# Patient Record
Sex: FEMALE | Race: BLACK OR AFRICAN AMERICAN | NOT HISPANIC OR LATINO | Employment: OTHER | URBAN - METROPOLITAN AREA
[De-identification: names, ages, dates, MRNs, and addresses within clinical notes are randomized per-mention and may not be internally consistent; named-entity substitution may affect disease eponyms.]

---

## 2017-01-18 DIAGNOSIS — G47.00 INSOMNIA, UNSPECIFIED TYPE: Primary | ICD-10-CM

## 2017-01-19 RX ORDER — AMITRIPTYLINE HYDROCHLORIDE 25 MG/1
TABLET, FILM COATED ORAL
Qty: 30 TABLET | Refills: 6 | Status: SHIPPED | OUTPATIENT
Start: 2017-01-19 | End: 2017-01-19 | Stop reason: SDUPTHER

## 2017-01-20 RX ORDER — AMITRIPTYLINE HYDROCHLORIDE 25 MG/1
25 TABLET, FILM COATED ORAL NIGHTLY
Qty: 30 TABLET | Refills: 6 | Status: SHIPPED | OUTPATIENT
Start: 2017-01-20 | End: 2018-01-31 | Stop reason: SDUPTHER

## 2017-03-16 DIAGNOSIS — J45.20 ASTHMA, WELL CONTROLLED, MILD INTERMITTENT: ICD-10-CM

## 2017-03-20 RX ORDER — ALBUTEROL SULFATE 90 UG/1
AEROSOL, METERED RESPIRATORY (INHALATION)
Qty: 8.5 INHALER | Refills: 0 | Status: SHIPPED | OUTPATIENT
Start: 2017-03-20 | End: 2017-06-14 | Stop reason: SDUPTHER

## 2017-04-04 ENCOUNTER — CLINICAL SUPPORT (OUTPATIENT)
Dept: OPHTHALMOLOGY | Facility: CLINIC | Age: 72
End: 2017-04-04
Attending: OPHTHALMOLOGY
Payer: MEDICARE

## 2017-04-04 DIAGNOSIS — H40.1133 PRIMARY OPEN ANGLE GLAUCOMA OF BOTH EYES, SEVERE STAGE: ICD-10-CM

## 2017-04-04 DIAGNOSIS — Z98.83 GLAUCOMA FILTERING BLEB OF BOTH EYES: ICD-10-CM

## 2017-04-04 DIAGNOSIS — H04.123 DRY EYES, BILATERAL: Primary | ICD-10-CM

## 2017-04-04 DIAGNOSIS — Z96.1 PSEUDOPHAKIA OF BOTH EYES: ICD-10-CM

## 2017-04-04 DIAGNOSIS — H43.813 POSTERIOR VITREOUS DETACHMENT OF BOTH EYES: ICD-10-CM

## 2017-04-04 DIAGNOSIS — E11.9 TYPE 2 DIABETES MELLITUS WITHOUT OPHTHALMIC MANIFESTATIONS: ICD-10-CM

## 2017-04-04 PROCEDURE — 99213 OFFICE O/P EST LOW 20 MIN: CPT | Mod: PBBFAC | Performed by: OPHTHALMOLOGY

## 2017-04-04 PROCEDURE — 99999 PR PBB SHADOW E&M-EST. PATIENT-LVL III: CPT | Mod: PBBFAC,,, | Performed by: OPHTHALMOLOGY

## 2017-04-04 PROCEDURE — 92083 EXTENDED VISUAL FIELD XM: CPT | Mod: 26,S$PBB,, | Performed by: OPHTHALMOLOGY

## 2017-04-04 PROCEDURE — 92014 COMPRE OPH EXAM EST PT 1/>: CPT | Mod: S$PBB,,, | Performed by: OPHTHALMOLOGY

## 2017-04-04 PROCEDURE — 92133 CPTRZD OPH DX IMG PST SGM ON: CPT | Mod: PBBFAC | Performed by: OPHTHALMOLOGY

## 2017-04-04 NOTE — PROGRESS NOTES
HPI     Glaucoma    Additional comments: HVF and OCT review today           Comments   DLS: 11/11/16    1) POAG  2) PCIOL OU  3) Type 2 DM NO DR  4) LOLA  5) PVD OU    MEDS:  AT's PF PRN OU           Last edited by Tia Garcia MA on 4/4/2017 10:34 AM. (History)            Assessment /Plan     For exam results, see Encounter Report.    Dry eyes, bilateral    Primary open angle glaucoma of both eyes, severe stage  -     Posterior Segment OCT Optic Nerve- Both eyes    Posterior vitreous detachment of both eyes - Both Eyes    Type 2 diabetes mellitus without ophthalmic manifestations    Pseudophakia of both eyes    Glaucoma filtering bleb of both eyes      1. POAG (primary open-angle glaucoma) - Severe stage - Both Eyes   Glaucoma (type and duration) POAG,   -  first HVF 1997  -  first photo: 1996     Family history None   Glaucoma meds - (off all gtts os post combined)  (( use to use - Alphagan od , Xalatan od , dorzolamide bid od ))  H/O adverse rxn to glaucoma drops into to BB 2/2 asthma   LASERS SLT 12/8/05 and repeat 7/16/09 OD, and SLT 1/6/06 OS;  SLT os 7/31/14, od 8/14/14 (no response ou)   GLAUCOMA SURGERIES - combined phaco/IOL trab -os 12/28/2015// elastic sclera - 9 sutures to close - all visible ones cut /// combined - phaco/trab w/ minishunt od - 3/30/2016  OTHER EYE SURGERIES PC IOL OS (combined 1/28/2015) // PC IOL od (combined 3/30/2016)  CDR 0.95 OU   Tbase 22 OU   Tmax 37 (4/11/2016) /38 ( 4/16/2015)   Ttarget 14/14  HVF 20 HVF: 1928-3148 - SAD with new IAD OD, Sad/iad os  Gonio +3 OU   /421   OCT 8 test 2004 - 2017: Dec S/I/T OD// Dec S/T/I,bord N  HRT 9 test 2005 to 2016 -  - dec. S, bord T/I // dec/ N, bord S/T/I   Disc photos 1996, 1997, 2003: slides // 2012 , 2015 - OIS    Ttoday  12/14  Test done today: HVF / DFE / OCT      2. Diabetes mellitus type II   No BDR     3. Dry eyes - Both Eyes   AT's prn     4. Posterior vitreous detachment of both eyes - Both Eyes   With floaters - stable      5. A lot of family stressors   Mom passed since last visit  dad elderly and in poor health, have sitters  Daughter is unemployed and had to move back home  Daughter recently ill - in ICU and intubated 2/2 pancreatitis (8/2/2015 to 8/11/2015)          Plan    Stay off  all glaucoma gtts OU for now - can add back if IOP goes above target of 14 ou   IOP OK ou off all gtts post trabs ou // OD without mini shunt // OS with a mini shunt (very elastic sclera)     Cont AT's ou prn     -  consider yag laser to ant capsule has significant  Ant capsule phimosis    Glasses - heidi 8/25/2016 - doing well     F/U 3-4 months with HRT / gonio       I have seen and personally examined the patient.  I agree with the findings, assessment and plan of the resident and/or fellow.     Sanna Wiley MD

## 2017-04-11 ENCOUNTER — HOSPITAL ENCOUNTER (OUTPATIENT)
Dept: RADIOLOGY | Facility: HOSPITAL | Age: 72
Discharge: HOME | End: 2017-04-11
Attending: ALLERGY & IMMUNOLOGY
Payer: MEDICARE

## 2017-04-11 ENCOUNTER — OFFICE VISIT (OUTPATIENT)
Dept: INTERNAL MEDICINE | Facility: CLINIC | Age: 72
End: 2017-04-11
Payer: MEDICARE

## 2017-04-11 VITALS
WEIGHT: 173.06 LBS | DIASTOLIC BLOOD PRESSURE: 90 MMHG | SYSTOLIC BLOOD PRESSURE: 146 MMHG | HEART RATE: 92 BPM | BODY MASS INDEX: 32.67 KG/M2 | HEIGHT: 61 IN

## 2017-04-11 DIAGNOSIS — J45.41 MODERATE PERSISTENT ASTHMA WITH ACUTE EXACERBATION: ICD-10-CM

## 2017-04-11 DIAGNOSIS — J20.9 BRONCHITIS, ACUTE, WITH BRONCHOSPASM: ICD-10-CM

## 2017-04-11 DIAGNOSIS — J30.89 ALLERGIC RHINITIS DUE TO OTHER ALLERGEN: Primary | ICD-10-CM

## 2017-04-11 DIAGNOSIS — J06.9 UPPER RESPIRATORY TRACT INFECTION, UNSPECIFIED TYPE: ICD-10-CM

## 2017-04-11 PROCEDURE — 71020 XR CHEST PA AND LATERAL: CPT | Mod: TC

## 2017-04-11 PROCEDURE — 71020 XR CHEST PA AND LATERAL: CPT | Mod: 26,,, | Performed by: RADIOLOGY

## 2017-04-11 PROCEDURE — 99999 PR PBB SHADOW E&M-EST. PATIENT-LVL IV: CPT | Mod: PBBFAC,,, | Performed by: ALLERGY & IMMUNOLOGY

## 2017-04-11 RX ORDER — BUDESONIDE AND FORMOTEROL FUMARATE DIHYDRATE 160; 4.5 UG/1; UG/1
AEROSOL RESPIRATORY (INHALATION)
Qty: 10.2 G | Refills: 3 | Status: SHIPPED | OUTPATIENT
Start: 2017-04-11 | End: 2017-11-27 | Stop reason: SDUPTHER

## 2017-04-11 RX ORDER — PREDNISONE 20 MG/1
TABLET ORAL
Qty: 18 TABLET | Refills: 0 | Status: SHIPPED | OUTPATIENT
Start: 2017-04-11 | End: 2017-08-11

## 2017-04-11 RX ORDER — DOXYCYCLINE HYCLATE 100 MG
100 TABLET ORAL 2 TIMES DAILY
Qty: 28 TABLET | Refills: 0 | Status: SHIPPED | OUTPATIENT
Start: 2017-04-11 | End: 2017-08-11 | Stop reason: ALTCHOICE

## 2017-04-11 RX ORDER — PREDNISONE 20 MG/1
20 TABLET ORAL DAILY
Qty: 10 TABLET | Refills: 0 | Status: SHIPPED | OUTPATIENT
Start: 2017-04-11 | End: 2017-04-11 | Stop reason: ALTCHOICE

## 2017-04-11 RX ORDER — FLUTICASONE PROPIONATE 50 MCG
2 SPRAY, SUSPENSION (ML) NASAL 2 TIMES DAILY
Qty: 32 G | Refills: 6 | Status: SHIPPED | OUTPATIENT
Start: 2017-04-11 | End: 2018-04-06 | Stop reason: SDUPTHER

## 2017-04-11 NOTE — PROGRESS NOTES
Subjective:       Patient ID: Lupe Jewell is a 71 y.o. female.    Chief Complaint: Wheezing (3Xweeks,taking short breathes ); Cough (3Xweeks, hard cough, mucus greenish color); and Headache (on and off after a really hard cough)    HPI Comments: Patient presents today with acute URI, Bronchitis with bronchospasm, and asthma exacerbation. She was exposed to a viral URI by her grandchildren. She began with symptoms 3 weeks ago. At the end of the 2nd week she began to feel better then symptoms flared again. She is off of her Symbicort for 6 weeks due to cost. She has been using Proair HFA 2-3 times a day. She has a history of allergy to mold, dust, and mildew, fumes also trigger asthma symptoms. She has tried other inhalers, but feels Symbicort controlled her symptoms the best. She is having nasal congestion, no rhinorrhea, no sinus pressure/ha, she is having ET dysfunction pressure and popping. Positive for Wheezing, SOB, Cough is tight, but productive with thick green discolored mucous.     Review of Systems   Constitutional: Positive for activity change. Negative for appetite change, chills, diaphoresis, fatigue, fever and unexpected weight change.   HENT: Positive for congestion, postnasal drip, rhinorrhea and sinus pressure. Negative for dental problem, drooling, ear discharge, ear pain, facial swelling, hearing loss, mouth sores, nosebleeds, sneezing, sore throat, tinnitus, trouble swallowing and voice change.    Eyes: Negative for photophobia, pain, discharge, redness, itching and visual disturbance.   Respiratory: Positive for cough, shortness of breath and wheezing. Negative for apnea, choking, chest tightness and stridor.    Cardiovascular: Negative for chest pain, palpitations and leg swelling.   Gastrointestinal: Negative for abdominal distention, abdominal pain, constipation, diarrhea, nausea and vomiting.   Endocrine: Negative for cold intolerance, heat intolerance, polydipsia, polyphagia and polyuria.    Genitourinary: Negative for difficulty urinating, dysuria, enuresis, flank pain, frequency, hematuria, menstrual problem, pelvic pain and urgency.   Musculoskeletal: Negative for arthralgias, back pain, gait problem, joint swelling, myalgias, neck pain and neck stiffness.   Skin: Negative for color change, pallor, rash and wound.   Allergic/Immunologic: Negative for environmental allergies, food allergies and immunocompromised state.   Neurological: Negative for dizziness, tremors, seizures, syncope, facial asymmetry, speech difficulty, weakness, light-headedness, numbness and headaches.   Hematological: Negative for adenopathy. Does not bruise/bleed easily.   Psychiatric/Behavioral: Negative for agitation, behavioral problems, confusion, decreased concentration, dysphoric mood, hallucinations, self-injury, sleep disturbance and suicidal ideas. The patient is not nervous/anxious and is not hyperactive.    All other systems reviewed and are negative.    Objective:   Physical Exam   Constitutional: She is oriented to person, place, and time. She appears well-developed and well-nourished. She is active and cooperative.  Non-toxic appearance. She does not have a sickly appearance. She does not appear ill. No distress. She is not intubated.   HENT:   Head: Normocephalic and atraumatic.   Right Ear: Hearing, tympanic membrane, external ear and ear canal normal. No lacerations. No drainage, swelling or tenderness. No foreign bodies. No mastoid tenderness. Tympanic membrane is not injected, not scarred, not perforated, not erythematous, not retracted and not bulging. Tympanic membrane mobility is normal. No middle ear effusion. No hemotympanum. No decreased hearing is noted.   Left Ear: Hearing, tympanic membrane, external ear and ear canal normal. No lacerations. No drainage, swelling or tenderness. No foreign bodies. No mastoid tenderness. Tympanic membrane is not injected, not scarred, not perforated, not erythematous,  not retracted and not bulging. Tympanic membrane mobility is normal.  No middle ear effusion. No hemotympanum. No decreased hearing is noted.   Nose: Mucosal edema and rhinorrhea present. No nose lacerations, sinus tenderness, nasal deformity, septal deviation or nasal septal hematoma. No epistaxis.  No foreign bodies. Right sinus exhibits no maxillary sinus tenderness and no frontal sinus tenderness. Left sinus exhibits no maxillary sinus tenderness and no frontal sinus tenderness.   Mouth/Throat: Uvula is midline, oropharynx is clear and moist and mucous membranes are normal. She does not have dentures. No oral lesions. No trismus in the jaw. Normal dentition. No dental abscesses, uvula swelling, lacerations or dental caries. No oropharyngeal exudate, posterior oropharyngeal edema, posterior oropharyngeal erythema or tonsillar abscesses. No tonsillar exudate.   Eyes: Conjunctivae, EOM and lids are normal. Pupils are equal, round, and reactive to light. Right eye exhibits no chemosis, no discharge, no exudate and no hordeolum. No foreign body present in the right eye. Left eye exhibits no chemosis, no discharge, no exudate and no hordeolum. No foreign body present in the left eye. Right conjunctiva is not injected. Right conjunctiva has no hemorrhage. Left conjunctiva is not injected. Left conjunctiva has no hemorrhage. No scleral icterus.   Neck: Trachea normal, normal range of motion, full passive range of motion without pain and phonation normal. Neck supple. No tracheal tenderness, no spinous process tenderness and no muscular tenderness present. No rigidity. No tracheal deviation, no edema, no erythema and normal range of motion present. No thyroid mass and no thyromegaly present.   Cardiovascular: Normal rate, regular rhythm, S1 normal, S2 normal, normal heart sounds and normal pulses.  Exam reveals no gallop and no friction rub.    No murmur heard.  Pulmonary/Chest: Effort normal. No accessory muscle usage  or stridor. No apnea, no tachypnea and no bradypnea. She is not intubated. No respiratory distress. She has no decreased breath sounds. She has wheezes. She has rhonchi. She has no rales. She exhibits no tenderness.   Bilateral expiratory wheezing and rhonchi scattered through out chest in all fields   Abdominal: Soft. Normal appearance and bowel sounds are normal. She exhibits no distension and no mass. There is no tenderness.   Musculoskeletal: Normal range of motion. She exhibits no edema, tenderness or deformity.   Lymphadenopathy:        Head (right side): No submental, no submandibular, no tonsillar, no preauricular, no posterior auricular and no occipital adenopathy present.        Head (left side): No submental, no submandibular, no tonsillar, no preauricular, no posterior auricular and no occipital adenopathy present.     She has no cervical adenopathy.        Right cervical: No superficial cervical, no deep cervical and no posterior cervical adenopathy present.       Left cervical: No superficial cervical, no deep cervical and no posterior cervical adenopathy present.   Neurological: She is alert and oriented to person, place, and time. She has normal strength. She is not disoriented.   Skin: Skin is warm, dry and intact. No abrasion, no bruising, no burn, no ecchymosis, no laceration, no lesion, no petechiae, no purpura and no rash noted. Rash is not macular, not maculopapular, not nodular, not pustular, not vesicular and not urticarial. She is not diaphoretic. No cyanosis or erythema. No pallor. Nails show no clubbing.   Psychiatric: She has a normal mood and affect. Her speech is normal and behavior is normal. Judgment and thought content normal. Cognition and memory are normal.   Nursing note and vitals reviewed.    Assessment:     1. Allergic rhinitis due to other allergen    2. Moderate persistent asthma with acute exacerbation    3. Bronchitis, acute, with bronchospasm    4. Upper respiratory tract  infection, unspecified type      Plan:   Lupe ORTEGA was seen today for wheezing, cough and headache.    Diagnoses and all orders for this visit:    Allergic rhinitis due to other allergen  -     fluticasone (FLONASE) 50 mcg/actuation nasal spray; 2 sprays by Each Nare route 2 (two) times daily.    Moderate persistent asthma with acute exacerbation  -     budesonide-formoterol 160-4.5 mcg (SYMBICORT) 160-4.5 mcg/actuation HFAA; inhale 2 puffs by mouth every 12 hours  -     X-Ray Chest PA And Lateral; Future  -     Discontinue: predniSONE (DELTASONE) 20 MG tablet; Take 1 tablet (20 mg total) by mouth once daily.    Bronchitis, acute, with bronchospasm  -     X-Ray Chest PA And Lateral; Future  -     Discontinue: predniSONE (DELTASONE) 20 MG tablet; Take 1 tablet (20 mg total) by mouth once daily.  -     doxycycline (VIBRA-TABS) 100 MG tablet; Take 1 tablet (100 mg total) by mouth 2 (two) times daily.  -     predniSONE (DELTASONE) 20 MG tablet; 1 tab 3 times a day for 5 days then 1 tab once a day  For 3 days    Upper respiratory tract infection, unspecified type  -     X-Ray Chest PA And Lateral; Future    Start Doxycycline 100 mg 1 tab 2 times a day for 14 days  Start Prednisone 20 mg 1 tab 3 times a day for 5 days then 1 tab once a day for 3 days. This will make your blood sugar elevated, monitor.  Re-start Symbicort 160/4.5 HFA 2 puffs inhaled twice a day, rinse mouth after each dose  Proair HFA 2 puffs inhaled every 4 hours as needed  Continue Flonase 2 sprays each nostril 2 times a day until well 1 week then decrease to once a day  Continue Singulair 10 mg 1 tab every 24 hours.    Return if symptoms worsen or fail to improve.    Discussed options and strategies  Reviewed medications risk v. Benefit  Reviewed pathophysiology  All questions answered  Emotional support provided  Pt verbalized understanding of all the above and treatment plan.      DALILA Man

## 2017-04-11 NOTE — PATIENT INSTRUCTIONS
Start Doxycycline 100 mg 1 tab 2 times a day for 14 days  Start Prednisone 20 mg 1 tab 3 times a day for 5 days then 1 tab once a day for 3 days. This will make your blood sugar elevated, monitor.  Re-start Symbicort 160/4.5 HFA 2 puffs inhaled twice a day, rinse mouth after each dose  Proair HFA 2 puffs inhaled every 4 hours as needed  Continue Flonase 2 sprays each nostril 2 times a day until well 1 week then decrease to once a day  Continue Singulair 10 mg 1 tab every 24 hours.

## 2017-04-11 NOTE — MR AVS SNAPSHOT
[facility]  [address] [address]  Phone: [phone]  Fax: [phone]                  Lupe Jewell   2017 11:30 AM   Office Visit    Description:  Female : 1945   Provider:  DALILA Man   Department:  [facility]           Reason for Visit     Wheezing     Cough     Headache           Diagnoses this Visit        Comments    Allergic rhinitis due to other allergen    -  Primary     Moderate persistent asthma with acute exacerbation         Bronchitis, acute, with bronchospasm         Upper respiratory tract infection, unspecified type                To Do List           Future Appointments        Provider Department Dept Phone    2017 9:00 AM Jose Jeong MD [facility] [phone]    2017 10:15 AM CARDONA, VISUAL ORELLANA [facility] [phone]    2017 10:45 AM Sanna Wiley MD [facility] [phone]      Goals (5 Years of Data)     None      Follow-Up and Disposition     Return if symptoms worsen or fail to improve.       These Medications        Disp Refills Start End    budesonide-formoterol 160-4.5 mcg (SYMBICORT) 160-4.5 mcg/actuation HFAA 10.2 g 3 2017     inhale 2 puffs by mouth every 12 hours    Pharmacy: [address] Ph #: [phone]       fluticasone (FLONASE) 50 mcg/actuation nasal spray 32 g 6 2017     2 sprays by Each Nare route 2 (two) times daily. - Each Nare    Pharmacy: [address] - [address] Ph #: [phone]       doxycycline (VIBRA-TABS) 100 MG tablet 28 tablet 0 2017     Take 1 tablet (100 mg total) by mouth 2 (two) times daily. - Oral    Pharmacy: [address] Ph #: [phone]       predniSONE (DELTASONE) 20 MG tablet 18 tablet 0  2017     1 tab 3 times a day for 5 days then 1 tab once a day  For 3 days    Pharmacy: [facility] #: [phone]         [facility]     [facility] Nurse Care Line -  Assistance  Unless otherwise directed by your provider, please contact [facility], our nurse care line that is available for  assistance.     Registered nurses in the [facility] provide: appointment scheduling, clinical advisement, health education, and other advisory services.  Call: [phone] (toll free)               Medications           Message regarding Medications     Verify the changes and/or additions to your medication regime listed below are the same as discussed with your clinician today.  If any of these changes or additions are incorrect, please notify your healthcare provider.        START taking these NEW medications        Refills    doxycycline (VIBRA-TABS) 100 MG tablet 0    Sig: Take 1 tablet (100 mg total) by mouth 2 (two) times daily.    Class: Normal    Route: Oral    predniSONE (DELTASONE) 20 MG tablet 0    Si tab 3 times a day for 5 days then 1 tab once a day  For 3 days    Class: Normal      CHANGE how you are taking these medications     Start Taking Instead of    fluticasone (FLONASE) 50 mcg/actuation nasal spray fluticasone (FLONASE) 50 mcg/actuation nasal spray    Dosage:  2 sprays by Each Nare route 2 (two) times daily. Dosage:  instill 2 sprays into each nostril twice a day    Reason for Change:  Reorder            Verify that the below list of medications is an accurate representation of the medications you are currently taking.  If none reported, the list may be blank. If incorrect, please contact your healthcare provider. Carry this list with you in case of emergency.           Current Medications     amitriptyline (ELAVIL) 25 MG tablet Take 1 tablet (25 mg total) by mouth every evening.    atorvastatin  "(LIPITOR) 10 MG tablet take 1 tablet by mouth once daily    budesonide-formoterol 160-4.5 mcg (SYMBICORT) 160-4.5 mcg/actuation HFAA inhale 2 puffs by mouth every 12 hours    fluocinonide (LIDEX) 0.05 % external solution apply to affected area twice a day    fluticasone (FLONASE) 50 mcg/actuation nasal spray 2 sprays by Each Nare route 2 (two) times daily.    metformin (GLUCOPHAGE) 1000 MG tablet take 1 tablet by mouth twice a day with food    montelukast (SINGULAIR) 10 mg tablet take 1 tablet by mouth every evening    PROAIR HFA 90 mcg/actuation inhaler inhale 2 puffs every 4 hours    triamterene-hydrochlorothiazide 37.5-25 mg (MAXZIDE-25) 37.5-25 mg per tablet take 1 tablet by mouth once daily    desonide (DESOWEN) 0.05 % cream AAA bid    doxycycline (VIBRA-TABS) 100 MG tablet Take 1 tablet (100 mg total) by mouth 2 (two) times daily.    hydrocortisone butyrate (LOCOID) 0.1 % Oint AAA face bid    predniSONE (DELTASONE) 20 MG tablet 1 tab 3 times a day for 5 days then 1 tab once a day  For 3 days           Clinical Reference Information           Your Vitals Were     BP Pulse Height Weight BMI    146/90 92 5' 0.5" (1.537 m) 78.5 kg (173 lb 1 oz) 33.24 kg/m2      Blood Pressure          Most Recent Value    BP  (!)  146/90      Allergies as of 4/11/2017     Penicillins      Immunizations Administered on Date of Encounter - 4/11/2017     None      Orders Placed During Today's Visit     Future Labs/Procedures Expected by Expires    X-Ray Chest PA And Lateral  4/11/2017 4/11/2018      [facility] Sign-Up     Activating your [facility] account is as easy as 1-2-3!     1) Visit [email or web address], select Sign Up Now, enter this activation code and your date of birth, then select Next.  [email or web address]  Expires: 5/26/2017 11:51 AM      2) Create a username and password to use when you visit [facility] in the future and select a security question in case you lose your password and select Next.    3) Enter your e-mail address and " click Sign Up!    Additional Information  If you have questions, please e-mail [email or web address] or call [phone] to talk to our [facility] staff. Remember, [facility] is NOT to be used for urgent needs. For medical emergencies, dial 911.         Instructions    Start Doxycycline 100 mg 1 tab 2 times a day for 14 days  Start Prednisone 20 mg 1 tab 3 times a day for 5 days then 1 tab once a day for 3 days. This will make your blood sugar elevated, monitor.  Re-start Symbicort 160/4.5 HFA 2 puffs inhaled twice a day, rinse mouth after each dose  Proair HFA 2 puffs inhaled every 4 hours as needed  Continue Flonase 2 sprays each nostril 2 times a day until well 1 week then decrease to once a day  Continue Singulair 10 mg 1 tab every 24 hours.       Language Assistance Services     ATTENTION: Language assistance services are available, free of charge. Please call [phone].      ATENCIÓN: Si habla alex, tiene a pyle disposición servicios gratuitos de asistencia lingüística. Llame al [phone].     WATSON Ý: N?u b?n nói Ti?ng Vi?t, có các d?ch v? h? tr? ngôn ng? mi?n phí dành cho b?n. G?i s? [phone].         [facility] complies with applicable Federal civil rights laws and does not discriminate on the basis of race, color, national origin, age, disability, or sex.

## 2017-04-20 ENCOUNTER — OFFICE VISIT (OUTPATIENT)
Dept: INTERNAL MEDICINE | Facility: CLINIC | Age: 72
End: 2017-04-20
Payer: MEDICARE

## 2017-04-20 VITALS
HEART RATE: 88 BPM | WEIGHT: 171.31 LBS | TEMPERATURE: 98 F | SYSTOLIC BLOOD PRESSURE: 138 MMHG | BODY MASS INDEX: 32.9 KG/M2 | DIASTOLIC BLOOD PRESSURE: 78 MMHG

## 2017-04-20 DIAGNOSIS — R22.2 SUBCUTANEOUS MASS OF SUPRACLAVICULAR AREA: Primary | ICD-10-CM

## 2017-04-20 PROCEDURE — 99999 PR PBB SHADOW E&M-EST. PATIENT-LVL III: CPT | Mod: PBBFAC,,, | Performed by: INTERNAL MEDICINE

## 2017-04-20 PROCEDURE — 99213 OFFICE O/P EST LOW 20 MIN: CPT | Mod: S$PBB,,, | Performed by: INTERNAL MEDICINE

## 2017-04-20 PROCEDURE — 99213 OFFICE O/P EST LOW 20 MIN: CPT | Mod: PBBFAC | Performed by: INTERNAL MEDICINE

## 2017-04-20 NOTE — PROGRESS NOTES
Subjective:       Patient ID: Lupe Jewell is a 71 y.o. female.    Chief Complaint: Mass    Brandie Jewell here today for evaluation of a mass she noted in her L supraclavicular area last month. She denies any pain, fevers, chills, weight loss. She is currently being treated for excacerbation of her asthma and is doing well in that regard. On exam, she has a prominent, fleshy prominence in the L supraclavicular area > than the R. This has the consistency of a fat pad. She has hx of diabetes, has used steroids for her asthma issues and is likely benign. Nevertheless, will obtain US of that area and proceed from there.  Review of Systems   All other systems reviewed and are negative.      Objective:      Physical Exam   Constitutional: She is oriented to person, place, and time. She appears well-developed and well-nourished. No distress.   Neck: Normal range of motion. Neck supple. No JVD present. No thyromegaly present.   Prominent L supraclavicular fat pad >R.   Cardiovascular: Normal rate, regular rhythm, normal heart sounds and intact distal pulses.    No murmur heard.  Pulmonary/Chest: Effort normal and breath sounds normal. No respiratory distress. She has no wheezes. She exhibits no tenderness.   Lymphadenopathy:     She has no cervical adenopathy.   Neurological: She is alert and oriented to person, place, and time.   Skin: She is not diaphoretic.   Psychiatric: She has a normal mood and affect. Her behavior is normal.   Nursing note and vitals reviewed.      Assessment:       1. Subcutaneous mass of supraclavicular area        Plan:   1. Obtain soft tissue US and proceed from there.       2. Call with results.

## 2017-04-20 NOTE — MR AVS SNAPSHOT
[facility]  [address]  [facility] [address]  Phone: [phone]  Fax: [phone]                  Lupe Jewell   2017 9:00 AM   Office Visit    Description:  Female : 1945   Provider:  Jose Jeong MD   Department:  [facility]           Reason for Visit     Mass           Diagnoses this Visit        Comments    Subcutaneous mass of supraclavicular area    -  Primary            To Do List           Future Appointments        Provider Department Dept Phone    2017 2:45 PM [facility] 11 ALL [facility] [phone]    2017 10:15 AM CARDONA, VISUAL ORELLANA [facility] [phone]    2017 10:45 AM Sanna Wiley MD [facility] [phone]      Goals (5 Years of Data)     None      Follow-Up and Disposition     Return if symptoms worsen or fail to improve.      [facility]     [facility] Nurse Care Line -  Assistance  Unless otherwise directed by your provider, please contact [facility], our nurse care line that is available for  assistance.     Registered nurses in the [facility] provide: appointment scheduling, clinical advisement, health education, and other advisory services.  Call: [phone] (toll free)               Medications           Message regarding Medications     Verify the changes and/or additions to your medication regime listed below are the same as discussed with your clinician today.  If any of these changes or additions are incorrect, please notify your healthcare provider.             Verify that the below list of medications is an accurate representation of the medications you are currently taking.  If none reported, the list may be blank. If incorrect, please contact your healthcare provider. Carry this list with you in case of emergency.           Current Medications     amitriptyline (ELAVIL) 25 MG tablet Take 1  tablet (25 mg total) by mouth every evening.    atorvastatin (LIPITOR) 10 MG tablet take 1 tablet by mouth once daily    budesonide-formoterol 160-4.5 mcg (SYMBICORT) 160-4.5 mcg/actuation HFAA inhale 2 puffs by mouth every 12 hours    doxycycline (VIBRA-TABS) 100 MG tablet Take 1 tablet (100 mg total) by mouth 2 (two) times daily.    fluticasone (FLONASE) 50 mcg/actuation nasal spray 2 sprays by Each Nare route 2 (two) times daily.    metformin (GLUCOPHAGE) 1000 MG tablet take 1 tablet by mouth twice a day with food    montelukast (SINGULAIR) 10 mg tablet take 1 tablet by mouth every evening    PROAIR HFA 90 mcg/actuation inhaler inhale 2 puffs every 4 hours    triamterene-hydrochlorothiazide 37.5-25 mg (MAXZIDE-25) 37.5-25 mg per tablet take 1 tablet by mouth once daily    desonide (DESOWEN) 0.05 % cream AAA bid    fluocinonide (LIDEX) 0.05 % external solution apply to affected area twice a day    hydrocortisone butyrate (LOCOID) 0.1 % Oint AAA face bid    predniSONE (DELTASONE) 20 MG tablet 1 tab 3 times a day for 5 days then 1 tab once a day  For 3 days           Clinical Reference Information           Your Vitals Were     BP Pulse Temp Weight BMI    138/78 (BP Location: Left arm, Patient Position: Sitting) 88 98 °F (36.7 °C) (Oral) 77.7 kg (171 lb 4.8 oz) 32.9 kg/m2      Blood Pressure          Most Recent Value    BP  138/78      Allergies as of 4/20/2017     Penicillins      Immunizations Administered on Date of Encounter - 4/20/2017     None      Orders Placed During Today's Visit     Future Labs/Procedures Expected by Expires    US Soft Tissue Misc  4/20/2017 4/20/2018      [facility] Sign-Up     Activating your [facility] account is as easy as 1-2-3!     1) Visit [email or web address], select Sign Up Now, enter this activation code and your date of birth, then select Next.  [email or web address]  Expires: 5/26/2017 11:51 AM      2) Create a username and password to use when you visit [facility] in the future and select  a security question in case you lose your password and select Next.    3) Enter your e-mail address and click Sign Up!    Additional Information  If you have questions, please e-mail [email or web address] or call [phone] to talk to our [facility] staff. Remember, [facility] is NOT to be used for urgent needs. For medical emergencies, dial 911.         Language Assistance Services     ATTENTION: Language assistance services are available, free of charge. Please call [phone].      ATENCIÓN: Si habla español, tiene a pyle disposición servicios gratuitos de asistencia lingüística. Llame al [phone].     CHÚ Ý: N?u b?n nói Ti?ng Vi?t, có các d?ch v? h? tr? ngôn ng? mi?n phí dành cho b?n. G?i s? [phone].         [facility] complies with applicable Federal civil rights laws and does not discriminate on the basis of race, color, national origin, age, disability, or sex.

## 2017-04-24 ENCOUNTER — HOSPITAL ENCOUNTER (OUTPATIENT)
Dept: RADIOLOGY | Facility: HOSPITAL | Age: 72
Discharge: HOME | End: 2017-04-24
Attending: INTERNAL MEDICINE
Payer: MEDICARE

## 2017-04-24 DIAGNOSIS — R22.2 SUBCUTANEOUS MASS OF SUPRACLAVICULAR AREA: ICD-10-CM

## 2017-04-24 PROCEDURE — 76604 US EXAM CHEST: CPT | Mod: TC

## 2017-04-24 PROCEDURE — 76604 US EXAM CHEST: CPT | Mod: 26,GC,, | Performed by: INTERNAL MEDICINE

## 2017-04-24 RX ORDER — ATORVASTATIN CALCIUM 10 MG/1
TABLET, FILM COATED ORAL
Qty: 30 TABLET | Refills: 6 | Status: SHIPPED | OUTPATIENT
Start: 2017-04-24 | End: 2017-12-02 | Stop reason: SDUPTHER

## 2017-04-28 ENCOUNTER — TELEPHONE (OUTPATIENT)
Dept: INTERNAL MEDICINE | Facility: CLINIC | Age: 72
End: 2017-04-28

## 2017-04-28 DIAGNOSIS — Z12.39 BREAST CANCER SCREENING: Primary | ICD-10-CM

## 2017-04-28 DIAGNOSIS — Z12.31 ENCOUNTER FOR SCREENING MAMMOGRAM FOR MALIGNANT NEOPLASM OF BREAST: ICD-10-CM

## 2017-05-08 ENCOUNTER — HOSPITAL ENCOUNTER (OUTPATIENT)
Dept: RADIOLOGY | Facility: HOSPITAL | Age: 72
Discharge: HOME | End: 2017-05-08
Attending: INTERNAL MEDICINE
Payer: MEDICARE

## 2017-05-08 DIAGNOSIS — Z12.31 ENCOUNTER FOR SCREENING MAMMOGRAM FOR MALIGNANT NEOPLASM OF BREAST: ICD-10-CM

## 2017-05-08 DIAGNOSIS — Z12.39 BREAST CANCER SCREENING: ICD-10-CM

## 2017-05-08 PROCEDURE — 77063 BREAST TOMOSYNTHESIS BI: CPT | Mod: 26,,, | Performed by: RADIOLOGY

## 2017-05-08 PROCEDURE — 77067 SCR MAMMO BI INCL CAD: CPT | Mod: TC

## 2017-05-08 PROCEDURE — 77067 SCR MAMMO BI INCL CAD: CPT | Mod: 26,,, | Performed by: RADIOLOGY

## 2017-06-14 DIAGNOSIS — J45.20 ASTHMA, WELL CONTROLLED, MILD INTERMITTENT: ICD-10-CM

## 2017-06-20 RX ORDER — ALBUTEROL SULFATE 90 UG/1
AEROSOL, METERED RESPIRATORY (INHALATION)
Qty: 8.5 INHALER | Refills: 0 | Status: SHIPPED | OUTPATIENT
Start: 2017-06-20 | End: 2018-01-23 | Stop reason: SDUPTHER

## 2017-06-24 RX ORDER — MONTELUKAST SODIUM 10 MG/1
TABLET ORAL
Qty: 30 TABLET | Refills: 12 | Status: SHIPPED | OUTPATIENT
Start: 2017-06-24 | End: 2018-07-03 | Stop reason: SDUPTHER

## 2017-06-30 ENCOUNTER — OFFICE VISIT (OUTPATIENT)
Dept: INTERNAL MEDICINE | Facility: CLINIC | Age: 72
End: 2017-06-30
Payer: MEDICARE

## 2017-06-30 ENCOUNTER — HOSPITAL ENCOUNTER (OUTPATIENT)
Dept: CARDIOLOGY | Facility: CLINIC | Age: 72
Discharge: HOME | End: 2017-06-30
Payer: MEDICARE

## 2017-06-30 VITALS
DIASTOLIC BLOOD PRESSURE: 80 MMHG | HEART RATE: 92 BPM | BODY MASS INDEX: 31.76 KG/M2 | WEIGHT: 165.38 LBS | SYSTOLIC BLOOD PRESSURE: 136 MMHG | TEMPERATURE: 99 F

## 2017-06-30 DIAGNOSIS — I10 ESSENTIAL HYPERTENSION: ICD-10-CM

## 2017-06-30 DIAGNOSIS — E78.5 HYPERLIPIDEMIA, UNSPECIFIED HYPERLIPIDEMIA TYPE: ICD-10-CM

## 2017-06-30 DIAGNOSIS — Z00.00 ROUTINE GENERAL MEDICAL EXAMINATION AT A HEALTH CARE FACILITY: Primary | ICD-10-CM

## 2017-06-30 DIAGNOSIS — Z00.00 ROUTINE GENERAL MEDICAL EXAMINATION AT A HEALTH CARE FACILITY: ICD-10-CM

## 2017-06-30 DIAGNOSIS — E08.9 DIABETES MELLITUS DUE TO UNDERLYING CONDITION WITHOUT COMPLICATION, WITHOUT LONG-TERM CURRENT USE OF INSULIN: ICD-10-CM

## 2017-06-30 DIAGNOSIS — E55.9 VITAMIN D DEFICIENCY: ICD-10-CM

## 2017-06-30 PROCEDURE — 93005 ELECTROCARDIOGRAM TRACING: CPT | Mod: PBBFAC | Performed by: INTERNAL MEDICINE

## 2017-06-30 PROCEDURE — 1126F AMNT PAIN NOTED NONE PRSNT: CPT | Mod: ,,, | Performed by: INTERNAL MEDICINE

## 2017-06-30 PROCEDURE — 99214 OFFICE O/P EST MOD 30 MIN: CPT | Mod: S$PBB,,, | Performed by: INTERNAL MEDICINE

## 2017-06-30 PROCEDURE — 93010 ELECTROCARDIOGRAM REPORT: CPT | Mod: S$PBB,,, | Performed by: INTERNAL MEDICINE

## 2017-06-30 PROCEDURE — 1159F MED LIST DOCD IN RCRD: CPT | Mod: ,,, | Performed by: INTERNAL MEDICINE

## 2017-06-30 PROCEDURE — 99999 PR PBB SHADOW E&M-EST. PATIENT-LVL III: CPT | Mod: PBBFAC,,, | Performed by: INTERNAL MEDICINE

## 2017-06-30 NOTE — PROGRESS NOTES
Subjective:       Patient ID: Lupe Jewell is a 71 y.o. female.    Chief Complaint: Annual Exam    HPIPleasant lady from [place] here for her annual exam. Overall doing well and had no specific complaints. She has hx of DM on metformin. She recently received prednisone for asthma excacerbation and she noted sx's c/w hyperglycemia at the time. These have resolved, but she has noted occassional episodes of hypoglycemia with sensation of hunger, shaky - with the AM dose of metformin. She notes this especially when she does not eat sufficient amounts; she counteracts the sx's with OJ or a snack. She has not noted similar sx's at night. I briefly discussed hypoglycemia, how to recognize sx's and what to do about it. Otherwise doing well.  Review of Systems   Constitutional: Negative for activity change, appetite change, fatigue and unexpected weight change.   HENT: Negative.    Eyes: Negative for visual disturbance.   Respiratory: Negative for cough, shortness of breath and wheezing.    Cardiovascular: Negative for chest pain, palpitations and leg swelling.   Gastrointestinal: Negative for abdominal distention, abdominal pain and blood in stool.   Endocrine: Negative for polydipsia, polyphagia and polyuria.   Genitourinary: Negative for difficulty urinating.   Musculoskeletal: Negative for arthralgias and joint swelling.   Neurological: Negative for dizziness, tremors, weakness, light-headedness, numbness and headaches.   Hematological: Negative.        Objective:      Physical Exam   Constitutional: She is oriented to person, place, and time. She appears well-developed and well-nourished. No distress.   HENT:   Head: Normocephalic and atraumatic.   Right Ear: External ear normal.   Left Ear: External ear normal.   Nose: Nose normal.   Mouth/Throat: Oropharynx is clear and moist. No oropharyngeal exudate.   Eyes: Conjunctivae and EOM are normal. Pupils are equal, round, and reactive to light. Right eye exhibits no  discharge. Left eye exhibits no discharge. No scleral icterus.   Neck: Normal range of motion. Neck supple. No JVD present. No thyromegaly present.   Cardiovascular: Normal rate, regular rhythm, normal heart sounds and intact distal pulses.    Pulmonary/Chest: Effort normal and breath sounds normal. No respiratory distress. She exhibits no tenderness.   Abdominal: Soft. Bowel sounds are normal. She exhibits no distension and no mass. There is no tenderness.   Musculoskeletal: Normal range of motion. She exhibits no edema.   Lymphadenopathy:     She has no cervical adenopathy.   Neurological: She is alert and oriented to person, place, and time. No cranial nerve deficit. Coordination normal.   Skin: Skin is warm and dry. No rash noted. She is not diaphoretic. No erythema.   Psychiatric: She has a normal mood and affect. Her behavior is normal. Judgment and thought content normal.   Nursing note and vitals reviewed.      Assessment:       1. Routine general medical examination at a health care facility    2. Diabetes mellitus due to underlying condition without complication, without long-term current use of insulin    3. Vitamin D deficiency    4. Hyperlipidemia, unspecified hyperlipidemia type    5. Essential hypertension        Plan:    1. Obtain labs.         2. Obtain urine.         3. EKG.         4. Continue with current medications.         5. Call with results and adjust meds if indicated.         6. RTC 6 months or sooner if needed.

## 2017-07-03 RX ORDER — METFORMIN HYDROCHLORIDE 1000 MG/1
TABLET ORAL
Qty: 180 TABLET | Refills: 3 | Status: SHIPPED | OUTPATIENT
Start: 2017-07-03 | End: 2018-09-24 | Stop reason: SDUPTHER

## 2017-07-23 DIAGNOSIS — L30.9 DERMATITIS: Primary | ICD-10-CM

## 2017-07-25 RX ORDER — FLUOCINONIDE 0.5 MG/G
CREAM TOPICAL
Qty: 60 G | Refills: 2 | Status: SHIPPED | OUTPATIENT
Start: 2017-07-25 | End: 2018-06-19 | Stop reason: SDUPTHER

## 2017-08-11 ENCOUNTER — OFFICE VISIT (OUTPATIENT)
Dept: OPHTHALMOLOGY | Facility: CLINIC | Age: 72
End: 2017-08-11
Payer: MEDICARE

## 2017-08-11 ENCOUNTER — CLINICAL SUPPORT (OUTPATIENT)
Dept: OPHTHALMOLOGY | Facility: CLINIC | Age: 72
End: 2017-08-11
Payer: MEDICARE

## 2017-08-11 DIAGNOSIS — E11.9 TYPE 2 DIABETES MELLITUS WITHOUT OPHTHALMIC MANIFESTATIONS: ICD-10-CM

## 2017-08-11 DIAGNOSIS — Z98.83 GLAUCOMA FILTERING BLEB OF BOTH EYES: ICD-10-CM

## 2017-08-11 DIAGNOSIS — H43.813 POSTERIOR VITREOUS DETACHMENT OF BOTH EYES: ICD-10-CM

## 2017-08-11 DIAGNOSIS — H40.1133 PRIMARY OPEN ANGLE GLAUCOMA OF BOTH EYES, SEVERE STAGE: Primary | ICD-10-CM

## 2017-08-11 DIAGNOSIS — H04.123 DRY EYES, BILATERAL: ICD-10-CM

## 2017-08-11 DIAGNOSIS — Z96.1 PSEUDOPHAKIA OF BOTH EYES: ICD-10-CM

## 2017-08-11 PROCEDURE — 92134 CPTRZ OPH DX IMG PST SGM RTA: CPT | Mod: 50,PBBFAC | Performed by: OPHTHALMOLOGY

## 2017-08-11 PROCEDURE — 99999 PR PBB SHADOW E&M-EST. PATIENT-LVL II: CPT | Mod: PBBFAC,,, | Performed by: OPHTHALMOLOGY

## 2017-08-11 PROCEDURE — 92012 INTRM OPH EXAM EST PATIENT: CPT | Mod: S$PBB,,, | Performed by: OPHTHALMOLOGY

## 2017-08-11 PROCEDURE — 99212 OFFICE O/P EST SF 10 MIN: CPT | Mod: PBBFAC | Performed by: OPHTHALMOLOGY

## 2017-08-11 NOTE — PROGRESS NOTES
HPI     Glaucoma    Additional comments: HRT review today           Comments   DLS: 4/4/17    1) POAG  2) PCIOL OU  3) Type 2 DM NO DR  4) LOLA  5) PVD OU    MEDS:  AT's PF PRN OU  Off glaucoma drops post trabs           Last edited by Sanna Wiley MD on 8/11/2017 10:45 AM. (History)            Assessment /Plan     For exam results, see Encounter Report.    There are no diagnoses linked to this encounter.    1. POAG (primary open-angle glaucoma) - Severe stage - Both Eyes   Glaucoma (type and duration) POAG,   -  first HVF 1997  -  first photo: 1996     Family history None   Glaucoma meds - (off all gtts os post combined)  (( use to use - Alphagan od , Xalatan od , dorzolamide bid od ))  H/O adverse rxn to glaucoma drops into to BB 2/2 asthma   LASERS SLT 12/8/05 and repeat 7/16/09 OD, and SLT 1/6/06 OS;  SLT os 7/31/14, od 8/14/14 (no response ou)   GLAUCOMA SURGERIES - combined phaco/IOL trab -os 12/28/2015// elastic sclera - 9 sutures to close - all visible ones cut /// combined - phaco/trab w/ minishunt od - 3/30/2016  OTHER EYE SURGERIES PC IOL OS (combined 1/28/2015) // PC IOL od (combined 3/30/2016)  CDR 0.95 OU   Tbase 22 OU   Tmax 37 (4/11/2016) /38 ( 4/16/2015)   Ttarget 14/14  HVF 20 HVF: 7134-0214 - SAD with new IAD OD, Sad/iad os  Gonio +3 OU   /421   OCT 8 test 2004 - 2017: Dec S/I/T OD// Dec S/T/I,bord N  HRT 10 test 2005 to 2017 - MR -  Border TS, T od // decr G, TI, N, NI, border T, TS, NS os /// CDR 0.68 od // 0.78 os  Disc photos 1996, 1997, 2003: slides // 2012 , 2015 - OIS    Ttoday  12/14  Test done today: HRT     2. Diabetes mellitus type II   No BDR     3. Dry eyes - Both Eyes   AT's prn     4. Posterior vitreous detachment of both eyes - Both Eyes   With floaters - stable     5. A lot of family stressors   Mom passed since last visit  dad elderly and in poor health, have sitters  Daughter is unemployed and had to move back home  Daughter recently ill - in ICU and intubated 2/2  pancreatitis (8/2/2015 to 8/11/2015)          Plan    Stay off  all glaucoma gtts OU for now - can add back if IOP goes above target of 14 ou   IOP OK ou off all gtts post trabs ou // OD w/mini shunt // OS without mini shunt (very elastic sclera)     Cont AT's ou prn     -  consider yag laser to ant capsule has significant  Ant capsule phimosis    Glasses - heidi 8/25/2016 - doing well     F/U 3-4 months with IOP check other tests up to date      I have seen and personally examined the patient.  I agree with the findings, assessment and plan of the resident and/or fellow.     Sanna Wiley MD

## 2017-10-23 RX ORDER — TRIAMTERENE/HYDROCHLOROTHIAZID 37.5-25 MG
TABLET ORAL
Qty: 30 TABLET | Refills: 9 | Status: SHIPPED | OUTPATIENT
Start: 2017-10-23 | End: 2018-09-06 | Stop reason: SDUPTHER

## 2017-11-27 ENCOUNTER — HOSPITAL ENCOUNTER (OUTPATIENT)
Dept: RADIOLOGY | Facility: HOSPITAL | Age: 72
Discharge: HOME | End: 2017-11-27
Attending: NURSE PRACTITIONER
Payer: MEDICARE

## 2017-11-27 ENCOUNTER — OFFICE VISIT (OUTPATIENT)
Dept: INTERNAL MEDICINE | Facility: CLINIC | Age: 72
End: 2017-11-27
Payer: MEDICARE

## 2017-11-27 VITALS
TEMPERATURE: 99 F | HEIGHT: 60 IN | HEART RATE: 102 BPM | DIASTOLIC BLOOD PRESSURE: 80 MMHG | BODY MASS INDEX: 33.76 KG/M2 | WEIGHT: 171.94 LBS | SYSTOLIC BLOOD PRESSURE: 140 MMHG | OXYGEN SATURATION: 98 %

## 2017-11-27 DIAGNOSIS — J45.41 MODERATE PERSISTENT ASTHMA WITH ACUTE EXACERBATION: ICD-10-CM

## 2017-11-27 DIAGNOSIS — J45.901 EXACERBATION OF ASTHMA, UNSPECIFIED ASTHMA SEVERITY, UNSPECIFIED WHETHER PERSISTENT: Primary | ICD-10-CM

## 2017-11-27 DIAGNOSIS — J45.901 EXACERBATION OF ASTHMA, UNSPECIFIED ASTHMA SEVERITY, UNSPECIFIED WHETHER PERSISTENT: ICD-10-CM

## 2017-11-27 PROCEDURE — 71020 XR CHEST PA AND LATERAL: CPT | Mod: 26,,, | Performed by: RADIOLOGY

## 2017-11-27 PROCEDURE — 99214 OFFICE O/P EST MOD 30 MIN: CPT | Mod: S$PBB,25,, | Performed by: NURSE PRACTITIONER

## 2017-11-27 PROCEDURE — 94760 N-INVAS EAR/PLS OXIMETRY 1: CPT | Mod: PBBFAC | Performed by: NURSE PRACTITIONER

## 2017-11-27 PROCEDURE — 99215 OFFICE O/P EST HI 40 MIN: CPT | Mod: PBBFAC,25 | Performed by: NURSE PRACTITIONER

## 2017-11-27 PROCEDURE — 71020 XR CHEST PA AND LATERAL: CPT | Mod: TC

## 2017-11-27 PROCEDURE — 94640 AIRWAY INHALATION TREATMENT: CPT | Mod: PBBFAC

## 2017-11-27 PROCEDURE — 94060 EVALUATION OF WHEEZING: CPT | Mod: S$PBB,,, | Performed by: NURSE PRACTITIONER

## 2017-11-27 PROCEDURE — 94060 EVALUATION OF WHEEZING: CPT | Mod: PBBFAC | Performed by: NURSE PRACTITIONER

## 2017-11-27 PROCEDURE — 99999 PR PBB SHADOW E&M-EST. PATIENT-LVL V: CPT | Mod: PBBFAC,,, | Performed by: NURSE PRACTITIONER

## 2017-11-27 RX ORDER — PREDNISONE 20 MG/1
TABLET ORAL
Qty: 10 TABLET | Refills: 0 | Status: SHIPPED | OUTPATIENT
Start: 2017-11-27 | End: 2017-12-02

## 2017-11-27 RX ORDER — IPRATROPIUM BROMIDE AND ALBUTEROL SULFATE 2.5; .5 MG/3ML; MG/3ML
3 SOLUTION RESPIRATORY (INHALATION) EVERY 6 HOURS PRN
Qty: 1 BOX | Refills: 6 | Status: SHIPPED | OUTPATIENT
Start: 2017-11-27 | End: 2019-07-22 | Stop reason: SDUPTHER

## 2017-11-27 RX ORDER — DOXYCYCLINE HYCLATE 100 MG
100 TABLET ORAL EVERY 12 HOURS
Qty: 20 TABLET | Refills: 0 | Status: SHIPPED | OUTPATIENT
Start: 2017-11-27 | End: 2018-01-23 | Stop reason: SDUPTHER

## 2017-11-27 RX ORDER — IPRATROPIUM BROMIDE AND ALBUTEROL SULFATE 2.5; .5 MG/3ML; MG/3ML
3 SOLUTION RESPIRATORY (INHALATION)
Status: COMPLETED | OUTPATIENT
Start: 2017-11-27 | End: 2017-11-27

## 2017-11-27 RX ORDER — BUDESONIDE AND FORMOTEROL FUMARATE DIHYDRATE 160; 4.5 UG/1; UG/1
AEROSOL RESPIRATORY (INHALATION)
Qty: 10.2 INHALER | Refills: 3 | Status: SHIPPED | OUTPATIENT
Start: 2017-11-27 | End: 2018-08-06 | Stop reason: SDUPTHER

## 2017-11-27 RX ADMIN — IPRATROPIUM BROMIDE AND ALBUTEROL SULFATE 3 ML: .5; 2.5 SOLUTION RESPIRATORY (INHALATION) at 01:11

## 2017-11-27 NOTE — TELEPHONE ENCOUNTER
----- Message from DALILA Hanna-DEANDRE sent at 11/27/2017  4:11 PM CST -----  Please call pt and tell her that her chest xray looks good no pneumonia

## 2017-11-27 NOTE — PATIENT INSTRUCTIONS
Resume Symbicort    Take over the counter Allegra, Zyrtec or Claritin daily to help    To ER for any worsening    Use Nebulizer machine and Duoneb treatments every 6 hours if needed    Take Prednisone once a day with food for 5 days start today    Chest xray today

## 2017-11-27 NOTE — PROGRESS NOTES
Subjective:       Patient ID: Lupe Jewell is a 72 y.o. female.    Chief Complaint: Cough and Nasal Congestion    Pt here with nasal congestion x 4 weeks and increased cough.  Has hx asthma has been out of her Symbicort for a week due to finances.  Denies fever.  States that she is using her Flonase and Singulair.  Not taking any long acting histamine blocker.  Denies any recent car trips or plane rides        Cough   Associated symptoms include postnasal drip, rhinorrhea and wheezing. Pertinent negatives include no chest pain, fever, headaches, myalgias, rash or sore throat.     Past Medical History:   Diagnosis Date    ALLERGIC RHINITIS 8/17/2012    Asthma, well controlled 8/17/2012    Chronic asthma     Chronic rhinitis     Diabetes 2/4/2014    Diabetes mellitus     Diabetes mellitus type II     Fever blister     GERD (gastroesophageal reflux disease) 8/17/2012    Glaucoma     Hyperlipemia     Hypertension     Obstructive sleep apnea     Osteoporosis, unspecified      Past Surgical History:   Procedure Laterality Date    BLADDER SURGERY      BREAST BIOPSY      CATARACT EXTRACTION W/  INTRAOCULAR LENS IMPLANT Left 1/28/2015    OS with trab (Dr. Wiley)    CATARACT EXTRACTION W/  INTRAOCULAR LENS IMPLANT Right 3/30/16    OD (Dr. Wiley) trab     CATARACT EXTRACTION W/ INTRAOCULAR LENS IMPLANTW/ TRABECULECTOMY Left 1/28/15    combined CE and trab with express mini shunt ()    COLONOSCOPY N/A 7/1/2016    Procedure: COLONOSCOPY;  Surgeon: Rony Lima MD;  Location: [address]);  Service: Endoscopy;  Laterality: N/A;    HYSTERECTOMY      stomach procedure       Social History     Social History Narrative    No narrative on file     Family History   Problem Relation Age of Onset    Glaucoma Mother     Glaucoma Sister     No Known Problems Father     No Known Problems Brother     No Known Problems Maternal Aunt     No Known Problems Maternal Uncle     No  Known Problems Paternal Aunt     No Known Problems Paternal Uncle     No Known Problems Maternal Grandmother     No Known Problems Maternal Grandfather     No Known Problems Paternal Grandmother     No Known Problems Paternal Grandfather     Melanoma Neg Hx     Psoriasis Neg Hx     Lupus Neg Hx     Eczema Neg Hx     Acne Neg Hx     Blindness Neg Hx     Macular degeneration Neg Hx     Retinal detachment Neg Hx     Amblyopia Neg Hx     Cancer Neg Hx     Cataracts Neg Hx     Diabetes Neg Hx     Hypertension Neg Hx     Strabismus Neg Hx     Stroke Neg Hx     Thyroid disease Neg Hx      Vitals:    11/27/17 1307   BP: (!) 140/80   Pulse: 102   Temp: 99 °F (37.2 °C)   SpO2: 98%   Weight: 78 kg (171 lb 15.3 oz)   Height: 5' (1.524 m)   PainSc: 0-No pain     Outpatient Encounter Prescriptions as of 11/27/2017   Medication Sig Dispense Refill    amitriptyline (ELAVIL) 25 MG tablet Take 1 tablet (25 mg total) by mouth every evening. 30 tablet 6    atorvastatin (LIPITOR) 10 MG tablet take 1 tablet by mouth once daily 30 tablet 6    budesonide-formoterol 160-4.5 mcg (SYMBICORT) 160-4.5 mcg/actuation HFAA inhale 2 puffs by mouth every 12 hours 10.2 g 3    fluocinonide (LIDEX) 0.05 % external solution apply to affected area twice a day 30 mL 3    fluocinonide 0.05% (LIDEX) 0.05 % cream apply to affected area twice a day 60 g 2    fluticasone (FLONASE) 50 mcg/actuation nasal spray 2 sprays by Each Nare route 2 (two) times daily. 32 g 6    metformin (GLUCOPHAGE) 1000 MG tablet take 1 tablet by mouth twice a day with food 180 tablet 3    montelukast (SINGULAIR) 10 mg tablet take 1 tablet by mouth every evening 30 tablet 12    PROAIR HFA 90 mcg/actuation inhaler inhale 2 puff by mouth every 4 hours 8.5 Inhaler 0    triamterene-hydrochlorothiazide 37.5-25 mg (MAXZIDE-25) 37.5-25 mg per tablet take 1 tablet by mouth once daily 30 tablet 9    desonide (DESOWEN) 0.05 % cream AAA bid 1 Tube 3    FLUAD  "1537-0037, 65 YR UP,,PF, 45 mcg (15 mcg x 3)/0.5 mL Syrg inject 0.5 milliliter intramuscularly  0    hydrocortisone butyrate (LOCOID) 0.1 % Oint AAA face bid 45 g 3     Facility-Administered Encounter Medications as of 11/27/2017   Medication Dose Route Frequency Provider Last Rate Last Dose    albuterol-ipratropium 2.5mg-0.5mg/3mL nebulizer solution 3 mL  3 mL Nebulization 1 time in Clinic/HOD PADILLA Hanna         Wt Readings from Last 3 Encounters:   11/27/17 78 kg (171 lb 15.3 oz)   06/30/17 75 kg (165 lb 5.5 oz)   04/20/17 77.7 kg (171 lb 4.8 oz)     Last 3 sets of Vitals    Vitals - 1 value per visit 6/30/2017 8/11/2017 11/27/2017   SYSTOLIC 136 - 140   DIASTOLIC 80 - 80   PULSE 92 - 102   TEMPERATURE 98.5 - 99   RESPIRATIONS - - -   SPO2 - - 98   Weight (lb) 165.35 - 171.96   Weight (kg) 75 - 78   HEIGHT - - 5' 0"   BODY MASS INDEX 31.76 - 33.58   VISIT REPORT - - -   Pain Score  0 0 0   Some recent data might be hidden     No results found for: CBC  Review of Systems   Constitutional: Negative for fatigue, fever and unexpected weight change.   HENT: Positive for congestion, postnasal drip and rhinorrhea. Negative for sinus pain, sinus pressure, sneezing, sore throat, tinnitus, trouble swallowing and voice change.    Respiratory: Positive for cough, chest tightness and wheezing.    Cardiovascular: Negative for chest pain and palpitations.   Gastrointestinal: Negative for abdominal pain, diarrhea, nausea and vomiting.   Musculoskeletal: Negative for arthralgias, myalgias, neck pain and neck stiffness.   Skin: Negative for rash.   Neurological: Negative for dizziness, facial asymmetry and headaches.   Hematological: Negative for adenopathy.   Psychiatric/Behavioral: Negative for sleep disturbance.       Objective:      Physical Exam   Constitutional: She is oriented to person, place, and time. She appears well-developed and well-nourished. No distress.   HENT:   Head: Normocephalic and " atraumatic.   Right Ear: External ear normal.   Left Ear: External ear normal.   Nose: Nose normal.   Mouth/Throat: Oropharynx is clear and moist. No oropharyngeal exudate.   Eyes: Conjunctivae and EOM are normal. Pupils are equal, round, and reactive to light. Right eye exhibits no discharge. Left eye exhibits no discharge.   Neck: Normal range of motion. No JVD present.   Cardiovascular: Normal heart sounds.  Tachycardia present.    Pulmonary/Chest: Effort normal. No stridor. She has decreased breath sounds in the right upper field, the right middle field, the right lower field, the left upper field, the left middle field and the left lower field.   Abdominal: Soft.   Lymphadenopathy:     She has no cervical adenopathy.   Neurological: She is alert and oriented to person, place, and time.   Skin: Skin is warm and dry. Capillary refill takes less than 2 seconds. No rash noted. She is not diaphoretic. No erythema. No pallor.   Psychiatric: She has a normal mood and affect. Her behavior is normal. Judgment and thought content normal.   Nursing note and vitals reviewed.        Improved air mvmt all lobes after tx.  Still with some exp wheezing RLL    Assessment:       1. Exacerbation of asthma, unspecified asthma severity, unspecified whether persistent        Plan:       Lupe ORTEGA was seen today for cough and nasal congestion.    Diagnoses and all orders for this visit:    Exacerbation of asthma, unspecified asthma severity, unspecified whether persistent  -     albuterol-ipratropium 2.5mg-0.5mg/3mL nebulizer solution 3 mL; Take 3 mLs by nebulization one time.  -     X-Ray Chest PA And Lateral; Future  -     predniSONE (DELTASONE) 20 MG tablet; Take 2 po with breakfast x 5d  -     doxycycline (VIBRA-TABS) 100 MG tablet; Take 1 tablet (100 mg total) by mouth every 12 (twelve) hours.  -     NEBULIZER FOR HOME USE  -     NEBULIZER KIT (SUPPLIES) FOR HOME USE  -     albuterol-ipratropium 2.5mg-0.5mg/3mL (DUO-NEB) 0.5 mg-3  mg(2.5 mg base)/3 mL nebulizer solution; Take 3 mLs by nebulization every 6 (six) hours as needed for Wheezing or Shortness of Breath (cough). Rescue    Moderate persistent asthma with acute exacerbation  -     X-Ray Chest PA And Lateral; Future  -     predniSONE (DELTASONE) 20 MG tablet; Take 2 po with breakfast x 5d  -     doxycycline (VIBRA-TABS) 100 MG tablet; Take 1 tablet (100 mg total) by mouth every 12 (twelve) hours.  -     NEBULIZER FOR HOME USE  -     NEBULIZER KIT (SUPPLIES) FOR HOME USE  -     albuterol-ipratropium 2.5mg-0.5mg/3mL (DUO-NEB) 0.5 mg-3 mg(2.5 mg base)/3 mL nebulizer solution; Take 3 mLs by nebulization every 6 (six) hours as needed for Wheezing or Shortness of Breath (cough). Rescue      Patient Instructions   Resume Symbicort    Take over the counter Allegra, Zyrtec or Claritin daily to help    To ER for any worsening    Use Nebulizer machine and Duoneb treatments every 6 hours if needed    Take Prednisone once a day with food for 5 days start today    Chest xray today

## 2017-11-27 NOTE — TELEPHONE ENCOUNTER
Msg left on VM , CXR was normal , no pneumonia. Instructed if she has any further concerns please call our office at [phone].

## 2017-11-28 ENCOUNTER — TELEPHONE (OUTPATIENT)
Dept: INTERNAL MEDICINE | Facility: CLINIC | Age: 72
End: 2017-11-28

## 2017-11-28 NOTE — TELEPHONE ENCOUNTER
MADHU [place] did patient discuss needing a nebulizer ? I didn't see any orders . Can you please address.

## 2017-11-28 NOTE — TELEPHONE ENCOUNTER
----- Message from Herlinda Manning sent at 11/28/2017  8:48 AM CST -----  Contact: Patient [phone]  Patient stated that [facility] does not supply the nebulizer machine and she needs to know where to get it.    Please call and advise.    Thank You

## 2017-11-30 NOTE — TELEPHONE ENCOUNTER
Spoke with Eugenia at [facility],  She states that the request for the Nebulizer and supplies will be out-sourced to another distributor due to her insurance. [employer] Will contact the patient to deliver the nebulizer and supplies.

## 2017-11-30 NOTE — TELEPHONE ENCOUNTER
Spoke with patient she was made aware that as per [facility] , her order will be out-sourced to an outside DME Co due to her insurance. Made her aware that [employer], will contact her to deliver the nebulizer and supplies. She verbalizes understanding.

## 2017-12-04 RX ORDER — ATORVASTATIN CALCIUM 10 MG/1
TABLET, FILM COATED ORAL
Qty: 30 TABLET | Refills: 6 | Status: SHIPPED | OUTPATIENT
Start: 2017-12-04 | End: 2018-06-28 | Stop reason: SDUPTHER

## 2017-12-18 ENCOUNTER — OFFICE VISIT (OUTPATIENT)
Dept: OPHTHALMOLOGY | Facility: CLINIC | Age: 72
End: 2017-12-18
Payer: MEDICARE

## 2017-12-18 DIAGNOSIS — H40.1113 PRIMARY OPEN ANGLE GLAUCOMA OF RIGHT EYE, SEVERE STAGE: ICD-10-CM

## 2017-12-18 DIAGNOSIS — Z98.83 GLAUCOMA FILTERING BLEB OF BOTH EYES: ICD-10-CM

## 2017-12-18 DIAGNOSIS — Z96.1 PSEUDOPHAKIA OF BOTH EYES: ICD-10-CM

## 2017-12-18 DIAGNOSIS — H04.123 DRY EYES, BILATERAL: ICD-10-CM

## 2017-12-18 DIAGNOSIS — H40.1122 PRIMARY OPEN ANGLE GLAUCOMA OF LEFT EYE, MODERATE STAGE: Primary | ICD-10-CM

## 2017-12-18 PROCEDURE — 99212 OFFICE O/P EST SF 10 MIN: CPT | Mod: PBBFAC | Performed by: OPHTHALMOLOGY

## 2017-12-18 PROCEDURE — 92012 INTRM OPH EXAM EST PATIENT: CPT | Mod: S$PBB,,, | Performed by: OPHTHALMOLOGY

## 2017-12-18 PROCEDURE — 99999 PR PBB SHADOW E&M-EST. PATIENT-LVL II: CPT | Mod: PBBFAC,,, | Performed by: OPHTHALMOLOGY

## 2017-12-18 NOTE — PROGRESS NOTES
HPI     DLS: 8/11/17    Pt here for 4 month check;  Pt states she was on 40mg of steroids due to asthma a few weeks ago.     Meds: AT's PF PRN OU    1) POAG   2) PCIOL OU   3) Type 2 DM NO DR   4) LOLA   5) PVD OU       Last edited by Tila Kent on 12/18/2017  9:00 AM. (History)            Assessment /Plan     For exam results, see Encounter Report.    Primary open angle glaucoma of left eye, moderate stage    Primary open angle glaucoma of right eye, severe stage    Dry eyes, bilateral    Pseudophakia of both eyes    Glaucoma filtering bleb of both eyes      1. POAG (primary open-angle glaucoma) - Severe stage - Both Eyes   Glaucoma (type and duration) POAG,   -  first HVF 1997  -  first photo: 1996     Family history None   Glaucoma meds - (off all gtts os post combined)  (( use to use - Alphagan od , Xalatan od , dorzolamide bid od ))  H/O adverse rxn to glaucoma drops into to BB 2/2 asthma   LASERS SLT 12/8/05 and repeat 7/16/09 OD, and SLT 1/6/06 OS;  SLT os 7/31/14, od 8/14/14 (no response ou)   GLAUCOMA SURGERIES - combined phaco/IOL trab -os 12/28/2015// elastic sclera - 9 sutures to close - all visible ones cut /// combined - phaco/trab w/ minishunt od - 3/30/2016  OTHER EYE SURGERIES PC IOL OS (combined 1/28/2015) // PC IOL od (combined 3/30/2016)  CDR 0.95 OU   Tbase 22 OU   Tmax 37 (4/11/2016) /38 ( 4/16/2015)   Ttarget 14/14  HVF 20 HVF: 8288-7092 - SAD with new IAD OD, Sad/iad os  Gonio +3 OU   /421   OCT 8 test 2004 - 2017: Dec S/I/T OD// Dec S/T/I,bord N  HRT 10 test 2005 to 2017 - MR -  Border TS, T od // decr G, TI, N, NI, border T, TS, NS os /// CDR 0.68 od // 0.78 os  Disc photos 1996, 1997, 2003: slides // 2012 , 2015 - OIS    Ttoday  14/14  Test done today: follow up     2. Diabetes mellitus type II   No BDR     3. Dry eyes - Both Eyes   AT's prn     4. Posterior vitreous detachment of both eyes - Both Eyes   With floaters - stable     5. A lot of family stressors   Mom passed since  last visit  dad elderly and in poor health, have sitters  Daughter is unemployed and had to move back home  Daughter recently ill - in ICU and intubated 2/2 pancreatitis (8/2/2015 to 8/11/2015)          Plan    Stay off  all glaucoma gtts OU for now - can add back if IOP goes above target of 14 ou   IOP OK ou off all gtts post trabs ou // OD w/mini shunt // OS without mini shunt (very elastic sclera)     Cont AT's ou prn     -  consider yag laser to ant capsule has significant  Ant capsule phimosis    Glasses - heidi 8/25/2016 - doing well     F/U 3-4 months with HVF / DFE /Photos

## 2017-12-22 ENCOUNTER — TELEPHONE (OUTPATIENT)
Dept: INTERNAL MEDICINE | Facility: CLINIC | Age: 72
End: 2017-12-22

## 2017-12-22 NOTE — TELEPHONE ENCOUNTER
Message left on VM that I was doing a call back to make sure she had gotten her Nebulizer and supplies as ordered. She was instructed to leave a message in her My [facility] or give our office a call at [phone] to discuss.

## 2018-01-20 PROCEDURE — 99284 EMERGENCY DEPT VISIT MOD MDM: CPT | Mod: 25

## 2018-01-21 ENCOUNTER — HOSPITAL ENCOUNTER (EMERGENCY)
Facility: OTHER | Age: 73
Discharge: HOME | End: 2018-01-21
Attending: EMERGENCY MEDICINE
Payer: MEDICARE

## 2018-01-21 VITALS
WEIGHT: 171 LBS | HEART RATE: 108 BPM | RESPIRATION RATE: 18 BRPM | BODY MASS INDEX: 32.28 KG/M2 | OXYGEN SATURATION: 100 % | HEIGHT: 61 IN | DIASTOLIC BLOOD PRESSURE: 83 MMHG | TEMPERATURE: 100 F | SYSTOLIC BLOOD PRESSURE: 178 MMHG

## 2018-01-21 DIAGNOSIS — J45.901 ASTHMA WITH SEVERE EXACERBATION: Primary | ICD-10-CM

## 2018-01-21 DIAGNOSIS — R06.02 SOB (SHORTNESS OF BREATH): ICD-10-CM

## 2018-01-21 LAB
FLUAV AG SPEC QL IA: NEGATIVE
FLUBV AG SPEC QL IA: NEGATIVE
SPECIMEN SOURCE: NORMAL

## 2018-01-21 PROCEDURE — 94644 CONT INHLJ TX 1ST HOUR: CPT

## 2018-01-21 PROCEDURE — 96365 THER/PROPH/DIAG IV INF INIT: CPT

## 2018-01-21 PROCEDURE — 87400 INFLUENZA A/B EACH AG IA: CPT

## 2018-01-21 PROCEDURE — 25000242 PHARM REV CODE 250 ALT 637 W/ HCPCS

## 2018-01-21 PROCEDURE — 25000242 PHARM REV CODE 250 ALT 637 W/ HCPCS: Performed by: EMERGENCY MEDICINE

## 2018-01-21 PROCEDURE — 94640 AIRWAY INHALATION TREATMENT: CPT

## 2018-01-21 PROCEDURE — 63600175 PHARM REV CODE 636 W HCPCS: Performed by: EMERGENCY MEDICINE

## 2018-01-21 RX ORDER — PREDNISONE 20 MG/1
60 TABLET ORAL
Status: COMPLETED | OUTPATIENT
Start: 2018-01-21 | End: 2018-01-21

## 2018-01-21 RX ORDER — PREDNISONE 20 MG/1
60 TABLET ORAL DAILY
Qty: 12 TABLET | Refills: 0 | Status: SHIPPED | OUTPATIENT
Start: 2018-01-21 | End: 2018-01-25

## 2018-01-21 RX ORDER — MAGNESIUM SULFATE HEPTAHYDRATE 40 MG/ML
2 INJECTION, SOLUTION INTRAVENOUS
Status: COMPLETED | OUTPATIENT
Start: 2018-01-21 | End: 2018-01-21

## 2018-01-21 RX ORDER — ALBUTEROL SULFATE 0.83 MG/ML
SOLUTION RESPIRATORY (INHALATION)
Status: COMPLETED
Start: 2018-01-21 | End: 2018-01-21

## 2018-01-21 RX ORDER — IPRATROPIUM BROMIDE AND ALBUTEROL SULFATE 2.5; .5 MG/3ML; MG/3ML
SOLUTION RESPIRATORY (INHALATION)
Status: DISCONTINUED
Start: 2018-01-21 | End: 2018-01-21 | Stop reason: WASHOUT

## 2018-01-21 RX ORDER — ALBUTEROL SULFATE 0.83 MG/ML
15 SOLUTION RESPIRATORY (INHALATION)
Status: COMPLETED | OUTPATIENT
Start: 2018-01-21 | End: 2018-01-21

## 2018-01-21 RX ORDER — ALBUTEROL SULFATE 0.83 MG/ML
2.5 SOLUTION RESPIRATORY (INHALATION)
Status: COMPLETED | OUTPATIENT
Start: 2018-01-21 | End: 2018-01-21

## 2018-01-21 RX ADMIN — PREDNISONE 60 MG: 20 TABLET ORAL at 12:01

## 2018-01-21 RX ADMIN — ALBUTEROL SULFATE 2.5 MG: 2.5 SOLUTION RESPIRATORY (INHALATION) at 02:01

## 2018-01-21 RX ADMIN — MAGNESIUM SULFATE IN WATER 2 G: 40 INJECTION, SOLUTION INTRAVENOUS at 12:01

## 2018-01-21 RX ADMIN — ALBUTEROL SULFATE 15 MG: 2.5 SOLUTION RESPIRATORY (INHALATION) at 12:01

## 2018-01-21 NOTE — ED NOTES
Pt presents to the ed with c/o SOB x1 week with a productive cough. Pt reports is experiencing left sided pain. Pt denies any nausea or dizziness. Pt has hx of asthma. Pt reports she used her Symbicort earlier this week but ran out and could not go to  the prescription do to not feeling well.  pt is able to speak in sentences. O2 sat is >95% on RA. Pt is placed on cont cardiac, bp and pulse ox monitoring. Call light is within reach.

## 2018-01-21 NOTE — ED PROVIDER NOTES
"Encounter Date: 1/20/2018    SCRIBE #1 NOTE: I, Marisol Lozano , am scribing for, and in the presence of, Dr. Arambula.       History     Chief Complaint   Patient presents with    Shortness of Breath     "I cant breath"     Time seen by provider: 12:39 AM    This is a 72 y.o. Female, with history of asthma, who presents with complaint of shortness of breath that has been constant for six days. She did not experience any improvement after using her nebulizer machine today, and last used steroids two months ago. She denies prior intubation or hospital admission for an asthma exacerbation. The patient reports cough with yellow sputum production and non-specific headache, but denies fever, chills, congestion, rhinorrhea, sore throat, nausea, vomiting, abdominal pain, wheezing, or myalgias. She reports that her daughter and grandchildren are currently experiencing "cold-like" symptoms.       The history is provided by the patient.     Review of patient's allergies indicates:   Allergen Reactions    Penicillins Rash     Past Medical History:   Diagnosis Date    ALLERGIC RHINITIS 8/17/2012    Asthma, well controlled 8/17/2012    Chronic asthma     Chronic rhinitis     Diabetes 2/4/2014    Diabetes mellitus     Diabetes mellitus type II     Fever blister     GERD (gastroesophageal reflux disease) 8/17/2012    Glaucoma     Hyperlipemia     Hypertension     Obstructive sleep apnea     Osteoporosis, unspecified      Past Surgical History:   Procedure Laterality Date    BLADDER SURGERY      BREAST BIOPSY      CATARACT EXTRACTION W/  INTRAOCULAR LENS IMPLANT Left 1/28/2015    OS with trab (Dr. Wiley)    CATARACT EXTRACTION W/  INTRAOCULAR LENS IMPLANT Right 3/30/16    OD (Dr. Wiley) trab     CATARACT EXTRACTION W/ INTRAOCULAR LENS IMPLANTW/ TRABECULECTOMY Left 1/28/15    combined CE and trab with express mini shunt ()    COLONOSCOPY N/A 7/1/2016    Procedure: COLONOSCOPY;  Surgeon: Rony BEASLEY" MD Clarence;  Location: [facility] ([address] [place] [address]);  Service: Endoscopy;  Laterality: N/A;    HYSTERECTOMY      stomach procedure       Family History   Problem Relation Age of Onset    Glaucoma Mother     Glaucoma Sister     No Known Problems Father     No Known Problems Brother     No Known Problems Maternal Aunt     No Known Problems Maternal Uncle     No Known Problems Paternal Aunt     No Known Problems Paternal Uncle     No Known Problems Maternal Grandmother     No Known Problems Maternal Grandfather     No Known Problems Paternal Grandmother     No Known Problems Paternal Grandfather     Melanoma Neg Hx     Psoriasis Neg Hx     Lupus Neg Hx     Eczema Neg Hx     Acne Neg Hx     Blindness Neg Hx     Macular degeneration Neg Hx     Retinal detachment Neg Hx     Amblyopia Neg Hx     Cancer Neg Hx     Cataracts Neg Hx     Diabetes Neg Hx     Hypertension Neg Hx     Strabismus Neg Hx     Stroke Neg Hx     Thyroid disease Neg Hx      Social History   Substance Use Topics    Smoking status: Former Smoker    Smokeless tobacco: Never Used    Alcohol use Yes      Comment: socially     Review of Systems   Constitutional: Negative for chills and fever.   HENT: Negative for congestion, rhinorrhea and sore throat.    Eyes: Negative for photophobia and redness.   Respiratory: Positive for cough and shortness of breath. Negative for wheezing.    Cardiovascular: Negative for chest pain.   Gastrointestinal: Negative for abdominal pain, nausea and vomiting.   Genitourinary: Negative for dysuria.   Musculoskeletal: Negative for back pain and myalgias.   Skin: Negative for rash.   Neurological: Positive for headaches. Negative for weakness and light-headedness.   Psychiatric/Behavioral: Negative for confusion.       Physical Exam     Initial Vitals [01/20/18 2341]   BP Pulse Resp Temp SpO2   (!) 147/85 (!) 124 (!) 30 99.8 °F (37.7 °C) 95 %      MAP       105.67         Physical Exam    Nursing note and  vitals reviewed.  Constitutional: She appears well-developed and well-nourished. She is not diaphoretic. No distress.   HENT:   Head: Normocephalic and atraumatic.   Right Ear: External ear normal.   Left Ear: External ear normal.   Eyes: EOM are normal. Pupils are equal, round, and reactive to light. Right eye exhibits no discharge. Left eye exhibits no discharge.   Neck: Normal range of motion. No JVD present.   Cardiovascular: Regular rhythm and normal heart sounds. Tachycardia present.  Exam reveals no gallop and no friction rub.    No murmur heard.  Pulmonary/Chest: Tachypnea noted. No respiratory distress. She has no wheezes. She has no rhonchi. She has no rales.   Patient is speaking in short sentences. Severely diminished breath sounds bilaterally.    Abdominal: Soft. There is no tenderness. There is no rebound and no guarding.   Musculoskeletal: Normal range of motion. She exhibits no edema or tenderness.   No lower extremity edema.    Neurological: She is alert and oriented to person, place, and time.   Skin: Skin is warm and dry. No rash and no abscess noted. No erythema. No pallor.   Psychiatric: She has a normal mood and affect. Her behavior is normal. Judgment and thought content normal.         ED Course   Procedures  Labs Reviewed   INFLUENZA A AND B ANTIGEN     Imaging Results          X-Ray Chest PA And Lateral (Final result)  Result time 01/21/18 00:12:39    Final result by Emilia De Los Santos MD (01/21/18 00:12:39)                 Impression:      No acute cardiopulmonary process identified.      Electronically signed by: EMILIA DE LOS SANTOS MD  Date:     01/21/18  Time:    00:12              Narrative:    Chest PA and lateral..  Comparison: 11/27/17.    Cardiac silhouette is mildly enlarged but stable in size.  Mediastinal structures are midline.  Lungs are symmetrically expanded.  No evidence of focal consolidative process, pneumothorax, or significant effusion.  No acute osseous abnormality  identified.                                   X-Rays:   Independently Interpreted Readings:   Chest X-Ray: No effusion or consolidation.      Medical Decision Making:   Clinical Tests:   Lab Tests: Ordered and Reviewed  Radiological Study: Ordered and Reviewed    Additional MDM:   Comments: 72-year-old female with history of asthma presented with complaint of a nonproductive cough and dyspnea that has not been relieved with  her home inhaler. on exam she did have severely diminished breath sounds.  Vital signs are significant for tachycardia and tachypnea.  She received prednisone, magnesium, and one continuous albuterol neb.  Upon reassessment she had improvement in her lung exam as well as symptomatic improvement.  She was able to ambulate down the hallway without difficulty but stated she could use another treatment.  SHe was given 3 additional intermittent nebs and was d/c'ed home with a RX for a steroid burst.  CXR WNL and flu swab neg.  .          Scribe Attestation:   Scribe #1: I performed the above scribed service and the documentation accurately describes the services I performed. I attest to the accuracy of the note.    Attending Attestation:           Physician Attestation for Scribe:  Physician Attestation Statement for Scribe #1: I, Dr. Arambula, reviewed documentation, as scribed by Marisol Lozano  in my presence, and it is both accurate and complete.                 ED Course      Clinical Impression:     1. Asthma with severe exacerbation    2. SOB (shortness of breath)                                 Elba Arambula MD  01/21/18 2752

## 2018-01-21 NOTE — ED NOTES
Pt is sitting up on the side of the bed. Breathing treatment is completed. Pt feels much better and is breathing better

## 2018-01-23 ENCOUNTER — OFFICE VISIT (OUTPATIENT)
Dept: INTERNAL MEDICINE | Facility: CLINIC | Age: 73
End: 2018-01-23
Payer: MEDICARE

## 2018-01-23 DIAGNOSIS — J45.901 EXACERBATION OF ASTHMA, UNSPECIFIED ASTHMA SEVERITY, UNSPECIFIED WHETHER PERSISTENT: Primary | ICD-10-CM

## 2018-01-23 DIAGNOSIS — J45.41 MODERATE PERSISTENT ASTHMA WITH ACUTE EXACERBATION: ICD-10-CM

## 2018-01-23 PROCEDURE — 99213 OFFICE O/P EST LOW 20 MIN: CPT | Mod: S$PBB,,, | Performed by: INTERNAL MEDICINE

## 2018-01-23 PROCEDURE — 99999 PR PBB SHADOW E&M-EST. PATIENT-LVL III: CPT | Mod: PBBFAC,,, | Performed by: INTERNAL MEDICINE

## 2018-01-23 PROCEDURE — 99213 OFFICE O/P EST LOW 20 MIN: CPT | Mod: PBBFAC | Performed by: INTERNAL MEDICINE

## 2018-01-23 RX ORDER — DOXYCYCLINE HYCLATE 100 MG
100 TABLET ORAL EVERY 12 HOURS
Qty: 20 TABLET | Refills: 0 | Status: SHIPPED | OUTPATIENT
Start: 2018-01-23 | End: 2018-08-23 | Stop reason: SDUPTHER

## 2018-01-23 RX ORDER — ALBUTEROL SULFATE 90 UG/1
AEROSOL, METERED RESPIRATORY (INHALATION)
Qty: 8.5 INHALER | Refills: 3 | Status: SHIPPED | OUTPATIENT
Start: 2018-01-23 | End: 2018-07-18 | Stop reason: SDUPTHER

## 2018-01-24 VITALS — HEART RATE: 94 BPM | SYSTOLIC BLOOD PRESSURE: 124 MMHG | OXYGEN SATURATION: 95 % | DIASTOLIC BLOOD PRESSURE: 76 MMHG

## 2018-01-24 NOTE — PROGRESS NOTES
Subjective:       Patient ID: Lupe Jewell is a 72 y.o. female.    Chief Complaint: Follow-up and Asthma    HPIMiss Lupe is here today for follow up. SHe was recently seen in ER 2o asthma exacerbation. She was treated symptomatically, received nebulizer treatments that helped and was sent home on prednisone 60 mgs daily x 5 days which she is still taking. She has been nebulizing at home as well, but coughing not improved.   On exam, she is in NAD. Breathing not labored, but has diffuse expiratory wheezes thruout both lung fields with significant cough with deep breaths.   Review of Systems   All other systems reviewed and are negative.      Objective:      Physical Exam   Constitutional: She is oriented to person, place, and time. She appears well-developed and well-nourished. No distress.   HENT:   Head: Normocephalic and atraumatic.   Right Ear: External ear normal.   Left Ear: External ear normal.   Mouth/Throat: Oropharynx is clear and moist. No oropharyngeal exudate.   Cardiovascular: Normal rate, regular rhythm, normal heart sounds and intact distal pulses.    No murmur heard.  Pulmonary/Chest: Effort normal. No respiratory distress. She has wheezes.   Neurological: She is alert and oriented to person, place, and time.   Skin: Skin is warm and dry. She is not diaphoretic.   Psychiatric: She has a normal mood and affect. Her behavior is normal.   Nursing note and vitals reviewed.      Assessment:       1. Exacerbation of asthma, unspecified asthma severity, unspecified whether persistent    2. Moderate persistent asthma with acute exacerbation        Plan:   1. Rx for Doxycycline 100 mgs BID x 10 days.        2. Continuewith prednisone 60 mgs daily until completed treatment.        3. Continue with nebulizers and inhalers as indicated.        4. Advised to go to ER If not improved or worsening sx's.        5. RTC prn.

## 2018-01-31 DIAGNOSIS — G47.00 INSOMNIA, UNSPECIFIED TYPE: ICD-10-CM

## 2018-01-31 RX ORDER — AMITRIPTYLINE HYDROCHLORIDE 25 MG/1
TABLET, FILM COATED ORAL
Qty: 30 TABLET | Refills: 6 | Status: SHIPPED | OUTPATIENT
Start: 2018-01-31 | End: 2018-12-09 | Stop reason: SDUPTHER

## 2018-03-31 RX ORDER — FLUTICASONE PROPIONATE 50 MCG
SPRAY, SUSPENSION (ML) NASAL
Qty: 32 G | Refills: 6 | Status: CANCELLED | OUTPATIENT
Start: 2018-03-31

## 2018-04-05 ENCOUNTER — TELEPHONE (OUTPATIENT)
Dept: ALLERGY | Facility: CLINIC | Age: 73
End: 2018-04-05

## 2018-04-06 DIAGNOSIS — J30.1 ACUTE NONSEASONAL ALLERGIC RHINITIS DUE TO POLLEN: ICD-10-CM

## 2018-04-06 RX ORDER — FLUTICASONE PROPIONATE 50 MCG
2 SPRAY, SUSPENSION (ML) NASAL 2 TIMES DAILY
Qty: 32 G | Refills: 6 | Status: SHIPPED | OUTPATIENT
Start: 2018-04-06 | End: 2019-04-16 | Stop reason: SDUPTHER

## 2018-04-23 ENCOUNTER — OFFICE VISIT (OUTPATIENT)
Dept: OPHTHALMOLOGY | Facility: CLINIC | Age: 73
End: 2018-04-23
Attending: OPHTHALMOLOGY
Payer: MEDICARE

## 2018-04-23 DIAGNOSIS — H40.1122 PRIMARY OPEN ANGLE GLAUCOMA OF LEFT EYE, MODERATE STAGE: Primary | ICD-10-CM

## 2018-04-23 DIAGNOSIS — H40.1113 PRIMARY OPEN ANGLE GLAUCOMA OF RIGHT EYE, SEVERE STAGE: ICD-10-CM

## 2018-04-23 DIAGNOSIS — H40.1122 PRIMARY OPEN ANGLE GLAUCOMA OF LEFT EYE, MODERATE STAGE: ICD-10-CM

## 2018-04-23 DIAGNOSIS — Z96.1 PSEUDOPHAKIA OF BOTH EYES: ICD-10-CM

## 2018-04-23 DIAGNOSIS — H04.123 DRY EYES, BILATERAL: ICD-10-CM

## 2018-04-23 DIAGNOSIS — E11.9 TYPE 2 DIABETES MELLITUS WITHOUT OPHTHALMIC MANIFESTATIONS: ICD-10-CM

## 2018-04-23 DIAGNOSIS — Z98.83 GLAUCOMA FILTERING BLEB OF BOTH EYES: ICD-10-CM

## 2018-04-23 PROCEDURE — 92083 EXTENDED VISUAL FIELD XM: CPT | Mod: 26,S$PBB,, | Performed by: OPHTHALMOLOGY

## 2018-04-23 PROCEDURE — 92083 EXTENDED VISUAL FIELD XM: CPT | Mod: PBBFAC

## 2018-04-23 PROCEDURE — 99999 PR PBB SHADOW E&M-EST. PATIENT-LVL II: CPT | Mod: PBBFAC,,, | Performed by: OPHTHALMOLOGY

## 2018-04-23 PROCEDURE — 99212 OFFICE O/P EST SF 10 MIN: CPT | Mod: PBBFAC | Performed by: OPHTHALMOLOGY

## 2018-04-23 PROCEDURE — 92250 FUNDUS PHOTOGRAPHY W/I&R: CPT | Mod: PBBFAC | Performed by: OPHTHALMOLOGY

## 2018-04-23 PROCEDURE — 92014 COMPRE OPH EXAM EST PT 1/>: CPT | Mod: S$PBB,,, | Performed by: OPHTHALMOLOGY

## 2018-04-23 NOTE — PROGRESS NOTES
HPI     DLS: 12/18/17    Pt here for HVF review;  Pt states she is having trouble seeing small, fine print with her   bifocals.     Meds: AT's tid ou    1) POAG   2) PCIOL OU   3) Type 2 DM NO DR   4) LOLA   5) PVD OU         Last edited by Tila Kent on 4/23/2018 10:06 AM. (History)            Assessment /Plan     For exam results, see Encounter Report.    Dry eyes, bilateral    Primary open angle glaucoma of left eye, moderate stage  -     Color Fundus Photography - OU - Both Eyes    Primary open angle glaucoma of right eye, severe stage  -     Color Fundus Photography - OU - Both Eyes    Pseudophakia of both eyes    Glaucoma filtering bleb of both eyes    Type 2 diabetes mellitus without ophthalmic manifestations          1. POAG (primary open-angle glaucoma) - Severe stage - Both Eyes   Glaucoma (type and duration) POAG,   -  first HVF 1997  -  first photo: 1996     Family history None   Glaucoma meds - (off all gtts os post combined)  (( use to use - Alphagan od , Xalatan od , dorzolamide bid od ))  H/O adverse rxn to glaucoma drops into to BB 2/2 asthma   LASERS SLT 12/8/05 and repeat 7/16/09 OD, and SLT 1/6/06 OS;  SLT os 7/31/14, od 8/14/14 (no response ou)   GLAUCOMA SURGERIES - combined phaco/IOL trab -os 12/28/2015// elastic sclera - 9 sutures to close - all visible ones cut /// combined - phaco/trab w/ minishunt od - 3/30/2016  OTHER EYE SURGERIES PC IOL OS (combined 1/28/2015) // PC IOL od (combined 3/30/2016)  CDR 0.95 OU   Tbase 22 OU   Tmax 37 (4/11/2016) /38 ( 4/16/2015)   Ttarget 14/14  HVF 20 HVF: 1102-9802 - SAD with new IAD OD, Sad/iad os  Gonio +3 OU   /421   OCT 8 test 2004 - 2017: Dec S/I/T OD// Dec S/T/I,bord N  HRT 10 test 2005 to 2017 - MR -  Border TS, T od // decr G, TI, N, NI, border T, TS, NS os /// CDR 0.68 od // 0.78 os  Disc photos 1996, 1997, 2003: slides // 2012 , 2015 - OIS    Ttoday  14/14  Test done today: follow up     2. Diabetes mellitus type II   No BDR     3. Dry  eyes - Both Eyes   AT's prn     4. Posterior vitreous detachment of both eyes - Both Eyes   With floaters - stable     5. A lot of family stressors   Mom passed since last visit  dad elderly and in poor health, have sitters  Daughter is unemployed and had to move back home  Daughter recently ill - in ICU and intubated 2/2 pancreatitis (8/2/2015 to 8/11/2015)          Plan    Stay off  all glaucoma gtts OU for now - can add back if IOP goes above target of 14 ou   IOP OK ou off all gtts post trabs ou // OD w/mini shunt // OS without mini shunt (very elastic sclera)     Cont AT's ou prn     -  consider yag laser to ant capsule has significant  Ant capsule phimosis    Glasses - heidi 8/25/2016 - doing well     F/U 3-4 months with HRT

## 2018-05-09 ENCOUNTER — TELEPHONE (OUTPATIENT)
Dept: INTERNAL MEDICINE | Facility: CLINIC | Age: 73
End: 2018-05-09

## 2018-05-09 DIAGNOSIS — Z12.39 BREAST CANCER SCREENING: Primary | ICD-10-CM

## 2018-05-11 ENCOUNTER — HOSPITAL ENCOUNTER (OUTPATIENT)
Dept: RADIOLOGY | Facility: HOSPITAL | Age: 73
Discharge: HOME | End: 2018-05-11
Attending: INTERNAL MEDICINE
Payer: MEDICARE

## 2018-05-11 DIAGNOSIS — Z12.39 BREAST CANCER SCREENING: ICD-10-CM

## 2018-05-11 PROCEDURE — 77063 BREAST TOMOSYNTHESIS BI: CPT | Mod: 26,,, | Performed by: RADIOLOGY

## 2018-05-11 PROCEDURE — 77067 SCR MAMMO BI INCL CAD: CPT | Mod: TC

## 2018-05-11 PROCEDURE — 77067 SCR MAMMO BI INCL CAD: CPT | Mod: 26,,, | Performed by: RADIOLOGY

## 2018-06-19 DIAGNOSIS — L30.9 DERMATITIS: ICD-10-CM

## 2018-06-19 RX ORDER — FLUOCINONIDE 0.5 MG/G
CREAM TOPICAL
Qty: 60 G | Refills: 0 | Status: SHIPPED | OUTPATIENT
Start: 2018-06-19 | End: 2018-09-06 | Stop reason: SDUPTHER

## 2018-06-29 RX ORDER — ATORVASTATIN CALCIUM 10 MG/1
10 TABLET, FILM COATED ORAL DAILY
Qty: 90 TABLET | Refills: 0 | Status: SHIPPED | OUTPATIENT
Start: 2018-06-29 | End: 2018-11-14 | Stop reason: SDUPTHER

## 2018-07-09 RX ORDER — MONTELUKAST SODIUM 10 MG/1
10 TABLET ORAL NIGHTLY
Qty: 30 TABLET | Refills: 6 | Status: SHIPPED | OUTPATIENT
Start: 2018-07-09 | End: 2019-03-04 | Stop reason: SDUPTHER

## 2018-07-18 DIAGNOSIS — J45.41 MODERATE PERSISTENT ASTHMA WITH ACUTE EXACERBATION: ICD-10-CM

## 2018-07-18 DIAGNOSIS — J45.901 EXACERBATION OF ASTHMA, UNSPECIFIED ASTHMA SEVERITY, UNSPECIFIED WHETHER PERSISTENT: ICD-10-CM

## 2018-07-18 RX ORDER — ALBUTEROL SULFATE 90 UG/1
AEROSOL, METERED RESPIRATORY (INHALATION)
Qty: 8.5 INHALER | Refills: 3 | Status: SHIPPED | OUTPATIENT
Start: 2018-07-18 | End: 2019-09-09 | Stop reason: SDUPTHER

## 2018-07-31 ENCOUNTER — EXTERNAL CHRONIC CARE MANAGEMENT (OUTPATIENT)
Dept: PRIMARY CARE CLINIC | Facility: CLINIC | Age: 73
End: 2018-07-31
Payer: MEDICARE

## 2018-07-31 PROCEDURE — 99490 CHRNC CARE MGMT STAFF 1ST 20: CPT | Mod: PBBFAC | Performed by: INTERNAL MEDICINE

## 2018-07-31 PROCEDURE — 99490 CHRNC CARE MGMT STAFF 1ST 20: CPT | Mod: S$PBB,,, | Performed by: INTERNAL MEDICINE

## 2018-08-06 DIAGNOSIS — J45.41 MODERATE PERSISTENT ASTHMA WITH ACUTE EXACERBATION: ICD-10-CM

## 2018-08-08 RX ORDER — BUDESONIDE AND FORMOTEROL FUMARATE DIHYDRATE 160; 4.5 UG/1; UG/1
AEROSOL RESPIRATORY (INHALATION)
Qty: 10.2 G | Refills: 3 | Status: SHIPPED | OUTPATIENT
Start: 2018-08-08 | End: 2019-09-09 | Stop reason: SDUPTHER

## 2018-08-23 ENCOUNTER — LAB VISIT (OUTPATIENT)
Dept: LAB | Facility: HOSPITAL | Age: 73
End: 2018-08-23
Attending: INTERNAL MEDICINE
Payer: MEDICARE

## 2018-08-23 ENCOUNTER — OFFICE VISIT (OUTPATIENT)
Dept: INTERNAL MEDICINE | Facility: CLINIC | Age: 73
End: 2018-08-23
Payer: MEDICARE

## 2018-08-23 ENCOUNTER — TELEPHONE (OUTPATIENT)
Dept: INTERNAL MEDICINE | Facility: CLINIC | Age: 73
End: 2018-08-23

## 2018-08-23 VITALS
HEIGHT: 61 IN | BODY MASS INDEX: 32.47 KG/M2 | TEMPERATURE: 99 F | SYSTOLIC BLOOD PRESSURE: 132 MMHG | WEIGHT: 172 LBS | DIASTOLIC BLOOD PRESSURE: 88 MMHG

## 2018-08-23 DIAGNOSIS — J45.41 MODERATE PERSISTENT ASTHMA WITH ACUTE EXACERBATION: ICD-10-CM

## 2018-08-23 DIAGNOSIS — E78.5 HYPERLIPIDEMIA, UNSPECIFIED HYPERLIPIDEMIA TYPE: ICD-10-CM

## 2018-08-23 DIAGNOSIS — E11.9 TYPE 2 DIABETES MELLITUS WITHOUT COMPLICATION, WITHOUT LONG-TERM CURRENT USE OF INSULIN: ICD-10-CM

## 2018-08-23 DIAGNOSIS — J45.40 MODERATE PERSISTENT ASTHMA, UNSPECIFIED WHETHER COMPLICATED: ICD-10-CM

## 2018-08-23 DIAGNOSIS — E55.9 VITAMIN D DEFICIENCY: ICD-10-CM

## 2018-08-23 DIAGNOSIS — R53.83 FATIGUE, UNSPECIFIED TYPE: ICD-10-CM

## 2018-08-23 DIAGNOSIS — J45.901 EXACERBATION OF ASTHMA, UNSPECIFIED ASTHMA SEVERITY, UNSPECIFIED WHETHER PERSISTENT: ICD-10-CM

## 2018-08-23 DIAGNOSIS — Z00.00 ROUTINE GENERAL MEDICAL EXAMINATION AT A HEALTH CARE FACILITY: ICD-10-CM

## 2018-08-23 DIAGNOSIS — J40 BRONCHITIS: ICD-10-CM

## 2018-08-23 DIAGNOSIS — Z00.00 ROUTINE GENERAL MEDICAL EXAMINATION AT A HEALTH CARE FACILITY: Primary | ICD-10-CM

## 2018-08-23 LAB
25(OH)D3+25(OH)D2 SERPL-MCNC: 7 NG/ML
ALBUMIN SERPL BCP-MCNC: 4.1 G/DL
ALP SERPL-CCNC: 87 U/L
ALT SERPL W/O P-5'-P-CCNC: 16 U/L
ANION GAP SERPL CALC-SCNC: 10 MMOL/L
AST SERPL-CCNC: 17 U/L
BASOPHILS # BLD AUTO: 0.08 K/UL
BASOPHILS NFR BLD: 0.7 %
BILIRUB SERPL-MCNC: 0.5 MG/DL
BUN SERPL-MCNC: 6 MG/DL
CALCIUM SERPL-MCNC: 9.9 MG/DL
CHLORIDE SERPL-SCNC: 101 MMOL/L
CHOLEST SERPL-MCNC: 161 MG/DL
CHOLEST/HDLC SERPL: 3 {RATIO}
CO2 SERPL-SCNC: 29 MMOL/L
CREAT SERPL-MCNC: 0.8 MG/DL
DIFFERENTIAL METHOD: ABNORMAL
EOSINOPHIL # BLD AUTO: 0.3 K/UL
EOSINOPHIL NFR BLD: 2.4 %
ERYTHROCYTE [DISTWIDTH] IN BLOOD BY AUTOMATED COUNT: 12.9 %
EST. GFR  (AFRICAN AMERICAN): >60 ML/MIN/1.73 M^2
EST. GFR  (NON AFRICAN AMERICAN): >60 ML/MIN/1.73 M^2
ESTIMATED AVG GLUCOSE: 108 MG/DL
GLUCOSE SERPL-MCNC: 126 MG/DL
HBA1C MFR BLD HPLC: 5.4 %
HCT VFR BLD AUTO: 37.8 %
HDLC SERPL-MCNC: 54 MG/DL
HDLC SERPL: 33.5 %
HGB BLD-MCNC: 13.1 G/DL
IMM GRANULOCYTES # BLD AUTO: 0.17 K/UL
IMM GRANULOCYTES NFR BLD AUTO: 1.6 %
LDLC SERPL CALC-MCNC: 91 MG/DL
LYMPHOCYTES # BLD AUTO: 3.2 K/UL
LYMPHOCYTES NFR BLD: 29.5 %
MCH RBC QN AUTO: 29.4 PG
MCHC RBC AUTO-ENTMCNC: 34.7 G/DL
MCV RBC AUTO: 85 FL
MONOCYTES # BLD AUTO: 0.7 K/UL
MONOCYTES NFR BLD: 6.8 %
NEUTROPHILS # BLD AUTO: 6.3 K/UL
NEUTROPHILS NFR BLD: 59 %
NONHDLC SERPL-MCNC: 107 MG/DL
NRBC BLD-RTO: 0 /100 WBC
PLATELET # BLD AUTO: 391 K/UL
PMV BLD AUTO: 9.9 FL
POTASSIUM SERPL-SCNC: 3.8 MMOL/L
PROT SERPL-MCNC: 8 G/DL
RBC # BLD AUTO: 4.45 M/UL
SODIUM SERPL-SCNC: 140 MMOL/L
TRIGL SERPL-MCNC: 80 MG/DL
TSH SERPL DL<=0.005 MIU/L-ACNC: 1.51 UIU/ML
WBC # BLD AUTO: 10.73 K/UL

## 2018-08-23 PROCEDURE — 80053 COMPREHEN METABOLIC PANEL: CPT

## 2018-08-23 PROCEDURE — 99214 OFFICE O/P EST MOD 30 MIN: CPT | Mod: S$PBB,,, | Performed by: INTERNAL MEDICINE

## 2018-08-23 PROCEDURE — 82306 VITAMIN D 25 HYDROXY: CPT

## 2018-08-23 PROCEDURE — 83036 HEMOGLOBIN GLYCOSYLATED A1C: CPT

## 2018-08-23 PROCEDURE — 99999 PR PBB SHADOW E&M-EST. PATIENT-LVL III: CPT | Mod: PBBFAC,,, | Performed by: INTERNAL MEDICINE

## 2018-08-23 PROCEDURE — 80061 LIPID PANEL: CPT

## 2018-08-23 PROCEDURE — 84443 ASSAY THYROID STIM HORMONE: CPT

## 2018-08-23 PROCEDURE — 85025 COMPLETE CBC W/AUTO DIFF WBC: CPT

## 2018-08-23 PROCEDURE — 36415 COLL VENOUS BLD VENIPUNCTURE: CPT

## 2018-08-23 PROCEDURE — 99213 OFFICE O/P EST LOW 20 MIN: CPT | Mod: PBBFAC | Performed by: INTERNAL MEDICINE

## 2018-08-23 RX ORDER — DOXYCYCLINE HYCLATE 100 MG
100 TABLET ORAL EVERY 12 HOURS
Qty: 20 TABLET | Refills: 0 | Status: SHIPPED | OUTPATIENT
Start: 2018-08-23 | End: 2019-05-30 | Stop reason: ALTCHOICE

## 2018-08-23 NOTE — TELEPHONE ENCOUNTER
----- Message from Veronica Batres sent at 8/23/2018  8:53 AM CDT -----  Contact: Lacy/Jaime/[phone]  Jaime called in regards needing diagnosis code, is not on file for albuterol-ipratropium 2.5mg-0.5mg/3mL (DUO-NEB) 0.5 mg-3 mg(2.5 mg base)/3 mL nebulizer solution please call and advise. Thank you

## 2018-08-31 ENCOUNTER — EXTERNAL CHRONIC CARE MANAGEMENT (OUTPATIENT)
Dept: PRIMARY CARE CLINIC | Facility: CLINIC | Age: 73
End: 2018-08-31
Payer: MEDICARE

## 2018-08-31 PROCEDURE — 99490 CHRNC CARE MGMT STAFF 1ST 20: CPT | Mod: S$PBB,,, | Performed by: INTERNAL MEDICINE

## 2018-08-31 PROCEDURE — 99490 CHRNC CARE MGMT STAFF 1ST 20: CPT | Mod: PBBFAC | Performed by: INTERNAL MEDICINE

## 2018-09-06 DIAGNOSIS — I10 HYPERTENSION, UNSPECIFIED TYPE: Primary | ICD-10-CM

## 2018-09-06 DIAGNOSIS — L30.9 DERMATITIS: ICD-10-CM

## 2018-09-06 RX ORDER — TRIAMTERENE/HYDROCHLOROTHIAZID 37.5-25 MG
TABLET ORAL
Qty: 30 TABLET | Refills: 9 | Status: SHIPPED | OUTPATIENT
Start: 2018-09-06 | End: 2019-07-23 | Stop reason: SDUPTHER

## 2018-09-07 RX ORDER — FLUOCINONIDE 0.5 MG/G
CREAM TOPICAL
Qty: 60 G | Refills: 0 | Status: SHIPPED | OUTPATIENT
Start: 2018-09-07 | End: 2018-12-29 | Stop reason: SDUPTHER

## 2018-09-23 NOTE — PROGRESS NOTES
Assessment /Plan     For exam results, see Encounter Report.    Primary open angle glaucoma of left eye, moderate stage    Primary open angle glaucoma of right eye, severe stage    Dry eyes, bilateral    Type 2 diabetes mellitus without ophthalmic manifestations    Posterior vitreous detachment of both eyes - Both Eyes    Pseudophakia of both eyes    Glaucoma filtering bleb of both eyes        1. POAG (primary open-angle glaucoma) - Severe stage - Both Eyes   Glaucoma (type and duration) POAG,   -  first HVF 1997  -  first photo: 1996     Family history None   Glaucoma meds - (off all gtts os post combined)  (( use to use - Alphagan od , Xalatan od , dorzolamide bid od ))  H/O adverse rxn to glaucoma drops into to BB 2/2 asthma   LASERS SLT 12/8/05 and repeat 7/16/09 OD, and SLT 1/6/06 OS;  SLT os 7/31/14, od 8/14/14 (no response ou)   GLAUCOMA SURGERIES - combined phaco/IOL trab -os 12/28/2015// elastic sclera - 9 sutures to close - all visible ones cut /// combined - phaco/trab w/ minishunt od - 3/30/2016  OTHER EYE SURGERIES PC IOL OS (combined 1/28/2015) // PC IOL od (combined 3/30/2016)  CDR 0.95 OU   Tbase 22 OU   Tmax 37 (4/11/2016) /38 ( 4/16/2015)   Ttarget 14/14  HVF 20 HVF: 7688-9365 - SAD with new IAD OD, Sad/iad os  Gonio +3 OU   /421   OCT 8 test 2004 - 2017: Dec S/I/T OD// Dec S/T/I,bord N   test 2005 to 2018 -MR -  ? [place] off  od // dec. S/N,, bord T/I os /// CDR ? [place] off  od // 0.80 os  Disc photos 1996, 1997, 2003: slides // 2012 , 2015 - OIS    Ttoday  14/14  Test done today: HRT / IOP      2. Diabetes mellitus type II   No BDR     3. Dry eyes - Both Eyes   AT's prn     4. Posterior vitreous detachment of both eyes - Both Eyes   With floaters - stable     5. A lot of family stressors   Mom passed since last visit  dad elderly and in poor health, have sitters  Daughter is unemployed and had to move back home  Daughter recently ill - in ICU and intubated 2/2 pancreatitis  (8/2/2015 to 8/11/2015)          Plan    Stay off  all glaucoma gtts OU for now - can add back if IOP goes above target of 14 ou   IOP OK ou off all gtts post trabs ou // OD w/mini shunt // OS without mini shunt (very elastic sclera)     Cont AT's ou prn     -  consider yag laser to ant capsule has significant  Ant capsule phimosis    Glasses - heidi 8/25/2016 - doing well     F/U 4 months gonio

## 2018-09-24 ENCOUNTER — OFFICE VISIT (OUTPATIENT)
Dept: OPHTHALMOLOGY | Facility: CLINIC | Age: 73
End: 2018-09-24
Payer: MEDICARE

## 2018-09-24 ENCOUNTER — CLINICAL SUPPORT (OUTPATIENT)
Dept: OPHTHALMOLOGY | Facility: CLINIC | Age: 73
End: 2018-09-24
Payer: MEDICARE

## 2018-09-24 DIAGNOSIS — Z96.1 PSEUDOPHAKIA OF BOTH EYES: ICD-10-CM

## 2018-09-24 DIAGNOSIS — H40.1113 PRIMARY OPEN ANGLE GLAUCOMA OF RIGHT EYE, SEVERE STAGE: ICD-10-CM

## 2018-09-24 DIAGNOSIS — H04.123 DRY EYES, BILATERAL: ICD-10-CM

## 2018-09-24 DIAGNOSIS — H43.813 POSTERIOR VITREOUS DETACHMENT OF BOTH EYES: ICD-10-CM

## 2018-09-24 DIAGNOSIS — E08.9 DIABETES MELLITUS DUE TO UNDERLYING CONDITION WITHOUT COMPLICATION, WITHOUT LONG-TERM CURRENT USE OF INSULIN: Primary | ICD-10-CM

## 2018-09-24 DIAGNOSIS — H40.1122 PRIMARY OPEN ANGLE GLAUCOMA OF LEFT EYE, MODERATE STAGE: Primary | ICD-10-CM

## 2018-09-24 DIAGNOSIS — Z98.83 GLAUCOMA FILTERING BLEB OF BOTH EYES: ICD-10-CM

## 2018-09-24 DIAGNOSIS — E11.9 TYPE 2 DIABETES MELLITUS WITHOUT OPHTHALMIC MANIFESTATIONS: ICD-10-CM

## 2018-09-24 DIAGNOSIS — H40.1122 PRIMARY OPEN ANGLE GLAUCOMA OF LEFT EYE, MODERATE STAGE: ICD-10-CM

## 2018-09-24 PROCEDURE — 99212 OFFICE O/P EST SF 10 MIN: CPT | Mod: PBBFAC,25 | Performed by: OPHTHALMOLOGY

## 2018-09-24 PROCEDURE — 92133 CPTRZD OPH DX IMG PST SGM ON: CPT | Mod: PBBFAC

## 2018-09-24 PROCEDURE — 92133 CPTRZD OPH DX IMG PST SGM ON: CPT | Mod: 26,S$PBB,, | Performed by: OPHTHALMOLOGY

## 2018-09-24 PROCEDURE — 92012 INTRM OPH EXAM EST PATIENT: CPT | Mod: S$PBB,,, | Performed by: OPHTHALMOLOGY

## 2018-09-24 PROCEDURE — 99999 PR PBB SHADOW E&M-EST. PATIENT-LVL II: CPT | Mod: PBBFAC,,, | Performed by: OPHTHALMOLOGY

## 2018-09-24 RX ORDER — METFORMIN HYDROCHLORIDE 1000 MG/1
1000 TABLET ORAL 2 TIMES DAILY WITH MEALS
Qty: 180 TABLET | Refills: 3 | Status: SHIPPED | OUTPATIENT
Start: 2018-09-24 | End: 2019-08-23 | Stop reason: DRUGHIGH

## 2018-09-30 ENCOUNTER — EXTERNAL CHRONIC CARE MANAGEMENT (OUTPATIENT)
Dept: PRIMARY CARE CLINIC | Facility: CLINIC | Age: 73
End: 2018-09-30
Payer: MEDICARE

## 2018-09-30 PROCEDURE — 99490 CHRNC CARE MGMT STAFF 1ST 20: CPT | Mod: PBBFAC | Performed by: INTERNAL MEDICINE

## 2018-09-30 PROCEDURE — 99490 CHRNC CARE MGMT STAFF 1ST 20: CPT | Mod: S$PBB,,, | Performed by: INTERNAL MEDICINE

## 2018-10-31 ENCOUNTER — EXTERNAL CHRONIC CARE MANAGEMENT (OUTPATIENT)
Dept: PRIMARY CARE CLINIC | Facility: CLINIC | Age: 73
End: 2018-10-31
Payer: MEDICARE

## 2018-10-31 PROCEDURE — 99490 CHRNC CARE MGMT STAFF 1ST 20: CPT | Mod: PBBFAC | Performed by: INTERNAL MEDICINE

## 2018-10-31 PROCEDURE — 99490 CHRNC CARE MGMT STAFF 1ST 20: CPT | Mod: S$PBB,,, | Performed by: INTERNAL MEDICINE

## 2018-11-15 RX ORDER — ATORVASTATIN CALCIUM 10 MG/1
TABLET, FILM COATED ORAL
Qty: 90 TABLET | Refills: 0 | Status: SHIPPED | OUTPATIENT
Start: 2018-11-15 | End: 2019-03-04 | Stop reason: SDUPTHER

## 2018-11-23 ENCOUNTER — HOSPITAL ENCOUNTER (EMERGENCY)
Facility: OTHER | Age: 73
Discharge: HOME | End: 2018-11-23
Attending: EMERGENCY MEDICINE
Payer: MEDICARE

## 2018-11-23 VITALS
TEMPERATURE: 98 F | HEIGHT: 60 IN | RESPIRATION RATE: 20 BRPM | BODY MASS INDEX: 34.16 KG/M2 | WEIGHT: 174 LBS | DIASTOLIC BLOOD PRESSURE: 95 MMHG | HEART RATE: 98 BPM | OXYGEN SATURATION: 98 % | SYSTOLIC BLOOD PRESSURE: 172 MMHG

## 2018-11-23 DIAGNOSIS — R21 RASH: Primary | ICD-10-CM

## 2018-11-23 PROCEDURE — 99283 EMERGENCY DEPT VISIT LOW MDM: CPT

## 2018-11-23 RX ORDER — SELENIUM SULFIDE 2.5 MG/100ML
LOTION TOPICAL
Qty: 118 ML | Refills: 0 | Status: SHIPPED | OUTPATIENT
Start: 2018-11-23 | End: 2022-08-12

## 2018-11-23 NOTE — ED PROVIDER NOTES
Encounter Date: 11/23/2018       History     Chief Complaint   Patient presents with    Itching     with redness and burning x 1.5 weeks to chest, bilateral arms and anus.      Patient is a 73-year-old female with history of diabetes, hypertension, hyperlipidemia, osteoporosis, and asthma who presents to the emergency department with rash. Patient states over the last weeks she has had itching to her upper buttocks.  Patient states the itching is becoming so severe.  She states she has applied hydrogen peroxide, steroids, and Monistat with no relief of her symptoms. She states the itching is becoming so severe that she feels like she has developed a rash on her whole body.  She denies any changes in lotions, soaps, laundry detergents, or any other body products.  She denies any recent fevers or chills. She denies any new medications.  She denies fevers or chills.  She denies any other systemic symptoms.      The history is provided by the patient.     Review of patient's allergies indicates:   Allergen Reactions    Penicillins Rash     Past Medical History:   Diagnosis Date    ALLERGIC RHINITIS 8/17/2012    Asthma, well controlled 8/17/2012    Chronic asthma     Chronic rhinitis     Diabetes 2/4/2014    Diabetes mellitus     Diabetes mellitus type II     Fever blister     GERD (gastroesophageal reflux disease) 8/17/2012    Glaucoma     Hyperlipemia     Hypertension     Obstructive sleep apnea     Osteoporosis, unspecified      Past Surgical History:   Procedure Laterality Date    BLADDER SURGERY      BREAST BIOPSY      CATARACT EXTRACTION W/  INTRAOCULAR LENS IMPLANT Left 1/28/2015    OS with trab (Dr. Wiley)    CATARACT EXTRACTION W/  INTRAOCULAR LENS IMPLANT Right 3/30/16    OD (Dr. Wiley) trab     CATARACT EXTRACTION W/ INTRAOCULAR LENS IMPLANTW/ TRABECULECTOMY Left 1/28/15    combined CE and trab with express mini shunt ()    COLONOSCOPY N/A 7/1/2016    Procedure:  COLONOSCOPY;  Surgeon: Rony Lima MD;  Location: [facility] (4TH FLR);  Service: Endoscopy;  Laterality: N/A;    COLONOSCOPY N/A 7/1/2016    Performed by Rony Lima MD at [facility] (4TH FLR)    COLONOSCOPY N/A 4/9/2013    Performed by Rony Lima MD at [facility] (4TH FLR)    HYSTERECTOMY      INSERTION- SHUNT Right 3/30/2016    Performed by Sanna Wiley MD at [facility] OR 1ST FLR    INSERTION-INTRAOCULAR LENS (IOL) Right 3/30/2016    Performed by Sanna Wiley MD at [facility] OR 1ST FLR    INSERTION-INTRAOCULAR LENS (IOL) Left 1/28/2015    Performed by Sanna Wiley MD at [facility] OR [address] [place] [address]    PHACOEMULSIFICATION-ASPIRATION-CATARACT Right 3/30/2016    Performed by Sanna Wiley MD at [facility] OR 1ST FLR    PHACOEMULSIFICATION-ASPIRATION-CATARACT Left 1/28/2015    [facility]    stomach procedure      TRABECULECTOMY Right 3/30/2016    Performed by Sanna [facility] MD at [facility] 1ST FLR    TRABECULECTOMY Left 1/28/2015    Performed by Sanna Wiley MD at [facility] OR [address] [place] [address]     Family History   Problem Relation Age of Onset    Glaucoma Mother     Glaucoma Sister     No Known Problems Father     No Known Problems Brother     No Known Problems Maternal Aunt     No Known Problems Maternal Uncle     No Known Problems Paternal Aunt     No Known Problems Paternal Uncle     No Known Problems Maternal Grandmother     No Known Problems Maternal Grandfather     No Known Problems Paternal Grandmother     No Known Problems Paternal Grandfather     Melanoma Neg Hx     Psoriasis Neg Hx     Lupus Neg Hx     Eczema Neg Hx     Acne Neg Hx     Blindness Neg Hx     Macular degeneration Neg Hx     Retinal detachment Neg Hx     Amblyopia Neg Hx     Cancer Neg Hx     Cataracts Neg Hx     Diabetes Neg Hx     Hypertension Neg Hx     Strabismus Neg Hx     Stroke Neg Hx     Thyroid disease Neg Hx      Social History      Tobacco Use    Smoking status: Former Smoker    Smokeless tobacco: Never Used   Substance Use Topics    Alcohol use: Yes     Comment: socially    Drug use: No     Review of Systems   Constitutional: Negative for activity change, appetite change, chills, fatigue and fever.   HENT: Negative for congestion, ear discharge, ear pain, postnasal drip, rhinorrhea, sore throat and trouble swallowing.    Respiratory: Negative for cough and shortness of breath.    Cardiovascular: Negative for chest pain.   Gastrointestinal: Negative for abdominal pain, blood in stool, constipation, diarrhea, nausea and vomiting.   Genitourinary: Negative for dysuria, flank pain and hematuria.   Musculoskeletal: Negative for back pain, neck pain and neck stiffness.   Skin: Positive for rash. Negative for wound.   Neurological: Negative for dizziness, speech difficulty, weakness, light-headedness, numbness and headaches.       Physical Exam     Initial Vitals [11/23/18 1000]   BP Pulse Resp Temp SpO2   (!) 176/81 99 16 98.7 °F (37.1 °C) 97 %      MAP       --         Physical Exam    Nursing note and vitals reviewed.  Constitutional: She appears well-developed and well-nourished. She is not diaphoretic.  Non-toxic appearance. No distress.   HENT:   Head: Normocephalic.   Right Ear: Hearing and external ear normal.   Left Ear: Hearing and external ear normal.   Nose: Nose normal.   Mouth/Throat: Uvula is midline, oropharynx is clear and moist and mucous membranes are normal. No oropharyngeal exudate.   Eyes: Conjunctivae are normal. Pupils are equal, round, and reactive to light.   Neck: Normal range of motion.   Cardiovascular: Normal rate and regular rhythm.   Pulmonary/Chest: Breath sounds normal.   Abdominal: Soft. Bowel sounds are normal. There is no tenderness.   Genitourinary: Rectal exam shows external hemorrhoid. Rectal exam shows no mass.   Lymphadenopathy:     She has no cervical adenopathy.   Neurological: She is alert and  oriented to person, place, and time.   Skin: Skin is warm and dry. Capillary refill takes less than 2 seconds.        Psychiatric: She has a normal mood and affect.         ED Course   Procedures  Labs Reviewed - No data to display       Imaging Results    None          Medical Decision Making:   Initial Assessment:   Urgent evaluation of a 73-year-old female with extensive medical history who presents to the emergency department with itching.  Patient is afebrile, nontoxic appearing, and hemodynamically stable. On exam, there is a large mildly erythematous area to the upper buttocks with no open wounds.  There is hypopigmentation of skin.  No other rashes noted to body.  I suspect fungal infection.  Patient will be given topical antifungal.  She is advised to keep the area dry and clean.  She is advised to follow up with Dermatology if symptoms persist.  She is advised to return to the emergency department with any worsening symptoms or concerns.                      Clinical Impression:   The encounter diagnosis was Rash.                             Sari Rajput PA-C  11/23/18 1145

## 2018-11-23 NOTE — ED NOTES
ROUNDING:  Lying on the stretcher with HOB elevated. AAOx4. States pain 0/10. C/o itching to chest, R arm & buttocks. Denies difficulty swallowing, oral swelling, cp, sob, dizziness, lightheadedness or any other discomfort at this time. Skin is warm and dry. Resp:20 even and unlabored. Comfort and BR needs addressed. Plan of care updated. NADN. Bed locked in low position, side rails up x2 and call light within reach. Warm blanket provided. Will continue to monitor.

## 2018-11-26 ENCOUNTER — OFFICE VISIT (OUTPATIENT)
Dept: DERMATOLOGY | Facility: CLINIC | Age: 73
End: 2018-11-26
Payer: MEDICARE

## 2018-11-26 ENCOUNTER — TELEPHONE (OUTPATIENT)
Dept: DERMATOLOGY | Facility: CLINIC | Age: 73
End: 2018-11-26

## 2018-11-26 DIAGNOSIS — L30.9 ECZEMA, UNSPECIFIED TYPE: Primary | ICD-10-CM

## 2018-11-26 PROCEDURE — 99202 OFFICE O/P NEW SF 15 MIN: CPT | Mod: S$PBB,,, | Performed by: DERMATOLOGY

## 2018-11-26 PROCEDURE — 99999 PR PBB SHADOW E&M-EST. PATIENT-LVL II: CPT | Mod: PBBFAC,,, | Performed by: DERMATOLOGY

## 2018-11-26 PROCEDURE — 99212 OFFICE O/P EST SF 10 MIN: CPT | Mod: PBBFAC | Performed by: DERMATOLOGY

## 2018-11-26 RX ORDER — CLOBETASOL PROPIONATE 0.5 MG/G
OINTMENT TOPICAL
Qty: 60 G | Refills: 2 | Status: SHIPPED | OUTPATIENT
Start: 2018-11-26 | End: 2021-02-22 | Stop reason: ALTCHOICE

## 2018-11-26 RX ORDER — HYDROXYZINE HYDROCHLORIDE 25 MG/1
25 TABLET, FILM COATED ORAL NIGHTLY
Qty: 30 TABLET | Refills: 2 | Status: SHIPPED | OUTPATIENT
Start: 2018-11-26 | End: 2018-12-26

## 2018-11-26 NOTE — TELEPHONE ENCOUNTER
Spoke with pt and she stated that she has a rash that is getting worse. I explain to the pt that no other dermatologist has any availability for today because they are all returning from the thanksgiving holiday and advised the pt to go the nearest ER or to an urgent care until she can be seen on tomorrow. Pt stated that she went to the ER on Friday and the rash is still worse. Pt requested to speak with a supervisor so that she can be seen on today for an appointment.

## 2018-11-26 NOTE — PROGRESS NOTES
Subjective:       Patient ID:  Lupe Jewell is a 73 y.o. female who presents for   Chief Complaint   Patient presents with    Rash     body     Pt presents today for a rash on the body for a week; it started around the anus and has spread to the neck, arms.  She is itching all over.  History of sensitive skin in the past. Has tried treating with peroxide and Monistat cream. She has fecal incontinence and wears a padded undergarment during the day for this.  She does take care of grandkids but no known pinworm exposure nor recent travel history. No new soaps or detergents but she does use fragranced soaps and detergents.    She is very uncomfortable due to the itching.            Review of Systems   Skin: Positive for itching and rash.   Hematologic/Lymphatic: Does not bruise/bleed easily.        Objective:    Physical Exam   Constitutional: She appears well-developed and well-nourished. No distress.   Neurological: She is alert and oriented to person, place, and time. She is not disoriented.   Psychiatric: She has a normal mood and affect.   Skin:   Areas Examined (abnormalities noted in diagram):   Scalp / Hair Palpated and Inspected  Head / Face Inspection Performed  Neck Inspection Performed  Chest / Axilla Inspection Performed  Abdomen Inspection Performed  Genitals / Buttocks / Groin Inspection Performed  Back Inspection Performed  RUE Inspected  LUE Inspection Performed  RLE Inspected  LLE Inspection Performed  Nails and Digits Inspection Performed              Diagram Legend     Erythematous scaling macule/papule c/w actinic keratosis       Vascular papule c/w angioma      Pigmented verrucoid papule/plaque c/w seborrheic keratosis      Yellow umbilicated papule c/w sebaceous hyperplasia      Irregularly shaped tan macule c/w lentigo     1-2 mm smooth white papules consistent with Milia      Movable subcutaneous cyst with punctum c/w epidermal inclusion cyst      Subcutaneous movable cyst c/w pilar cyst       Firm pink to brown papule c/w dermatofibroma      Pedunculated fleshy papule(s) c/w skin tag(s)      Evenly pigmented macule c/w junctional nevus     Mildly variegated pigmented, slightly irregular-bordered macule c/w mildly atypical nevus      Flesh colored to evenly pigmented papule c/w intradermal nevus       Pink pearly papule/plaque c/w basal cell carcinoma      Erythematous hyperkeratotic cursted plaque c/w SCC      Surgical scar with no sign of skin cancer recurrence      Open and closed comedones      Inflammatory papules and pustules      Verrucoid papule consistent consistent with wart     Erythematous eczematous patches and plaques     Dystrophic onycholytic nail with subungual debris c/w onychomycosis     Umbilicated papule    Erythematous-base heme-crusted tan verrucoid plaque consistent with inflamed seborrheic keratosis     Erythematous Silvery Scaling Plaque c/w Psoriasis     See annotation      Assessment / Plan:        Eczema/pruritis ani -   -     clobetasol 0.05% (TEMOVATE) 0.05 % Oint; To rash on chest and buttocks area  Dispense: 60 g; Refill:   -     hydrOXYzine HCl (ATARAX) 25 MG tablet; Take 1 tablet (25 mg total) by mouth every evening. Prn pruritus. Do not drive or operate machinery while taking this medicine.  Dispense: 30 tablet; Refill: 2  I advised changing to a fragrance free bar soap or body wash such as Dove, and fragrance free detergent such as Tide Free & Clear, for sensitive skin.      Due to location of itching starting around the anus, I advised patient do a scotch tape test overnight - wear scotch tape over anus and check for pinworms in morning.  If worms noted, patient instructed to call for RX or use OTC pinworm Cholo's medication.           Follow-up if symptoms worsen or fail to improve.

## 2018-11-26 NOTE — TELEPHONE ENCOUNTER
----- Message from Porsche Ruffin sent at 11/26/2018  8:09 AM CST -----  Contact: self  Patient states has  rash whole body itching all over body with burning.   Patient has appointment scheduled for tomorrow patient  need appointment for today.  Please call  patient [phone]

## 2018-11-30 ENCOUNTER — EXTERNAL CHRONIC CARE MANAGEMENT (OUTPATIENT)
Dept: PRIMARY CARE CLINIC | Facility: CLINIC | Age: 73
End: 2018-11-30
Payer: MEDICARE

## 2018-11-30 PROCEDURE — 99490 CHRNC CARE MGMT STAFF 1ST 20: CPT | Mod: S$PBB,,, | Performed by: INTERNAL MEDICINE

## 2018-11-30 PROCEDURE — 99490 CHRNC CARE MGMT STAFF 1ST 20: CPT | Mod: PBBFAC | Performed by: INTERNAL MEDICINE

## 2018-12-09 DIAGNOSIS — G47.00 INSOMNIA, UNSPECIFIED TYPE: ICD-10-CM

## 2018-12-10 RX ORDER — AMITRIPTYLINE HYDROCHLORIDE 25 MG/1
TABLET, FILM COATED ORAL
Qty: 30 TABLET | Refills: 3 | Status: SHIPPED | OUTPATIENT
Start: 2018-12-10 | End: 2019-02-13 | Stop reason: SDUPTHER

## 2018-12-29 DIAGNOSIS — L30.9 DERMATITIS: ICD-10-CM

## 2018-12-31 ENCOUNTER — EXTERNAL CHRONIC CARE MANAGEMENT (OUTPATIENT)
Dept: PRIMARY CARE CLINIC | Facility: CLINIC | Age: 73
End: 2018-12-31
Payer: MEDICARE

## 2018-12-31 PROCEDURE — 99490 CHRNC CARE MGMT STAFF 1ST 20: CPT | Mod: PBBFAC | Performed by: INTERNAL MEDICINE

## 2018-12-31 PROCEDURE — 99490 CHRNC CARE MGMT STAFF 1ST 20: CPT | Mod: S$PBB,,, | Performed by: INTERNAL MEDICINE

## 2018-12-31 PROCEDURE — 99490 PR CHRONIC CARE MGMT, 1ST 20 MIN: ICD-10-PCS | Mod: S$PBB,,, | Performed by: INTERNAL MEDICINE

## 2019-01-07 RX ORDER — FLUOCINONIDE 0.5 MG/G
CREAM TOPICAL
Qty: 60 G | Refills: 0 | Status: SHIPPED | OUTPATIENT
Start: 2019-01-07 | End: 2019-07-08 | Stop reason: SDUPTHER

## 2019-01-27 NOTE — PROGRESS NOTES
Assessment /Plan     For exam results, see Encounter Report.    Primary open angle glaucoma of left eye, moderate stage    Primary open angle glaucoma of right eye, severe stage    Dry eyes, bilateral    Type 2 diabetes mellitus without ophthalmic manifestations    Posterior vitreous detachment of both eyes - Both Eyes    Pseudophakia of both eyes    Glaucoma filtering bleb of both eyes        1. POAG (primary open-angle glaucoma) - Severe stage - Both Eyes   Glaucoma (type and duration) POAG,   -  first HVF 1997  -  first photo: 1996     Family history None   Glaucoma meds - (off all gtts os post combined)  (( use to use - Alphagan od , Xalatan od , dorzolamide bid od ))  H/O adverse rxn to glaucoma drops into to BB 2/2 asthma   LASERS SLT 12/8/05 and repeat 7/16/09 OD, and SLT 1/6/06 OS;  SLT os 7/31/14, od 8/14/14 (no response ou)   GLAUCOMA SURGERIES - combined phaco/IOL trab -os 12/28/2015// elastic sclera - 9 sutures to close - all visible ones cut /// combined - phaco/trab w/ minishunt od - 3/30/2016  OTHER EYE SURGERIES PC IOL OS (combined 1/28/2015) // PC IOL od (combined 3/30/2016)  CDR 0.95 OU   Tbase 22 OU   Tmax 37 (4/11/2016) /38 ( 4/16/2015)   Ttarget 14/14  HVF 20 HVF: 6769-5529 - SAD with new IAD OD, Sad/iad os  Gonio +3 OU   /421   OCT 8 test 2004 - 2017: Dec S/I/T OD// Dec S/T/I,bord N   test 2005 to 2018 -MR -  ? [place] off  od // dec. S/N,, bord T/I os /// CDR ? [place] off  od // 0.80 os  Disc photos 1996, 1997, 2003: slides // 2012 , 2015 - OIS    Ttoday  14/14  Test done today: gonio      2. Diabetes mellitus type II   No BDR     3. Dry eyes - Both Eyes   AT's prn     4. Posterior vitreous detachment of both eyes - Both Eyes   With floaters - stable     5. A lot of family stressors   Mom passed since last visit  dad elderly and in poor health, have sitters  Daughter is unemployed and had to move back home  Daughter recently ill - in ICU and intubated 2/2 pancreatitis  (8/2/2015 to 8/11/2015)          Plan    Stay off  all glaucoma gtts OU for now - can add back if IOP goes above target of 14 ou   IOP OK ou off all gtts post trabs ou // OD w/mini shunt // OS without mini shunt (very elastic sclera)     Cont AT's ou prn     -  consider yag laser to ant capsule has significant  Ant capsule phimosis    Glasses - heidi 8/25/2016 - doing well     F/U 4 months HVF / DFE / OCT

## 2019-01-28 ENCOUNTER — OFFICE VISIT (OUTPATIENT)
Dept: OPHTHALMOLOGY | Facility: CLINIC | Age: 74
End: 2019-01-28
Payer: MEDICARE

## 2019-01-28 DIAGNOSIS — Z98.83 GLAUCOMA FILTERING BLEB OF BOTH EYES: ICD-10-CM

## 2019-01-28 DIAGNOSIS — H40.1113 PRIMARY OPEN ANGLE GLAUCOMA OF RIGHT EYE, SEVERE STAGE: ICD-10-CM

## 2019-01-28 DIAGNOSIS — E11.9 TYPE 2 DIABETES MELLITUS WITHOUT OPHTHALMIC MANIFESTATIONS: ICD-10-CM

## 2019-01-28 DIAGNOSIS — H40.1122 PRIMARY OPEN ANGLE GLAUCOMA OF LEFT EYE, MODERATE STAGE: Primary | ICD-10-CM

## 2019-01-28 DIAGNOSIS — H04.123 DRY EYES, BILATERAL: ICD-10-CM

## 2019-01-28 DIAGNOSIS — H43.813 POSTERIOR VITREOUS DETACHMENT OF BOTH EYES: ICD-10-CM

## 2019-01-28 DIAGNOSIS — Z96.1 PSEUDOPHAKIA OF BOTH EYES: ICD-10-CM

## 2019-01-28 PROCEDURE — 99999 PR PBB SHADOW E&M-EST. PATIENT-LVL II: ICD-10-PCS | Mod: PBBFAC,,, | Performed by: OPHTHALMOLOGY

## 2019-01-28 PROCEDURE — 92012 INTRM OPH EXAM EST PATIENT: CPT | Mod: S$PBB,,, | Performed by: OPHTHALMOLOGY

## 2019-01-28 PROCEDURE — 99999 PR PBB SHADOW E&M-EST. PATIENT-LVL II: CPT | Mod: PBBFAC,,, | Performed by: OPHTHALMOLOGY

## 2019-01-28 PROCEDURE — 99212 OFFICE O/P EST SF 10 MIN: CPT | Mod: PBBFAC | Performed by: OPHTHALMOLOGY

## 2019-01-28 PROCEDURE — 92012 PR EYE EXAM, EST PATIENT,INTERMED: ICD-10-PCS | Mod: S$PBB,,, | Performed by: OPHTHALMOLOGY

## 2019-01-31 ENCOUNTER — EXTERNAL CHRONIC CARE MANAGEMENT (OUTPATIENT)
Dept: PRIMARY CARE CLINIC | Facility: CLINIC | Age: 74
End: 2019-01-31
Payer: MEDICARE

## 2019-01-31 PROCEDURE — 99490 CHRNC CARE MGMT STAFF 1ST 20: CPT | Mod: S$PBB,,, | Performed by: INTERNAL MEDICINE

## 2019-01-31 PROCEDURE — 99490 CHRNC CARE MGMT STAFF 1ST 20: CPT | Mod: PBBFAC | Performed by: INTERNAL MEDICINE

## 2019-01-31 PROCEDURE — 99490 PR CHRONIC CARE MGMT, 1ST 20 MIN: ICD-10-PCS | Mod: S$PBB,,, | Performed by: INTERNAL MEDICINE

## 2019-02-13 DIAGNOSIS — G47.00 INSOMNIA, UNSPECIFIED TYPE: ICD-10-CM

## 2019-02-13 RX ORDER — AMITRIPTYLINE HYDROCHLORIDE 25 MG/1
TABLET, FILM COATED ORAL
Qty: 90 TABLET | Refills: 3 | Status: SHIPPED | OUTPATIENT
Start: 2019-02-13 | End: 2020-04-29

## 2019-02-28 ENCOUNTER — EXTERNAL CHRONIC CARE MANAGEMENT (OUTPATIENT)
Dept: PRIMARY CARE CLINIC | Facility: CLINIC | Age: 74
End: 2019-02-28
Payer: MEDICARE

## 2019-02-28 PROCEDURE — 99490 CHRNC CARE MGMT STAFF 1ST 20: CPT | Mod: PBBFAC | Performed by: INTERNAL MEDICINE

## 2019-02-28 PROCEDURE — 99490 CHRNC CARE MGMT STAFF 1ST 20: CPT | Mod: S$PBB,,, | Performed by: INTERNAL MEDICINE

## 2019-02-28 PROCEDURE — 99490 PR CHRONIC CARE MGMT, 1ST 20 MIN: ICD-10-PCS | Mod: S$PBB,,, | Performed by: INTERNAL MEDICINE

## 2019-03-07 ENCOUNTER — TELEPHONE (OUTPATIENT)
Dept: INTERNAL MEDICINE | Facility: CLINIC | Age: 74
End: 2019-03-07

## 2019-03-07 RX ORDER — ATORVASTATIN CALCIUM 10 MG/1
TABLET, FILM COATED ORAL
Qty: 90 TABLET | Refills: 0 | Status: SHIPPED | OUTPATIENT
Start: 2019-03-07 | End: 2019-06-10 | Stop reason: SDUPTHER

## 2019-03-07 RX ORDER — MONTELUKAST SODIUM 10 MG/1
TABLET ORAL
Qty: 90 TABLET | Refills: 1 | Status: SHIPPED | OUTPATIENT
Start: 2019-03-07 | End: 2019-09-09 | Stop reason: SDUPTHER

## 2019-03-31 ENCOUNTER — EXTERNAL CHRONIC CARE MANAGEMENT (OUTPATIENT)
Dept: PRIMARY CARE CLINIC | Facility: CLINIC | Age: 74
End: 2019-03-31
Payer: MEDICARE

## 2019-03-31 PROCEDURE — 99490 CHRNC CARE MGMT STAFF 1ST 20: CPT | Mod: S$PBB,,, | Performed by: INTERNAL MEDICINE

## 2019-03-31 PROCEDURE — 99490 CHRNC CARE MGMT STAFF 1ST 20: CPT | Mod: PBBFAC | Performed by: INTERNAL MEDICINE

## 2019-03-31 PROCEDURE — 99490 PR CHRONIC CARE MGMT, 1ST 20 MIN: ICD-10-PCS | Mod: S$PBB,,, | Performed by: INTERNAL MEDICINE

## 2019-04-16 DIAGNOSIS — J30.1 ACUTE NONSEASONAL ALLERGIC RHINITIS DUE TO POLLEN: ICD-10-CM

## 2019-04-17 RX ORDER — FLUTICASONE PROPIONATE 50 MCG
SPRAY, SUSPENSION (ML) NASAL
Qty: 32 ML | Refills: 0 | Status: SHIPPED | OUTPATIENT
Start: 2019-04-17 | End: 2019-07-11 | Stop reason: SDUPTHER

## 2019-04-25 ENCOUNTER — PES CALL (OUTPATIENT)
Dept: ADMINISTRATIVE | Facility: CLINIC | Age: 74
End: 2019-04-25

## 2019-04-30 ENCOUNTER — EXTERNAL CHRONIC CARE MANAGEMENT (OUTPATIENT)
Dept: PRIMARY CARE CLINIC | Facility: CLINIC | Age: 74
End: 2019-04-30
Payer: MEDICARE

## 2019-04-30 PROCEDURE — 99490 CHRNC CARE MGMT STAFF 1ST 20: CPT | Mod: S$PBB,,, | Performed by: INTERNAL MEDICINE

## 2019-04-30 PROCEDURE — 99490 PR CHRONIC CARE MGMT, 1ST 20 MIN: ICD-10-PCS | Mod: S$PBB,,, | Performed by: INTERNAL MEDICINE

## 2019-04-30 PROCEDURE — 99490 CHRNC CARE MGMT STAFF 1ST 20: CPT | Mod: PBBFAC | Performed by: INTERNAL MEDICINE

## 2019-05-01 ENCOUNTER — TELEPHONE (OUTPATIENT)
Dept: INTERNAL MEDICINE | Facility: CLINIC | Age: 74
End: 2019-05-01

## 2019-05-01 DIAGNOSIS — Z12.31 ENCOUNTER FOR SCREENING MAMMOGRAM FOR BREAST CANCER: Primary | ICD-10-CM

## 2019-05-01 DIAGNOSIS — R93.9 DIAGNOSTIC IMAGING INCONCLUSIVE DUE TO EXCESS BODY FAT OF PATIENT: ICD-10-CM

## 2019-05-07 ENCOUNTER — HOSPITAL ENCOUNTER (OUTPATIENT)
Dept: RADIOLOGY | Facility: HOSPITAL | Age: 74
Discharge: HOME | End: 2019-05-07
Attending: INTERNAL MEDICINE
Payer: MEDICARE

## 2019-05-07 DIAGNOSIS — R93.9 DIAGNOSTIC IMAGING INCONCLUSIVE DUE TO EXCESS BODY FAT OF PATIENT: ICD-10-CM

## 2019-05-07 DIAGNOSIS — Z12.31 ENCOUNTER FOR SCREENING MAMMOGRAM FOR BREAST CANCER: ICD-10-CM

## 2019-05-07 PROCEDURE — 77067 SCR MAMMO BI INCL CAD: CPT | Mod: 26,,, | Performed by: RADIOLOGY

## 2019-05-07 PROCEDURE — 77063 BREAST TOMOSYNTHESIS BI: CPT | Mod: 26,,, | Performed by: RADIOLOGY

## 2019-05-07 PROCEDURE — 77063 MAMMO DIGITAL SCREENING BILAT WITH TOMOSYNTHESIS_CAD: ICD-10-PCS | Mod: 26,,, | Performed by: RADIOLOGY

## 2019-05-07 PROCEDURE — 77067 MAMMO DIGITAL SCREENING BILAT WITH TOMOSYNTHESIS_CAD: ICD-10-PCS | Mod: 26,,, | Performed by: RADIOLOGY

## 2019-05-07 PROCEDURE — 77067 SCR MAMMO BI INCL CAD: CPT | Mod: TC,PO

## 2019-05-28 NOTE — PROGRESS NOTES
HPI     Glaucoma      Additional comments: HVF and OCT review today and pt states no   complaints today              Comments     DLS: 1/28/19    1) POAG  2) PCIOL OU  3) Type 2 DM NO DR  4) LOLA  5) PVD OU  6) S/P Trabs  ou - off gtts post surgery     MEDS:  Pres Free AT's  PRN OU              Last edited by Sanna Wiley MD on 5/30/2019  1:42 PM. (History)              Assessment /Plan     For exam results, see Encounter Report.    Primary open angle glaucoma of left eye, moderate stage    Primary open angle glaucoma of right eye, severe stage    Dry eyes, bilateral    Type 2 diabetes mellitus without ophthalmic manifestations    Posterior vitreous detachment of both eyes - Both Eyes    Pseudophakia of both eyes    Glaucoma filtering bleb of both eyes          1. POAG (primary open-angle glaucoma) - Severe stage - Both Eyes   Glaucoma (type and duration) POAG,   -  first HVF 1997  -  first photo: 1996     Family history None   Glaucoma meds - (off all gtts os post combined)  (( use to use - Alphagan od , Xalatan od , dorzolamide bid od ))  H/O adverse rxn to glaucoma drops into to BB 2/2 asthma   LASERS SLT 12/8/05 and repeat 7/16/09 OD, and SLT 1/6/06 OS;  SLT os 7/31/14, od 8/14/14 (no response ou)   GLAUCOMA SURGERIES - combined phaco/IOL trab -os 12/28/2015// elastic sclera - 9 sutures to close - all visible ones cut /// combined - phaco/trab w/ minishunt od - 3/30/2016  OTHER EYE SURGERIES PC IOL OS (combined 1/28/2015) // PC IOL od (combined 3/30/2016)  CDR 0.95 OU   Tbase 22 OU   Tmax 37 (4/11/2016) /38 ( 4/16/2015)   Ttarget 14/14  HVF 21 HVF: 0849-5469 - SAD and  INS  OD, // SAD / IAD  os  Gonio +3 OU   /421   OCT 9 test 2004 - 2019: Dec S/I/T OD// Dec thru out    test 2005 to 2018 -MR -  ? [place] off  od // dec. S/N,, bord T/I os /// CDR ? [place] off  od // 0.80 os  Disc photos 1996, 1997, 2003: slides // 2012 , 2015 - OIS    Ttoday  13/14  Test done today: HVF / GARYE / OCT      2.  Diabetes mellitus type II   No BDR     3. Dry eyes - Both Eyes   AT's prn     4. Posterior vitreous detachment of both eyes - Both Eyes   With floaters - stable     5. A lot of family stressors   Mom passed since last visit  dad elderly and in poor health, have sitters  Daughter is unemployed and had to move back home  Daughter recently ill - in ICU and intubated 2/2 pancreatitis (8/2/2015 to 8/11/2015)          Plan    Stay off  all glaucoma gtts OU for now - can add back if IOP goes above target of 14 ou   IOP OK ou off all gtts post trabs ou // OD w/mini shunt // OS without mini shunt (very elastic sclera)     Cont AT's ou prn     -  consider yag laser to PCO - left eye and also has significant  Ant capsule phimosis    Glasses - heidi 8/25/2016 - doing well     F/U 4 months HRT

## 2019-05-30 ENCOUNTER — CLINICAL SUPPORT (OUTPATIENT)
Dept: OPHTHALMOLOGY | Facility: CLINIC | Age: 74
End: 2019-05-30
Attending: OPHTHALMOLOGY
Payer: MEDICARE

## 2019-05-30 DIAGNOSIS — H43.813 POSTERIOR VITREOUS DETACHMENT OF BOTH EYES: ICD-10-CM

## 2019-05-30 DIAGNOSIS — H40.1113 PRIMARY OPEN ANGLE GLAUCOMA OF RIGHT EYE, SEVERE STAGE: ICD-10-CM

## 2019-05-30 DIAGNOSIS — H40.1122 PRIMARY OPEN ANGLE GLAUCOMA OF LEFT EYE, MODERATE STAGE: Primary | ICD-10-CM

## 2019-05-30 DIAGNOSIS — Z98.83 GLAUCOMA FILTERING BLEB OF BOTH EYES: ICD-10-CM

## 2019-05-30 DIAGNOSIS — H04.123 DRY EYES, BILATERAL: ICD-10-CM

## 2019-05-30 DIAGNOSIS — E11.9 TYPE 2 DIABETES MELLITUS WITHOUT OPHTHALMIC MANIFESTATIONS: ICD-10-CM

## 2019-05-30 DIAGNOSIS — Z96.1 PSEUDOPHAKIA OF BOTH EYES: ICD-10-CM

## 2019-05-30 DIAGNOSIS — H40.1122 PRIMARY OPEN ANGLE GLAUCOMA OF LEFT EYE, MODERATE STAGE: ICD-10-CM

## 2019-05-30 PROCEDURE — 92014 PR EYE EXAM, EST PATIENT,COMPREHESV: ICD-10-PCS | Mod: S$PBB,,, | Performed by: OPHTHALMOLOGY

## 2019-05-30 PROCEDURE — 92083 EXTENDED VISUAL FIELD XM: CPT | Mod: 26,S$PBB,, | Performed by: OPHTHALMOLOGY

## 2019-05-30 PROCEDURE — 92083 HUMPHREY VISUAL FIELD - OU - BOTH EYES: ICD-10-PCS | Mod: 26,S$PBB,, | Performed by: OPHTHALMOLOGY

## 2019-05-30 PROCEDURE — 92014 COMPRE OPH EXAM EST PT 1/>: CPT | Mod: S$PBB,,, | Performed by: OPHTHALMOLOGY

## 2019-05-30 PROCEDURE — 99999 PR PBB SHADOW E&M-EST. PATIENT-LVL II: ICD-10-PCS | Mod: PBBFAC,,, | Performed by: OPHTHALMOLOGY

## 2019-05-30 PROCEDURE — 99212 OFFICE O/P EST SF 10 MIN: CPT | Mod: PBBFAC,25 | Performed by: OPHTHALMOLOGY

## 2019-05-30 PROCEDURE — 92083 EXTENDED VISUAL FIELD XM: CPT | Mod: PBBFAC

## 2019-05-30 PROCEDURE — 92133 POSTERIOR SEGMENT OCT OPTIC NERVE(OCULAR COHERENCE TOMOGRAPHY) - OU - BOTH EYES: ICD-10-PCS | Mod: 26,S$PBB,, | Performed by: OPHTHALMOLOGY

## 2019-05-30 PROCEDURE — 92133 CPTRZD OPH DX IMG PST SGM ON: CPT | Mod: 26,S$PBB,, | Performed by: OPHTHALMOLOGY

## 2019-05-30 PROCEDURE — 99999 PR PBB SHADOW E&M-EST. PATIENT-LVL II: CPT | Mod: PBBFAC,,, | Performed by: OPHTHALMOLOGY

## 2019-05-30 PROCEDURE — 92133 CPTRZD OPH DX IMG PST SGM ON: CPT | Mod: PBBFAC

## 2019-05-31 ENCOUNTER — EXTERNAL CHRONIC CARE MANAGEMENT (OUTPATIENT)
Dept: PRIMARY CARE CLINIC | Facility: CLINIC | Age: 74
End: 2019-05-31
Payer: MEDICARE

## 2019-05-31 PROCEDURE — 99490 CHRNC CARE MGMT STAFF 1ST 20: CPT | Mod: S$PBB,,, | Performed by: INTERNAL MEDICINE

## 2019-05-31 PROCEDURE — 99490 PR CHRONIC CARE MGMT, 1ST 20 MIN: ICD-10-PCS | Mod: S$PBB,,, | Performed by: INTERNAL MEDICINE

## 2019-05-31 PROCEDURE — 99490 CHRNC CARE MGMT STAFF 1ST 20: CPT | Mod: PBBFAC | Performed by: INTERNAL MEDICINE

## 2019-06-10 NOTE — PROGRESS NOTES
Subjective:       Patient ID: Lupe Jewell is a 72 y.o. female.    Chief Complaint: Annual Exam (check up ); Asthma (Shortness of breath, green mucous ); Headache; and Otalgia    HPIMiss Lupe is here today for her annual exam. Overall doing well, but she is currently dealing with asthma exacerbation for the ls few days. She is also experiencing sinus issues with congestion, ear pain and fatigue. She also reports being under much stress 2o to her daughter's declining health. I acknowledged this issues and offered some suggestions in that regard.  Review of Systems   Constitutional: Positive for fatigue. Negative for unexpected weight change.   HENT: Positive for congestion, ear pain and sinus pressure.    Respiratory: Positive for cough, shortness of breath and wheezing.    Cardiovascular: Negative for chest pain, palpitations and leg swelling.   Gastrointestinal: Negative for abdominal distention, abdominal pain and blood in stool.   Genitourinary: Negative for difficulty urinating.   Musculoskeletal: Negative for arthralgias, back pain and joint swelling.   Skin: Negative.    Neurological: Negative for dizziness, weakness, light-headedness and headaches.   Hematological: Negative.        Objective:      Physical Exam   Constitutional: She is oriented to person, place, and time. She appears well-developed and well-nourished. No distress.   HENT:   Head: Normocephalic and atraumatic.   Right Ear: External ear normal.   Left Ear: External ear normal.   Mouth/Throat: Oropharynx is clear and moist. No oropharyngeal exudate.   Eyes: Conjunctivae and EOM are normal. Pupils are equal, round, and reactive to light. Right eye exhibits no discharge. Left eye exhibits no discharge. No scleral icterus.   Neck: Normal range of motion. Neck supple. No JVD present. No thyromegaly present.   Cardiovascular: Normal rate, regular rhythm, normal heart sounds and intact distal pulses.   No murmur heard.  Pulmonary/Chest: Effort  normal. No respiratory distress. She has wheezes. She exhibits no tenderness.   Abdominal: Soft. Bowel sounds are normal. She exhibits no distension and no mass. There is no tenderness.   Musculoskeletal: Normal range of motion. She exhibits no edema or tenderness.   Lymphadenopathy:     She has no cervical adenopathy.   Neurological: She is alert and oriented to person, place, and time. No cranial nerve deficit. Coordination normal.   Skin: Skin is warm and dry. No rash noted. She is not diaphoretic. No erythema.   Psychiatric: She has a normal mood and affect. Her behavior is normal. Judgment and thought content normal.   Nursing note reviewed.      Assessment:       1. Routine general medical examination at a health care facility    2. Type 2 diabetes mellitus without complication, without long-term current use of insulin    3. Bronchitis    4. Moderate persistent asthma, unspecified whether complicated    5. Hyperlipidemia, unspecified hyperlipidemia type    6. Vitamin D deficiency    7. Fatigue, unspecified type    8. Exacerbation of asthma, unspecified asthma severity, unspecified whether persistent    9. Moderate persistent asthma with acute exacerbation        Plan:    1. Obtain labs.         2. Rx fr Doxycycline 100 mgs BID x 10 days.         3. Continue with current medications including inhalers as indicated.         4. Call with results.         5. RTC if not improved.   Detail Level: Detailed Quality 138: Melanoma: Coordination Of Care: A treatment plan was communicated to the physicians providing continuing care within one month of diagnosis outlining: diagnosis, tumor thickness and a plan for surgery or alternate care. Quality 137: Melanoma: Continuity Of Care - Recall System: Patient information entered into a recall system that includes: target date for the next exam specified AND a process to follow up with patients regarding missed or unscheduled appointments

## 2019-06-11 RX ORDER — ATORVASTATIN CALCIUM 10 MG/1
TABLET, FILM COATED ORAL
Qty: 90 TABLET | Refills: 0 | Status: SHIPPED | OUTPATIENT
Start: 2019-06-11 | End: 2019-09-09 | Stop reason: SDUPTHER

## 2019-06-30 ENCOUNTER — EXTERNAL CHRONIC CARE MANAGEMENT (OUTPATIENT)
Dept: PRIMARY CARE CLINIC | Facility: CLINIC | Age: 74
End: 2019-06-30
Payer: MEDICARE

## 2019-06-30 PROCEDURE — 99490 CHRNC CARE MGMT STAFF 1ST 20: CPT | Mod: PBBFAC | Performed by: INTERNAL MEDICINE

## 2019-06-30 PROCEDURE — 99490 PR CHRONIC CARE MGMT, 1ST 20 MIN: ICD-10-PCS | Mod: S$PBB,,, | Performed by: INTERNAL MEDICINE

## 2019-06-30 PROCEDURE — 99490 CHRNC CARE MGMT STAFF 1ST 20: CPT | Mod: S$PBB,,, | Performed by: INTERNAL MEDICINE

## 2019-07-08 DIAGNOSIS — L30.9 DERMATITIS: ICD-10-CM

## 2019-07-08 RX ORDER — FLUOCINONIDE 0.5 MG/G
CREAM TOPICAL
Qty: 60 G | Refills: 0 | Status: SHIPPED | OUTPATIENT
Start: 2019-07-08 | End: 2019-10-22 | Stop reason: SDUPTHER

## 2019-07-11 DIAGNOSIS — J45.41 MODERATE PERSISTENT ASTHMA WITH ACUTE EXACERBATION: ICD-10-CM

## 2019-07-11 DIAGNOSIS — J30.1 ACUTE NONSEASONAL ALLERGIC RHINITIS DUE TO POLLEN: ICD-10-CM

## 2019-07-11 DIAGNOSIS — J45.901 EXACERBATION OF ASTHMA, UNSPECIFIED ASTHMA SEVERITY, UNSPECIFIED WHETHER PERSISTENT: ICD-10-CM

## 2019-07-11 RX ORDER — IPRATROPIUM BROMIDE AND ALBUTEROL SULFATE 2.5; .5 MG/3ML; MG/3ML
SOLUTION RESPIRATORY (INHALATION)
Qty: 180 ML | Refills: 0 | OUTPATIENT
Start: 2019-07-11

## 2019-07-12 RX ORDER — FLUTICASONE PROPIONATE 50 MCG
SPRAY, SUSPENSION (ML) NASAL
Qty: 32 ML | Refills: 2 | Status: SHIPPED | OUTPATIENT
Start: 2019-07-12 | End: 2020-01-07 | Stop reason: SDUPTHER

## 2019-07-22 DIAGNOSIS — J45.901 EXACERBATION OF ASTHMA, UNSPECIFIED ASTHMA SEVERITY, UNSPECIFIED WHETHER PERSISTENT: ICD-10-CM

## 2019-07-22 DIAGNOSIS — J45.41 MODERATE PERSISTENT ASTHMA WITH ACUTE EXACERBATION: ICD-10-CM

## 2019-07-22 RX ORDER — IPRATROPIUM BROMIDE AND ALBUTEROL SULFATE 2.5; .5 MG/3ML; MG/3ML
3 SOLUTION RESPIRATORY (INHALATION) EVERY 6 HOURS PRN
Qty: 1 BOX | Refills: 6 | Status: SHIPPED | OUTPATIENT
Start: 2019-07-22 | End: 2021-07-13

## 2019-07-23 DIAGNOSIS — I10 HYPERTENSION, UNSPECIFIED TYPE: ICD-10-CM

## 2019-07-24 RX ORDER — TRIAMTERENE/HYDROCHLOROTHIAZID 37.5-25 MG
TABLET ORAL
Qty: 30 TABLET | Refills: 9 | Status: SHIPPED | OUTPATIENT
Start: 2019-07-24 | End: 2020-05-29

## 2019-07-31 ENCOUNTER — EXTERNAL CHRONIC CARE MANAGEMENT (OUTPATIENT)
Dept: PRIMARY CARE CLINIC | Facility: CLINIC | Age: 74
End: 2019-07-31
Payer: MEDICARE

## 2019-07-31 PROCEDURE — 99490 CHRNC CARE MGMT STAFF 1ST 20: CPT | Mod: S$PBB,,, | Performed by: INTERNAL MEDICINE

## 2019-07-31 PROCEDURE — 99490 CHRNC CARE MGMT STAFF 1ST 20: CPT | Mod: PBBFAC | Performed by: INTERNAL MEDICINE

## 2019-07-31 PROCEDURE — 99490 PR CHRONIC CARE MGMT, 1ST 20 MIN: ICD-10-PCS | Mod: S$PBB,,, | Performed by: INTERNAL MEDICINE

## 2019-08-23 ENCOUNTER — HOSPITAL ENCOUNTER (OUTPATIENT)
Dept: RADIOLOGY | Facility: HOSPITAL | Age: 74
Discharge: HOME | End: 2019-08-23
Attending: INTERNAL MEDICINE
Payer: MEDICARE

## 2019-08-23 ENCOUNTER — OFFICE VISIT (OUTPATIENT)
Dept: INTERNAL MEDICINE | Facility: CLINIC | Age: 74
End: 2019-08-23
Payer: MEDICARE

## 2019-08-23 DIAGNOSIS — M54.16 LEFT LUMBAR RADICULITIS: ICD-10-CM

## 2019-08-23 DIAGNOSIS — E55.9 VITAMIN D DEFICIENCY: ICD-10-CM

## 2019-08-23 DIAGNOSIS — Z00.00 ROUTINE GENERAL MEDICAL EXAMINATION AT A HEALTH CARE FACILITY: Primary | ICD-10-CM

## 2019-08-23 DIAGNOSIS — R05.9 COUGH: ICD-10-CM

## 2019-08-23 DIAGNOSIS — R53.83 FATIGUE, UNSPECIFIED TYPE: ICD-10-CM

## 2019-08-23 DIAGNOSIS — E11.9 TYPE 2 DIABETES MELLITUS WITHOUT COMPLICATION, WITHOUT LONG-TERM CURRENT USE OF INSULIN: ICD-10-CM

## 2019-08-23 DIAGNOSIS — M81.0 OSTEOPOROSIS, UNSPECIFIED OSTEOPOROSIS TYPE, UNSPECIFIED PATHOLOGICAL FRACTURE PRESENCE: ICD-10-CM

## 2019-08-23 PROCEDURE — 72100 X-RAY EXAM L-S SPINE 2/3 VWS: CPT | Mod: TC

## 2019-08-23 PROCEDURE — 72100 XR LUMBAR SPINE AP AND LAT WITH FLEX/EXT: ICD-10-PCS | Mod: 26,,, | Performed by: RADIOLOGY

## 2019-08-23 PROCEDURE — 72100 X-RAY EXAM L-S SPINE 2/3 VWS: CPT | Mod: 26,,, | Performed by: RADIOLOGY

## 2019-08-23 PROCEDURE — 72120 XR LUMBAR SPINE AP AND LAT WITH FLEX/EXT: ICD-10-PCS | Mod: 26,,, | Performed by: RADIOLOGY

## 2019-08-23 PROCEDURE — 99999 PR PBB SHADOW E&M-EST. PATIENT-LVL III: CPT | Mod: PBBFAC,,, | Performed by: INTERNAL MEDICINE

## 2019-08-23 PROCEDURE — 99214 PR OFFICE/OUTPT VISIT, EST, LEVL IV, 30-39 MIN: ICD-10-PCS | Mod: S$PBB,,, | Performed by: INTERNAL MEDICINE

## 2019-08-23 PROCEDURE — 72120 X-RAY BEND ONLY L-S SPINE: CPT | Mod: 26,,, | Performed by: RADIOLOGY

## 2019-08-23 PROCEDURE — 99214 OFFICE O/P EST MOD 30 MIN: CPT | Mod: S$PBB,,, | Performed by: INTERNAL MEDICINE

## 2019-08-23 PROCEDURE — 99213 OFFICE O/P EST LOW 20 MIN: CPT | Mod: PBBFAC,25 | Performed by: INTERNAL MEDICINE

## 2019-08-23 PROCEDURE — 99999 PR PBB SHADOW E&M-EST. PATIENT-LVL III: ICD-10-PCS | Mod: PBBFAC,,, | Performed by: INTERNAL MEDICINE

## 2019-08-23 RX ORDER — ASPIRIN 325 MG
TABLET, DELAYED RELEASE (ENTERIC COATED) ORAL
Qty: 12 CAPSULE | Refills: 0 | Status: SHIPPED | OUTPATIENT
Start: 2019-08-23 | End: 2022-08-12 | Stop reason: ALTCHOICE

## 2019-08-23 RX ORDER — DOXYCYCLINE HYCLATE 100 MG
100 TABLET ORAL 2 TIMES DAILY
Qty: 20 TABLET | Refills: 0 | Status: SHIPPED | OUTPATIENT
Start: 2019-08-23 | End: 2019-10-02 | Stop reason: SDUPTHER

## 2019-08-23 RX ORDER — METFORMIN HYDROCHLORIDE 500 MG/1
500 TABLET ORAL 2 TIMES DAILY WITH MEALS
Qty: 60 TABLET | Refills: 9 | Status: SHIPPED | OUTPATIENT
Start: 2019-08-23 | End: 2020-08-10

## 2019-08-26 ENCOUNTER — TELEPHONE (OUTPATIENT)
Dept: INTERNAL MEDICINE | Facility: CLINIC | Age: 74
End: 2019-08-26

## 2019-08-28 ENCOUNTER — HOSPITAL ENCOUNTER (OUTPATIENT)
Dept: RADIOLOGY | Facility: CLINIC | Age: 74
Discharge: HOME | End: 2019-08-28
Attending: INTERNAL MEDICINE
Payer: MEDICARE

## 2019-08-28 DIAGNOSIS — M81.0 OSTEOPOROSIS, UNSPECIFIED OSTEOPOROSIS TYPE, UNSPECIFIED PATHOLOGICAL FRACTURE PRESENCE: ICD-10-CM

## 2019-08-28 PROCEDURE — 77080 DXA BONE DENSITY AXIAL: CPT | Mod: TC

## 2019-08-28 PROCEDURE — 77080 DXA BONE DENSITY AXIAL: CPT | Mod: 26,,, | Performed by: INTERNAL MEDICINE

## 2019-08-28 PROCEDURE — 77080 DEXA BONE DENSITY SPINE HIP: ICD-10-PCS | Mod: 26,,, | Performed by: INTERNAL MEDICINE

## 2019-08-31 ENCOUNTER — EXTERNAL CHRONIC CARE MANAGEMENT (OUTPATIENT)
Dept: PRIMARY CARE CLINIC | Facility: CLINIC | Age: 74
End: 2019-08-31
Payer: MEDICARE

## 2019-08-31 PROCEDURE — 99490 CHRNC CARE MGMT STAFF 1ST 20: CPT | Mod: S$PBB,,, | Performed by: INTERNAL MEDICINE

## 2019-08-31 PROCEDURE — 99490 CHRNC CARE MGMT STAFF 1ST 20: CPT | Mod: PBBFAC | Performed by: INTERNAL MEDICINE

## 2019-08-31 PROCEDURE — 99490 PR CHRONIC CARE MGMT, 1ST 20 MIN: ICD-10-PCS | Mod: S$PBB,,, | Performed by: INTERNAL MEDICINE

## 2019-09-09 DIAGNOSIS — J45.901 EXACERBATION OF ASTHMA, UNSPECIFIED ASTHMA SEVERITY, UNSPECIFIED WHETHER PERSISTENT: ICD-10-CM

## 2019-09-09 DIAGNOSIS — J45.41 MODERATE PERSISTENT ASTHMA WITH ACUTE EXACERBATION: ICD-10-CM

## 2019-09-10 RX ORDER — MONTELUKAST SODIUM 10 MG/1
TABLET ORAL
Qty: 90 TABLET | Refills: 0 | Status: SHIPPED | OUTPATIENT
Start: 2019-09-10 | End: 2019-12-16 | Stop reason: SDUPTHER

## 2019-09-10 RX ORDER — BUDESONIDE AND FORMOTEROL FUMARATE DIHYDRATE 160; 4.5 UG/1; UG/1
AEROSOL RESPIRATORY (INHALATION)
Qty: 10.2 G | Refills: 0 | Status: SHIPPED | OUTPATIENT
Start: 2019-09-10 | End: 2019-11-06 | Stop reason: SDUPTHER

## 2019-09-10 RX ORDER — ALBUTEROL SULFATE 90 UG/1
AEROSOL, METERED RESPIRATORY (INHALATION)
Qty: 8.5 G | Refills: 0 | Status: SHIPPED | OUTPATIENT
Start: 2019-09-10 | End: 2019-11-06 | Stop reason: SDUPTHER

## 2019-09-10 RX ORDER — ATORVASTATIN CALCIUM 10 MG/1
TABLET, FILM COATED ORAL
Qty: 90 TABLET | Refills: 0 | Status: SHIPPED | OUTPATIENT
Start: 2019-09-10 | End: 2019-12-16 | Stop reason: SDUPTHER

## 2019-09-11 NOTE — PROGRESS NOTES
HPI     DLS: 5/30/19    Pt here for HRT review; (HRT machine not working)    Meds: Pres Free AT's  PRN OU     1) POAG   2) PCIOL OU   3) Type 2 DM NO DR   4) LOLA   5) PVD OU   6) S/P Trabs  ou - off gtts post surgery     Last edited by Tila Kent on 9/13/2019  9:25 AM. (History)              Assessment /Plan     For exam results, see Encounter Report.    Primary open angle glaucoma of left eye, moderate stage    Primary open angle glaucoma of right eye, severe stage    Dry eyes, bilateral    Type 2 diabetes mellitus without ophthalmic manifestations    Posterior vitreous detachment of both eyes - Both Eyes    Pseudophakia of both eyes    Glaucoma filtering bleb of both eyes          1. POAG (primary open-angle glaucoma) - Severe stage - Both Eyes   Glaucoma (type and duration) POAG,   -  first HVF 1997  -  first photo: 1996     Family history None   Glaucoma meds - (off all gtts os post combined)  (( use to use - Alphagan od , Xalatan od , dorzolamide bid od ))  H/O adverse rxn to glaucoma drops into to BB 2/2 asthma   LASERS SLT 12/8/05 and repeat 7/16/09 OD, and SLT 1/6/06 OS;  SLT os 7/31/14, od 8/14/14 (no response ou)   GLAUCOMA SURGERIES - combined phaco/IOL trab -os 12/28/2015// elastic sclera - 9 sutures to close - all visible ones cut /// combined - phaco/trab w/ minishunt od - 3/30/2016  OTHER EYE SURGERIES PC IOL OS (combined 1/28/2015) // PC IOL od (combined 3/30/2016)  CDR 0.95 OU   Tbase 22 OU   Tmax 37 (4/11/2016) /38 ( 4/16/2015)   Ttarget 14/14  HVF 21 HVF: 1308-4660 - SAD and  INS  OD, // SAD / IAD  os  Gonio +3 OU   /421   OCT 9 test 2004 - 2019: Dec S/I/T OD// Dec thru out    test 2005 to 2018 -MR -  ? [place] off  od // dec. S/N,, bord T/I os /// CDR ? [place] off  od // 0.80 os  Disc photos 1996, 1997, 2003: slides // 2012 , 2015 - OIS    Ttoday  14/14  Test done today: HRT broken // gonio      2. Diabetes mellitus type II   No BDR     3. Dry eyes - Both Eyes   AT's prn     4.  Posterior vitreous detachment of both eyes - Both Eyes   With floaters - stable     5. A lot of family stressors   Mom passed since last visit  dad elderly and in poor health, have sitters  Daughter is unemployed and had to move back home  Daughter recently ill - in ICU and intubated 2/2 pancreatitis (8/2/2015 to 8/11/2015)          Plan    Stay off  all glaucoma gtts OU for now - can add back if IOP goes above target of 14 ou   IOP OK ou off all gtts post trabs ou // OD w/mini shunt // OS without mini shunt (very elastic sclera)     Cont AT's ou prn     -  consider yag laser to PCO - left eye and also has significant  Ant capsule phimosis    Glasses - heidi 8/25/2016 - doing well     F/U yag cap od then os (ant capsule phimosis os as well)    After yag cap can refer to optom for glasses - has seen Heidi before

## 2019-09-13 ENCOUNTER — OFFICE VISIT (OUTPATIENT)
Dept: OPHTHALMOLOGY | Facility: CLINIC | Age: 74
End: 2019-09-13
Payer: MEDICARE

## 2019-09-13 DIAGNOSIS — H40.1122 PRIMARY OPEN ANGLE GLAUCOMA OF LEFT EYE, MODERATE STAGE: Primary | ICD-10-CM

## 2019-09-13 DIAGNOSIS — Z98.83 GLAUCOMA FILTERING BLEB OF BOTH EYES: ICD-10-CM

## 2019-09-13 DIAGNOSIS — H40.1113 PRIMARY OPEN ANGLE GLAUCOMA OF RIGHT EYE, SEVERE STAGE: ICD-10-CM

## 2019-09-13 DIAGNOSIS — Z96.1 PSEUDOPHAKIA OF BOTH EYES: ICD-10-CM

## 2019-09-13 DIAGNOSIS — H43.813 POSTERIOR VITREOUS DETACHMENT OF BOTH EYES: ICD-10-CM

## 2019-09-13 DIAGNOSIS — H04.123 DRY EYES, BILATERAL: ICD-10-CM

## 2019-09-13 DIAGNOSIS — E11.9 TYPE 2 DIABETES MELLITUS WITHOUT OPHTHALMIC MANIFESTATIONS: ICD-10-CM

## 2019-09-13 PROCEDURE — 99999 PR PBB SHADOW E&M-EST. PATIENT-LVL II: CPT | Mod: PBBFAC,,, | Performed by: OPHTHALMOLOGY

## 2019-09-13 PROCEDURE — 92020 GONIOSCOPY: CPT | Mod: PBBFAC | Performed by: OPHTHALMOLOGY

## 2019-09-13 PROCEDURE — 92020 PR SPECIAL EYE EVAL,GONIOSCOPY: ICD-10-PCS | Mod: S$PBB,,, | Performed by: OPHTHALMOLOGY

## 2019-09-13 PROCEDURE — 92012 PR EYE EXAM, EST PATIENT,INTERMED: ICD-10-PCS | Mod: S$PBB,,, | Performed by: OPHTHALMOLOGY

## 2019-09-13 PROCEDURE — 92012 INTRM OPH EXAM EST PATIENT: CPT | Mod: S$PBB,,, | Performed by: OPHTHALMOLOGY

## 2019-09-13 PROCEDURE — 99212 OFFICE O/P EST SF 10 MIN: CPT | Mod: PBBFAC | Performed by: OPHTHALMOLOGY

## 2019-09-13 PROCEDURE — 92020 GONIOSCOPY: CPT | Mod: S$PBB,,, | Performed by: OPHTHALMOLOGY

## 2019-09-13 PROCEDURE — 99999 PR PBB SHADOW E&M-EST. PATIENT-LVL II: ICD-10-PCS | Mod: PBBFAC,,, | Performed by: OPHTHALMOLOGY

## 2019-09-30 ENCOUNTER — HOSPITAL ENCOUNTER (EMERGENCY)
Facility: HOSPITAL | Age: 74
Discharge: HOME | End: 2019-09-30
Attending: EMERGENCY MEDICINE
Payer: MEDICARE

## 2019-09-30 ENCOUNTER — EXTERNAL CHRONIC CARE MANAGEMENT (OUTPATIENT)
Dept: PRIMARY CARE CLINIC | Facility: CLINIC | Age: 74
End: 2019-09-30
Payer: MEDICARE

## 2019-09-30 VITALS
TEMPERATURE: 99 F | WEIGHT: 170 LBS | DIASTOLIC BLOOD PRESSURE: 77 MMHG | OXYGEN SATURATION: 98 % | HEIGHT: 61 IN | BODY MASS INDEX: 32.1 KG/M2 | SYSTOLIC BLOOD PRESSURE: 176 MMHG | RESPIRATION RATE: 20 BRPM | HEART RATE: 100 BPM

## 2019-09-30 DIAGNOSIS — J40 BRONCHITIS: Primary | ICD-10-CM

## 2019-09-30 DIAGNOSIS — R05.9 COUGH: ICD-10-CM

## 2019-09-30 PROCEDURE — 94640 AIRWAY INHALATION TREATMENT: CPT

## 2019-09-30 PROCEDURE — 99284 PR EMERGENCY DEPT VISIT,LEVEL IV: ICD-10-PCS | Mod: ,,, | Performed by: EMERGENCY MEDICINE

## 2019-09-30 PROCEDURE — 63600175 PHARM REV CODE 636 W HCPCS: Performed by: EMERGENCY MEDICINE

## 2019-09-30 PROCEDURE — 99285 EMERGENCY DEPT VISIT HI MDM: CPT | Mod: 25

## 2019-09-30 PROCEDURE — 99490 CHRNC CARE MGMT STAFF 1ST 20: CPT | Mod: PBBFAC | Performed by: INTERNAL MEDICINE

## 2019-09-30 PROCEDURE — 99490 PR CHRONIC CARE MGMT, 1ST 20 MIN: ICD-10-PCS | Mod: S$PBB,,, | Performed by: INTERNAL MEDICINE

## 2019-09-30 PROCEDURE — 25000242 PHARM REV CODE 250 ALT 637 W/ HCPCS: Performed by: EMERGENCY MEDICINE

## 2019-09-30 PROCEDURE — 99284 EMERGENCY DEPT VISIT MOD MDM: CPT | Mod: ,,, | Performed by: EMERGENCY MEDICINE

## 2019-09-30 PROCEDURE — 25000003 PHARM REV CODE 250: Performed by: EMERGENCY MEDICINE

## 2019-09-30 PROCEDURE — 99490 CHRNC CARE MGMT STAFF 1ST 20: CPT | Mod: S$PBB,,, | Performed by: INTERNAL MEDICINE

## 2019-09-30 PROCEDURE — 94761 N-INVAS EAR/PLS OXIMETRY MLT: CPT

## 2019-09-30 RX ORDER — IPRATROPIUM BROMIDE AND ALBUTEROL SULFATE 2.5; .5 MG/3ML; MG/3ML
3 SOLUTION RESPIRATORY (INHALATION)
Status: COMPLETED | OUTPATIENT
Start: 2019-09-30 | End: 2019-09-30

## 2019-09-30 RX ORDER — PREDNISONE 20 MG/1
60 TABLET ORAL
Status: COMPLETED | OUTPATIENT
Start: 2019-09-30 | End: 2019-09-30

## 2019-09-30 RX ORDER — METFORMIN HYDROCHLORIDE 500 MG/1
500 TABLET, FILM COATED, EXTENDED RELEASE ORAL
COMMUNITY
End: 2021-02-09

## 2019-09-30 RX ORDER — ACETAMINOPHEN 500 MG
1000 TABLET ORAL
Status: COMPLETED | OUTPATIENT
Start: 2019-09-30 | End: 2019-09-30

## 2019-09-30 RX ORDER — PREDNISONE 20 MG/1
40 TABLET ORAL DAILY
Qty: 10 TABLET | Refills: 0 | Status: SHIPPED | OUTPATIENT
Start: 2019-09-30 | End: 2019-10-05

## 2019-09-30 RX ORDER — TRIAMTERENE AND HYDROCHLOROTHIAZIDE 37.5; 25 MG/1; MG/1
1 CAPSULE ORAL
Status: COMPLETED | OUTPATIENT
Start: 2019-09-30 | End: 2019-09-30

## 2019-09-30 RX ADMIN — IPRATROPIUM BROMIDE AND ALBUTEROL SULFATE 3 ML: .5; 3 SOLUTION RESPIRATORY (INHALATION) at 07:09

## 2019-09-30 RX ADMIN — PREDNISONE 60 MG: 20 TABLET ORAL at 07:09

## 2019-09-30 RX ADMIN — TRIAMTERENE AND HYDROCHLOROTHIAZIDE 1 CAPSULE: 25; 37.5 CAPSULE ORAL at 11:09

## 2019-09-30 RX ADMIN — ACETAMINOPHEN 1000 MG: 500 TABLET ORAL at 07:09

## 2019-09-30 NOTE — ED PROVIDER NOTES
Encounter Date: 9/30/2019       History     Chief Complaint   Patient presents with    URI     sore throat, sinus, cough , not able to swallow bp meds     73-year-old female with a history of asthma, no hospitalizations or intubations, diabetes, hypertension complaining of URI symptoms x3 days.  Patient reports cough x3 days, worsening, with yellow sputum associated with shortness of breath and wheezing, no relief with her albuterol inhaler.  In addition she has had nasal discharge, sore throat. No fever, chills, night sweats. Patient has a history of hypertension and did not take any of her BP meds this morning.  No severe headache, no nausea, no vomiting, diarrhea.    The history is provided by the patient.     Review of patient's allergies indicates:   Allergen Reactions    Penicillins Rash     Past Medical History:   Diagnosis Date    ALLERGIC RHINITIS 8/17/2012    Asthma, well controlled 8/17/2012    Chronic asthma     Chronic rhinitis     Diabetes 2/4/2014    Diabetes mellitus     Diabetes mellitus type II     Fever blister     GERD (gastroesophageal reflux disease) 8/17/2012    Glaucoma     Hyperlipemia     Hypertension     Obstructive sleep apnea     Osteoporosis, unspecified      Past Surgical History:   Procedure Laterality Date    BLADDER SURGERY      BREAST BIOPSY Left     BREAST BIOPSY Right     core    CATARACT EXTRACTION W/  INTRAOCULAR LENS IMPLANT Left 1/28/2015    WITH TRAB ()    CATARACT EXTRACTION W/  INTRAOCULAR LENS IMPLANT Right 03/30/2016    WITH TRAB ()    COLONOSCOPY N/A 7/1/2016    Procedure: COLONOSCOPY;  Surgeon: Rony Lima MD;  Location: [address]);  Service: Endoscopy;  Laterality: N/A;    HYSTERECTOMY      stomach procedure      TRABECULECTOMY Left 12/28/2015    COMBINED WITH CE ()    TRABECULECTOMY Right 03/30/2016    COMBINED WITH CE ()     Family History   Problem Relation Age of Onset     Glaucoma Mother     Glaucoma Sister     No Known Problems Father     No Known Problems Brother     No Known Problems Maternal Aunt     No Known Problems Maternal Uncle     No Known Problems Paternal Aunt     No Known Problems Paternal Uncle     No Known Problems Maternal Grandmother     No Known Problems Maternal Grandfather     No Known Problems Paternal Grandmother     No Known Problems Paternal Grandfather     Melanoma Neg Hx     Psoriasis Neg Hx     Lupus Neg Hx     Eczema Neg Hx     Acne Neg Hx     Blindness Neg Hx     Macular degeneration Neg Hx     Retinal detachment Neg Hx     Amblyopia Neg Hx     Cancer Neg Hx     Cataracts Neg Hx     Diabetes Neg Hx     Hypertension Neg Hx     Strabismus Neg Hx     Stroke Neg Hx     Thyroid disease Neg Hx      Social History     Tobacco Use    Smoking status: Former Smoker    Smokeless tobacco: Never Used   Substance Use Topics    Alcohol use: Yes     Comment: socially    Drug use: No     Review of Systems   Constitutional: Negative for chills, diaphoresis, fatigue and fever.   HENT: Positive for congestion, rhinorrhea, sinus pressure and sore throat. Negative for drooling, trouble swallowing and voice change.    Respiratory: Positive for cough, shortness of breath and wheezing. Negative for chest tightness.    Cardiovascular: Negative for chest pain, palpitations and leg swelling.   Gastrointestinal: Negative for abdominal pain.   Genitourinary: Negative for difficulty urinating.   Neurological: Negative for dizziness and headaches.   All other systems reviewed and are negative.      Physical Exam     Initial Vitals [09/30/19 0735]   BP Pulse Resp Temp SpO2   (!) 231/93 106 16 99.1 °F (37.3 °C) 99 %      MAP       --         Physical Exam    Nursing note and vitals reviewed.  Constitutional: She appears well-developed and well-nourished. She is not diaphoretic. No distress.   HENT:   Head: Normocephalic and atraumatic.   Mouth/Throat: Uvula  is midline and mucous membranes are normal. No trismus in the jaw. No uvula swelling. Posterior oropharyngeal edema and posterior oropharyngeal erythema present. No oropharyngeal exudate or tonsillar abscesses.   Eyes: Conjunctivae are normal. Right eye exhibits no discharge. Left eye exhibits no discharge.   Neck: Neck supple.   Cardiovascular: Tachycardia present.    Pulmonary/Chest: No respiratory distress. She has wheezes.   Abdominal: Soft. She exhibits no distension. There is no tenderness. There is no rebound and no guarding.   Musculoskeletal: She exhibits no edema.   Neurological: She is alert and oriented to person, place, and time. She has normal strength.   Skin: Skin is warm and dry. No pallor.   Psychiatric: She has a normal mood and affect. Thought content normal.         ED Course   Procedures  Labs Reviewed - No data to display       Imaging Results          X-Ray Chest PA And Lateral (Final result)  Result time 09/30/19 08:48:54    Final result by Johann Murray III, MD (09/30/19 08:48:54)                 Impression:      No acute process seen.      Electronically signed by: Johann Murray MD  Date:    09/30/2019  Time:    08:48             Narrative:    EXAMINATION:  XR CHEST PA AND LATERAL    CLINICAL HISTORY:  Cough    FINDINGS:  Heart size is upper normal.  Lungs are clear and the bones show no abnormality.                                 Medical Decision Making:   Initial Assessment:   73-year-old female with comorbidities as above now here with URI symptoms, cough, wheezing     On arrival BP markedly elevated the patient is not taking any of her medications as prescribed this morning  Also tachycardic with low-grade temp, suspect that she is likely febrile.  Lungs with diffuse expiratory wheezing  Differential Diagnosis:   Asthma exacerbation  URI versus pneumonia versus acute bronchitis  Asymptomatic hypertension    Low suspicion for CHF or PE  Clinical Tests:   Radiological Study: Ordered  and Reviewed  ED Management:  Plan for continuous nebs and steroids  Chest x-ray to rule out pneumonia given age  Patient to be given all of her prescribed BP meds this morning will recheck blood pressure  Tylenol for suspected fever  Reassess    9:38 AM  Patient feels much better status post nebs.  Lungs are clear bilaterally the wheezing has resolved.  No signs pulmonary edema or pneumonia on her chest x-ray  BP has trended down without any intervention but will give patient her prescribed medication.  Plan for discharge home with a steroid course, patient does not need a refill of her albuterol.  As there is no signs of a bacterial infection at this time will hold off on antibiotic therapy.                      Clinical Impression:       ICD-10-CM ICD-9-CM   1. Bronchitis J40 490   2. Cough R05 786.2                                Cynthia Reid MD  10/02/19 0739

## 2019-10-01 ENCOUNTER — PATIENT OUTREACH (OUTPATIENT)
Dept: ADMINISTRATIVE | Facility: OTHER | Age: 74
End: 2019-10-01

## 2019-10-01 NOTE — PROGRESS NOTES
HPI     Laser Treatment      Additional comments: yag cap od              Comments     YAG CAP OD TODAY    1) POAG  2) PCIOL OU  3) Type 2 DM NO DR  4) LOLA  5) PVD OU    MEDS:  Pres Free AT's  PRN OU              Last edited by Tia Garcia MA on 10/3/2019 10:03 AM. (History)            Assessment /Plan     For exam results, see Encounter Report.    PCO (posterior capsular opacification), right    Primary open angle glaucoma of left eye, moderate stage    Primary open angle glaucoma of right eye, severe stage    Dry eyes, bilateral    Type 2 diabetes mellitus without ophthalmic manifestations    Posterior vitreous detachment of both eyes - Both Eyes    Pseudophakia of both eyes    Glaucoma filtering bleb of both eyes          1. POAG (primary open-angle glaucoma) - Severe stage - Both Eyes   Glaucoma (type and duration) POAG,   -  first HVF 1997  -  first photo: 1996     Family history None   Glaucoma meds - (off all gtts os post combined)  (( use to use - Alphagan od , Xalatan od , dorzolamide bid od ))  H/O adverse rxn to glaucoma drops into to BB 2/2 asthma   LASERS SLT 12/8/05 and repeat 7/16/09 OD, and SLT 1/6/06 OS;  SLT os 7/31/14, od 8/14/14 (no response ou)   GLAUCOMA SURGERIES - combined phaco/IOL trab -os 12/28/2015// elastic sclera - 9 sutures to close - all visible ones cut /// combined - phaco/trab w/ minishunt od - 3/30/2016  OTHER EYE SURGERIES PC IOL OS (combined 1/28/2015) // PC IOL od (combined 3/30/2016)  CDR 0.95 OU   Tbase 22 OU   Tmax 37 (4/11/2016) /38 ( 4/16/2015)   Ttarget 14/14  HVF 21 HVF: 3902-0157 - SAD and  INS  OD, // SAD / IAD  os  Gonio +3 OU   /421   OCT 9 test 2004 - 2019: Dec S/I/T OD// Dec thru out    test 2005 to 2018 -MR -  ? [place] off  od // dec. S/N,, bord T/I os /// CDR ? [place] off  od // 0.80 os  Disc photos 1996, 1997, 2003: slides // 2012 , 2015 - OIS    Ttoday  14/14  Test done today: HRT broken // gonio      2. Diabetes mellitus type II   No BDR      3. Dry eyes - Both Eyes   AT's prn     4. Posterior vitreous detachment of both eyes - Both Eyes   With floaters - stable     5. A lot of family stressors   Mom passed since last visit  dad elderly and in poor health, have sitters  Daughter is unemployed and had to move back home  Daughter recently ill - in ICU and intubated 2/2 pancreatitis (8/2/2015 to 8/11/2015)          Plan    Stay off  all glaucoma gtts OU for now - can add back if IOP goes above target of 14 ou   IOP OK ou off all gtts post trabs ou // OD w/mini shunt // OS without mini shunt (very elastic sclera)     Cont AT's ou prn     -  consider yag laser to PCO - left eye and also has significant  Ant capsule phimosis    Glasses - heidi 8/25/2016 - doing well     F/U yag cap od then os (ant capsule phimosis os as well)    After yag cap can refer to optom for glasses - has seen Heidi before      yag cap od - PCO and ant capsule phimosis - 10/3/2019 - steroid taper   yag cap os - pending    After both yag cap done can F/U 4 months with  ? HRT - may already have the appt

## 2019-10-02 DIAGNOSIS — R05.9 COUGH: ICD-10-CM

## 2019-10-03 ENCOUNTER — OFFICE VISIT (OUTPATIENT)
Dept: OPHTHALMOLOGY | Facility: CLINIC | Age: 74
End: 2019-10-03
Payer: MEDICARE

## 2019-10-03 VITALS — SYSTOLIC BLOOD PRESSURE: 141 MMHG | HEART RATE: 96 BPM | DIASTOLIC BLOOD PRESSURE: 79 MMHG

## 2019-10-03 DIAGNOSIS — H40.1113 PRIMARY OPEN ANGLE GLAUCOMA OF RIGHT EYE, SEVERE STAGE: ICD-10-CM

## 2019-10-03 DIAGNOSIS — H43.813 POSTERIOR VITREOUS DETACHMENT OF BOTH EYES: ICD-10-CM

## 2019-10-03 DIAGNOSIS — Z98.83 GLAUCOMA FILTERING BLEB OF BOTH EYES: ICD-10-CM

## 2019-10-03 DIAGNOSIS — Z96.1 PSEUDOPHAKIA OF BOTH EYES: ICD-10-CM

## 2019-10-03 DIAGNOSIS — E11.9 TYPE 2 DIABETES MELLITUS WITHOUT OPHTHALMIC MANIFESTATIONS: ICD-10-CM

## 2019-10-03 DIAGNOSIS — H40.1122 PRIMARY OPEN ANGLE GLAUCOMA OF LEFT EYE, MODERATE STAGE: ICD-10-CM

## 2019-10-03 DIAGNOSIS — H04.123 DRY EYES, BILATERAL: ICD-10-CM

## 2019-10-03 DIAGNOSIS — H26.491 PCO (POSTERIOR CAPSULAR OPACIFICATION), RIGHT: Primary | ICD-10-CM

## 2019-10-03 PROCEDURE — 99999 PR PBB SHADOW E&M-EST. PATIENT-LVL II: CPT | Mod: PBBFAC,,, | Performed by: OPHTHALMOLOGY

## 2019-10-03 PROCEDURE — 66821 YAG CAPSULOTOMY - OD - RIGHT EYE: ICD-10-PCS | Mod: S$PBB,RT,, | Performed by: OPHTHALMOLOGY

## 2019-10-03 PROCEDURE — 99212 OFFICE O/P EST SF 10 MIN: CPT | Mod: PBBFAC,25 | Performed by: OPHTHALMOLOGY

## 2019-10-03 PROCEDURE — 99999 PR PBB SHADOW E&M-EST. PATIENT-LVL II: ICD-10-PCS | Mod: PBBFAC,,, | Performed by: OPHTHALMOLOGY

## 2019-10-03 PROCEDURE — 99499 UNLISTED E&M SERVICE: CPT | Mod: S$PBB,,, | Performed by: OPHTHALMOLOGY

## 2019-10-03 PROCEDURE — 66821 AFTER CATARACT LASER SURGERY: CPT | Mod: PBBFAC,RT | Performed by: OPHTHALMOLOGY

## 2019-10-03 PROCEDURE — 99499 NO LOS: ICD-10-PCS | Mod: S$PBB,,, | Performed by: OPHTHALMOLOGY

## 2019-10-04 RX ORDER — DOXYCYCLINE HYCLATE 100 MG
100 TABLET ORAL 2 TIMES DAILY
Qty: 20 TABLET | Refills: 0 | Status: SHIPPED | OUTPATIENT
Start: 2019-10-04 | End: 2021-02-09

## 2019-10-14 NOTE — PROGRESS NOTES
HPI     Blurred Vision      Additional comments: OS              Post-op Evaluation      Additional comments: Yag Cap OD               Comments     YAG CAP OS TODAY    1) POAG  2) PCIOL OU  3) Type 2 DM NO DR  4) LOLA  5) PVD OU  6) Yag Cap OD     MEDS:  Pres Free AT's  PRN OU    Patient reports that on Tuesday and Wednesday she was having some aching   pain in her RIGHT EYE and the light caused the pain to increase, she woke   up today and the pain had went away and she feels fine today.              Last edited by Sanna Wiley MD on 10/17/2019 12:18 PM. (History)            Assessment /Plan     For exam results, see Encounter Report.    PCO (posterior capsular opacification), left  -     Yag Capsulotomy - OS - Left Eye    Primary open angle glaucoma of left eye, moderate stage    Primary open angle glaucoma of right eye, severe stage    Dry eyes, bilateral    Type 2 diabetes mellitus without ophthalmic manifestations    Posterior vitreous detachment of both eyes - Both Eyes    Pseudophakia of both eyes    Glaucoma filtering bleb of both eyes        1. POAG (primary open-angle glaucoma) - Severe stage - Both Eyes   Glaucoma (type and duration) POAG,   -  first HVF 1997  -  first photo: 1996     Family history None   Glaucoma meds - (off all gtts os post combined)  (( use to use - Alphagan od , Xalatan od , dorzolamide bid od ))  H/O adverse rxn to glaucoma drops into to BB 2/2 asthma   LASERS SLT 12/8/05 and repeat 7/16/09 OD, and SLT 1/6/06 OS;  SLT os 7/31/14, od 8/14/14 (no response ou)   GLAUCOMA SURGERIES - combined phaco/IOL trab -os 12/28/2015// elastic sclera - 9 sutures to close - all visible ones cut /// combined - phaco/trab w/ minishunt od - 3/30/2016  OTHER EYE SURGERIES PC IOL OS (combined 1/28/2015) // PC IOL od (combined 3/30/2016)  CDR 0.95 OU   Tbase 22 OU   Tmax 37 (4/11/2016) /38 ( 4/16/2015)   Ttarget 14/14  HVF 21 HVF: 9250-0554 - SAD and  INS  OD, // SAD / IAD  os  Gonio +3 OU    /421   OCT 9 test 2004 - 2019: Dec S/I/T OD// Dec thru out    test 2005 to 2018 -MR -  ? [place] off  od // dec. S/N,, katied T/I os /// CDR ? [place] off  od // 0.80 os  Disc photos 1996, 1997, 2003: slides // 2012 , 2015 - OIS    Ttoday  14/15  Test done today: YAG cap OS - 2nd eye //  f/u YAG CAP od      2. Diabetes mellitus type II   No BDR     3. Dry eyes - Both Eyes   AT's prn     4. Posterior vitreous detachment of both eyes - Both Eyes   With floaters - stable     5. A lot of family stressors   Mom passed since last visit  dad elderly and in poor health, have sitters  Daughter is unemployed and had to move back home  Daughter recently ill - in ICU and intubated 2/2 pancreatitis (8/2/2015 to 8/11/2015)          Plan    Stay off  all glaucoma gtts OU for now - can add back if IOP goes above target of 14 ou   IOP OK ou off all gtts post trabs ou // OD w/mini shunt // OS without mini shunt (very elastic sclera)     Cont AT's ou prn     -  consider yag laser to PCO - left eye and also has significant  Ant capsule phimosis    Glasses - heidi 8/25/2016 - doing well     After yag cap can refer to optom for glasses - has seen Heidi before - she can not aford new glasses just yet - wants to wait on this      yag cap od - PCO and ant capsule phimosis - 10/3/2019 -   yag cap os -  10/17/2019 - steroid taper     AT's prn     Keep F/u with me  2/7/2020 - HRT

## 2019-10-15 ENCOUNTER — PATIENT OUTREACH (OUTPATIENT)
Dept: ADMINISTRATIVE | Facility: OTHER | Age: 74
End: 2019-10-15

## 2019-10-17 ENCOUNTER — OFFICE VISIT (OUTPATIENT)
Dept: OPHTHALMOLOGY | Facility: CLINIC | Age: 74
End: 2019-10-17
Payer: MEDICARE

## 2019-10-17 DIAGNOSIS — H43.813 POSTERIOR VITREOUS DETACHMENT OF BOTH EYES: ICD-10-CM

## 2019-10-17 DIAGNOSIS — Z98.83 GLAUCOMA FILTERING BLEB OF BOTH EYES: ICD-10-CM

## 2019-10-17 DIAGNOSIS — H26.492 PCO (POSTERIOR CAPSULAR OPACIFICATION), LEFT: Primary | ICD-10-CM

## 2019-10-17 DIAGNOSIS — H40.1113 PRIMARY OPEN ANGLE GLAUCOMA OF RIGHT EYE, SEVERE STAGE: ICD-10-CM

## 2019-10-17 DIAGNOSIS — E11.9 TYPE 2 DIABETES MELLITUS WITHOUT OPHTHALMIC MANIFESTATIONS: ICD-10-CM

## 2019-10-17 DIAGNOSIS — Z96.1 PSEUDOPHAKIA OF BOTH EYES: ICD-10-CM

## 2019-10-17 DIAGNOSIS — H40.1122 PRIMARY OPEN ANGLE GLAUCOMA OF LEFT EYE, MODERATE STAGE: ICD-10-CM

## 2019-10-17 DIAGNOSIS — H04.123 DRY EYES, BILATERAL: ICD-10-CM

## 2019-10-17 PROCEDURE — 99499 UNLISTED E&M SERVICE: CPT | Mod: S$PBB,,, | Performed by: OPHTHALMOLOGY

## 2019-10-17 PROCEDURE — 66821 AFTER CATARACT LASER SURGERY: CPT | Mod: PBBFAC,LT | Performed by: OPHTHALMOLOGY

## 2019-10-17 PROCEDURE — 99499 NO LOS: ICD-10-PCS | Mod: S$PBB,,, | Performed by: OPHTHALMOLOGY

## 2019-10-17 PROCEDURE — 66821 YAG CAPSULOTOMY - OS - LEFT EYE: ICD-10-PCS | Mod: S$PBB,79,LT, | Performed by: OPHTHALMOLOGY

## 2019-10-17 PROCEDURE — 99999 PR PBB SHADOW E&M-EST. PATIENT-LVL II: ICD-10-PCS | Mod: PBBFAC,,, | Performed by: OPHTHALMOLOGY

## 2019-10-17 PROCEDURE — 99999 PR PBB SHADOW E&M-EST. PATIENT-LVL II: CPT | Mod: PBBFAC,,, | Performed by: OPHTHALMOLOGY

## 2019-10-17 PROCEDURE — 99212 OFFICE O/P EST SF 10 MIN: CPT | Mod: PBBFAC,25 | Performed by: OPHTHALMOLOGY

## 2019-10-22 DIAGNOSIS — L30.9 DERMATITIS: ICD-10-CM

## 2019-10-22 RX ORDER — FLUOCINONIDE 0.5 MG/G
CREAM TOPICAL
Qty: 60 G | Refills: 0 | Status: SHIPPED | OUTPATIENT
Start: 2019-10-22 | End: 2020-01-15

## 2019-10-31 ENCOUNTER — EXTERNAL CHRONIC CARE MANAGEMENT (OUTPATIENT)
Dept: PRIMARY CARE CLINIC | Facility: CLINIC | Age: 74
End: 2019-10-31
Payer: MEDICARE

## 2019-10-31 PROCEDURE — 99490 CHRNC CARE MGMT STAFF 1ST 20: CPT | Mod: PBBFAC | Performed by: INTERNAL MEDICINE

## 2019-10-31 PROCEDURE — 99490 PR CHRONIC CARE MGMT, 1ST 20 MIN: ICD-10-PCS | Mod: S$PBB,,, | Performed by: INTERNAL MEDICINE

## 2019-10-31 PROCEDURE — 99490 CHRNC CARE MGMT STAFF 1ST 20: CPT | Mod: S$PBB,,, | Performed by: INTERNAL MEDICINE

## 2019-11-06 DIAGNOSIS — J45.901 EXACERBATION OF ASTHMA, UNSPECIFIED ASTHMA SEVERITY, UNSPECIFIED WHETHER PERSISTENT: ICD-10-CM

## 2019-11-06 DIAGNOSIS — J45.41 MODERATE PERSISTENT ASTHMA WITH ACUTE EXACERBATION: ICD-10-CM

## 2019-11-06 RX ORDER — BUDESONIDE AND FORMOTEROL FUMARATE DIHYDRATE 160; 4.5 UG/1; UG/1
AEROSOL RESPIRATORY (INHALATION)
Qty: 10.2 G | Refills: 0 | Status: SHIPPED | OUTPATIENT
Start: 2019-11-06 | End: 2020-01-07 | Stop reason: SDUPTHER

## 2019-11-06 RX ORDER — ALBUTEROL SULFATE 90 UG/1
AEROSOL, METERED RESPIRATORY (INHALATION)
Qty: 8.5 G | Refills: 0 | Status: SHIPPED | OUTPATIENT
Start: 2019-11-06 | End: 2020-01-21

## 2019-11-30 ENCOUNTER — EXTERNAL CHRONIC CARE MANAGEMENT (OUTPATIENT)
Dept: PRIMARY CARE CLINIC | Facility: CLINIC | Age: 74
End: 2019-11-30
Payer: MEDICARE

## 2019-11-30 PROCEDURE — 99490 CHRNC CARE MGMT STAFF 1ST 20: CPT | Mod: S$PBB,,, | Performed by: INTERNAL MEDICINE

## 2019-11-30 PROCEDURE — 99490 CHRNC CARE MGMT STAFF 1ST 20: CPT | Mod: PBBFAC | Performed by: INTERNAL MEDICINE

## 2019-11-30 PROCEDURE — 99490 PR CHRONIC CARE MGMT, 1ST 20 MIN: ICD-10-PCS | Mod: S$PBB,,, | Performed by: INTERNAL MEDICINE

## 2019-12-17 RX ORDER — ATORVASTATIN CALCIUM 10 MG/1
TABLET, FILM COATED ORAL
Qty: 90 TABLET | Refills: 0 | Status: SHIPPED | OUTPATIENT
Start: 2019-12-17 | End: 2020-03-16

## 2019-12-17 RX ORDER — MONTELUKAST SODIUM 10 MG/1
TABLET ORAL
Qty: 90 TABLET | Refills: 0 | Status: SHIPPED | OUTPATIENT
Start: 2019-12-17 | End: 2020-01-16

## 2019-12-31 ENCOUNTER — EXTERNAL CHRONIC CARE MANAGEMENT (OUTPATIENT)
Dept: PRIMARY CARE CLINIC | Facility: CLINIC | Age: 74
End: 2019-12-31
Payer: MEDICARE

## 2019-12-31 PROCEDURE — 99490 CHRNC CARE MGMT STAFF 1ST 20: CPT | Mod: S$PBB,,, | Performed by: INTERNAL MEDICINE

## 2019-12-31 PROCEDURE — 99490 PR CHRONIC CARE MGMT, 1ST 20 MIN: ICD-10-PCS | Mod: S$PBB,,, | Performed by: INTERNAL MEDICINE

## 2019-12-31 PROCEDURE — 99490 CHRNC CARE MGMT STAFF 1ST 20: CPT | Mod: PBBFAC | Performed by: INTERNAL MEDICINE

## 2020-01-07 DIAGNOSIS — J45.41 MODERATE PERSISTENT ASTHMA WITH ACUTE EXACERBATION: ICD-10-CM

## 2020-01-07 DIAGNOSIS — J30.1 ACUTE NONSEASONAL ALLERGIC RHINITIS DUE TO POLLEN: ICD-10-CM

## 2020-01-07 RX ORDER — FLUTICASONE PROPIONATE 50 MCG
2 SPRAY, SUSPENSION (ML) NASAL 2 TIMES DAILY
Qty: 32 ML | Refills: 2 | Status: SHIPPED | OUTPATIENT
Start: 2020-01-07 | End: 2020-07-06

## 2020-01-07 RX ORDER — BUDESONIDE AND FORMOTEROL FUMARATE DIHYDRATE 160; 4.5 UG/1; UG/1
AEROSOL RESPIRATORY (INHALATION)
Qty: 10.2 G | Refills: 0 | Status: SHIPPED | OUTPATIENT
Start: 2020-01-07 | End: 2020-03-02

## 2020-01-10 DIAGNOSIS — E55.9 VITAMIN D DEFICIENCY: Primary | ICD-10-CM

## 2020-01-14 DIAGNOSIS — L30.9 DERMATITIS: ICD-10-CM

## 2020-01-15 RX ORDER — FLUOCINONIDE 0.5 MG/G
CREAM TOPICAL
Qty: 60 G | Refills: 0 | Status: SHIPPED | OUTPATIENT
Start: 2020-01-15 | End: 2020-04-17

## 2020-01-16 RX ORDER — MONTELUKAST SODIUM 10 MG/1
TABLET ORAL
Qty: 90 TABLET | Refills: 0 | Status: SHIPPED | OUTPATIENT
Start: 2020-01-16 | End: 2020-06-12

## 2020-01-20 DIAGNOSIS — J45.901 EXACERBATION OF ASTHMA, UNSPECIFIED ASTHMA SEVERITY, UNSPECIFIED WHETHER PERSISTENT: ICD-10-CM

## 2020-01-20 DIAGNOSIS — J45.41 MODERATE PERSISTENT ASTHMA WITH ACUTE EXACERBATION: ICD-10-CM

## 2020-01-21 RX ORDER — ALBUTEROL SULFATE 90 UG/1
AEROSOL, METERED RESPIRATORY (INHALATION)
Qty: 8.5 G | Refills: 3 | Status: SHIPPED | OUTPATIENT
Start: 2020-01-21 | End: 2021-01-29 | Stop reason: SDUPTHER

## 2020-01-31 ENCOUNTER — EXTERNAL CHRONIC CARE MANAGEMENT (OUTPATIENT)
Dept: PRIMARY CARE CLINIC | Facility: CLINIC | Age: 75
End: 2020-01-31
Payer: MEDICARE

## 2020-01-31 PROCEDURE — 99490 CHRNC CARE MGMT STAFF 1ST 20: CPT | Mod: S$PBB,,, | Performed by: INTERNAL MEDICINE

## 2020-01-31 PROCEDURE — G2058 CCM ADD 20MIN: HCPCS | Mod: S$PBB,,, | Performed by: INTERNAL MEDICINE

## 2020-01-31 PROCEDURE — 99490 CHRNC CARE MGMT STAFF 1ST 20: CPT | Mod: PBBFAC | Performed by: INTERNAL MEDICINE

## 2020-01-31 PROCEDURE — 99490 PR CHRONIC CARE MGMT, 1ST 20 MIN: ICD-10-PCS | Mod: S$PBB,,, | Performed by: INTERNAL MEDICINE

## 2020-01-31 PROCEDURE — G2058 PR CHRON CARE MGMT, EA ADDTL 20 MINS: ICD-10-PCS | Mod: S$PBB,,, | Performed by: INTERNAL MEDICINE

## 2020-02-04 VITALS — SYSTOLIC BLOOD PRESSURE: 134 MMHG | DIASTOLIC BLOOD PRESSURE: 74 MMHG | HEART RATE: 88 BPM

## 2020-02-04 DIAGNOSIS — Z00.00 ROUTINE GENERAL MEDICAL EXAMINATION AT A HEALTH CARE FACILITY: ICD-10-CM

## 2020-02-04 DIAGNOSIS — E11.9 TYPE 2 DIABETES MELLITUS WITHOUT COMPLICATION, WITHOUT LONG-TERM CURRENT USE OF INSULIN: ICD-10-CM

## 2020-02-04 DIAGNOSIS — R79.9 ABNORMAL FINDING OF BLOOD CHEMISTRY, UNSPECIFIED: ICD-10-CM

## 2020-02-04 DIAGNOSIS — R53.83 FATIGUE, UNSPECIFIED TYPE: ICD-10-CM

## 2020-02-04 DIAGNOSIS — E78.5 HYPERLIPIDEMIA, UNSPECIFIED HYPERLIPIDEMIA TYPE: ICD-10-CM

## 2020-02-04 DIAGNOSIS — E55.9 VITAMIN D DEFICIENCY: Primary | ICD-10-CM

## 2020-02-04 NOTE — PROGRESS NOTES
Subjective:       Patient ID: Lupe Jewell is a 74 y.o. female.    Chief Complaint: Annual Exam    HPIMialex Andrade is here for her annual exam. Overall doing well and had no specific complaints. She reported having issues with back pain with some L LE involvement. She denies weakness, numbness, but will obtain L spine xr initially and proceed from there. I will also obtain BMD given her use of steroids for asthma and the like.  I discussed the results of the blood work obtained earlier. These showed very low vitamin D levels and a rx for supplementation was provided. All other parameters were unremarkable.  Review of Systems   Constitutional: Positive for fatigue.   Respiratory: Negative for cough, shortness of breath and wheezing.    Cardiovascular: Negative for chest pain and leg swelling.   Gastrointestinal: Negative.    Genitourinary: Negative for difficulty urinating.   Musculoskeletal: Positive for back pain.   Neurological: Negative for dizziness, weakness and headaches.   Hematological: Negative.    All other systems reviewed and are negative.      Objective:      Physical Exam   Constitutional: She is oriented to person, place, and time. She appears well-developed and well-nourished. No distress.   HENT:   Head: Normocephalic and atraumatic.   Right Ear: External ear normal.   Left Ear: External ear normal.   Mouth/Throat: Oropharynx is clear and moist. No oropharyngeal exudate.   Neck: Normal range of motion. Neck supple. No JVD present. No thyromegaly present.   Cardiovascular: Normal rate, regular rhythm, normal heart sounds and intact distal pulses.   Pulmonary/Chest: Effort normal and breath sounds normal. No respiratory distress. She has no wheezes.   Abdominal: Soft. Bowel sounds are normal. She exhibits no distension and no mass. There is no tenderness.   Musculoskeletal: Normal range of motion. She exhibits no tenderness.   Lymphadenopathy:     She has no cervical adenopathy.   Neurological: She is  alert and oriented to person, place, and time. No cranial nerve deficit. Coordination normal.   Skin: Skin is warm and dry. She is not diaphoretic.   Psychiatric: She has a normal mood and affect. Her behavior is normal. Judgment and thought content normal.   Nursing note and vitals reviewed.      Assessment:       1. Routine general medical examination at a health care facility    2. Type 2 diabetes mellitus without complication, without long-term current use of insulin    3. Vitamin D deficiency    4. Fatigue, unspecified type    5. Osteoporosis, unspecified osteoporosis type, unspecified pathological fracture presence    6. Left lumbar radiculitis    7. Cough        Plan:    1. Start vitamin D3 - 50 K units weekly x 12 weeks.         2. Repeat levels at completion of therapy.         3. Obtain  L spine XR - done.         4. Obtain BMD - done.         5. RTC 3 months.

## 2020-02-06 ENCOUNTER — PATIENT OUTREACH (OUTPATIENT)
Dept: ADMINISTRATIVE | Facility: OTHER | Age: 75
End: 2020-02-06

## 2020-02-06 NOTE — PROGRESS NOTES
Chart reviewed.   Immunizations: updated  Orders placed: n/a  Upcoming appts to satisfy JOSE topics: 02/07/2020 Ophthalmology appt with Dr. Wiley

## 2020-02-07 ENCOUNTER — OFFICE VISIT (OUTPATIENT)
Dept: OPHTHALMOLOGY | Facility: CLINIC | Age: 75
End: 2020-02-07
Payer: MEDICARE

## 2020-02-07 ENCOUNTER — LAB VISIT (OUTPATIENT)
Dept: LAB | Facility: HOSPITAL | Age: 75
End: 2020-02-07
Attending: INTERNAL MEDICINE
Payer: MEDICARE

## 2020-02-07 DIAGNOSIS — Z00.00 ROUTINE GENERAL MEDICAL EXAMINATION AT A HEALTH CARE FACILITY: ICD-10-CM

## 2020-02-07 DIAGNOSIS — E78.5 HYPERLIPIDEMIA, UNSPECIFIED HYPERLIPIDEMIA TYPE: ICD-10-CM

## 2020-02-07 DIAGNOSIS — H40.1113 PRIMARY OPEN ANGLE GLAUCOMA OF RIGHT EYE, SEVERE STAGE: ICD-10-CM

## 2020-02-07 DIAGNOSIS — H26.492 PCO (POSTERIOR CAPSULAR OPACIFICATION), LEFT: ICD-10-CM

## 2020-02-07 DIAGNOSIS — Z98.83 GLAUCOMA FILTERING BLEB OF BOTH EYES: ICD-10-CM

## 2020-02-07 DIAGNOSIS — R53.83 FATIGUE, UNSPECIFIED TYPE: ICD-10-CM

## 2020-02-07 DIAGNOSIS — Z96.1 PSEUDOPHAKIA OF BOTH EYES: ICD-10-CM

## 2020-02-07 DIAGNOSIS — H04.123 DRY EYES, BILATERAL: ICD-10-CM

## 2020-02-07 DIAGNOSIS — H26.491 PCO (POSTERIOR CAPSULAR OPACIFICATION), RIGHT: ICD-10-CM

## 2020-02-07 DIAGNOSIS — R79.9 ABNORMAL FINDING OF BLOOD CHEMISTRY, UNSPECIFIED: ICD-10-CM

## 2020-02-07 DIAGNOSIS — E11.9 TYPE 2 DIABETES MELLITUS WITHOUT COMPLICATION, WITHOUT LONG-TERM CURRENT USE OF INSULIN: ICD-10-CM

## 2020-02-07 DIAGNOSIS — E11.9 TYPE 2 DIABETES MELLITUS WITHOUT OPHTHALMIC MANIFESTATIONS: ICD-10-CM

## 2020-02-07 DIAGNOSIS — E55.9 VITAMIN D DEFICIENCY: ICD-10-CM

## 2020-02-07 DIAGNOSIS — H40.1122 PRIMARY OPEN ANGLE GLAUCOMA OF LEFT EYE, MODERATE STAGE: Primary | ICD-10-CM

## 2020-02-07 DIAGNOSIS — H43.813 POSTERIOR VITREOUS DETACHMENT OF BOTH EYES: ICD-10-CM

## 2020-02-07 LAB
25(OH)D3+25(OH)D2 SERPL-MCNC: 39 NG/ML (ref 30–96)
ALBUMIN SERPL BCP-MCNC: 4 G/DL (ref 3.5–5.2)
ALP SERPL-CCNC: 83 U/L (ref 55–135)
ALT SERPL W/O P-5'-P-CCNC: 12 U/L (ref 10–44)
ANION GAP SERPL CALC-SCNC: 9 MMOL/L (ref 8–16)
AST SERPL-CCNC: 16 U/L (ref 10–40)
BASOPHILS # BLD AUTO: 0.08 K/UL (ref 0–0.2)
BASOPHILS NFR BLD: 0.7 % (ref 0–1.9)
BILIRUB SERPL-MCNC: 0.6 MG/DL (ref 0.1–1)
BUN SERPL-MCNC: 11 MG/DL (ref 8–23)
CALCIUM SERPL-MCNC: 10 MG/DL (ref 8.7–10.5)
CHLORIDE SERPL-SCNC: 101 MMOL/L (ref 95–110)
CHOLEST SERPL-MCNC: 181 MG/DL (ref 120–199)
CHOLEST/HDLC SERPL: 4.2 {RATIO} (ref 2–5)
CO2 SERPL-SCNC: 29 MMOL/L (ref 23–29)
CREAT SERPL-MCNC: 0.8 MG/DL (ref 0.5–1.4)
DIFFERENTIAL METHOD: ABNORMAL
EOSINOPHIL # BLD AUTO: 0.2 K/UL (ref 0–0.5)
EOSINOPHIL NFR BLD: 2.1 % (ref 0–8)
ERYTHROCYTE [DISTWIDTH] IN BLOOD BY AUTOMATED COUNT: 12.8 % (ref 11.5–14.5)
EST. GFR  (AFRICAN AMERICAN): >60 ML/MIN/1.73 M^2
EST. GFR  (NON AFRICAN AMERICAN): >60 ML/MIN/1.73 M^2
ESTIMATED AVG GLUCOSE: 131 MG/DL (ref 68–131)
GLUCOSE SERPL-MCNC: 174 MG/DL (ref 70–110)
HBA1C MFR BLD HPLC: 6.2 % (ref 4–5.6)
HCT VFR BLD AUTO: 38 % (ref 37–48.5)
HDLC SERPL-MCNC: 43 MG/DL (ref 40–75)
HDLC SERPL: 23.8 % (ref 20–50)
HGB BLD-MCNC: 12.4 G/DL (ref 12–16)
IMM GRANULOCYTES # BLD AUTO: 0.11 K/UL (ref 0–0.04)
IMM GRANULOCYTES NFR BLD AUTO: 0.9 % (ref 0–0.5)
LDLC SERPL CALC-MCNC: 119.8 MG/DL (ref 63–159)
LYMPHOCYTES # BLD AUTO: 3.9 K/UL (ref 1–4.8)
LYMPHOCYTES NFR BLD: 33.5 % (ref 18–48)
MCH RBC QN AUTO: 28.2 PG (ref 27–31)
MCHC RBC AUTO-ENTMCNC: 32.6 G/DL (ref 32–36)
MCV RBC AUTO: 86 FL (ref 82–98)
MONOCYTES # BLD AUTO: 0.9 K/UL (ref 0.3–1)
MONOCYTES NFR BLD: 7.5 % (ref 4–15)
NEUTROPHILS # BLD AUTO: 6.5 K/UL (ref 1.8–7.7)
NEUTROPHILS NFR BLD: 55.3 % (ref 38–73)
NONHDLC SERPL-MCNC: 138 MG/DL
NRBC BLD-RTO: 0 /100 WBC
PLATELET # BLD AUTO: 356 K/UL (ref 150–350)
PMV BLD AUTO: 10.2 FL (ref 9.2–12.9)
POTASSIUM SERPL-SCNC: 3.6 MMOL/L (ref 3.5–5.1)
PROT SERPL-MCNC: 8 G/DL (ref 6–8.4)
RBC # BLD AUTO: 4.4 M/UL (ref 4–5.4)
SODIUM SERPL-SCNC: 139 MMOL/L (ref 136–145)
TRIGL SERPL-MCNC: 91 MG/DL (ref 30–150)
TSH SERPL DL<=0.005 MIU/L-ACNC: 1.52 UIU/ML (ref 0.4–4)
WBC # BLD AUTO: 11.67 K/UL (ref 3.9–12.7)

## 2020-02-07 PROCEDURE — 92012 INTRM OPH EXAM EST PATIENT: CPT | Mod: S$PBB,,, | Performed by: OPHTHALMOLOGY

## 2020-02-07 PROCEDURE — 92133 CPTRZD OPH DX IMG PST SGM ON: CPT | Mod: PBBFAC | Performed by: OPHTHALMOLOGY

## 2020-02-07 PROCEDURE — 36415 COLL VENOUS BLD VENIPUNCTURE: CPT

## 2020-02-07 PROCEDURE — 84443 ASSAY THYROID STIM HORMONE: CPT

## 2020-02-07 PROCEDURE — 99999 PR PBB SHADOW E&M-EST. PATIENT-LVL II: CPT | Mod: PBBFAC,,, | Performed by: OPHTHALMOLOGY

## 2020-02-07 PROCEDURE — 85025 COMPLETE CBC W/AUTO DIFF WBC: CPT

## 2020-02-07 PROCEDURE — 99212 OFFICE O/P EST SF 10 MIN: CPT | Mod: PBBFAC,25 | Performed by: OPHTHALMOLOGY

## 2020-02-07 PROCEDURE — 99999 PR PBB SHADOW E&M-EST. PATIENT-LVL II: ICD-10-PCS | Mod: PBBFAC,,, | Performed by: OPHTHALMOLOGY

## 2020-02-07 PROCEDURE — 83036 HEMOGLOBIN GLYCOSYLATED A1C: CPT

## 2020-02-07 PROCEDURE — 80061 LIPID PANEL: CPT

## 2020-02-07 PROCEDURE — 82306 VITAMIN D 25 HYDROXY: CPT

## 2020-02-07 PROCEDURE — 92133 HEIDELBERG RETINA TOMOGRAPHY (HRT) - OU - BOTH EYES: ICD-10-PCS | Mod: 26,S$PBB,, | Performed by: OPHTHALMOLOGY

## 2020-02-07 PROCEDURE — 92012 PR EYE EXAM, EST PATIENT,INTERMED: ICD-10-PCS | Mod: S$PBB,,, | Performed by: OPHTHALMOLOGY

## 2020-02-07 PROCEDURE — 80053 COMPREHEN METABOLIC PANEL: CPT

## 2020-02-24 ENCOUNTER — PATIENT OUTREACH (OUTPATIENT)
Dept: ADMINISTRATIVE | Facility: OTHER | Age: 75
End: 2020-02-24

## 2020-02-26 ENCOUNTER — OFFICE VISIT (OUTPATIENT)
Dept: OPTOMETRY | Facility: CLINIC | Age: 75
End: 2020-02-26
Payer: MEDICARE

## 2020-02-26 DIAGNOSIS — H52.202 HYPEROPIA WITH ASTIGMATISM, LEFT: Primary | ICD-10-CM

## 2020-02-26 DIAGNOSIS — H52.02 HYPEROPIA WITH ASTIGMATISM, LEFT: Primary | ICD-10-CM

## 2020-02-26 PROCEDURE — 99499 NO LOS: ICD-10-PCS | Mod: S$PBB,,, | Performed by: OPTOMETRIST

## 2020-02-26 PROCEDURE — 99999 PR PBB SHADOW E&M-EST. PATIENT-LVL II: CPT | Mod: PBBFAC,,, | Performed by: OPTOMETRIST

## 2020-02-26 PROCEDURE — 92015 DETERMINE REFRACTIVE STATE: CPT | Mod: ,,, | Performed by: OPTOMETRIST

## 2020-02-26 PROCEDURE — 99212 OFFICE O/P EST SF 10 MIN: CPT | Mod: PBBFAC | Performed by: OPTOMETRIST

## 2020-02-26 PROCEDURE — 92015 PR REFRACTION: ICD-10-PCS | Mod: ,,, | Performed by: OPTOMETRIST

## 2020-02-26 PROCEDURE — 99999 PR PBB SHADOW E&M-EST. PATIENT-LVL II: ICD-10-PCS | Mod: PBBFAC,,, | Performed by: OPTOMETRIST

## 2020-02-26 PROCEDURE — 99499 UNLISTED E&M SERVICE: CPT | Mod: S$PBB,,, | Performed by: OPTOMETRIST

## 2020-02-26 NOTE — LETTER
February 26, 2020      Sanna Wiley MD  [address]  [facility] [address]           [facility]  [address]  [facility] [address]  Phone: [phone]  Fax: [phone]          Patient: Lupe Jewell   MR Number: [id number]   YOB: 1945   Date of Visit: 2/26/2020       Dear Dr. Sanna Wiley:    Thank you for referring Lupe Jewell to me for evaluation. Attached you will find relevant portions of my assessment and plan of care.    If you have questions, please do not hesitate to call me. I look forward to following Lupe Jewell along with you.    Sincerely,    Donte Gonzalez, OD    Enclosure  CC:  No Recipients    If you would like to receive this communication electronically, please contact [email or web address] or [phone] to request more information on [employer] Link access.    For providers and/or their staff who would like to refer a patient to [facility], please contact us through our one-stop-shop provider referral line, [facility] , at [phone].    If you feel you have received this communication in error or would no longer like to receive these types of communications, please e-mail [email or web address]

## 2020-02-26 NOTE — PROGRESS NOTES
HPI     Pt is here for a refraction per Dr Wiley.  Pt states she does currently have glasses however she is having trouble   with distance and small print.    Meds;   Pres Free AT's  QID OU       1) POAG   2) PCIOL OU   3) Type 2 DM NO DR   4) LOLA   5) PVD OU   6) filtering blebs ou       Last edited by Lino Reece on 2/26/2020  9:00 AM. (History)            Assessment /Plan     For exam results, see Encounter Report.    Hyperopia with astigmatism, left  -Refraction only  Eyeglass Final Rx     Eyeglass Final Rx       Sphere Cylinder Axis Dist VA Add    Right -1.25 +1.50 080 20/30 +2.50    Left +0.75 +1.50 085 20/30 +2.50    Type:  PAL    Expiration Date:  2/26/2021    Comments:  ANTIMETROPIA                  RTC as directed OMD

## 2020-02-29 ENCOUNTER — EXTERNAL CHRONIC CARE MANAGEMENT (OUTPATIENT)
Dept: PRIMARY CARE CLINIC | Facility: CLINIC | Age: 75
End: 2020-02-29
Payer: MEDICARE

## 2020-02-29 PROCEDURE — 99490 CHRNC CARE MGMT STAFF 1ST 20: CPT | Mod: S$PBB,,, | Performed by: INTERNAL MEDICINE

## 2020-02-29 PROCEDURE — 99490 CHRNC CARE MGMT STAFF 1ST 20: CPT | Mod: PBBFAC | Performed by: INTERNAL MEDICINE

## 2020-02-29 PROCEDURE — 99490 PR CHRONIC CARE MGMT, 1ST 20 MIN: ICD-10-PCS | Mod: S$PBB,,, | Performed by: INTERNAL MEDICINE

## 2020-03-01 DIAGNOSIS — J45.41 MODERATE PERSISTENT ASTHMA WITH ACUTE EXACERBATION: ICD-10-CM

## 2020-03-02 RX ORDER — BUDESONIDE AND FORMOTEROL FUMARATE DIHYDRATE 160; 4.5 UG/1; UG/1
AEROSOL RESPIRATORY (INHALATION)
Qty: 10.2 G | Refills: 0 | Status: SHIPPED | OUTPATIENT
Start: 2020-03-02 | End: 2020-04-17

## 2020-03-13 ENCOUNTER — LAB VISIT (OUTPATIENT)
Dept: LAB | Facility: HOSPITAL | Age: 75
End: 2020-03-13
Attending: INTERNAL MEDICINE
Payer: MEDICARE

## 2020-03-13 DIAGNOSIS — R79.9 ABNORMAL FINDING OF BLOOD CHEMISTRY, UNSPECIFIED: ICD-10-CM

## 2020-03-13 DIAGNOSIS — E11.9 TYPE 2 DIABETES MELLITUS WITHOUT COMPLICATION, WITHOUT LONG-TERM CURRENT USE OF INSULIN: ICD-10-CM

## 2020-03-13 DIAGNOSIS — E11.9 TYPE 2 DIABETES MELLITUS WITHOUT COMPLICATION, WITHOUT LONG-TERM CURRENT USE OF INSULIN: Primary | ICD-10-CM

## 2020-03-13 LAB
ALBUMIN SERPL BCP-MCNC: 3.6 G/DL (ref 3.5–5.2)
ALP SERPL-CCNC: 81 U/L (ref 55–135)
ALT SERPL W/O P-5'-P-CCNC: 10 U/L (ref 10–44)
ANION GAP SERPL CALC-SCNC: 8 MMOL/L (ref 8–16)
AST SERPL-CCNC: 12 U/L (ref 10–40)
BASOPHILS # BLD AUTO: 0.09 K/UL (ref 0–0.2)
BASOPHILS NFR BLD: 0.8 % (ref 0–1.9)
BILIRUB SERPL-MCNC: 0.5 MG/DL (ref 0.1–1)
BUN SERPL-MCNC: 13 MG/DL (ref 8–23)
CALCIUM SERPL-MCNC: 9.5 MG/DL (ref 8.7–10.5)
CHLORIDE SERPL-SCNC: 104 MMOL/L (ref 95–110)
CO2 SERPL-SCNC: 30 MMOL/L (ref 23–29)
CREAT SERPL-MCNC: 0.8 MG/DL (ref 0.5–1.4)
DIFFERENTIAL METHOD: ABNORMAL
EOSINOPHIL # BLD AUTO: 0.3 K/UL (ref 0–0.5)
EOSINOPHIL NFR BLD: 2.8 % (ref 0–8)
ERYTHROCYTE [DISTWIDTH] IN BLOOD BY AUTOMATED COUNT: 12.3 % (ref 11.5–14.5)
EST. GFR  (AFRICAN AMERICAN): >60 ML/MIN/1.73 M^2
EST. GFR  (NON AFRICAN AMERICAN): >60 ML/MIN/1.73 M^2
ESTIMATED AVG GLUCOSE: 143 MG/DL (ref 68–131)
GLUCOSE SERPL-MCNC: 165 MG/DL (ref 70–110)
HBA1C MFR BLD HPLC: 6.6 % (ref 4–5.6)
HCT VFR BLD AUTO: 36.6 % (ref 37–48.5)
HGB BLD-MCNC: 12.1 G/DL (ref 12–16)
IMM GRANULOCYTES # BLD AUTO: 0.13 K/UL (ref 0–0.04)
IMM GRANULOCYTES NFR BLD AUTO: 1.1 % (ref 0–0.5)
LYMPHOCYTES # BLD AUTO: 4.2 K/UL (ref 1–4.8)
LYMPHOCYTES NFR BLD: 35.6 % (ref 18–48)
MCH RBC QN AUTO: 28.4 PG (ref 27–31)
MCHC RBC AUTO-ENTMCNC: 33.1 G/DL (ref 32–36)
MCV RBC AUTO: 86 FL (ref 82–98)
MONOCYTES # BLD AUTO: 0.8 K/UL (ref 0.3–1)
MONOCYTES NFR BLD: 6.5 % (ref 4–15)
NEUTROPHILS # BLD AUTO: 6.3 K/UL (ref 1.8–7.7)
NEUTROPHILS NFR BLD: 53.2 % (ref 38–73)
NRBC BLD-RTO: 0 /100 WBC
PLATELET # BLD AUTO: 416 K/UL (ref 150–350)
PMV BLD AUTO: 10.2 FL (ref 9.2–12.9)
POTASSIUM SERPL-SCNC: 3.9 MMOL/L (ref 3.5–5.1)
PROT SERPL-MCNC: 7.3 G/DL (ref 6–8.4)
RBC # BLD AUTO: 4.26 M/UL (ref 4–5.4)
SODIUM SERPL-SCNC: 142 MMOL/L (ref 136–145)
WBC # BLD AUTO: 11.76 K/UL (ref 3.9–12.7)

## 2020-03-13 PROCEDURE — 83036 HEMOGLOBIN GLYCOSYLATED A1C: CPT

## 2020-03-13 PROCEDURE — 85025 COMPLETE CBC W/AUTO DIFF WBC: CPT

## 2020-03-13 PROCEDURE — 36415 COLL VENOUS BLD VENIPUNCTURE: CPT

## 2020-03-13 PROCEDURE — 80053 COMPREHEN METABOLIC PANEL: CPT

## 2020-03-16 RX ORDER — ATORVASTATIN CALCIUM 10 MG/1
TABLET, FILM COATED ORAL
Qty: 90 TABLET | Refills: 0 | Status: SHIPPED | OUTPATIENT
Start: 2020-03-16 | End: 2020-06-12

## 2020-03-31 ENCOUNTER — EXTERNAL CHRONIC CARE MANAGEMENT (OUTPATIENT)
Dept: PRIMARY CARE CLINIC | Facility: CLINIC | Age: 75
End: 2020-03-31
Payer: MEDICARE

## 2020-03-31 PROCEDURE — 99490 CHRNC CARE MGMT STAFF 1ST 20: CPT | Mod: PBBFAC | Performed by: INTERNAL MEDICINE

## 2020-03-31 PROCEDURE — 99490 PR CHRONIC CARE MGMT, 1ST 20 MIN: ICD-10-PCS | Mod: S$PBB,,, | Performed by: INTERNAL MEDICINE

## 2020-03-31 PROCEDURE — 99490 CHRNC CARE MGMT STAFF 1ST 20: CPT | Mod: S$PBB,,, | Performed by: INTERNAL MEDICINE

## 2020-04-17 DIAGNOSIS — L30.9 DERMATITIS: ICD-10-CM

## 2020-04-17 DIAGNOSIS — J45.41 MODERATE PERSISTENT ASTHMA WITH ACUTE EXACERBATION: ICD-10-CM

## 2020-04-17 RX ORDER — FLUOCINONIDE 0.5 MG/G
CREAM TOPICAL
Qty: 60 G | Refills: 2 | Status: SHIPPED | OUTPATIENT
Start: 2020-04-17 | End: 2021-02-09

## 2020-04-17 RX ORDER — BUDESONIDE AND FORMOTEROL FUMARATE DIHYDRATE 160; 4.5 UG/1; UG/1
AEROSOL RESPIRATORY (INHALATION)
Qty: 10.2 G | Refills: 3 | Status: SHIPPED | OUTPATIENT
Start: 2020-04-17 | End: 2021-01-04 | Stop reason: SDUPTHER

## 2020-04-28 DIAGNOSIS — G47.00 INSOMNIA, UNSPECIFIED TYPE: ICD-10-CM

## 2020-04-29 RX ORDER — AMITRIPTYLINE HYDROCHLORIDE 25 MG/1
TABLET, FILM COATED ORAL
Qty: 90 TABLET | Refills: 3 | Status: SHIPPED | OUTPATIENT
Start: 2020-04-29 | End: 2021-05-13 | Stop reason: SDUPTHER

## 2020-04-30 ENCOUNTER — EXTERNAL CHRONIC CARE MANAGEMENT (OUTPATIENT)
Dept: PRIMARY CARE CLINIC | Facility: CLINIC | Age: 75
End: 2020-04-30
Payer: MEDICARE

## 2020-04-30 PROCEDURE — 99490 CHRNC CARE MGMT STAFF 1ST 20: CPT | Mod: PBBFAC | Performed by: INTERNAL MEDICINE

## 2020-04-30 PROCEDURE — 99490 CHRNC CARE MGMT STAFF 1ST 20: CPT | Mod: S$PBB,,, | Performed by: INTERNAL MEDICINE

## 2020-04-30 PROCEDURE — 99490 PR CHRONIC CARE MGMT, 1ST 20 MIN: ICD-10-PCS | Mod: S$PBB,,, | Performed by: INTERNAL MEDICINE

## 2020-05-29 DIAGNOSIS — I10 HYPERTENSION, UNSPECIFIED TYPE: ICD-10-CM

## 2020-05-29 RX ORDER — TRIAMTERENE/HYDROCHLOROTHIAZID 37.5-25 MG
TABLET ORAL
Qty: 30 TABLET | Refills: 9 | Status: SHIPPED | OUTPATIENT
Start: 2020-05-29 | End: 2021-03-31 | Stop reason: SDUPTHER

## 2020-05-31 ENCOUNTER — EXTERNAL CHRONIC CARE MANAGEMENT (OUTPATIENT)
Dept: PRIMARY CARE CLINIC | Facility: CLINIC | Age: 75
End: 2020-05-31
Payer: MEDICARE

## 2020-05-31 PROCEDURE — 99490 CHRNC CARE MGMT STAFF 1ST 20: CPT | Mod: S$PBB,,, | Performed by: INTERNAL MEDICINE

## 2020-05-31 PROCEDURE — 99490 PR CHRONIC CARE MGMT, 1ST 20 MIN: ICD-10-PCS | Mod: S$PBB,,, | Performed by: INTERNAL MEDICINE

## 2020-05-31 PROCEDURE — 99490 CHRNC CARE MGMT STAFF 1ST 20: CPT | Mod: PBBFAC | Performed by: INTERNAL MEDICINE

## 2020-06-01 DIAGNOSIS — Z12.31 ENCOUNTER FOR SCREENING MAMMOGRAM FOR BREAST CANCER: Primary | ICD-10-CM

## 2020-06-19 ENCOUNTER — HOSPITAL ENCOUNTER (OUTPATIENT)
Dept: RADIOLOGY | Facility: HOSPITAL | Age: 75
Discharge: HOME | End: 2020-06-19
Attending: INTERNAL MEDICINE
Payer: MEDICARE

## 2020-06-19 DIAGNOSIS — Z12.31 ENCOUNTER FOR SCREENING MAMMOGRAM FOR BREAST CANCER: ICD-10-CM

## 2020-06-19 PROCEDURE — 77067 SCR MAMMO BI INCL CAD: CPT | Mod: TC

## 2020-06-19 PROCEDURE — 77063 BREAST TOMOSYNTHESIS BI: CPT | Mod: 26,,, | Performed by: RADIOLOGY

## 2020-06-19 PROCEDURE — 77067 SCR MAMMO BI INCL CAD: CPT | Mod: 26,,, | Performed by: RADIOLOGY

## 2020-06-19 PROCEDURE — 77063 MAMMO DIGITAL SCREENING BILAT WITH TOMOSYNTHESIS_CAD: ICD-10-PCS | Mod: 26,,, | Performed by: RADIOLOGY

## 2020-06-19 PROCEDURE — 77067 MAMMO DIGITAL SCREENING BILAT WITH TOMOSYNTHESIS_CAD: ICD-10-PCS | Mod: 26,,, | Performed by: RADIOLOGY

## 2020-06-30 ENCOUNTER — EXTERNAL CHRONIC CARE MANAGEMENT (OUTPATIENT)
Dept: PRIMARY CARE CLINIC | Facility: CLINIC | Age: 75
End: 2020-06-30
Payer: MEDICARE

## 2020-06-30 PROCEDURE — 99490 CHRNC CARE MGMT STAFF 1ST 20: CPT | Mod: PBBFAC | Performed by: INTERNAL MEDICINE

## 2020-06-30 PROCEDURE — 99490 PR CHRONIC CARE MGMT, 1ST 20 MIN: ICD-10-PCS | Mod: S$PBB,,, | Performed by: INTERNAL MEDICINE

## 2020-06-30 PROCEDURE — 99490 CHRNC CARE MGMT STAFF 1ST 20: CPT | Mod: S$PBB,,, | Performed by: INTERNAL MEDICINE

## 2020-07-15 DIAGNOSIS — Z71.89 COMPLEX CARE COORDINATION: ICD-10-CM

## 2020-07-31 ENCOUNTER — EXTERNAL CHRONIC CARE MANAGEMENT (OUTPATIENT)
Dept: PRIMARY CARE CLINIC | Facility: CLINIC | Age: 75
End: 2020-07-31
Payer: MEDICARE

## 2020-07-31 PROCEDURE — 99490 CHRNC CARE MGMT STAFF 1ST 20: CPT | Mod: S$PBB,,, | Performed by: INTERNAL MEDICINE

## 2020-07-31 PROCEDURE — 99490 PR CHRONIC CARE MGMT, 1ST 20 MIN: ICD-10-PCS | Mod: S$PBB,,, | Performed by: INTERNAL MEDICINE

## 2020-07-31 PROCEDURE — 99490 CHRNC CARE MGMT STAFF 1ST 20: CPT | Mod: PBBFAC | Performed by: INTERNAL MEDICINE

## 2020-08-31 ENCOUNTER — EXTERNAL CHRONIC CARE MANAGEMENT (OUTPATIENT)
Dept: PRIMARY CARE CLINIC | Facility: CLINIC | Age: 75
End: 2020-08-31
Payer: MEDICARE

## 2020-08-31 PROCEDURE — 99490 CHRNC CARE MGMT STAFF 1ST 20: CPT | Mod: PBBFAC | Performed by: INTERNAL MEDICINE

## 2020-08-31 PROCEDURE — 99490 CHRNC CARE MGMT STAFF 1ST 20: CPT | Mod: S$PBB,,, | Performed by: INTERNAL MEDICINE

## 2020-08-31 PROCEDURE — 99490 PR CHRONIC CARE MGMT, 1ST 20 MIN: ICD-10-PCS | Mod: S$PBB,,, | Performed by: INTERNAL MEDICINE

## 2020-09-03 ENCOUNTER — TELEPHONE (OUTPATIENT)
Dept: OPHTHALMOLOGY | Facility: CLINIC | Age: 75
End: 2020-09-03

## 2020-09-03 NOTE — TELEPHONE ENCOUNTER
Left several messages for pt about rescheduling appt on 9/18/20. Pt has not called back. I sent a letter for pt informing pt that  will be out on that day and we need to r/s appt.   Also, pt's appts were scheduled incorrectly and were put on 2 different dates. HVf was on 9/15/20 but 's appt was on 9/18/20.

## 2020-09-15 ENCOUNTER — TELEPHONE (OUTPATIENT)
Dept: OPHTHALMOLOGY | Facility: CLINIC | Age: 75
End: 2020-09-15

## 2020-09-15 NOTE — TELEPHONE ENCOUNTER
----- Message from Yessenia Zamora sent at 9/15/2020 10:23 AM CDT -----  Contact: Pt @ [phone]  Pt is cancelling appt for today, but is needing to reschedule. Schedule isn't allowing me to see appts to make this change.

## 2020-09-30 ENCOUNTER — EXTERNAL CHRONIC CARE MANAGEMENT (OUTPATIENT)
Dept: PRIMARY CARE CLINIC | Facility: CLINIC | Age: 75
End: 2020-09-30
Payer: MEDICARE

## 2020-09-30 PROCEDURE — G2058 PR CHRON CARE MGMT, EA ADDTL 20 MINS: ICD-10-PCS | Mod: S$PBB,,, | Performed by: INTERNAL MEDICINE

## 2020-09-30 PROCEDURE — 99490 CHRNC CARE MGMT STAFF 1ST 20: CPT | Mod: S$PBB,,, | Performed by: INTERNAL MEDICINE

## 2020-09-30 PROCEDURE — 99490 PR CHRONIC CARE MGMT, 1ST 20 MIN: ICD-10-PCS | Mod: S$PBB,,, | Performed by: INTERNAL MEDICINE

## 2020-09-30 PROCEDURE — G2058 CCM ADD 20MIN: HCPCS | Mod: S$PBB,,, | Performed by: INTERNAL MEDICINE

## 2020-09-30 PROCEDURE — 99490 CHRNC CARE MGMT STAFF 1ST 20: CPT | Mod: PBBFAC | Performed by: INTERNAL MEDICINE

## 2020-09-30 PROCEDURE — G2058 CCM ADD 20MIN: HCPCS | Mod: PBBFAC | Performed by: INTERNAL MEDICINE

## 2020-09-30 NOTE — PROGRESS NOTES
HPI     DLS: 2/07/20    Pt here for HVF review/OCT;    Meds:   Pres Free AT's  QID OU     1) POAG   2) PCIOL OU   3) Type 2 DM NO DR   4) LOLA   5) PVD OU   6) filtering blebs ou     Last edited by Tila Kent on 10/2/2020  2:53 PM. (History)              Assessment /Plan     For exam results, see Encounter Report.    Primary open angle glaucoma of left eye, moderate stage    Primary open angle glaucoma of right eye, severe stage    Type 2 diabetes mellitus without ophthalmic manifestations    Posterior vitreous detachment of both eyes - Both Eyes    PCO (posterior capsular opacification), left    PCO (posterior capsular opacification), right    Pseudophakia of both eyes    Glaucoma filtering bleb of both eyes      1. POAG (primary open-angle glaucoma) - Severe stage - Both Eyes   Glaucoma (type and duration) POAG,   -  first HVF 1997  -  first photo: 1996     Family history None   Glaucoma meds - (off all gtts os post combined)  (( use to use - Alphagan od , Xalatan od , dorzolamide bid od ))  H/O adverse rxn to glaucoma drops into to BB 2/2 asthma   LASERS SLT 12/8/05 and repeat 7/16/09 OD, and SLT 1/6/06 OS;  SLT os 7/31/14, od 8/14/14 (no response ou) // yag cap od 1/3/2019 /// yag cap os  10/17/2019   GLAUCOMA SURGERIES - combined phaco/IOL trab -os 12/28/2015// elastic sclera - 9 sutures to close - all visible ones cut /// combined - phaco/trab w/ minishunt od - 3/30/2016  OTHER EYE SURGERIES PC IOL OS (combined 1/28/2015) // PC IOL od (combined 3/30/2016)  CDR 0.95 OU   Tbase 22 OU   Tmax 37 (4/11/2016) /38 ( 4/16/2015)   Ttarget 14/14  HVF 22 HVF: 0575-0660 - SAD and  INS  OD, // SAD / IAD  Os (+prog ou 10/2020)   Gonio +3 OU   /421   OCT 10 test 2004 - 2020: Dec S/I/T OD// Dec thru out   HRT 13 test 2005 to 2020 - MR- Dec. SN/N, bord IT od // dec. N/IN, joshua SN/IT os /// CDR 0.78 od // 0.75 os  Disc photos 1996, 1997, 2003: slides // 2012 , 2015 - OIS    Ttoday  15/15  Test done today: IOP / HVF /  DFE / OCT      2. Diabetes mellitus type II   No BDR     3. Dry eyes - Both Eyes   AT's prn     4. Posterior vitreous detachment of both eyes - Both Eyes   With floaters - stable     5. A lot of family stressors   Mom passed since last visit  dad elderly and in poor health, have sitters  Daughter is unemployed and had to move back home  Daughter recently ill - in ICU and intubated 2/2 pancreatitis (8/2/2015 to 8/11/2015)      6. S/P yag cap ou   -od 10/3/3019 (also Rx ant capsule phimosis od)   -OS - 10/17/2019        Plan  Glaucoma   + blebs ou -   But target is 14 ou and + VF prog ou on 10/2020 - so add back latanoprost ou     Cont AT's ou prn     Sees Lisa for glasses     AT's prn     F/U 4 months with gonio and IOP check back on latanoprost

## 2020-10-02 ENCOUNTER — OFFICE VISIT (OUTPATIENT)
Dept: OPHTHALMOLOGY | Facility: CLINIC | Age: 75
End: 2020-10-02
Payer: MEDICARE

## 2020-10-02 ENCOUNTER — CLINICAL SUPPORT (OUTPATIENT)
Dept: OPHTHALMOLOGY | Facility: CLINIC | Age: 75
End: 2020-10-02
Payer: MEDICARE

## 2020-10-02 DIAGNOSIS — Z98.83 GLAUCOMA FILTERING BLEB OF BOTH EYES: ICD-10-CM

## 2020-10-02 DIAGNOSIS — Z96.1 PSEUDOPHAKIA OF BOTH EYES: ICD-10-CM

## 2020-10-02 DIAGNOSIS — H26.491 PCO (POSTERIOR CAPSULAR OPACIFICATION), RIGHT: ICD-10-CM

## 2020-10-02 DIAGNOSIS — H43.813 POSTERIOR VITREOUS DETACHMENT OF BOTH EYES: ICD-10-CM

## 2020-10-02 DIAGNOSIS — E11.9 TYPE 2 DIABETES MELLITUS WITHOUT OPHTHALMIC MANIFESTATIONS: ICD-10-CM

## 2020-10-02 DIAGNOSIS — H26.492 PCO (POSTERIOR CAPSULAR OPACIFICATION), LEFT: ICD-10-CM

## 2020-10-02 DIAGNOSIS — H40.1122 PRIMARY OPEN ANGLE GLAUCOMA OF LEFT EYE, MODERATE STAGE: Primary | ICD-10-CM

## 2020-10-02 DIAGNOSIS — H40.1113 PRIMARY OPEN ANGLE GLAUCOMA OF RIGHT EYE, SEVERE STAGE: ICD-10-CM

## 2020-10-02 PROCEDURE — 92133 CPTRZD OPH DX IMG PST SGM ON: CPT | Mod: PBBFAC | Performed by: OPHTHALMOLOGY

## 2020-10-02 PROCEDURE — 92083 EXTENDED VISUAL FIELD XM: CPT | Mod: PBBFAC | Performed by: OPHTHALMOLOGY

## 2020-10-02 PROCEDURE — 92014 PR EYE EXAM, EST PATIENT,COMPREHESV: ICD-10-PCS | Mod: S$PBB,,, | Performed by: OPHTHALMOLOGY

## 2020-10-02 PROCEDURE — 92133 POSTERIOR SEGMENT OCT OPTIC NERVE(OCULAR COHERENCE TOMOGRAPHY) - OU - BOTH EYES: ICD-10-PCS | Mod: 26,S$PBB,, | Performed by: OPHTHALMOLOGY

## 2020-10-02 PROCEDURE — 92014 COMPRE OPH EXAM EST PT 1/>: CPT | Mod: S$PBB,,, | Performed by: OPHTHALMOLOGY

## 2020-10-02 PROCEDURE — 92083 HUMPHREY VISUAL FIELD - OU - BOTH EYES: ICD-10-PCS | Mod: 26,S$PBB,, | Performed by: OPHTHALMOLOGY

## 2020-10-02 PROCEDURE — 99999 PR PBB SHADOW E&M-EST. PATIENT-LVL III: ICD-10-PCS | Mod: PBBFAC,,, | Performed by: OPHTHALMOLOGY

## 2020-10-02 PROCEDURE — 99213 OFFICE O/P EST LOW 20 MIN: CPT | Mod: PBBFAC,25 | Performed by: OPHTHALMOLOGY

## 2020-10-02 PROCEDURE — 99999 PR PBB SHADOW E&M-EST. PATIENT-LVL III: CPT | Mod: PBBFAC,,, | Performed by: OPHTHALMOLOGY

## 2020-10-02 RX ORDER — LATANOPROST 50 UG/ML
1 SOLUTION/ DROPS OPHTHALMIC DAILY
Qty: 2.5 ML | Refills: 12 | Status: SHIPPED | OUTPATIENT
Start: 2020-10-02 | End: 2021-10-22 | Stop reason: SDUPTHER

## 2020-10-31 ENCOUNTER — EXTERNAL CHRONIC CARE MANAGEMENT (OUTPATIENT)
Dept: PRIMARY CARE CLINIC | Facility: CLINIC | Age: 75
End: 2020-10-31
Payer: MEDICARE

## 2020-10-31 PROCEDURE — 99490 CHRNC CARE MGMT STAFF 1ST 20: CPT | Mod: PBBFAC | Performed by: INTERNAL MEDICINE

## 2020-10-31 PROCEDURE — 99490 CHRNC CARE MGMT STAFF 1ST 20: CPT | Mod: S$PBB,,, | Performed by: INTERNAL MEDICINE

## 2020-10-31 PROCEDURE — 99490 PR CHRONIC CARE MGMT, 1ST 20 MIN: ICD-10-PCS | Mod: S$PBB,,, | Performed by: INTERNAL MEDICINE

## 2020-11-11 ENCOUNTER — PATIENT OUTREACH (OUTPATIENT)
Dept: ADMINISTRATIVE | Facility: HOSPITAL | Age: 75
End: 2020-11-11

## 2020-11-17 ENCOUNTER — PES CALL (OUTPATIENT)
Dept: ADMINISTRATIVE | Facility: CLINIC | Age: 75
End: 2020-11-17

## 2020-11-25 ENCOUNTER — LAB VISIT (OUTPATIENT)
Dept: LAB | Facility: HOSPITAL | Age: 75
End: 2020-11-25
Attending: INTERNAL MEDICINE
Payer: MEDICARE

## 2020-11-25 ENCOUNTER — OFFICE VISIT (OUTPATIENT)
Dept: INTERNAL MEDICINE | Facility: CLINIC | Age: 75
End: 2020-11-25
Payer: MEDICARE

## 2020-11-25 DIAGNOSIS — E55.9 VITAMIN D DEFICIENCY: ICD-10-CM

## 2020-11-25 DIAGNOSIS — E11.9 DIABETES MELLITUS WITHOUT COMPLICATION: ICD-10-CM

## 2020-11-25 DIAGNOSIS — R45.84 ANHEDONIA: ICD-10-CM

## 2020-11-25 DIAGNOSIS — I10 HYPERTENSION, UNSPECIFIED TYPE: ICD-10-CM

## 2020-11-25 DIAGNOSIS — E55.9 VITAMIN D DEFICIENCY: Primary | ICD-10-CM

## 2020-11-25 LAB
25(OH)D3+25(OH)D2 SERPL-MCNC: 39 NG/ML (ref 30–96)
ALBUMIN SERPL BCP-MCNC: 4.1 G/DL (ref 3.5–5.2)
ALP SERPL-CCNC: 84 U/L (ref 55–135)
ALT SERPL W/O P-5'-P-CCNC: 12 U/L (ref 10–44)
ANION GAP SERPL CALC-SCNC: 10 MMOL/L (ref 8–16)
AST SERPL-CCNC: 15 U/L (ref 10–40)
BILIRUB SERPL-MCNC: 0.5 MG/DL (ref 0.1–1)
BUN SERPL-MCNC: 11 MG/DL (ref 8–23)
CALCIUM SERPL-MCNC: 9.9 MG/DL (ref 8.7–10.5)
CHLORIDE SERPL-SCNC: 102 MMOL/L (ref 95–110)
CO2 SERPL-SCNC: 29 MMOL/L (ref 23–29)
CREAT SERPL-MCNC: 0.8 MG/DL (ref 0.5–1.4)
EST. GFR  (AFRICAN AMERICAN): >60 ML/MIN/1.73 M^2
EST. GFR  (NON AFRICAN AMERICAN): >60 ML/MIN/1.73 M^2
ESTIMATED AVG GLUCOSE: 114 MG/DL (ref 68–131)
FOLATE SERPL-MCNC: 10 NG/ML (ref 4–24)
GLUCOSE SERPL-MCNC: 115 MG/DL (ref 70–110)
HBA1C MFR BLD HPLC: 5.6 % (ref 4–5.6)
POTASSIUM SERPL-SCNC: 3.8 MMOL/L (ref 3.5–5.1)
PROT SERPL-MCNC: 7.9 G/DL (ref 6–8.4)
SODIUM SERPL-SCNC: 141 MMOL/L (ref 136–145)
VIT B12 SERPL-MCNC: 236 PG/ML (ref 210–950)

## 2020-11-25 PROCEDURE — 82607 VITAMIN B-12: CPT

## 2020-11-25 PROCEDURE — 82746 ASSAY OF FOLIC ACID SERUM: CPT

## 2020-11-25 PROCEDURE — 80053 COMPREHEN METABOLIC PANEL: CPT

## 2020-11-25 PROCEDURE — 99214 OFFICE O/P EST MOD 30 MIN: CPT | Mod: S$PBB,,, | Performed by: INTERNAL MEDICINE

## 2020-11-25 PROCEDURE — 83036 HEMOGLOBIN GLYCOSYLATED A1C: CPT

## 2020-11-25 PROCEDURE — 82306 VITAMIN D 25 HYDROXY: CPT

## 2020-11-25 PROCEDURE — 99999 PR PBB SHADOW E&M-EST. PATIENT-LVL V: CPT | Mod: PBBFAC,,, | Performed by: INTERNAL MEDICINE

## 2020-11-25 PROCEDURE — 99999 PR PBB SHADOW E&M-EST. PATIENT-LVL V: ICD-10-PCS | Mod: PBBFAC,,, | Performed by: INTERNAL MEDICINE

## 2020-11-25 PROCEDURE — 99215 OFFICE O/P EST HI 40 MIN: CPT | Mod: PBBFAC | Performed by: INTERNAL MEDICINE

## 2020-11-25 PROCEDURE — 36415 COLL VENOUS BLD VENIPUNCTURE: CPT

## 2020-11-25 PROCEDURE — 99214 PR OFFICE/OUTPT VISIT, EST, LEVL IV, 30-39 MIN: ICD-10-PCS | Mod: S$PBB,,, | Performed by: INTERNAL MEDICINE

## 2020-11-29 VITALS
OXYGEN SATURATION: 97 % | DIASTOLIC BLOOD PRESSURE: 84 MMHG | HEART RATE: 82 BPM | TEMPERATURE: 99 F | SYSTOLIC BLOOD PRESSURE: 136 MMHG | WEIGHT: 168 LBS | HEIGHT: 60 IN | BODY MASS INDEX: 32.98 KG/M2

## 2020-11-29 NOTE — PROGRESS NOTES
Subjective:       Patient ID: Lupe Jewell is a 75 y.o. female.    Chief Complaint: Hypertension    HPI  She returns for management of hypertension.  She has had hypertension for over a year.  Current treatment has included medications outlined in medication list.  She denies chest pain or shortness of breath.  No palpitations.  Denies left arm or neck pain.  She has diabetes.  Denies polyuria, polydipsia    Past medical history:  Hypertension, diabetes, asthma, glaucoma, vitamin-D deficiency , osteoporosis, status post hysterectomy, status post Carmelita n Y gastrectomy, bladder repair, colon adenoma.  She had a colonoscopy July 2016    Medications:  See med list    Allergies:  Penicillin      Review of Systems   Constitutional: Negative for chills, fatigue, fever and unexpected weight change.   Respiratory: Negative for chest tightness and shortness of breath.    Cardiovascular: Negative for chest pain and palpitations.   Gastrointestinal: Negative for abdominal pain and blood in stool.   Neurological: Negative for dizziness, syncope, numbness and headaches.       Objective:      Physical Exam  HENT:      Right Ear: External ear normal.      Left Ear: External ear normal.      Nose: Nose normal.      Mouth/Throat:      Mouth: Mucous membranes are moist.      Pharynx: Oropharynx is clear.   Eyes:      Pupils: Pupils are equal, round, and reactive to light.   Neck:      Musculoskeletal: Normal range of motion.   Cardiovascular:      Rate and Rhythm: Normal rate and regular rhythm.      Heart sounds: No murmur.   Pulmonary:      Breath sounds: Normal breath sounds.   Abdominal:      General: There is no distension.      Palpations: There is no hepatomegaly or splenomegaly.      Tenderness: There is no abdominal tenderness.   Lymphadenopathy:      Cervical: No cervical adenopathy.      Upper Body:      Right upper body: No axillary adenopathy.      Left upper body: No axillary adenopathy.   Neurological:      Cranial  Nerves: No cranial nerve deficit.      Sensory: No sensory deficit.      Motor: Motor function is intact.      Deep Tendon Reflexes: Reflexes are normal and symmetric.         Assessment/Plan           assessment and plan:  1.  Hypertension:  Check BMP  2.  Diabetes:  Check A1c, urine microalbumin.  Discussed podiatry appointment, she declined  3.  Check B12, folate, vitamin-D.  Discussed Pap smear, pelvic exam.  She declined all

## 2020-11-30 ENCOUNTER — EXTERNAL CHRONIC CARE MANAGEMENT (OUTPATIENT)
Dept: PRIMARY CARE CLINIC | Facility: CLINIC | Age: 75
End: 2020-11-30
Payer: MEDICARE

## 2020-11-30 PROCEDURE — 99490 PR CHRONIC CARE MGMT, 1ST 20 MIN: ICD-10-PCS | Mod: S$PBB,,, | Performed by: INTERNAL MEDICINE

## 2020-11-30 PROCEDURE — 99490 CHRNC CARE MGMT STAFF 1ST 20: CPT | Mod: PBBFAC | Performed by: INTERNAL MEDICINE

## 2020-11-30 PROCEDURE — 99490 CHRNC CARE MGMT STAFF 1ST 20: CPT | Mod: S$PBB,,, | Performed by: INTERNAL MEDICINE

## 2020-12-18 ENCOUNTER — OFFICE VISIT (OUTPATIENT)
Dept: INTERNAL MEDICINE | Facility: CLINIC | Age: 75
End: 2020-12-18
Payer: MEDICARE

## 2020-12-18 DIAGNOSIS — K62.5 RECTAL BLEED: Primary | ICD-10-CM

## 2020-12-18 DIAGNOSIS — R05.9 COUGH: ICD-10-CM

## 2020-12-18 DIAGNOSIS — I10 HYPERTENSION, UNSPECIFIED TYPE: ICD-10-CM

## 2020-12-18 PROCEDURE — 99999 PR PBB SHADOW E&M-EST. PATIENT-LVL II: CPT | Mod: PBBFAC,,, | Performed by: INTERNAL MEDICINE

## 2020-12-18 PROCEDURE — 99999 PR PBB SHADOW E&M-EST. PATIENT-LVL II: ICD-10-PCS | Mod: PBBFAC,,, | Performed by: INTERNAL MEDICINE

## 2020-12-18 PROCEDURE — 99214 PR OFFICE/OUTPT VISIT, EST, LEVL IV, 30-39 MIN: ICD-10-PCS | Mod: S$PBB,,, | Performed by: INTERNAL MEDICINE

## 2020-12-18 PROCEDURE — 99214 OFFICE O/P EST MOD 30 MIN: CPT | Mod: S$PBB,,, | Performed by: INTERNAL MEDICINE

## 2020-12-18 PROCEDURE — U0003 INFECTIOUS AGENT DETECTION BY NUCLEIC ACID (DNA OR RNA); SEVERE ACUTE RESPIRATORY SYNDROME CORONAVIRUS 2 (SARS-COV-2) (CORONAVIRUS DISEASE [COVID-19]), AMPLIFIED PROBE TECHNIQUE, MAKING USE OF HIGH THROUGHPUT TECHNOLOGIES AS DESCRIBED BY CMS-2020-01-R: HCPCS

## 2020-12-18 PROCEDURE — 99212 OFFICE O/P EST SF 10 MIN: CPT | Mod: PBBFAC | Performed by: INTERNAL MEDICINE

## 2020-12-20 LAB — SARS-COV-2 RNA RESP QL NAA+PROBE: NOT DETECTED

## 2020-12-21 ENCOUNTER — TELEPHONE (OUTPATIENT)
Dept: INTERNAL MEDICINE | Facility: CLINIC | Age: 75
End: 2020-12-21

## 2020-12-21 NOTE — TELEPHONE ENCOUNTER
----- Message from Rupali Springer sent at 12/21/2020 10:11 AM CST -----  Contact: [phone]  Calling to get test results.  Name of test (lab, x-ray): Covid  Date of test: 12/18  Where was the test performed: [facility]  Comments:

## 2020-12-22 ENCOUNTER — OFFICE VISIT (OUTPATIENT)
Dept: SURGERY | Facility: CLINIC | Age: 75
End: 2020-12-22
Payer: MEDICARE

## 2020-12-22 VITALS
WEIGHT: 169.06 LBS | HEART RATE: 101 BPM | SYSTOLIC BLOOD PRESSURE: 170 MMHG | DIASTOLIC BLOOD PRESSURE: 90 MMHG | HEIGHT: 61 IN | BODY MASS INDEX: 31.92 KG/M2

## 2020-12-22 DIAGNOSIS — R15.2 INCONTINENCE OF FECES WITH FECAL URGENCY: ICD-10-CM

## 2020-12-22 DIAGNOSIS — R15.9 INCONTINENCE OF FECES WITH FECAL URGENCY: ICD-10-CM

## 2020-12-22 DIAGNOSIS — K64.8 INTERNAL HEMORRHOIDS: Primary | ICD-10-CM

## 2020-12-22 PROCEDURE — 46600 DIAGNOSTIC ANOSCOPY SPX: CPT | Mod: S$PBB,,, | Performed by: NURSE PRACTITIONER

## 2020-12-22 PROCEDURE — 46600 DIAGNOSTIC ANOSCOPY SPX: CPT | Mod: PBBFAC | Performed by: NURSE PRACTITIONER

## 2020-12-22 PROCEDURE — 99203 PR OFFICE/OUTPT VISIT, NEW, LEVL III, 30-44 MIN: ICD-10-PCS | Mod: S$PBB,25,, | Performed by: NURSE PRACTITIONER

## 2020-12-22 PROCEDURE — 46600 PR DIAG2STIC A2SCOPY: ICD-10-PCS | Mod: S$PBB,,, | Performed by: NURSE PRACTITIONER

## 2020-12-22 PROCEDURE — 99999 PR PBB SHADOW E&M-EST. PATIENT-LVL IV: ICD-10-PCS | Mod: PBBFAC,,, | Performed by: NURSE PRACTITIONER

## 2020-12-22 PROCEDURE — 99214 OFFICE O/P EST MOD 30 MIN: CPT | Mod: PBBFAC,25 | Performed by: NURSE PRACTITIONER

## 2020-12-22 PROCEDURE — 99999 PR PBB SHADOW E&M-EST. PATIENT-LVL IV: CPT | Mod: PBBFAC,,, | Performed by: NURSE PRACTITIONER

## 2020-12-22 PROCEDURE — 99203 OFFICE O/P NEW LOW 30 MIN: CPT | Mod: S$PBB,25,, | Performed by: NURSE PRACTITIONER

## 2020-12-22 RX ORDER — ACETAMINOPHEN AND CODEINE PHOSPHATE 300; 30 MG/1; MG/1
TABLET ORAL
COMMUNITY
Start: 2020-10-08 | End: 2021-02-09

## 2020-12-22 RX ORDER — CLINDAMYCIN HYDROCHLORIDE 300 MG/1
CAPSULE ORAL
COMMUNITY
Start: 2020-10-08 | End: 2021-06-04 | Stop reason: ALTCHOICE

## 2020-12-22 RX ORDER — INFLUENZA A VIRUS A/MICHIGAN/45/2015 X-275 (H1N1) ANTIGEN (FORMALDEHYDE INACTIVATED), INFLUENZA A VIRUS A/SINGAPORE/INFIMH-16-0019/2016 IVR-186 (H3N2) ANTIGEN (FORMALDEHYDE INACTIVATED), INFLUENZA B VIRUS B/PHUKET/3073/2013 ANTIGEN (FORMALDEHYDE INACTIVATED), AND INFLUENZA B VIRUS B/MARYLAND/15/2016 BX-69A ANTIGEN (FORMALDEHYDE INACTIVATED) 60; 60; 60; 60 UG/.7ML; UG/.7ML; UG/.7ML; UG/.7ML
INJECTION, SUSPENSION INTRAMUSCULAR
COMMUNITY
Start: 2020-11-01 | End: 2021-02-09

## 2020-12-22 NOTE — PROGRESS NOTES
Patient ID:  Lupe Jewell is a 75 y.o. female     Chief Complaint:   Chief Complaint   Patient presents with    Rectal Bleeding        HPI:  Lupe Jewell presents to colon and rectal surgery with a complaint of rectal bleeding. She had an episode of loose stool, diarrhea. She noticed pink tinged blood and mucus in the toilet after this X 1 episode. Now having daily soft stools. No additional episodes of bleeding. Denies abdominal pain, rectal pain, unexplained weight loss. She saw Dr Ramirez in 2015 for fecal incontinence - stills occurs with laughing, sneezing, coughing. Not taking any fiber.     No family history of CRC  Colonoscopy 2016         - Diverticulosis in the sigmoid colon.                         - The examination was otherwise normal.                         - No specimens collected.   No family hx of CRC  No prior anorectal surgeries       ROS:     Review of Systems   Constitutional: Negative for fatigue, fever and unexpected weight change.   Respiratory: Negative for shortness of breath.    Cardiovascular: Negative for chest pain.   Gastrointestinal: Positive for blood in stool. Negative for abdominal distention, abdominal pain, anal bleeding, constipation, diarrhea, nausea, rectal pain and vomiting.       Review of patient's allergies indicates:   Allergen Reactions    Penicillins Rash     Past Medical History:   Diagnosis Date    ALLERGIC RHINITIS 8/17/2012    Asthma, well controlled 8/17/2012    Chronic asthma     Chronic rhinitis     Diabetes 2/4/2014    Diabetes mellitus     Diabetes mellitus type II     Fever blister     GERD (gastroesophageal reflux disease) 8/17/2012    Glaucoma     Hyperlipemia     Hypertension     Obstructive sleep apnea     Osteoporosis, unspecified      Past Surgical History:   Procedure Laterality Date    BLADDER SURGERY      BREAST BIOPSY Left     BREAST BIOPSY Right     core    CATARACT EXTRACTION W/  INTRAOCULAR LENS IMPLANT Left 1/28/2015    WITH  TRAB ()    CATARACT EXTRACTION W/  INTRAOCULAR LENS IMPLANT Right 03/30/2016    WITH TRAB ()    COLONOSCOPY N/A 7/1/2016    Procedure: COLONOSCOPY;  Surgeon: Rony Lima MD;  Location: [facility] ([address] [place] [address]);  Service: Endoscopy;  Laterality: N/A;    HYSTERECTOMY      stomach procedure      TRABECULECTOMY Left 12/28/2015    COMBINED WITH CE ()    TRABECULECTOMY Right 03/30/2016    COMBINED WITH CE ()    YAG CAPSULOTOMY Right 10/03/2019         Family History   Problem Relation Age of Onset    Glaucoma Mother     Glaucoma Sister     No Known Problems Father     No Known Problems Brother     No Known Problems Maternal Aunt     No Known Problems Maternal Uncle     No Known Problems Paternal Aunt     No Known Problems Paternal Uncle     No Known Problems Maternal Grandmother     No Known Problems Maternal Grandfather     No Known Problems Paternal Grandmother     No Known Problems Paternal Grandfather     Melanoma Neg Hx     Psoriasis Neg Hx     Lupus Neg Hx     Eczema Neg Hx     Acne Neg Hx     Blindness Neg Hx     Macular degeneration Neg Hx     Retinal detachment Neg Hx     Amblyopia Neg Hx     Cancer Neg Hx     Cataracts Neg Hx     Diabetes Neg Hx     Hypertension Neg Hx     Strabismus Neg Hx     Stroke Neg Hx     Thyroid disease Neg Hx      Current Outpatient Medications on File Prior to Visit   Medication Sig    albuterol (PROVENTIL/VENTOLIN HFA) 90 mcg/actuation inhaler INHALE 2 PUFFS BY MOUTH EVERY 4 HOURS    amitriptyline (ELAVIL) 25 MG tablet TAKE 1 TABLET BY MOUTH EVERY EVENING    atorvastatin (LIPITOR) 10 MG tablet TAKE 1 TABLET BY MOUTH EVERY DAY    budesonide-formoterol 160-4.5 mcg (SYMBICORT) 160-4.5 mcg/actuation HFAA INHALE 2 PUFFS BY MOUTH EVERY 12 HOURS    cholecalciferol, vitamin D3, 50,000 unit capsule Take 1 tablet weekly (every 7 days) for 12 weeks.    clobetasol 0.05% (TEMOVATE) 0.05 % Oint To  rash on chest and buttocks area    doxycycline (VIBRA-TABS) 100 MG tablet Take 1 tablet (100 mg total) by mouth 2 (two) times daily.    fluocinonide (LIDEX) 0.05 % external solution apply to affected area twice a day    fluocinonide 0.05% (LIDEX) 0.05 % cream APPLY EXTERNALLY TO THE AFFECTED AREA TWICE DAILY    fluticasone propionate (FLONASE) 50 mcg/actuation nasal spray SHAKE LIQUID AND USE 2 SPRAYS(100 MCG) IN EACH NOSTRIL TWICE DAILY    latanoprost 0.005 % ophthalmic solution Place 1 drop into both eyes once daily.    metFORMIN (GLUCOPHAGE) 500 MG tablet TAKE 1 TABLET(500 MG) BY MOUTH TWICE DAILY WITH MEALS    metFORMIN (GLUMETZA) 500 MG (MOD) 24 hr tablet Take 500 mg by mouth daily with breakfast.    montelukast (SINGULAIR) 10 mg tablet TAKE 1 TABLET BY MOUTH EVERY EVENING    selenium sulfide 2.5 % Lotn Apply once a day to affected area    triamterene-hydrochlorothiazide 37.5-25 mg (MAXZIDE-25) 37.5-25 mg per tablet TAKE 1 TABLET BY MOUTH EVERY DAY    albuterol-ipratropium (DUO-NEB) 2.5 mg-0.5 mg/3 mL nebulizer solution Take 3 mLs by nebulization every 6 (six) hours as needed for Wheezing or Shortness of Breath (cough). Rescue    desonide (DESOWEN) 0.05 % cream AAA bid    hydrocortisone butyrate (LOCOID) 0.1 % Oint AAA face bid     No current facility-administered medications on file prior to visit.        PE:  Vitals:    12/22/20 0858   BP: (!) 170/90   Pulse: 101     Physical Exam   Constitutional: She is oriented to person, place, and time and well-developed, well-nourished, and in no distress. No distress.   HENT:   Head: Normocephalic and atraumatic.   Pulmonary/Chest: Effort normal. No respiratory distress.   Abdominal: Soft. She exhibits no distension. There is no abdominal tenderness.   Genitourinary:    Genitourinary Comments: Normal perianal skin. eversion of anus revealed no abnormality or fissure, PARVEZ revealed no masses, blood or stool in vault, weak sphincter tone, anoscopy revealed  grade I internal hemorrhoids with no bleeding or stigmata of same.       Musculoskeletal: Normal range of motion.   Neurological: She is alert and oriented to person, place, and time. GCS score is 15.   Skin: Skin is warm and dry. No rash noted. No erythema.   Psychiatric: Affect normal.       Impression:  Encounter Diagnoses   Name Primary?    Internal hemorrhoids Yes    Incontinence of feces with fecal urgency        PLAN:  Lupe was seen today for rectal bleeding.    Diagnoses and all orders for this visit:    Internal hemorrhoids  -     Ambulatory referral/consult to Colorectal Surgery    Incontinence of feces with fecal urgency    Fiber con 2 pills in AM  Avoid straining or sitting on the toilet for prolonged period of time  High fiber diet  RTC 4-6 weeks. If no improvement with fecal seepage, will refer to pelvic floor PT.

## 2020-12-24 VITALS
OXYGEN SATURATION: 96 % | SYSTOLIC BLOOD PRESSURE: 130 MMHG | DIASTOLIC BLOOD PRESSURE: 80 MMHG | TEMPERATURE: 99 F | HEART RATE: 72 BPM

## 2020-12-24 RX ORDER — ATORVASTATIN CALCIUM 10 MG/1
10 TABLET, FILM COATED ORAL DAILY
Qty: 90 TABLET | Refills: 0 | Status: SHIPPED | OUTPATIENT
Start: 2020-12-24 | End: 2021-03-31 | Stop reason: SDUPTHER

## 2020-12-24 RX ORDER — MONTELUKAST SODIUM 10 MG/1
10 TABLET ORAL NIGHTLY
Qty: 90 TABLET | Refills: 0 | Status: SHIPPED | OUTPATIENT
Start: 2020-12-24 | End: 2021-04-05

## 2020-12-24 NOTE — PROGRESS NOTES
Subjective:       Patient ID: Lupe Jewell is a 75 y.o. female.    Chief Complaint: Hypertension    HPI  She returns for management of hypertension.  She has had hypertension for over a year.  Current treatment has included medications outlined in medication list.  She denies chest pain or shortness of breath.  No palpitations.  Denies left arm or neck pain.  She complains of an episode of rectal bleeding.  She also complains of nonproductive cough.  No shortness of breath    Medications:  See med list    Social history:  Does not smoke, does not drink alcohol      Review of Systems   Constitutional: Negative for chills, fatigue, fever and unexpected weight change.   Respiratory: Negative for chest tightness and shortness of breath.    Cardiovascular: Negative for chest pain and palpitations.   Gastrointestinal: Negative for abdominal pain and blood in stool.   Neurological: Negative for dizziness, syncope, numbness and headaches.       Objective:      Physical Exam  HENT:      Right Ear: External ear normal.      Left Ear: External ear normal.      Nose: Nose normal.      Mouth/Throat:      Mouth: Mucous membranes are moist.      Pharynx: Oropharynx is clear.   Eyes:      Pupils: Pupils are equal, round, and reactive to light.   Neck:      Musculoskeletal: Normal range of motion.   Cardiovascular:      Rate and Rhythm: Normal rate and regular rhythm.      Heart sounds: No murmur.   Pulmonary:      Breath sounds: Normal breath sounds.   Abdominal:      General: There is no distension.      Palpations: There is no hepatomegaly or splenomegaly.      Tenderness: There is no abdominal tenderness.   Lymphadenopathy:      Cervical: No cervical adenopathy.      Upper Body:      Right upper body: No axillary adenopathy.      Left upper body: No axillary adenopathy.   Neurological:      Cranial Nerves: No cranial nerve deficit.      Sensory: No sensory deficit.      Motor: Motor function is intact.      Deep Tendon  Reflexes: Reflexes are normal and symmetric.         Assessment/Plan       Assessment and plan:  1.  Hypertension:  Controlled  2.  Rectal bleed:  Schedule colon rectal appointment  3.  COVID test run  4.  She was here for urgent care only appointment.  She will return to clinic for a physical

## 2020-12-24 NOTE — TELEPHONE ENCOUNTER
Approved Prescriptions     montelukast (SINGULAIR) 10 mg tablet         Sig: Take 1 tablet (10 mg total) by mouth every evening.    Disp:  90 tablet    Refills:  0    Start: 12/24/2020    Class: Normal    Authorized by: Yanick Jamil MD         atorvastatin (LIPITOR) 10 MG tablet         Sig: Take 1 tablet (10 mg total) by mouth once daily.    Disp:  90 tablet    Refills:  0    Start: 12/24/2020    Class: Normal    Authorized by: Yanick Jamil MD        To be [facility] at: [id number] [address] AT [address]

## 2020-12-31 ENCOUNTER — EXTERNAL CHRONIC CARE MANAGEMENT (OUTPATIENT)
Dept: PRIMARY CARE CLINIC | Facility: CLINIC | Age: 75
End: 2020-12-31
Payer: MEDICARE

## 2020-12-31 PROCEDURE — 99490 CHRNC CARE MGMT STAFF 1ST 20: CPT | Mod: PBBFAC | Performed by: INTERNAL MEDICINE

## 2020-12-31 PROCEDURE — 99490 PR CHRONIC CARE MGMT, 1ST 20 MIN: ICD-10-PCS | Mod: S$PBB,,, | Performed by: INTERNAL MEDICINE

## 2020-12-31 PROCEDURE — 99490 CHRNC CARE MGMT STAFF 1ST 20: CPT | Mod: S$PBB,,, | Performed by: INTERNAL MEDICINE

## 2021-01-04 DIAGNOSIS — J45.41 MODERATE PERSISTENT ASTHMA WITH ACUTE EXACERBATION: ICD-10-CM

## 2021-01-04 RX ORDER — BUDESONIDE AND FORMOTEROL FUMARATE DIHYDRATE 160; 4.5 UG/1; UG/1
AEROSOL RESPIRATORY (INHALATION)
Qty: 10.2 G | Refills: 3 | Status: SHIPPED | OUTPATIENT
Start: 2021-01-04 | End: 2021-10-04

## 2021-01-27 ENCOUNTER — TELEPHONE (OUTPATIENT)
Dept: INTERNAL MEDICINE | Facility: CLINIC | Age: 76
End: 2021-01-27

## 2021-01-28 ENCOUNTER — TELEPHONE (OUTPATIENT)
Dept: INTERNAL MEDICINE | Facility: CLINIC | Age: 76
End: 2021-01-28

## 2021-01-29 DIAGNOSIS — J45.41 MODERATE PERSISTENT ASTHMA WITH ACUTE EXACERBATION: ICD-10-CM

## 2021-01-29 DIAGNOSIS — J45.901 EXACERBATION OF ASTHMA, UNSPECIFIED ASTHMA SEVERITY, UNSPECIFIED WHETHER PERSISTENT: ICD-10-CM

## 2021-01-29 RX ORDER — ALBUTEROL SULFATE 90 UG/1
2 AEROSOL, METERED RESPIRATORY (INHALATION) EVERY 6 HOURS PRN
Qty: 8.5 G | Refills: 3 | Status: SHIPPED | OUTPATIENT
Start: 2021-01-29 | End: 2021-10-12 | Stop reason: SDUPTHER

## 2021-01-31 ENCOUNTER — EXTERNAL CHRONIC CARE MANAGEMENT (OUTPATIENT)
Dept: PRIMARY CARE CLINIC | Facility: CLINIC | Age: 76
End: 2021-01-31
Payer: MEDICARE

## 2021-01-31 PROCEDURE — 99490 CHRNC CARE MGMT STAFF 1ST 20: CPT | Mod: S$PBB,,, | Performed by: INTERNAL MEDICINE

## 2021-01-31 PROCEDURE — 99490 CHRNC CARE MGMT STAFF 1ST 20: CPT | Mod: PBBFAC | Performed by: INTERNAL MEDICINE

## 2021-01-31 PROCEDURE — 99490 PR CHRONIC CARE MGMT, 1ST 20 MIN: ICD-10-PCS | Mod: S$PBB,,, | Performed by: INTERNAL MEDICINE

## 2021-02-09 ENCOUNTER — OFFICE VISIT (OUTPATIENT)
Dept: INTERNAL MEDICINE | Facility: CLINIC | Age: 76
End: 2021-02-09
Payer: MEDICARE

## 2021-02-09 VITALS
HEIGHT: 61 IN | SYSTOLIC BLOOD PRESSURE: 134 MMHG | WEIGHT: 169.75 LBS | HEART RATE: 95 BPM | RESPIRATION RATE: 18 BRPM | OXYGEN SATURATION: 98 % | DIASTOLIC BLOOD PRESSURE: 86 MMHG | BODY MASS INDEX: 32.05 KG/M2 | TEMPERATURE: 97 F

## 2021-02-09 DIAGNOSIS — S33.6XXA SACROILIAC (LIGAMENT) SPRAIN, INITIAL ENCOUNTER: Primary | ICD-10-CM

## 2021-02-09 PROCEDURE — 99213 PR OFFICE/OUTPT VISIT, EST, LEVL III, 20-29 MIN: ICD-10-PCS | Mod: S$PBB,,, | Performed by: INTERNAL MEDICINE

## 2021-02-09 PROCEDURE — 99999 PR PBB SHADOW E&M-EST. PATIENT-LVL V: CPT | Mod: PBBFAC,,, | Performed by: INTERNAL MEDICINE

## 2021-02-09 PROCEDURE — 99999 PR PBB SHADOW E&M-EST. PATIENT-LVL V: ICD-10-PCS | Mod: PBBFAC,,, | Performed by: INTERNAL MEDICINE

## 2021-02-09 PROCEDURE — 99215 OFFICE O/P EST HI 40 MIN: CPT | Mod: PBBFAC | Performed by: INTERNAL MEDICINE

## 2021-02-09 PROCEDURE — 99213 OFFICE O/P EST LOW 20 MIN: CPT | Mod: S$PBB,,, | Performed by: INTERNAL MEDICINE

## 2021-02-11 ENCOUNTER — TELEPHONE (OUTPATIENT)
Dept: INTERNAL MEDICINE | Facility: CLINIC | Age: 76
End: 2021-02-11

## 2021-02-22 RX ORDER — TRIAMCINOLONE ACETONIDE 1 MG/G
CREAM TOPICAL 2 TIMES DAILY PRN
Qty: 15 G | Refills: 1 | Status: SHIPPED | OUTPATIENT
Start: 2021-02-22 | End: 2022-08-12

## 2021-02-25 ENCOUNTER — CLINICAL SUPPORT (OUTPATIENT)
Dept: REHABILITATION | Facility: OTHER | Age: 76
End: 2021-02-25
Payer: MEDICARE

## 2021-02-25 DIAGNOSIS — M54.50 ACUTE LEFT-SIDED LOW BACK PAIN WITHOUT SCIATICA: ICD-10-CM

## 2021-02-25 DIAGNOSIS — R29.898 WEAKNESS OF BOTH HIPS: ICD-10-CM

## 2021-02-25 DIAGNOSIS — S33.6XXA SACROILIAC (LIGAMENT) SPRAIN, INITIAL ENCOUNTER: ICD-10-CM

## 2021-02-25 PROCEDURE — 97161 PT EVAL LOW COMPLEX 20 MIN: CPT | Mod: PN

## 2021-02-25 PROCEDURE — 97110 THERAPEUTIC EXERCISES: CPT | Mod: PN

## 2021-02-27 ENCOUNTER — IMMUNIZATION (OUTPATIENT)
Dept: PHARMACY | Facility: CLINIC | Age: 76
End: 2021-02-27
Payer: MEDICARE

## 2021-02-27 DIAGNOSIS — Z23 NEED FOR VACCINATION: Primary | ICD-10-CM

## 2021-02-28 ENCOUNTER — EXTERNAL CHRONIC CARE MANAGEMENT (OUTPATIENT)
Dept: PRIMARY CARE CLINIC | Facility: CLINIC | Age: 76
End: 2021-02-28
Payer: MEDICARE

## 2021-02-28 PROCEDURE — 99439 PR CHRONIC CARE MGMT, EA ADDTL 20 MIN: ICD-10-PCS | Mod: S$PBB,,, | Performed by: INTERNAL MEDICINE

## 2021-02-28 PROCEDURE — 99490 CHRNC CARE MGMT STAFF 1ST 20: CPT | Mod: S$PBB,,, | Performed by: INTERNAL MEDICINE

## 2021-02-28 PROCEDURE — 99439 CHRNC CARE MGMT STAF EA ADDL: CPT | Mod: S$PBB,,, | Performed by: INTERNAL MEDICINE

## 2021-02-28 PROCEDURE — 99439 CHRNC CARE MGMT STAF EA ADDL: CPT | Mod: PBBFAC | Performed by: INTERNAL MEDICINE

## 2021-02-28 PROCEDURE — 99490 CHRNC CARE MGMT STAFF 1ST 20: CPT | Mod: PBBFAC | Performed by: INTERNAL MEDICINE

## 2021-02-28 PROCEDURE — 99490 PR CHRONIC CARE MGMT, 1ST 20 MIN: ICD-10-PCS | Mod: S$PBB,,, | Performed by: INTERNAL MEDICINE

## 2021-03-01 ENCOUNTER — OFFICE VISIT (OUTPATIENT)
Dept: OPHTHALMOLOGY | Facility: CLINIC | Age: 76
End: 2021-03-01
Payer: MEDICARE

## 2021-03-01 DIAGNOSIS — Z96.1 PSEUDOPHAKIA OF BOTH EYES: ICD-10-CM

## 2021-03-01 DIAGNOSIS — Z98.83 GLAUCOMA FILTERING BLEB OF BOTH EYES: ICD-10-CM

## 2021-03-01 DIAGNOSIS — H40.1113 PRIMARY OPEN ANGLE GLAUCOMA OF RIGHT EYE, SEVERE STAGE: ICD-10-CM

## 2021-03-01 DIAGNOSIS — H43.813 POSTERIOR VITREOUS DETACHMENT OF BOTH EYES: ICD-10-CM

## 2021-03-01 DIAGNOSIS — H04.123 DRY EYES, BILATERAL: ICD-10-CM

## 2021-03-01 DIAGNOSIS — H26.491 PCO (POSTERIOR CAPSULAR OPACIFICATION), RIGHT: ICD-10-CM

## 2021-03-01 DIAGNOSIS — H40.1122 PRIMARY OPEN ANGLE GLAUCOMA OF LEFT EYE, MODERATE STAGE: Primary | ICD-10-CM

## 2021-03-01 DIAGNOSIS — H26.492 PCO (POSTERIOR CAPSULAR OPACIFICATION), LEFT: ICD-10-CM

## 2021-03-01 DIAGNOSIS — E11.9 TYPE 2 DIABETES MELLITUS WITHOUT OPHTHALMIC MANIFESTATIONS: ICD-10-CM

## 2021-03-01 PROCEDURE — 99213 OFFICE O/P EST LOW 20 MIN: CPT | Mod: PBBFAC | Performed by: OPHTHALMOLOGY

## 2021-03-01 PROCEDURE — 92012 PR EYE EXAM, EST PATIENT,INTERMED: ICD-10-PCS | Mod: S$PBB,,, | Performed by: OPHTHALMOLOGY

## 2021-03-01 PROCEDURE — 92012 INTRM OPH EXAM EST PATIENT: CPT | Mod: S$PBB,,, | Performed by: OPHTHALMOLOGY

## 2021-03-01 PROCEDURE — 99999 PR PBB SHADOW E&M-EST. PATIENT-LVL III: CPT | Mod: PBBFAC,,, | Performed by: OPHTHALMOLOGY

## 2021-03-01 PROCEDURE — 99999 PR PBB SHADOW E&M-EST. PATIENT-LVL III: ICD-10-PCS | Mod: PBBFAC,,, | Performed by: OPHTHALMOLOGY

## 2021-03-01 RX ORDER — BRIMONIDINE TARTRATE 2 MG/ML
1 SOLUTION/ DROPS OPHTHALMIC 3 TIMES DAILY
Qty: 10 ML | Refills: 12 | Status: SHIPPED | OUTPATIENT
Start: 2021-03-01 | End: 2022-03-02 | Stop reason: SDUPTHER

## 2021-03-12 ENCOUNTER — PATIENT OUTREACH (OUTPATIENT)
Dept: ADMINISTRATIVE | Facility: HOSPITAL | Age: 76
End: 2021-03-12

## 2021-03-16 DIAGNOSIS — J30.1 ACUTE NONSEASONAL ALLERGIC RHINITIS DUE TO POLLEN: ICD-10-CM

## 2021-03-17 RX ORDER — FLUTICASONE PROPIONATE 50 MCG
SPRAY, SUSPENSION (ML) NASAL
Qty: 32 G | Refills: 3 | Status: SHIPPED | OUTPATIENT
Start: 2021-03-17 | End: 2021-03-31 | Stop reason: SDUPTHER

## 2021-03-27 ENCOUNTER — IMMUNIZATION (OUTPATIENT)
Dept: PHARMACY | Facility: CLINIC | Age: 76
End: 2021-03-27
Payer: MEDICARE

## 2021-03-27 DIAGNOSIS — Z23 NEED FOR VACCINATION: Primary | ICD-10-CM

## 2021-03-29 ENCOUNTER — CLINICAL SUPPORT (OUTPATIENT)
Dept: REHABILITATION | Facility: OTHER | Age: 76
End: 2021-03-29
Payer: MEDICARE

## 2021-03-29 DIAGNOSIS — M54.50 ACUTE LEFT-SIDED LOW BACK PAIN WITHOUT SCIATICA: ICD-10-CM

## 2021-03-29 DIAGNOSIS — R29.898 WEAKNESS OF BOTH HIPS: ICD-10-CM

## 2021-03-29 PROCEDURE — 97110 THERAPEUTIC EXERCISES: CPT | Mod: PN

## 2021-03-31 ENCOUNTER — OFFICE VISIT (OUTPATIENT)
Dept: INTERNAL MEDICINE | Facility: CLINIC | Age: 76
End: 2021-03-31
Payer: MEDICARE

## 2021-03-31 ENCOUNTER — LAB VISIT (OUTPATIENT)
Dept: LAB | Facility: HOSPITAL | Age: 76
End: 2021-03-31
Attending: INTERNAL MEDICINE
Payer: MEDICARE

## 2021-03-31 ENCOUNTER — EXTERNAL CHRONIC CARE MANAGEMENT (OUTPATIENT)
Dept: PRIMARY CARE CLINIC | Facility: CLINIC | Age: 76
End: 2021-03-31
Payer: MEDICARE

## 2021-03-31 DIAGNOSIS — E11.9 TYPE 2 DIABETES MELLITUS WITHOUT COMPLICATION, WITHOUT LONG-TERM CURRENT USE OF INSULIN: ICD-10-CM

## 2021-03-31 DIAGNOSIS — I10 HYPERTENSION, UNSPECIFIED TYPE: ICD-10-CM

## 2021-03-31 DIAGNOSIS — R21 RASH: Primary | ICD-10-CM

## 2021-03-31 DIAGNOSIS — J30.1 ACUTE NONSEASONAL ALLERGIC RHINITIS DUE TO POLLEN: ICD-10-CM

## 2021-03-31 LAB
ALBUMIN SERPL BCP-MCNC: 3.8 G/DL (ref 3.5–5.2)
ALP SERPL-CCNC: 81 U/L (ref 55–135)
ALT SERPL W/O P-5'-P-CCNC: 13 U/L (ref 10–44)
ANION GAP SERPL CALC-SCNC: 10 MMOL/L (ref 8–16)
AST SERPL-CCNC: 14 U/L (ref 10–40)
BASOPHILS # BLD AUTO: 0.1 K/UL (ref 0–0.2)
BASOPHILS NFR BLD: 0.9 % (ref 0–1.9)
BILIRUB SERPL-MCNC: 0.4 MG/DL (ref 0.1–1)
BUN SERPL-MCNC: 12 MG/DL (ref 8–23)
CALCIUM SERPL-MCNC: 9.5 MG/DL (ref 8.7–10.5)
CHLORIDE SERPL-SCNC: 101 MMOL/L (ref 95–110)
CHOLEST SERPL-MCNC: 154 MG/DL (ref 120–199)
CHOLEST/HDLC SERPL: 3.9 {RATIO} (ref 2–5)
CO2 SERPL-SCNC: 26 MMOL/L (ref 23–29)
CREAT SERPL-MCNC: 0.9 MG/DL (ref 0.5–1.4)
DIFFERENTIAL METHOD: ABNORMAL
EOSINOPHIL # BLD AUTO: 0.6 K/UL (ref 0–0.5)
EOSINOPHIL NFR BLD: 5.1 % (ref 0–8)
ERYTHROCYTE [DISTWIDTH] IN BLOOD BY AUTOMATED COUNT: 13.2 % (ref 11.5–14.5)
EST. GFR  (AFRICAN AMERICAN): >60 ML/MIN/1.73 M^2
EST. GFR  (NON AFRICAN AMERICAN): >60 ML/MIN/1.73 M^2
ESTIMATED AVG GLUCOSE: 131 MG/DL (ref 68–131)
GLUCOSE SERPL-MCNC: 176 MG/DL (ref 70–110)
HBA1C MFR BLD: 6.2 % (ref 4–5.6)
HCT VFR BLD AUTO: 36.2 % (ref 37–48.5)
HDLC SERPL-MCNC: 40 MG/DL (ref 40–75)
HDLC SERPL: 26 % (ref 20–50)
HGB BLD-MCNC: 12.2 G/DL (ref 12–16)
IMM GRANULOCYTES # BLD AUTO: 0.15 K/UL (ref 0–0.04)
IMM GRANULOCYTES NFR BLD AUTO: 1.3 % (ref 0–0.5)
LDLC SERPL CALC-MCNC: 101.6 MG/DL (ref 63–159)
LYMPHOCYTES # BLD AUTO: 3.2 K/UL (ref 1–4.8)
LYMPHOCYTES NFR BLD: 27.9 % (ref 18–48)
MCH RBC QN AUTO: 28.5 PG (ref 27–31)
MCHC RBC AUTO-ENTMCNC: 33.7 G/DL (ref 32–36)
MCV RBC AUTO: 85 FL (ref 82–98)
MONOCYTES # BLD AUTO: 0.9 K/UL (ref 0.3–1)
MONOCYTES NFR BLD: 7.4 % (ref 4–15)
NEUTROPHILS # BLD AUTO: 6.7 K/UL (ref 1.8–7.7)
NEUTROPHILS NFR BLD: 57.4 % (ref 38–73)
NONHDLC SERPL-MCNC: 114 MG/DL
NRBC BLD-RTO: 0 /100 WBC
PLATELET # BLD AUTO: 369 K/UL (ref 150–450)
PMV BLD AUTO: 10.7 FL (ref 9.2–12.9)
POTASSIUM SERPL-SCNC: 3.9 MMOL/L (ref 3.5–5.1)
PROT SERPL-MCNC: 7.6 G/DL (ref 6–8.4)
RBC # BLD AUTO: 4.28 M/UL (ref 4–5.4)
SODIUM SERPL-SCNC: 137 MMOL/L (ref 136–145)
TRIGL SERPL-MCNC: 62 MG/DL (ref 30–150)
WBC # BLD AUTO: 11.63 K/UL (ref 3.9–12.7)

## 2021-03-31 PROCEDURE — 99490 CHRNC CARE MGMT STAFF 1ST 20: CPT | Mod: S$PBB,,, | Performed by: INTERNAL MEDICINE

## 2021-03-31 PROCEDURE — 99999 PR PBB SHADOW E&M-EST. PATIENT-LVL IV: ICD-10-PCS | Mod: PBBFAC,,, | Performed by: INTERNAL MEDICINE

## 2021-03-31 PROCEDURE — 99214 PR OFFICE/OUTPT VISIT, EST, LEVL IV, 30-39 MIN: ICD-10-PCS | Mod: S$PBB,,, | Performed by: INTERNAL MEDICINE

## 2021-03-31 PROCEDURE — 99490 CHRNC CARE MGMT STAFF 1ST 20: CPT | Mod: PBBFAC | Performed by: INTERNAL MEDICINE

## 2021-03-31 PROCEDURE — 99214 OFFICE O/P EST MOD 30 MIN: CPT | Mod: PBBFAC,25 | Performed by: INTERNAL MEDICINE

## 2021-03-31 PROCEDURE — 85025 COMPLETE CBC W/AUTO DIFF WBC: CPT | Performed by: INTERNAL MEDICINE

## 2021-03-31 PROCEDURE — 36415 COLL VENOUS BLD VENIPUNCTURE: CPT | Performed by: INTERNAL MEDICINE

## 2021-03-31 PROCEDURE — 80053 COMPREHEN METABOLIC PANEL: CPT | Performed by: INTERNAL MEDICINE

## 2021-03-31 PROCEDURE — 99490 PR CHRONIC CARE MGMT, 1ST 20 MIN: ICD-10-PCS | Mod: S$PBB,,, | Performed by: INTERNAL MEDICINE

## 2021-03-31 PROCEDURE — 99214 OFFICE O/P EST MOD 30 MIN: CPT | Mod: S$PBB,,, | Performed by: INTERNAL MEDICINE

## 2021-03-31 PROCEDURE — 80061 LIPID PANEL: CPT | Performed by: INTERNAL MEDICINE

## 2021-03-31 PROCEDURE — 83036 HEMOGLOBIN GLYCOSYLATED A1C: CPT | Performed by: INTERNAL MEDICINE

## 2021-03-31 PROCEDURE — 99999 PR PBB SHADOW E&M-EST. PATIENT-LVL IV: CPT | Mod: PBBFAC,,, | Performed by: INTERNAL MEDICINE

## 2021-03-31 RX ORDER — TRIAMTERENE/HYDROCHLOROTHIAZID 37.5-25 MG
1 TABLET ORAL DAILY
Qty: 90 TABLET | Refills: 1 | Status: SHIPPED | OUTPATIENT
Start: 2021-03-31 | End: 2021-10-04

## 2021-03-31 RX ORDER — FLUTICASONE PROPIONATE 50 MCG
SPRAY, SUSPENSION (ML) NASAL
Qty: 64 G | Refills: 4 | Status: SHIPPED | OUTPATIENT
Start: 2021-03-31 | End: 2022-04-05

## 2021-03-31 RX ORDER — LOSARTAN POTASSIUM 50 MG/1
50 TABLET ORAL DAILY
Qty: 90 TABLET | Refills: 1 | Status: SHIPPED | OUTPATIENT
Start: 2021-03-31 | End: 2021-10-04

## 2021-03-31 RX ORDER — METFORMIN HYDROCHLORIDE 500 MG/1
500 TABLET ORAL 2 TIMES DAILY WITH MEALS
Qty: 180 TABLET | Refills: 1 | Status: SHIPPED | OUTPATIENT
Start: 2021-03-31 | End: 2021-10-12 | Stop reason: SDUPTHER

## 2021-03-31 RX ORDER — ATORVASTATIN CALCIUM 10 MG/1
10 TABLET, FILM COATED ORAL DAILY
Qty: 90 TABLET | Refills: 1 | Status: SHIPPED | OUTPATIENT
Start: 2021-03-31 | End: 2021-10-11

## 2021-04-04 VITALS
BODY MASS INDEX: 32.59 KG/M2 | SYSTOLIC BLOOD PRESSURE: 158 MMHG | WEIGHT: 172.63 LBS | HEART RATE: 96 BPM | OXYGEN SATURATION: 95 % | TEMPERATURE: 99 F | DIASTOLIC BLOOD PRESSURE: 78 MMHG | HEIGHT: 61 IN

## 2021-04-06 ENCOUNTER — OFFICE VISIT (OUTPATIENT)
Dept: DERMATOLOGY | Facility: CLINIC | Age: 76
End: 2021-04-06
Payer: MEDICARE

## 2021-04-06 DIAGNOSIS — L23.9 ALLERGIC CONTACT DERMATITIS, UNSPECIFIED TRIGGER: Primary | ICD-10-CM

## 2021-04-06 DIAGNOSIS — R21 RASH: ICD-10-CM

## 2021-04-06 PROCEDURE — 99214 OFFICE O/P EST MOD 30 MIN: CPT | Mod: S$PBB,GC,, | Performed by: DERMATOLOGY

## 2021-04-06 PROCEDURE — 99214 PR OFFICE/OUTPT VISIT, EST, LEVL IV, 30-39 MIN: ICD-10-PCS | Mod: S$PBB,GC,, | Performed by: DERMATOLOGY

## 2021-04-06 PROCEDURE — 99213 OFFICE O/P EST LOW 20 MIN: CPT | Mod: PBBFAC

## 2021-04-06 PROCEDURE — 99999 PR PBB SHADOW E&M-EST. PATIENT-LVL III: ICD-10-PCS | Mod: PBBFAC,,,

## 2021-04-06 PROCEDURE — 99999 PR PBB SHADOW E&M-EST. PATIENT-LVL III: CPT | Mod: PBBFAC,,,

## 2021-04-06 RX ORDER — TACROLIMUS 1 MG/G
OINTMENT TOPICAL DAILY
Qty: 30 G | Refills: 1 | Status: SHIPPED | OUTPATIENT
Start: 2021-04-06 | End: 2022-08-12

## 2021-04-06 RX ORDER — TRIAMCINOLONE ACETONIDE 0.25 MG/G
CREAM TOPICAL DAILY
Qty: 15 G | Refills: 1 | Status: SHIPPED | OUTPATIENT
Start: 2021-04-06 | End: 2022-08-12

## 2021-04-09 ENCOUNTER — CLINICAL SUPPORT (OUTPATIENT)
Dept: REHABILITATION | Facility: OTHER | Age: 76
End: 2021-04-09
Payer: MEDICARE

## 2021-04-09 DIAGNOSIS — M54.50 ACUTE LEFT-SIDED LOW BACK PAIN WITHOUT SCIATICA: Primary | ICD-10-CM

## 2021-04-09 DIAGNOSIS — R29.898 WEAKNESS OF BOTH HIPS: ICD-10-CM

## 2021-04-09 PROCEDURE — 97110 THERAPEUTIC EXERCISES: CPT | Mod: PN

## 2021-04-14 ENCOUNTER — DOCUMENTATION ONLY (OUTPATIENT)
Dept: REHABILITATION | Facility: OTHER | Age: 76
End: 2021-04-14

## 2021-04-20 ENCOUNTER — TELEPHONE (OUTPATIENT)
Dept: DERMATOLOGY | Facility: CLINIC | Age: 76
End: 2021-04-20

## 2021-04-26 ENCOUNTER — DOCUMENTATION ONLY (OUTPATIENT)
Dept: REHABILITATION | Facility: OTHER | Age: 76
End: 2021-04-26

## 2021-04-30 ENCOUNTER — EXTERNAL CHRONIC CARE MANAGEMENT (OUTPATIENT)
Dept: PRIMARY CARE CLINIC | Facility: CLINIC | Age: 76
End: 2021-04-30
Payer: MEDICARE

## 2021-04-30 PROCEDURE — 99490 PR CHRONIC CARE MGMT, 1ST 20 MIN: ICD-10-PCS | Mod: S$PBB,,, | Performed by: INTERNAL MEDICINE

## 2021-04-30 PROCEDURE — 99490 CHRNC CARE MGMT STAFF 1ST 20: CPT | Mod: S$PBB,,, | Performed by: INTERNAL MEDICINE

## 2021-04-30 PROCEDURE — 99490 CHRNC CARE MGMT STAFF 1ST 20: CPT | Mod: PBBFAC | Performed by: INTERNAL MEDICINE

## 2021-05-13 DIAGNOSIS — G47.00 INSOMNIA, UNSPECIFIED TYPE: ICD-10-CM

## 2021-05-13 RX ORDER — AMITRIPTYLINE HYDROCHLORIDE 25 MG/1
25 TABLET, FILM COATED ORAL NIGHTLY
Qty: 90 TABLET | Refills: 3 | Status: CANCELLED | OUTPATIENT
Start: 2021-05-13

## 2021-05-13 RX ORDER — AMITRIPTYLINE HYDROCHLORIDE 25 MG/1
25 TABLET, FILM COATED ORAL NIGHTLY
Qty: 90 TABLET | Refills: 0 | Status: SHIPPED | OUTPATIENT
Start: 2021-05-13 | End: 2021-08-13

## 2021-05-17 ENCOUNTER — CLINICAL SUPPORT (OUTPATIENT)
Dept: DERMATOLOGY | Facility: CLINIC | Age: 76
End: 2021-05-17
Payer: MEDICARE

## 2021-05-17 DIAGNOSIS — L23.9 ALLERGIC CONTACT DERMATITIS, UNSPECIFIED TRIGGER: ICD-10-CM

## 2021-05-17 PROCEDURE — 95044 PATCH/APPLICATION TESTS: CPT | Mod: PBBFAC

## 2021-05-19 ENCOUNTER — OFFICE VISIT (OUTPATIENT)
Dept: DERMATOLOGY | Facility: CLINIC | Age: 76
End: 2021-05-19
Payer: MEDICARE

## 2021-05-19 DIAGNOSIS — L23.9 ALLERGIC CONTACT DERMATITIS, UNSPECIFIED TRIGGER: Primary | ICD-10-CM

## 2021-05-19 PROCEDURE — 99212 PR OFFICE/OUTPT VISIT, EST, LEVL II, 10-19 MIN: ICD-10-PCS | Mod: S$PBB,,, | Performed by: DERMATOLOGY

## 2021-05-19 PROCEDURE — 99211 OFF/OP EST MAY X REQ PHY/QHP: CPT | Mod: PBBFAC | Performed by: DERMATOLOGY

## 2021-05-19 PROCEDURE — 99999 PR PBB SHADOW E&M-EST. PATIENT-LVL I: ICD-10-PCS | Mod: PBBFAC,,, | Performed by: DERMATOLOGY

## 2021-05-19 PROCEDURE — 99999 PR PBB SHADOW E&M-EST. PATIENT-LVL I: CPT | Mod: PBBFAC,,, | Performed by: DERMATOLOGY

## 2021-05-19 PROCEDURE — 99212 OFFICE O/P EST SF 10 MIN: CPT | Mod: S$PBB,,, | Performed by: DERMATOLOGY

## 2021-05-21 ENCOUNTER — OFFICE VISIT (OUTPATIENT)
Dept: DERMATOLOGY | Facility: CLINIC | Age: 76
End: 2021-05-21
Payer: MEDICARE

## 2021-05-21 DIAGNOSIS — L23.9 ALLERGIC CONTACT DERMATITIS, UNSPECIFIED TRIGGER: Primary | ICD-10-CM

## 2021-05-21 DIAGNOSIS — L81.1 MELASMA: ICD-10-CM

## 2021-05-21 PROCEDURE — 99999 PR PBB SHADOW E&M-EST. PATIENT-LVL II: CPT | Mod: PBBFAC,,, | Performed by: DERMATOLOGY

## 2021-05-21 PROCEDURE — 99212 OFFICE O/P EST SF 10 MIN: CPT | Mod: S$PBB,,, | Performed by: DERMATOLOGY

## 2021-05-21 PROCEDURE — 99212 OFFICE O/P EST SF 10 MIN: CPT | Mod: PBBFAC | Performed by: DERMATOLOGY

## 2021-05-21 PROCEDURE — 99212 PR OFFICE/OUTPT VISIT, EST, LEVL II, 10-19 MIN: ICD-10-PCS | Mod: S$PBB,,, | Performed by: DERMATOLOGY

## 2021-05-21 PROCEDURE — 99999 PR PBB SHADOW E&M-EST. PATIENT-LVL II: ICD-10-PCS | Mod: PBBFAC,,, | Performed by: DERMATOLOGY

## 2021-05-24 ENCOUNTER — PES CALL (OUTPATIENT)
Dept: ADMINISTRATIVE | Facility: CLINIC | Age: 76
End: 2021-05-24

## 2021-05-31 ENCOUNTER — EXTERNAL CHRONIC CARE MANAGEMENT (OUTPATIENT)
Dept: PRIMARY CARE CLINIC | Facility: CLINIC | Age: 76
End: 2021-05-31
Payer: MEDICARE

## 2021-05-31 PROCEDURE — 99490 PR CHRONIC CARE MGMT, 1ST 20 MIN: ICD-10-PCS | Mod: S$PBB,,, | Performed by: INTERNAL MEDICINE

## 2021-05-31 PROCEDURE — 99490 CHRNC CARE MGMT STAFF 1ST 20: CPT | Mod: S$PBB,,, | Performed by: INTERNAL MEDICINE

## 2021-05-31 PROCEDURE — 99490 CHRNC CARE MGMT STAFF 1ST 20: CPT | Mod: PBBFAC | Performed by: INTERNAL MEDICINE

## 2021-06-04 ENCOUNTER — OFFICE VISIT (OUTPATIENT)
Dept: OPHTHALMOLOGY | Facility: CLINIC | Age: 76
End: 2021-06-04
Payer: MEDICARE

## 2021-06-04 DIAGNOSIS — H40.1113 PRIMARY OPEN ANGLE GLAUCOMA OF RIGHT EYE, SEVERE STAGE: Primary | ICD-10-CM

## 2021-06-04 DIAGNOSIS — H26.491 PCO (POSTERIOR CAPSULAR OPACIFICATION), RIGHT: ICD-10-CM

## 2021-06-04 DIAGNOSIS — H04.123 DRY EYES, BILATERAL: ICD-10-CM

## 2021-06-04 DIAGNOSIS — H40.1122 PRIMARY OPEN ANGLE GLAUCOMA OF LEFT EYE, MODERATE STAGE: ICD-10-CM

## 2021-06-04 DIAGNOSIS — E11.9 TYPE 2 DIABETES MELLITUS WITHOUT OPHTHALMIC MANIFESTATIONS: ICD-10-CM

## 2021-06-04 DIAGNOSIS — Z98.83 GLAUCOMA FILTERING BLEB OF BOTH EYES: ICD-10-CM

## 2021-06-04 DIAGNOSIS — H43.813 POSTERIOR VITREOUS DETACHMENT OF BOTH EYES: ICD-10-CM

## 2021-06-04 DIAGNOSIS — H26.492 PCO (POSTERIOR CAPSULAR OPACIFICATION), LEFT: ICD-10-CM

## 2021-06-04 DIAGNOSIS — Z96.1 PSEUDOPHAKIA OF BOTH EYES: ICD-10-CM

## 2021-06-04 PROCEDURE — 99212 OFFICE O/P EST SF 10 MIN: CPT | Mod: PBBFAC | Performed by: OPHTHALMOLOGY

## 2021-06-04 PROCEDURE — 92012 PR EYE EXAM, EST PATIENT,INTERMED: ICD-10-PCS | Mod: S$PBB,,, | Performed by: OPHTHALMOLOGY

## 2021-06-04 PROCEDURE — 99999 PR PBB SHADOW E&M-EST. PATIENT-LVL II: ICD-10-PCS | Mod: PBBFAC,,, | Performed by: OPHTHALMOLOGY

## 2021-06-04 PROCEDURE — 99999 PR PBB SHADOW E&M-EST. PATIENT-LVL II: CPT | Mod: PBBFAC,,, | Performed by: OPHTHALMOLOGY

## 2021-06-04 PROCEDURE — 92012 INTRM OPH EXAM EST PATIENT: CPT | Mod: S$PBB,,, | Performed by: OPHTHALMOLOGY

## 2021-06-04 RX ORDER — DORZOLAMIDE HCL 20 MG/ML
1 SOLUTION/ DROPS OPHTHALMIC 3 TIMES DAILY
Qty: 10 ML | Refills: 11 | Status: SHIPPED | OUTPATIENT
Start: 2021-06-04 | End: 2022-07-12

## 2021-06-11 ENCOUNTER — TELEPHONE (OUTPATIENT)
Dept: ADMINISTRATIVE | Facility: CLINIC | Age: 76
End: 2021-06-11

## 2021-06-14 ENCOUNTER — OFFICE VISIT (OUTPATIENT)
Dept: INTERNAL MEDICINE | Facility: CLINIC | Age: 76
End: 2021-06-14
Payer: MEDICARE

## 2021-06-14 VITALS
BODY MASS INDEX: 33.98 KG/M2 | SYSTOLIC BLOOD PRESSURE: 138 MMHG | HEART RATE: 89 BPM | HEIGHT: 60 IN | WEIGHT: 173.06 LBS | OXYGEN SATURATION: 96 % | DIASTOLIC BLOOD PRESSURE: 60 MMHG

## 2021-06-14 DIAGNOSIS — H25.11 NUCLEAR SCLEROSIS OF RIGHT EYE: ICD-10-CM

## 2021-06-14 DIAGNOSIS — J30.9 ALLERGIC RHINITIS, UNSPECIFIED SEASONALITY, UNSPECIFIED TRIGGER: ICD-10-CM

## 2021-06-14 DIAGNOSIS — R15.2 INCONTINENCE OF FECES WITH FECAL URGENCY: ICD-10-CM

## 2021-06-14 DIAGNOSIS — H40.1122 PRIMARY OPEN ANGLE GLAUCOMA (POAG) OF LEFT EYE, MODERATE STAGE: ICD-10-CM

## 2021-06-14 DIAGNOSIS — H52.4 PRESBYOPIA: ICD-10-CM

## 2021-06-14 DIAGNOSIS — Z00.00 ENCOUNTER FOR PREVENTIVE HEALTH EXAMINATION: Primary | ICD-10-CM

## 2021-06-14 DIAGNOSIS — R15.9 INCONTINENCE OF FECES WITH FECAL URGENCY: ICD-10-CM

## 2021-06-14 DIAGNOSIS — K21.9 GASTROESOPHAGEAL REFLUX DISEASE, UNSPECIFIED WHETHER ESOPHAGITIS PRESENT: ICD-10-CM

## 2021-06-14 DIAGNOSIS — E11.9 TYPE 2 DIABETES MELLITUS WITHOUT COMPLICATION, WITHOUT LONG-TERM CURRENT USE OF INSULIN: ICD-10-CM

## 2021-06-14 DIAGNOSIS — H40.1113 PRIMARY OPEN ANGLE GLAUCOMA (POAG) OF RIGHT EYE, SEVERE STAGE: ICD-10-CM

## 2021-06-14 DIAGNOSIS — J40 BRONCHITIS: ICD-10-CM

## 2021-06-14 DIAGNOSIS — Z12.11 SPECIAL SCREENING FOR MALIGNANT NEOPLASMS, COLON: ICD-10-CM

## 2021-06-14 DIAGNOSIS — H43.813 POSTERIOR VITREOUS DETACHMENT OF BOTH EYES: ICD-10-CM

## 2021-06-14 DIAGNOSIS — J45.909 ASTHMA, WELL CONTROLLED, UNSPECIFIED ASTHMA SEVERITY, UNSPECIFIED WHETHER PERSISTENT: ICD-10-CM

## 2021-06-14 DIAGNOSIS — H91.90 HEARING LOSS, UNSPECIFIED HEARING LOSS TYPE, UNSPECIFIED LATERALITY: ICD-10-CM

## 2021-06-14 DIAGNOSIS — E11.69 TYPE 2 DIABETES MELLITUS WITH OTHER SPECIFIED COMPLICATION, WITHOUT LONG-TERM CURRENT USE OF INSULIN: ICD-10-CM

## 2021-06-14 DIAGNOSIS — H04.123 DRY EYES: ICD-10-CM

## 2021-06-14 DIAGNOSIS — H25.11 NUCLEAR SENILE CATARACT OF RIGHT EYE: ICD-10-CM

## 2021-06-14 DIAGNOSIS — L30.9 DERMATITIS: ICD-10-CM

## 2021-06-14 DIAGNOSIS — Z98.890 POST-OPERATIVE STATE: ICD-10-CM

## 2021-06-14 PROCEDURE — G0439 PR MEDICARE ANNUAL WELLNESS SUBSEQUENT VISIT: ICD-10-PCS | Mod: ,,, | Performed by: NURSE PRACTITIONER

## 2021-06-14 PROCEDURE — 99999 PR PBB SHADOW E&M-EST. PATIENT-LVL IV: CPT | Mod: PBBFAC,,, | Performed by: NURSE PRACTITIONER

## 2021-06-14 PROCEDURE — G0439 PPPS, SUBSEQ VISIT: HCPCS | Mod: ,,, | Performed by: NURSE PRACTITIONER

## 2021-06-14 PROCEDURE — 99214 OFFICE O/P EST MOD 30 MIN: CPT | Mod: PBBFAC | Performed by: NURSE PRACTITIONER

## 2021-06-14 PROCEDURE — 99999 PR PBB SHADOW E&M-EST. PATIENT-LVL IV: ICD-10-PCS | Mod: PBBFAC,,, | Performed by: NURSE PRACTITIONER

## 2021-06-15 ENCOUNTER — TELEPHONE (OUTPATIENT)
Dept: INTERNAL MEDICINE | Facility: CLINIC | Age: 76
End: 2021-06-15

## 2021-06-15 DIAGNOSIS — Z12.31 SCREENING MAMMOGRAM, ENCOUNTER FOR: Primary | ICD-10-CM

## 2021-06-16 ENCOUNTER — DOCUMENTATION ONLY (OUTPATIENT)
Dept: REHABILITATION | Facility: OTHER | Age: 76
End: 2021-06-16

## 2021-06-16 PROBLEM — M54.50 ACUTE LEFT-SIDED LOW BACK PAIN WITHOUT SCIATICA: Status: RESOLVED | Noted: 2021-02-25 | Resolved: 2021-06-16

## 2021-06-16 PROBLEM — R29.898 WEAKNESS OF BOTH HIPS: Status: RESOLVED | Noted: 2021-02-25 | Resolved: 2021-06-16

## 2021-06-17 ENCOUNTER — OFFICE VISIT (OUTPATIENT)
Dept: PODIATRY | Facility: CLINIC | Age: 76
End: 2021-06-17
Payer: MEDICARE

## 2021-06-17 VITALS — BODY MASS INDEX: 33.96 KG/M2 | WEIGHT: 173 LBS | HEIGHT: 60 IN

## 2021-06-17 DIAGNOSIS — H91.90 HEARING DISORDER, UNSPECIFIED LATERALITY: Primary | ICD-10-CM

## 2021-06-17 DIAGNOSIS — L84 CORN OR CALLUS: Primary | ICD-10-CM

## 2021-06-17 DIAGNOSIS — E11.9 TYPE 2 DIABETES MELLITUS WITHOUT COMPLICATION, WITHOUT LONG-TERM CURRENT USE OF INSULIN: ICD-10-CM

## 2021-06-17 DIAGNOSIS — B35.3 TINEA PEDIS OF BOTH FEET: ICD-10-CM

## 2021-06-17 PROCEDURE — 99204 OFFICE O/P NEW MOD 45 MIN: CPT | Mod: ,,, | Performed by: PODIATRIST

## 2021-06-17 PROCEDURE — 17999 UNLISTD PX SKN MUC MEMB SUBQ: CPT | Mod: CSM,,, | Performed by: PODIATRIST

## 2021-06-17 PROCEDURE — 99213 OFFICE O/P EST LOW 20 MIN: CPT | Mod: PBBFAC,PN | Performed by: PODIATRIST

## 2021-06-17 PROCEDURE — 17999 PR NON-COVERED FOOT CARE: ICD-10-PCS | Mod: CSM,,, | Performed by: PODIATRIST

## 2021-06-17 PROCEDURE — 99999 PR PBB SHADOW E&M-EST. PATIENT-LVL III: CPT | Mod: PBBFAC,,, | Performed by: PODIATRIST

## 2021-06-17 PROCEDURE — 99999 PR PBB SHADOW E&M-EST. PATIENT-LVL III: ICD-10-PCS | Mod: PBBFAC,,, | Performed by: PODIATRIST

## 2021-06-17 PROCEDURE — 99204 PR OFFICE/OUTPT VISIT, NEW, LEVL IV, 45-59 MIN: ICD-10-PCS | Mod: ,,, | Performed by: PODIATRIST

## 2021-06-17 RX ORDER — CICLOPIROX OLAMINE 7.7 MG/G
CREAM TOPICAL 2 TIMES DAILY
Qty: 90 G | Refills: 2 | Status: SHIPPED | OUTPATIENT
Start: 2021-06-17

## 2021-06-30 ENCOUNTER — EXTERNAL CHRONIC CARE MANAGEMENT (OUTPATIENT)
Dept: PRIMARY CARE CLINIC | Facility: CLINIC | Age: 76
End: 2021-06-30
Payer: MEDICARE

## 2021-06-30 PROCEDURE — 99490 PR CHRONIC CARE MGMT, 1ST 20 MIN: ICD-10-PCS | Mod: S$PBB,,, | Performed by: INTERNAL MEDICINE

## 2021-06-30 PROCEDURE — 99490 CHRNC CARE MGMT STAFF 1ST 20: CPT | Mod: PBBFAC | Performed by: INTERNAL MEDICINE

## 2021-06-30 PROCEDURE — 99490 CHRNC CARE MGMT STAFF 1ST 20: CPT | Mod: S$PBB,,, | Performed by: INTERNAL MEDICINE

## 2021-07-07 ENCOUNTER — HOSPITAL ENCOUNTER (OUTPATIENT)
Dept: RADIOLOGY | Facility: HOSPITAL | Age: 76
Discharge: HOME | End: 2021-07-07
Attending: INTERNAL MEDICINE
Payer: MEDICARE

## 2021-07-07 VITALS — HEIGHT: 60 IN | BODY MASS INDEX: 33.96 KG/M2 | WEIGHT: 173 LBS

## 2021-07-07 DIAGNOSIS — Z12.31 SCREENING MAMMOGRAM, ENCOUNTER FOR: ICD-10-CM

## 2021-07-07 PROCEDURE — 77067 MAMMO DIGITAL SCREENING BILAT WITH TOMO: ICD-10-PCS | Mod: 26,,, | Performed by: RADIOLOGY

## 2021-07-07 PROCEDURE — 77063 MAMMO DIGITAL SCREENING BILAT WITH TOMO: ICD-10-PCS | Mod: 26,,, | Performed by: RADIOLOGY

## 2021-07-07 PROCEDURE — 77063 BREAST TOMOSYNTHESIS BI: CPT | Mod: 26,,, | Performed by: RADIOLOGY

## 2021-07-07 PROCEDURE — 77067 SCR MAMMO BI INCL CAD: CPT | Mod: TC

## 2021-07-07 PROCEDURE — 77067 SCR MAMMO BI INCL CAD: CPT | Mod: 26,,, | Performed by: RADIOLOGY

## 2021-07-29 ENCOUNTER — TELEPHONE (OUTPATIENT)
Dept: OTOLARYNGOLOGY | Facility: CLINIC | Age: 76
End: 2021-07-29

## 2021-07-31 ENCOUNTER — EXTERNAL CHRONIC CARE MANAGEMENT (OUTPATIENT)
Dept: PRIMARY CARE CLINIC | Facility: CLINIC | Age: 76
End: 2021-07-31
Payer: MEDICARE

## 2021-07-31 PROCEDURE — 99490 PR CHRONIC CARE MGMT, 1ST 20 MIN: ICD-10-PCS | Mod: S$PBB,,, | Performed by: INTERNAL MEDICINE

## 2021-07-31 PROCEDURE — 99490 CHRNC CARE MGMT STAFF 1ST 20: CPT | Mod: S$PBB,,, | Performed by: INTERNAL MEDICINE

## 2021-07-31 PROCEDURE — 99490 CHRNC CARE MGMT STAFF 1ST 20: CPT | Mod: PBBFAC | Performed by: INTERNAL MEDICINE

## 2021-08-05 ENCOUNTER — LAB VISIT (OUTPATIENT)
Dept: LAB | Facility: OTHER | Age: 76
End: 2021-08-05
Attending: NURSE PRACTITIONER
Payer: MEDICARE

## 2021-08-05 ENCOUNTER — CLINICAL SUPPORT (OUTPATIENT)
Dept: OTOLARYNGOLOGY | Facility: CLINIC | Age: 76
End: 2021-08-05
Payer: MEDICARE

## 2021-08-05 ENCOUNTER — OFFICE VISIT (OUTPATIENT)
Dept: OTOLARYNGOLOGY | Facility: CLINIC | Age: 76
End: 2021-08-05
Payer: MEDICARE

## 2021-08-05 VITALS
SYSTOLIC BLOOD PRESSURE: 119 MMHG | WEIGHT: 173 LBS | HEART RATE: 96 BPM | BODY MASS INDEX: 33.79 KG/M2 | DIASTOLIC BLOOD PRESSURE: 75 MMHG | TEMPERATURE: 98 F

## 2021-08-05 DIAGNOSIS — Z00.00 ENCOUNTER FOR PREVENTIVE HEALTH EXAMINATION: ICD-10-CM

## 2021-08-05 DIAGNOSIS — H69.93 TYPE C TYMPANOGRAM OF BOTH EARS: ICD-10-CM

## 2021-08-05 DIAGNOSIS — Z87.09 HISTORY OF ASTHMA: ICD-10-CM

## 2021-08-05 DIAGNOSIS — H91.90 HEARING LOSS, UNSPECIFIED HEARING LOSS TYPE, UNSPECIFIED LATERALITY: ICD-10-CM

## 2021-08-05 DIAGNOSIS — H90.3 SENSORINEURAL HEARING LOSS, BILATERAL: ICD-10-CM

## 2021-08-05 DIAGNOSIS — Z87.19 HISTORY OF GASTROESOPHAGEAL REFLUX (GERD): ICD-10-CM

## 2021-08-05 DIAGNOSIS — H90.3 BILATERAL SENSORINEURAL HEARING LOSS: Primary | ICD-10-CM

## 2021-08-05 DIAGNOSIS — J30.9 ALLERGIC RHINITIS, UNSPECIFIED SEASONALITY, UNSPECIFIED TRIGGER: ICD-10-CM

## 2021-08-05 DIAGNOSIS — R29.2 ABNORMAL ACOUSTIC REFLEX: ICD-10-CM

## 2021-08-05 PROCEDURE — 99204 OFFICE O/P NEW MOD 45 MIN: CPT | Mod: S$GLB,,, | Performed by: OTOLARYNGOLOGY

## 2021-08-05 PROCEDURE — 92550 PR TYMPANOMETRY AND REFLEX THRESHOLD MEASUREMENTS: ICD-10-PCS | Mod: S$GLB,,, | Performed by: AUDIOLOGIST-HEARING AID FITTER

## 2021-08-05 PROCEDURE — 86803 HEPATITIS C AB TEST: CPT | Performed by: NURSE PRACTITIONER

## 2021-08-05 PROCEDURE — 92557 PR COMPREHENSIVE HEARING TEST: ICD-10-PCS | Mod: S$GLB,,, | Performed by: AUDIOLOGIST-HEARING AID FITTER

## 2021-08-05 PROCEDURE — 92557 COMPREHENSIVE HEARING TEST: CPT | Mod: S$GLB,,, | Performed by: AUDIOLOGIST-HEARING AID FITTER

## 2021-08-05 PROCEDURE — 99204 PR OFFICE/OUTPT VISIT, NEW, LEVL IV, 45-59 MIN: ICD-10-PCS | Mod: S$GLB,,, | Performed by: OTOLARYNGOLOGY

## 2021-08-05 PROCEDURE — 92550 TYMPANOMETRY & REFLEX THRESH: CPT | Mod: S$GLB,,, | Performed by: AUDIOLOGIST-HEARING AID FITTER

## 2021-08-05 PROCEDURE — 36415 COLL VENOUS BLD VENIPUNCTURE: CPT | Performed by: NURSE PRACTITIONER

## 2021-08-05 RX ORDER — AZELASTINE 1 MG/ML
SPRAY, METERED NASAL
Qty: 30 ML | Refills: 2 | Status: SHIPPED | OUTPATIENT
Start: 2021-08-05 | End: 2022-01-19

## 2021-08-06 LAB — HCV AB SERPL QL IA: NEGATIVE

## 2021-08-13 ENCOUNTER — TELEPHONE (OUTPATIENT)
Dept: INTERNAL MEDICINE | Facility: CLINIC | Age: 76
End: 2021-08-13

## 2021-08-31 ENCOUNTER — EXTERNAL CHRONIC CARE MANAGEMENT (OUTPATIENT)
Dept: PRIMARY CARE CLINIC | Facility: CLINIC | Age: 76
End: 2021-08-31
Payer: MEDICARE

## 2021-08-31 PROCEDURE — 99490 CHRNC CARE MGMT STAFF 1ST 20: CPT | Mod: PBBFAC | Performed by: INTERNAL MEDICINE

## 2021-08-31 PROCEDURE — 99490 PR CHRONIC CARE MGMT, 1ST 20 MIN: ICD-10-PCS | Mod: S$PBB,,, | Performed by: INTERNAL MEDICINE

## 2021-08-31 PROCEDURE — 99490 CHRNC CARE MGMT STAFF 1ST 20: CPT | Mod: S$PBB,,, | Performed by: INTERNAL MEDICINE

## 2021-09-28 ENCOUNTER — TELEPHONE (OUTPATIENT)
Dept: OTOLARYNGOLOGY | Facility: CLINIC | Age: 76
End: 2021-09-28

## 2021-09-30 ENCOUNTER — EXTERNAL CHRONIC CARE MANAGEMENT (OUTPATIENT)
Dept: PRIMARY CARE CLINIC | Facility: CLINIC | Age: 76
End: 2021-09-30
Payer: MEDICARE

## 2021-09-30 PROCEDURE — 99490 PR CHRONIC CARE MGMT, 1ST 20 MIN: ICD-10-PCS | Mod: S$PBB,,, | Performed by: INTERNAL MEDICINE

## 2021-09-30 PROCEDURE — 99490 CHRNC CARE MGMT STAFF 1ST 20: CPT | Mod: PBBFAC | Performed by: INTERNAL MEDICINE

## 2021-09-30 PROCEDURE — 99490 CHRNC CARE MGMT STAFF 1ST 20: CPT | Mod: S$PBB,,, | Performed by: INTERNAL MEDICINE

## 2021-10-12 ENCOUNTER — OFFICE VISIT (OUTPATIENT)
Dept: INTERNAL MEDICINE | Facility: CLINIC | Age: 76
End: 2021-10-12
Payer: MEDICARE

## 2021-10-12 ENCOUNTER — HOSPITAL ENCOUNTER (OUTPATIENT)
Dept: RADIOLOGY | Facility: HOSPITAL | Age: 76
Discharge: HOME | End: 2021-10-12
Attending: INTERNAL MEDICINE
Payer: MEDICARE

## 2021-10-12 DIAGNOSIS — I10 HYPERTENSION, UNSPECIFIED TYPE: ICD-10-CM

## 2021-10-12 DIAGNOSIS — K63.5 POLYP OF COLON, UNSPECIFIED PART OF COLON, UNSPECIFIED TYPE: ICD-10-CM

## 2021-10-12 DIAGNOSIS — E55.9 VITAMIN D DEFICIENCY: ICD-10-CM

## 2021-10-12 DIAGNOSIS — J45.41 MODERATE PERSISTENT ASTHMA WITH ACUTE EXACERBATION: ICD-10-CM

## 2021-10-12 DIAGNOSIS — R05.9 COUGH: ICD-10-CM

## 2021-10-12 DIAGNOSIS — Z78.0 ASYMPTOMATIC MENOPAUSAL STATE: ICD-10-CM

## 2021-10-12 DIAGNOSIS — R05.9 COUGH: Primary | ICD-10-CM

## 2021-10-12 DIAGNOSIS — E11.9 TYPE 2 DIABETES MELLITUS WITHOUT COMPLICATION, WITHOUT LONG-TERM CURRENT USE OF INSULIN: ICD-10-CM

## 2021-10-12 DIAGNOSIS — J45.901 EXACERBATION OF ASTHMA, UNSPECIFIED ASTHMA SEVERITY, UNSPECIFIED WHETHER PERSISTENT: ICD-10-CM

## 2021-10-12 DIAGNOSIS — R45.84 ANHEDONIA: ICD-10-CM

## 2021-10-12 PROCEDURE — 71046 X-RAY EXAM CHEST 2 VIEWS: CPT | Mod: 26,,, | Performed by: RADIOLOGY

## 2021-10-12 PROCEDURE — 99999 PR PBB SHADOW E&M-EST. PATIENT-LVL V: CPT | Mod: PBBFAC,,, | Performed by: INTERNAL MEDICINE

## 2021-10-12 PROCEDURE — 99999 PR PBB SHADOW E&M-EST. PATIENT-LVL V: ICD-10-PCS | Mod: PBBFAC,,, | Performed by: INTERNAL MEDICINE

## 2021-10-12 PROCEDURE — 99214 OFFICE O/P EST MOD 30 MIN: CPT | Mod: S$PBB,,, | Performed by: INTERNAL MEDICINE

## 2021-10-12 PROCEDURE — 99215 OFFICE O/P EST HI 40 MIN: CPT | Mod: PBBFAC,25 | Performed by: INTERNAL MEDICINE

## 2021-10-12 PROCEDURE — 71046 XR CHEST PA AND LATERAL: ICD-10-PCS | Mod: 26,,, | Performed by: RADIOLOGY

## 2021-10-12 PROCEDURE — 99214 PR OFFICE/OUTPT VISIT, EST, LEVL IV, 30-39 MIN: ICD-10-PCS | Mod: S$PBB,,, | Performed by: INTERNAL MEDICINE

## 2021-10-12 PROCEDURE — 71046 X-RAY EXAM CHEST 2 VIEWS: CPT | Mod: TC

## 2021-10-12 RX ORDER — MONTELUKAST SODIUM 10 MG/1
10 TABLET ORAL NIGHTLY
Qty: 90 TABLET | Refills: 1 | Status: SHIPPED | OUTPATIENT
Start: 2021-10-12 | End: 2022-04-18

## 2021-10-12 RX ORDER — ALBUTEROL SULFATE 90 UG/1
2 AEROSOL, METERED RESPIRATORY (INHALATION) EVERY 6 HOURS PRN
Qty: 8.5 G | Refills: 3 | Status: SHIPPED | OUTPATIENT
Start: 2021-10-12 | End: 2022-06-08 | Stop reason: SDUPTHER

## 2021-10-12 RX ORDER — DOXYCYCLINE HYCLATE 100 MG
100 TABLET ORAL 2 TIMES DAILY
Qty: 14 TABLET | Refills: 0 | Status: SHIPPED | OUTPATIENT
Start: 2021-10-12 | End: 2021-10-19

## 2021-10-12 RX ORDER — METFORMIN HYDROCHLORIDE 500 MG/1
500 TABLET ORAL 2 TIMES DAILY WITH MEALS
Qty: 180 TABLET | Refills: 1 | Status: SHIPPED | OUTPATIENT
Start: 2021-10-12 | End: 2022-05-16

## 2021-10-17 VITALS
TEMPERATURE: 99 F | OXYGEN SATURATION: 95 % | HEART RATE: 96 BPM | WEIGHT: 168.88 LBS | SYSTOLIC BLOOD PRESSURE: 132 MMHG | BODY MASS INDEX: 31.88 KG/M2 | HEIGHT: 61 IN | DIASTOLIC BLOOD PRESSURE: 70 MMHG

## 2021-10-22 DIAGNOSIS — H40.1113 PRIMARY OPEN ANGLE GLAUCOMA OF RIGHT EYE, SEVERE STAGE: ICD-10-CM

## 2021-10-22 DIAGNOSIS — H40.1122 PRIMARY OPEN ANGLE GLAUCOMA OF LEFT EYE, MODERATE STAGE: ICD-10-CM

## 2021-10-22 RX ORDER — LATANOPROST 50 UG/ML
1 SOLUTION/ DROPS OPHTHALMIC DAILY
Qty: 2.5 ML | Refills: 12 | Status: SHIPPED | OUTPATIENT
Start: 2021-10-22 | End: 2022-12-01

## 2021-10-31 ENCOUNTER — EXTERNAL CHRONIC CARE MANAGEMENT (OUTPATIENT)
Dept: PRIMARY CARE CLINIC | Facility: CLINIC | Age: 76
End: 2021-10-31
Payer: MEDICARE

## 2021-10-31 PROCEDURE — 99490 CHRNC CARE MGMT STAFF 1ST 20: CPT | Mod: PBBFAC | Performed by: INTERNAL MEDICINE

## 2021-10-31 PROCEDURE — 99490 CHRNC CARE MGMT STAFF 1ST 20: CPT | Mod: S$PBB,,, | Performed by: INTERNAL MEDICINE

## 2021-10-31 PROCEDURE — 99490 PR CHRONIC CARE MGMT, 1ST 20 MIN: ICD-10-PCS | Mod: S$PBB,,, | Performed by: INTERNAL MEDICINE

## 2021-11-01 ENCOUNTER — CLINICAL SUPPORT (OUTPATIENT)
Dept: OPHTHALMOLOGY | Facility: CLINIC | Age: 76
End: 2021-11-01
Payer: MEDICARE

## 2021-11-01 ENCOUNTER — OFFICE VISIT (OUTPATIENT)
Dept: OPHTHALMOLOGY | Facility: CLINIC | Age: 76
End: 2021-11-01
Payer: MEDICARE

## 2021-11-01 DIAGNOSIS — E11.9 TYPE 2 DIABETES MELLITUS WITHOUT OPHTHALMIC MANIFESTATIONS: ICD-10-CM

## 2021-11-01 DIAGNOSIS — H26.491 PCO (POSTERIOR CAPSULAR OPACIFICATION), RIGHT: ICD-10-CM

## 2021-11-01 DIAGNOSIS — Z96.1 PSEUDOPHAKIA OF BOTH EYES: ICD-10-CM

## 2021-11-01 DIAGNOSIS — H40.1122 PRIMARY OPEN ANGLE GLAUCOMA OF LEFT EYE, MODERATE STAGE: Primary | ICD-10-CM

## 2021-11-01 DIAGNOSIS — H04.123 DRY EYES, BILATERAL: ICD-10-CM

## 2021-11-01 DIAGNOSIS — Z98.83 GLAUCOMA FILTERING BLEB OF BOTH EYES: ICD-10-CM

## 2021-11-01 DIAGNOSIS — H40.1113 PRIMARY OPEN ANGLE GLAUCOMA OF RIGHT EYE, SEVERE STAGE: ICD-10-CM

## 2021-11-01 DIAGNOSIS — H43.813 POSTERIOR VITREOUS DETACHMENT OF BOTH EYES: ICD-10-CM

## 2021-11-01 DIAGNOSIS — H26.492 PCO (POSTERIOR CAPSULAR OPACIFICATION), LEFT: ICD-10-CM

## 2021-11-01 PROCEDURE — 92133 CPTRZD OPH DX IMG PST SGM ON: CPT | Mod: PBBFAC | Performed by: OPHTHALMOLOGY

## 2021-11-01 PROCEDURE — 99999 PR PBB SHADOW E&M-EST. PATIENT-LVL III: ICD-10-PCS | Mod: PBBFAC,,, | Performed by: OPHTHALMOLOGY

## 2021-11-01 PROCEDURE — 99999 PR PBB SHADOW E&M-EST. PATIENT-LVL III: CPT | Mod: PBBFAC,,, | Performed by: OPHTHALMOLOGY

## 2021-11-01 PROCEDURE — 99213 OFFICE O/P EST LOW 20 MIN: CPT | Mod: PBBFAC | Performed by: OPHTHALMOLOGY

## 2021-11-01 PROCEDURE — 92083 HUMPHREY VISUAL FIELD - OU - BOTH EYES: ICD-10-PCS | Mod: 26,S$PBB,, | Performed by: OPHTHALMOLOGY

## 2021-11-01 PROCEDURE — 92014 COMPRE OPH EXAM EST PT 1/>: CPT | Mod: S$PBB,,, | Performed by: OPHTHALMOLOGY

## 2021-11-01 PROCEDURE — 92014 PR EYE EXAM, EST PATIENT,COMPREHESV: ICD-10-PCS | Mod: S$PBB,,, | Performed by: OPHTHALMOLOGY

## 2021-11-01 PROCEDURE — 92133 POSTERIOR SEGMENT OCT OPTIC NERVE(OCULAR COHERENCE TOMOGRAPHY) - OU - BOTH EYES: ICD-10-PCS | Mod: 26,S$PBB,, | Performed by: OPHTHALMOLOGY

## 2021-11-01 PROCEDURE — 92083 EXTENDED VISUAL FIELD XM: CPT | Mod: PBBFAC | Performed by: OPHTHALMOLOGY

## 2021-11-01 RX ORDER — NETARSUDIL 0.2 MG/ML
1 SOLUTION/ DROPS OPHTHALMIC; TOPICAL DAILY
Qty: 2.5 ML | Refills: 0
Start: 2021-11-01 | End: 2021-11-29 | Stop reason: SDUPTHER

## 2021-11-03 ENCOUNTER — HOSPITAL ENCOUNTER (OUTPATIENT)
Dept: RADIOLOGY | Facility: OTHER | Age: 76
Discharge: HOME | End: 2021-11-03
Attending: INTERNAL MEDICINE
Payer: MEDICARE

## 2021-11-03 DIAGNOSIS — Z78.0 ASYMPTOMATIC MENOPAUSAL STATE: ICD-10-CM

## 2021-11-03 PROCEDURE — 77080 DEXA BONE DENSITY SPINE HIP: ICD-10-PCS | Mod: 26,,, | Performed by: INTERNAL MEDICINE

## 2021-11-03 PROCEDURE — 77080 DXA BONE DENSITY AXIAL: CPT | Mod: 26,,, | Performed by: INTERNAL MEDICINE

## 2021-11-03 PROCEDURE — 77080 DXA BONE DENSITY AXIAL: CPT | Mod: TC

## 2021-11-10 ENCOUNTER — TELEPHONE (OUTPATIENT)
Dept: ENDOSCOPY | Facility: HOSPITAL | Age: 76
End: 2021-11-10
Payer: MEDICARE

## 2021-11-26 ENCOUNTER — IMMUNIZATION (OUTPATIENT)
Dept: INTERNAL MEDICINE | Facility: CLINIC | Age: 76
End: 2021-11-26
Payer: MEDICARE

## 2021-11-26 DIAGNOSIS — Z23 NEED FOR VACCINATION: Primary | ICD-10-CM

## 2021-11-26 PROCEDURE — 0064A COVID-19, MRNA, LNP-S, PF, 100 MCG/0.25 ML DOSE VACCINE (MODERNA BOOSTER): CPT | Mod: PBBFAC,CV19

## 2021-11-29 ENCOUNTER — IMMUNIZATION (OUTPATIENT)
Dept: INTERNAL MEDICINE | Facility: CLINIC | Age: 76
End: 2021-11-29
Payer: MEDICARE

## 2021-11-29 ENCOUNTER — OFFICE VISIT (OUTPATIENT)
Dept: OPHTHALMOLOGY | Facility: CLINIC | Age: 76
End: 2021-11-29
Payer: MEDICARE

## 2021-11-29 DIAGNOSIS — E11.9 TYPE 2 DIABETES MELLITUS WITHOUT OPHTHALMIC MANIFESTATIONS: ICD-10-CM

## 2021-11-29 DIAGNOSIS — H43.813 POSTERIOR VITREOUS DETACHMENT OF BOTH EYES: ICD-10-CM

## 2021-11-29 DIAGNOSIS — Z98.83 GLAUCOMA FILTERING BLEB OF BOTH EYES: ICD-10-CM

## 2021-11-29 DIAGNOSIS — H26.492 PCO (POSTERIOR CAPSULAR OPACIFICATION), LEFT: ICD-10-CM

## 2021-11-29 DIAGNOSIS — H26.491 PCO (POSTERIOR CAPSULAR OPACIFICATION), RIGHT: ICD-10-CM

## 2021-11-29 DIAGNOSIS — H40.1113 PRIMARY OPEN ANGLE GLAUCOMA OF RIGHT EYE, SEVERE STAGE: ICD-10-CM

## 2021-11-29 DIAGNOSIS — H04.123 DRY EYES, BILATERAL: ICD-10-CM

## 2021-11-29 DIAGNOSIS — Z96.1 PSEUDOPHAKIA OF BOTH EYES: ICD-10-CM

## 2021-11-29 DIAGNOSIS — H40.1122 PRIMARY OPEN ANGLE GLAUCOMA OF LEFT EYE, MODERATE STAGE: Primary | ICD-10-CM

## 2021-11-29 PROCEDURE — 90694 VACC AIIV4 NO PRSRV 0.5ML IM: CPT | Mod: PBBFAC

## 2021-11-29 PROCEDURE — 92012 INTRM OPH EXAM EST PATIENT: CPT | Mod: S$PBB,,, | Performed by: OPHTHALMOLOGY

## 2021-11-29 PROCEDURE — 92012 PR EYE EXAM, EST PATIENT,INTERMED: ICD-10-PCS | Mod: S$PBB,,, | Performed by: OPHTHALMOLOGY

## 2021-11-29 PROCEDURE — 99213 OFFICE O/P EST LOW 20 MIN: CPT | Mod: PBBFAC | Performed by: OPHTHALMOLOGY

## 2021-11-29 PROCEDURE — 99999 PR PBB SHADOW E&M-EST. PATIENT-LVL III: ICD-10-PCS | Mod: PBBFAC,,, | Performed by: OPHTHALMOLOGY

## 2021-11-29 PROCEDURE — G0008 ADMIN INFLUENZA VIRUS VAC: HCPCS | Mod: PBBFAC

## 2021-11-29 PROCEDURE — 99999 PR PBB SHADOW E&M-EST. PATIENT-LVL III: CPT | Mod: PBBFAC,,, | Performed by: OPHTHALMOLOGY

## 2021-11-29 RX ORDER — NETARSUDIL 0.2 MG/ML
1 SOLUTION/ DROPS OPHTHALMIC; TOPICAL DAILY
Qty: 2.5 ML | Refills: 12 | Status: SHIPPED | OUTPATIENT
Start: 2021-11-29 | End: 2022-09-26

## 2021-11-30 ENCOUNTER — EXTERNAL CHRONIC CARE MANAGEMENT (OUTPATIENT)
Dept: PRIMARY CARE CLINIC | Facility: CLINIC | Age: 76
End: 2021-11-30
Payer: MEDICARE

## 2021-11-30 DIAGNOSIS — Z12.11 SPECIAL SCREENING FOR MALIGNANT NEOPLASMS, COLON: Primary | ICD-10-CM

## 2021-11-30 PROCEDURE — 99490 CHRNC CARE MGMT STAFF 1ST 20: CPT | Mod: PBBFAC | Performed by: INTERNAL MEDICINE

## 2021-11-30 PROCEDURE — 99490 PR CHRONIC CARE MGMT, 1ST 20 MIN: ICD-10-PCS | Mod: S$PBB,,, | Performed by: INTERNAL MEDICINE

## 2021-11-30 PROCEDURE — 99490 CHRNC CARE MGMT STAFF 1ST 20: CPT | Mod: S$PBB,,, | Performed by: INTERNAL MEDICINE

## 2021-11-30 RX ORDER — SODIUM, POTASSIUM,MAG SULFATES 17.5-3.13G
1 SOLUTION, RECONSTITUTED, ORAL ORAL DAILY
Qty: 1 KIT | Refills: 0 | Status: SHIPPED | OUTPATIENT
Start: 2021-11-30 | End: 2021-12-02

## 2021-12-27 ENCOUNTER — TELEPHONE (OUTPATIENT)
Dept: INTERNAL MEDICINE | Facility: CLINIC | Age: 76
End: 2021-12-27
Payer: MEDICARE

## 2021-12-27 NOTE — TELEPHONE ENCOUNTER
----- Message from Connie Thurman MA sent at 12/27/2021  9:09 AM CST -----  Contact: Lupe [phone]    ----- Message -----  From: Dipti Mcmullen  Sent: 12/27/2021   8:36 AM CST  To: [facility] SORTO Staff    Patient would like to get medical advice.  Symptoms (please be specific):  Asthma,cough, rattle in chest  How long have you had these symptoms: 3 weeks  Would you like a call back, or a response through your [facility] portal?:   call back  Pharmacy name and phone # (copy from chart):    "[facility] #[id number] - [place] [address] [address] [facility] [facility] [place] [address] [address] [facility] [address]  Phone: [phone] Fax: [phone]        Comments:

## 2021-12-29 ENCOUNTER — OFFICE VISIT (OUTPATIENT)
Dept: INTERNAL MEDICINE | Facility: CLINIC | Age: 76
End: 2021-12-29
Payer: MEDICARE

## 2021-12-29 VITALS
WEIGHT: 171.06 LBS | SYSTOLIC BLOOD PRESSURE: 116 MMHG | HEART RATE: 54 BPM | BODY MASS INDEX: 33.58 KG/M2 | DIASTOLIC BLOOD PRESSURE: 78 MMHG | TEMPERATURE: 99 F | HEIGHT: 60 IN | OXYGEN SATURATION: 98 %

## 2021-12-29 DIAGNOSIS — J45.41 MODERATE PERSISTENT ASTHMA WITH ACUTE EXACERBATION: ICD-10-CM

## 2021-12-29 DIAGNOSIS — R05.9 COUGH: Primary | ICD-10-CM

## 2021-12-29 LAB
SARS-COV-2 RNA RESP QL NAA+PROBE: NOT DETECTED
SARS-COV-2- CYCLE NUMBER: NORMAL

## 2021-12-29 PROCEDURE — U0003 INFECTIOUS AGENT DETECTION BY NUCLEIC ACID (DNA OR RNA); SEVERE ACUTE RESPIRATORY SYNDROME CORONAVIRUS 2 (SARS-COV-2) (CORONAVIRUS DISEASE [COVID-19]), AMPLIFIED PROBE TECHNIQUE, MAKING USE OF HIGH THROUGHPUT TECHNOLOGIES AS DESCRIBED BY CMS-2020-01-R: HCPCS | Performed by: PHYSICIAN ASSISTANT

## 2021-12-29 PROCEDURE — 99213 OFFICE O/P EST LOW 20 MIN: CPT | Mod: S$PBB,,, | Performed by: PHYSICIAN ASSISTANT

## 2021-12-29 PROCEDURE — 99999 PR PBB SHADOW E&M-EST. PATIENT-LVL V: CPT | Mod: PBBFAC,,, | Performed by: PHYSICIAN ASSISTANT

## 2021-12-29 PROCEDURE — 99213 PR OFFICE/OUTPT VISIT, EST, LEVL III, 20-29 MIN: ICD-10-PCS | Mod: S$PBB,,, | Performed by: PHYSICIAN ASSISTANT

## 2021-12-29 PROCEDURE — U0005 INFEC AGEN DETEC AMPLI PROBE: HCPCS | Performed by: PHYSICIAN ASSISTANT

## 2021-12-29 PROCEDURE — 99999 PR PBB SHADOW E&M-EST. PATIENT-LVL V: ICD-10-PCS | Mod: PBBFAC,,, | Performed by: PHYSICIAN ASSISTANT

## 2021-12-29 PROCEDURE — 99215 OFFICE O/P EST HI 40 MIN: CPT | Mod: PBBFAC | Performed by: PHYSICIAN ASSISTANT

## 2021-12-29 RX ORDER — PROMETHAZINE HYDROCHLORIDE AND DEXTROMETHORPHAN HYDROBROMIDE 6.25; 15 MG/5ML; MG/5ML
5 SYRUP ORAL NIGHTLY PRN
Qty: 118 ML | Refills: 0 | Status: SHIPPED | OUTPATIENT
Start: 2021-12-29 | End: 2022-01-25

## 2021-12-29 RX ORDER — PREDNISONE 20 MG/1
40 TABLET ORAL DAILY
Qty: 10 TABLET | Refills: 0 | Status: SHIPPED | OUTPATIENT
Start: 2021-12-29 | End: 2022-01-03

## 2021-12-30 ENCOUNTER — TELEPHONE (OUTPATIENT)
Dept: INTERNAL MEDICINE | Facility: CLINIC | Age: 76
End: 2021-12-30
Payer: MEDICARE

## 2021-12-31 ENCOUNTER — EXTERNAL CHRONIC CARE MANAGEMENT (OUTPATIENT)
Dept: PRIMARY CARE CLINIC | Facility: CLINIC | Age: 76
End: 2021-12-31
Payer: MEDICARE

## 2021-12-31 PROCEDURE — 99490 CHRNC CARE MGMT STAFF 1ST 20: CPT | Mod: PBBFAC | Performed by: INTERNAL MEDICINE

## 2021-12-31 PROCEDURE — 99490 CHRNC CARE MGMT STAFF 1ST 20: CPT | Mod: S$PBB,,, | Performed by: INTERNAL MEDICINE

## 2021-12-31 PROCEDURE — 99490 PR CHRONIC CARE MGMT, 1ST 20 MIN: ICD-10-PCS | Mod: S$PBB,,, | Performed by: INTERNAL MEDICINE

## 2022-01-10 DIAGNOSIS — I10 HYPERTENSION, UNSPECIFIED TYPE: ICD-10-CM

## 2022-01-10 NOTE — TELEPHONE ENCOUNTER
No new care gaps identified.  Powered by [employer]. Reference number: [id number].   1/10/2022 12:32:26 AM CST

## 2022-01-13 RX ORDER — LOSARTAN POTASSIUM 50 MG/1
TABLET ORAL
Qty: 90 TABLET | Refills: 3 | Status: SHIPPED | OUTPATIENT
Start: 2022-01-13 | End: 2023-01-26

## 2022-01-13 RX ORDER — TRIAMTERENE/HYDROCHLOROTHIAZID 37.5-25 MG
TABLET ORAL
Qty: 90 TABLET | Refills: 3 | Status: SHIPPED | OUTPATIENT
Start: 2022-01-13 | End: 2023-01-26

## 2022-01-13 NOTE — TELEPHONE ENCOUNTER
Refill Authorization Note   Lupe Jewell  is requesting a refill authorization.  Brief Assessment and Rationale for Refill:  Approve     Medication Therapy Plan:       Medication Reconciliation Completed: No   Comments:   --->Care Gap information included below if applicable.   Orders Placed This Encounter    losartan (COZAAR) 50 MG tablet    triamterene-hydrochlorothiazide 37.5-25 mg (MAXZIDE-25) 37.5-25 mg per tablet      Requested Prescriptions   Signed Prescriptions Disp Refills    losartan (COZAAR) 50 MG tablet 90 tablet 3     Sig: TAKE 1 TABLET(50 MG) BY MOUTH EVERY DAY       Cardiovascular:  Angiotensin Receptor Blockers Passed - 1/10/2022 12:31 AM        Passed - Patient is at least 18 years old        Passed - Last BP in normal range within 360 days     BP Readings from Last 1 Encounters:   12/29/21 116/78               Passed - Valid encounter within last 15 months     Recent Visits  Date Type Provider Dept   10/12/21 Office Visit Marisol Restrepo MD [facility]   03/31/21 Office Visit Marisol Restrepo MD [facility]   12/18/20 Office Visit Marisol Restrepo MD [facility]   11/25/20 Office Visit Marisol Restrepo MD [facility]   Showing recent visits within past 720 days and meeting all other requirements  Future Appointments  No visits were found meeting these conditions.  Showing future appointments within next 150 days and meeting all other requirements      Future Appointments              In 2 weeks MD Pete Iraheta - [address], [place]                Passed - Cr is 1.39 or below and within 360 days     Lab Results   Component Value Date    CREATININE 0.8 10/12/2021    CREATININE 0.9 03/31/2021    CREATININE 0.8 11/25/2020              Passed - K is 5.2 or below and within 360 days     Potassium   Date Value Ref Range Status   10/12/2021 3.7 3.5 - 5.1 mmol/L Final   03/31/2021 3.9 3.5 - 5.1 mmol/L Final   11/25/2020 3.8 3.5 - 5.1 mmol/L  Final              Passed - eGFR within 360 days     Lab Results   Component Value Date    EGFRNONAA >60.0 10/12/2021    EGFRNONAA >60.0 03/31/2021    EGFRNONAA >60.0 11/25/2020                  triamterene-hydrochlorothiazide 37.5-25 mg (MAXZIDE-25) 37.5-25 mg per tablet 90 tablet 3     Sig: TAKE 1 TABLET BY MOUTH EVERY DAY       Cardiovascular: Diuretic Combos Passed - 1/10/2022 12:31 AM        Passed - Patient is at least 18 years old        Passed - Last BP in normal range within 360 days     BP Readings from Last 1 Encounters:   12/29/21 116/78               Passed - Valid encounter within last 15 months     Recent Visits  Date Type Provider Dept   10/12/21 Office Visit Marisol Restrepo MD [facility]   03/31/21 Office Visit Marisol Restrepo MD [facility]   12/18/20 Office Visit Marisol Restrepo MD [facility]   11/25/20 Office Visit Marisol Restrepo MD [facility]   Showing recent visits within past 720 days and meeting all other requirements  Future Appointments  No visits were found meeting these conditions.  Showing future appointments within next 150 days and meeting all other requirements      Future Appointments              In 2 weeks MD Pete Iraheta - [address], [place]                Passed - K in normal range and within 360 days     Potassium   Date Value Ref Range Status   10/12/2021 3.7 3.5 - 5.1 mmol/L Final   03/31/2021 3.9 3.5 - 5.1 mmol/L Final   11/25/2020 3.8 3.5 - 5.1 mmol/L Final              Passed - Na is between 130 and 148 and within 360 days     Sodium   Date Value Ref Range Status   10/12/2021 138 136 - 145 mmol/L Final   03/31/2021 137 136 - 145 mmol/L Final   11/25/2020 141 136 - 145 mmol/L Final              Passed - Cr is 1.39 or below and within 360 days     Lab Results   Component Value Date    CREATININE 0.8 10/12/2021    CREATININE 0.9 03/31/2021    CREATININE 0.8 11/25/2020              Passed - eGFR within 360  days     Lab Results   Component Value Date    EGFRNONAA >60.0 10/12/2021    EGFRNONAA >60.0 03/31/2021    EGFRNONAA >60.0 11/25/2020                    Appointments  past 12m or future 3m with PCP    Date Provider   Last Visit   10/12/2021 Marisol Restrepo MD   Next Visit   Visit date not found Marisol Restrepo MD   ED visits in past 90 days: 0     Note composed:1:32 PM 01/13/2022

## 2022-01-19 NOTE — TELEPHONE ENCOUNTER
Care Due:                  Date            Visit Type   Department     Provider  --------------------------------------------------------------------------------                                             [facility]  Last Visit: 10-      None         MEDICINE       Marisol Restrepo  Next Visit: None Scheduled  None         None Found                                                            Last  Test          Frequency    Reason                     Performed    Due Date  --------------------------------------------------------------------------------    HBA1C.......  6 months...  metFORMIN................  10-   04-    Lipid Panel.  12 months..  atorvastatin.............  Not Found    Overdue    Powered by [employer]. Reference number: [id number].   1/19/2022 5:04:09 PM CST

## 2022-01-26 DIAGNOSIS — Z01.812 PRE-PROCEDURE LAB EXAM: ICD-10-CM

## 2022-01-26 NOTE — PROGRESS NOTES
HPI     DLS: 11/29/2021    Pt here for 2 Month Check;  Pt states no eye pain or discomfort.     Meds;   Latanoprost QDAY OU   Brimonidine TID OU   Dorzolamide TID OD   Rhopressa QDAY OD     1) POAG   2) PCIOL OU   3) Type 2 DM NO DR   4) LOLA   5) PVD OU   6) Yag Cap OU     Last edited by Tila Kent on 1/28/2022 10:14 AM. (History)            Assessment /Plan     For exam results, see Encounter Report.    Primary open angle glaucoma of left eye, moderate stage    Primary open angle glaucoma of right eye, severe stage    Type 2 diabetes mellitus without ophthalmic manifestations    Posterior vitreous detachment of both eyes - Both Eyes    PCO (posterior capsular opacification), left    PCO (posterior capsular opacification), right    Dry eyes, bilateral    Pseudophakia of both eyes    Glaucoma filtering bleb of both eyes        1. POAG (primary open-angle glaucoma) - Severe stage - Both Eyes   Glaucoma (type and duration) POAG,   -  first HVF 1997  -  first photo: 1996     Family history None   Glaucoma meds - (off all gtts os post combined)  (( use to use - Alphagan od , Xalatan od , dorzolamide bid od ))  H/O adverse rxn to glaucoma drops into to BB 2/2 asthma   LASERS SLT 12/8/05 and repeat 7/16/09 OD, and SLT 1/6/06 OS;  SLT os 7/31/14, od 8/14/14 (no response ou) // yag cap od 1/3/2019 /// yag cap os  10/17/2019   GLAUCOMA SURGERIES - combined phaco/IOL trab -os 12/28/2015// elastic sclera - 9 sutures to close - all visible ones cut /// combined - phaco/trab w/ minishunt od - 3/30/2016  OTHER EYE SURGERIES PC IOL OS (combined 1/28/2015) // PC IOL od (combined 3/30/2016)  CDR 0.95 OU   Tbase 22 OU   Tmax 37 (4/11/2016) /38 ( 4/16/2015)   Ttarget 14/14  HVF 23 HVF: 1378-6578 - SAD and  INS  OD, // SAD / IAD  Os (+prog ou 10/2020)   Gonio ext PAS od - 90 degrees open // +1-3 os    /421   OCT 11 test 2004 - 2021: unable to interpret  OD// Dec thru out   HRT 13 test 2005 to 2020 - MR- Dec. SN/N, bord IT od  // dec. N/IN, bord SN/IT os /// CDR 0.78 od // 0.75 os  Disc photos 1996, 1997, 2003: slides // 2012 , 2015 - OIS    Ttoday  19/14  (down from 26/12 w/ addition of rhopressa od)   Test done today:  IOP check with rhopressa od // gonio      2. Diabetes mellitus type II   No BDR     3. Dry eyes - Both Eyes   AT's prn     4. Posterior vitreous detachment of both eyes - Both Eyes   With floaters - stable     5. A lot of family stressors   Mom passed since last visit  dad elderly and in poor health, have sitters  Daughter is unemployed and had to move back home  Daughter recently ill - in ICU and intubated 2/2 pancreatitis (8/2/2015 to 8/11/2015)      6. S/P yag cap ou   -od 10/3/3019 (also Rx ant capsule phimosis od)   -OS - 10/17/2019        Plan  Glaucoma   + blebs ou -  IOP TOO high OD     target is 14 ou and + VF prog ou on 10/2020 -    Adjust glaucoma drops   Latanoprost ou q day   Brimonidine tid ou   Dorzolamide tid OD  ADD RHOPRESSA OD Q DAY - sample given     - poor candidate for repeat SLT od  - lots of PAS and no response last repeat in 8/2014   ?? If pt is a candidate for trial of diamox   consideer bleb needling - has a express minishunt - but may be able to needle bleb and improve function - has free sup conj, but there is a lot of subconj episcleral fibrosis and there may be no flow through the express - most likely can not revieve it -- may need a GDD - ahmed or baerveldt     Cont AT's ou prn     Sees Lisa for glasses     AT's prn     Good resp to trial of rhopressa od 28--> 18- 19 - will see if insurance will cover it     Photo file updated - 1.28.2022 --    F/U 3-4 months - IOP check

## 2022-01-28 ENCOUNTER — OFFICE VISIT (OUTPATIENT)
Dept: OPHTHALMOLOGY | Facility: CLINIC | Age: 77
End: 2022-01-28
Payer: MEDICARE

## 2022-01-28 DIAGNOSIS — H04.123 DRY EYES, BILATERAL: ICD-10-CM

## 2022-01-28 DIAGNOSIS — H26.491 PCO (POSTERIOR CAPSULAR OPACIFICATION), RIGHT: ICD-10-CM

## 2022-01-28 DIAGNOSIS — E11.9 TYPE 2 DIABETES MELLITUS WITHOUT OPHTHALMIC MANIFESTATIONS: ICD-10-CM

## 2022-01-28 DIAGNOSIS — H40.1122 PRIMARY OPEN ANGLE GLAUCOMA OF LEFT EYE, MODERATE STAGE: Primary | ICD-10-CM

## 2022-01-28 DIAGNOSIS — Z96.1 PSEUDOPHAKIA OF BOTH EYES: ICD-10-CM

## 2022-01-28 DIAGNOSIS — H43.813 POSTERIOR VITREOUS DETACHMENT OF BOTH EYES: ICD-10-CM

## 2022-01-28 DIAGNOSIS — H40.1113 PRIMARY OPEN ANGLE GLAUCOMA OF RIGHT EYE, SEVERE STAGE: ICD-10-CM

## 2022-01-28 DIAGNOSIS — H26.492 PCO (POSTERIOR CAPSULAR OPACIFICATION), LEFT: ICD-10-CM

## 2022-01-28 DIAGNOSIS — Z98.83 GLAUCOMA FILTERING BLEB OF BOTH EYES: ICD-10-CM

## 2022-01-28 PROCEDURE — 99213 OFFICE O/P EST LOW 20 MIN: CPT | Mod: PBBFAC | Performed by: OPHTHALMOLOGY

## 2022-01-28 PROCEDURE — 92020 GONIOSCOPY: CPT | Mod: S$PBB,,, | Performed by: OPHTHALMOLOGY

## 2022-01-28 PROCEDURE — 92012 PR EYE EXAM, EST PATIENT,INTERMED: ICD-10-PCS | Mod: S$PBB,,, | Performed by: OPHTHALMOLOGY

## 2022-01-28 PROCEDURE — 92020 PR SPECIAL EYE EVAL,GONIOSCOPY: ICD-10-PCS | Mod: S$PBB,,, | Performed by: OPHTHALMOLOGY

## 2022-01-28 PROCEDURE — 92020 GONIOSCOPY: CPT | Mod: PBBFAC | Performed by: OPHTHALMOLOGY

## 2022-01-28 PROCEDURE — 99999 PR PBB SHADOW E&M-EST. PATIENT-LVL III: ICD-10-PCS | Mod: PBBFAC,,, | Performed by: OPHTHALMOLOGY

## 2022-01-28 PROCEDURE — 92012 INTRM OPH EXAM EST PATIENT: CPT | Mod: S$PBB,,, | Performed by: OPHTHALMOLOGY

## 2022-01-28 PROCEDURE — 99999 PR PBB SHADOW E&M-EST. PATIENT-LVL III: CPT | Mod: PBBFAC,,, | Performed by: OPHTHALMOLOGY

## 2022-01-31 ENCOUNTER — EXTERNAL CHRONIC CARE MANAGEMENT (OUTPATIENT)
Dept: PRIMARY CARE CLINIC | Facility: CLINIC | Age: 77
End: 2022-01-31
Payer: MEDICARE

## 2022-01-31 PROCEDURE — 99490 PR CHRONIC CARE MGMT, 1ST 20 MIN: ICD-10-PCS | Mod: S$PBB,,, | Performed by: INTERNAL MEDICINE

## 2022-01-31 PROCEDURE — 99490 CHRNC CARE MGMT STAFF 1ST 20: CPT | Mod: PBBFAC | Performed by: INTERNAL MEDICINE

## 2022-01-31 PROCEDURE — 99490 CHRNC CARE MGMT STAFF 1ST 20: CPT | Mod: S$PBB,,, | Performed by: INTERNAL MEDICINE

## 2022-02-01 ENCOUNTER — HOSPITAL ENCOUNTER (OUTPATIENT)
Dept: PREADMISSION TESTING | Facility: OTHER | Age: 77
Discharge: HOME | End: 2022-02-01
Attending: ANESTHESIOLOGY
Payer: MEDICARE

## 2022-02-01 DIAGNOSIS — Z01.812 PRE-PROCEDURE LAB EXAM: ICD-10-CM

## 2022-02-01 LAB
SARS-COV-2 RNA RESP QL NAA+PROBE: NOT DETECTED
SARS-COV-2- CYCLE NUMBER: NORMAL

## 2022-02-01 PROCEDURE — U0005 INFEC AGEN DETEC AMPLI PROBE: HCPCS | Performed by: CLINICAL NURSE SPECIALIST

## 2022-02-01 PROCEDURE — U0003 INFECTIOUS AGENT DETECTION BY NUCLEIC ACID (DNA OR RNA); SEVERE ACUTE RESPIRATORY SYNDROME CORONAVIRUS 2 (SARS-COV-2) (CORONAVIRUS DISEASE [COVID-19]), AMPLIFIED PROBE TECHNIQUE, MAKING USE OF HIGH THROUGHPUT TECHNOLOGIES AS DESCRIBED BY CMS-2020-01-R: HCPCS | Performed by: CLINICAL NURSE SPECIALIST

## 2022-02-03 ENCOUNTER — ANESTHESIA EVENT (OUTPATIENT)
Dept: ENDOSCOPY | Facility: HOSPITAL | Age: 77
End: 2022-02-03
Payer: MEDICARE

## 2022-02-03 NOTE — ANESTHESIA PREPROCEDURE EVALUATION
02/03/2022  Lupe Jewell is a 76 y.o., female.  Past Medical History:   Diagnosis Date    ALLERGIC RHINITIS 8/17/2012    Asthma, well controlled 8/17/2012    Chronic asthma     Chronic rhinitis     Diabetes 2/4/2014    Diabetes mellitus     Diabetes mellitus type II     Fever blister     GERD (gastroesophageal reflux disease) 8/17/2012    Glaucoma     Hyperlipemia     Hypertension     Obstructive sleep apnea     Osteoporosis, unspecified      Past Surgical History:   Procedure Laterality Date    BLADDER SURGERY      BREAST BIOPSY Left     BREAST SURGERY      left breast biopsy    CATARACT EXTRACTION W/  INTRAOCULAR LENS IMPLANT Left 1/28/2015    WITH TRAB ()    CATARACT EXTRACTION W/  INTRAOCULAR LENS IMPLANT Right 03/30/2016    WITH TRAB ()    COLONOSCOPY N/A 7/1/2016    Procedure: COLONOSCOPY;  Surgeon: Rony Lima MD;  Location: [address]);  Service: Endoscopy;  Laterality: N/A;    EYE SURGERY Bilateral     cataract removal and Laser surgery    HYSTERECTOMY      stomach procedure      TRABECULECTOMY Left 12/28/2015    COMBINED WITH CE ()    TRABECULECTOMY Right 03/30/2016    COMBINED WITH CE ()    TUBAL LIGATION      YAG CAPSULOTOMY Bilateral 10/3/2019 and 10/17/2019           Anesthesia Evaluation    I have reviewed the Patient Summary Reports.    I have reviewed the Nursing Notes.    I have reviewed the Medications.     Review of Systems  Hematology/Oncology:  Hematology Normal   Oncology Normal     EENT/Dental:EENT/Dental Normal   Cardiovascular:  Cardiovascular Normal     Pulmonary:  Pulmonary Normal    Renal/:  Renal/ Normal     Hepatic/GI:  Hepatic/GI Normal    Musculoskeletal:  Musculoskeletal Normal    Neurological:  Neurology Normal    Endocrine:  Endocrine Normal    Dermatological:  Skin  Normal    Psych:  Psychiatric Normal           Physical Exam  General:  Well nourished     Eyes/Ears/Nose:  EYES/EARS/NOSE FINDINGS: Normal    Chest/Lungs:  Chest/Lungs Clear    Heart/Vascular:  Heart Findings: Normal Heart murmur: negative Vascular Findings: Normal    Abdomen:  Abdomen Findings: Normal    Musculoskeletal:  Musculoskeletal Findings: Normal   Skin:  Skin Findings: Normal    Mental Status:  Mental Status Findings: Normal        Anesthesia Plan  Type of Anesthesia, risks & benefits discussed:  Anesthesia Type:  general    Patient's Preference: General  Plan Factors:          Intra-op Monitoring Plan: standard ASA monitors  Intra-op Monitoring Plan Comments:   Post Op Pain Control Plan: multimodal analgesia  Post Op Pain Control Plan Comments:     Induction:   IV  Beta Blocker:  Patient is not currently on a Beta-Blocker (No further documentation required).       Informed Consent: Patient understands risks and agrees with Anesthesia plan.  Questions answered. Anesthesia consent signed with patient.  ASA Score: 3     Day of Surgery Review of History & Physical:    H&P update referred to the surgeon.         Ready For Surgery From Anesthesia Perspective.

## 2022-02-04 ENCOUNTER — ANESTHESIA (OUTPATIENT)
Dept: ENDOSCOPY | Facility: HOSPITAL | Age: 77
End: 2022-02-04
Payer: MEDICARE

## 2022-02-04 ENCOUNTER — HOSPITAL ENCOUNTER (OUTPATIENT)
Facility: HOSPITAL | Age: 77
Discharge: HOME | End: 2022-02-04
Attending: COLON & RECTAL SURGERY | Admitting: COLON & RECTAL SURGERY
Payer: MEDICARE

## 2022-02-04 VITALS
WEIGHT: 172 LBS | HEART RATE: 91 BPM | OXYGEN SATURATION: 96 % | SYSTOLIC BLOOD PRESSURE: 108 MMHG | BODY MASS INDEX: 33.77 KG/M2 | RESPIRATION RATE: 16 BRPM | TEMPERATURE: 98 F | HEIGHT: 60 IN | DIASTOLIC BLOOD PRESSURE: 61 MMHG

## 2022-02-04 DIAGNOSIS — Z86.010 HISTORY OF COLON POLYPS: ICD-10-CM

## 2022-02-04 DIAGNOSIS — Z12.11 SPECIAL SCREENING FOR MALIGNANT NEOPLASMS, COLON: Primary | ICD-10-CM

## 2022-02-04 LAB — POCT GLUCOSE: 179 MG/DL (ref 70–110)

## 2022-02-04 PROCEDURE — 63600175 PHARM REV CODE 636 W HCPCS: Performed by: NURSE ANESTHETIST, CERTIFIED REGISTERED

## 2022-02-04 PROCEDURE — 25000003 PHARM REV CODE 250: Performed by: NURSE ANESTHETIST, CERTIFIED REGISTERED

## 2022-02-04 PROCEDURE — 45385 PR COLONOSCOPY,REMV LESN,SNARE: ICD-10-PCS | Mod: PT,,, | Performed by: COLON & RECTAL SURGERY

## 2022-02-04 PROCEDURE — 88305 TISSUE EXAM BY PATHOLOGIST: CPT | Performed by: PATHOLOGY

## 2022-02-04 PROCEDURE — 27201089 HC SNARE, DISP (ANY): Performed by: COLON & RECTAL SURGERY

## 2022-02-04 PROCEDURE — E9220 PRA ENDO ANESTHESIA: ICD-10-PCS | Mod: PT,,, | Performed by: NURSE ANESTHETIST, CERTIFIED REGISTERED

## 2022-02-04 PROCEDURE — 45385 COLONOSCOPY W/LESION REMOVAL: CPT | Performed by: COLON & RECTAL SURGERY

## 2022-02-04 PROCEDURE — 88305 TISSUE EXAM BY PATHOLOGIST: CPT | Mod: 26,,, | Performed by: PATHOLOGY

## 2022-02-04 PROCEDURE — 45385 COLONOSCOPY W/LESION REMOVAL: CPT | Mod: PT,,, | Performed by: COLON & RECTAL SURGERY

## 2022-02-04 PROCEDURE — E9220 PRA ENDO ANESTHESIA: HCPCS | Mod: PT,,, | Performed by: NURSE ANESTHETIST, CERTIFIED REGISTERED

## 2022-02-04 PROCEDURE — 37000009 HC ANESTHESIA EA ADD 15 MINS: Performed by: COLON & RECTAL SURGERY

## 2022-02-04 PROCEDURE — 37000008 HC ANESTHESIA 1ST 15 MINUTES: Performed by: COLON & RECTAL SURGERY

## 2022-02-04 PROCEDURE — 88305 TISSUE EXAM BY PATHOLOGIST: ICD-10-PCS | Mod: 26,,, | Performed by: PATHOLOGY

## 2022-02-04 RX ORDER — SODIUM CHLORIDE 9 MG/ML
INJECTION, SOLUTION INTRAVENOUS CONTINUOUS
Status: DISCONTINUED | OUTPATIENT
Start: 2022-02-04 | End: 2022-02-04 | Stop reason: HOSPADM

## 2022-02-04 RX ORDER — PROPOFOL 10 MG/ML
VIAL (ML) INTRAVENOUS
Status: DISCONTINUED | OUTPATIENT
Start: 2022-02-04 | End: 2022-02-04

## 2022-02-04 RX ORDER — LIDOCAINE HYDROCHLORIDE 20 MG/ML
INJECTION INTRAVENOUS
Status: DISCONTINUED | OUTPATIENT
Start: 2022-02-04 | End: 2022-02-04

## 2022-02-04 RX ORDER — PROPOFOL 10 MG/ML
VIAL (ML) INTRAVENOUS CONTINUOUS PRN
Status: DISCONTINUED | OUTPATIENT
Start: 2022-02-04 | End: 2022-02-04

## 2022-02-04 RX ADMIN — PROPOFOL 60 MG: 10 INJECTION, EMULSION INTRAVENOUS at 10:02

## 2022-02-04 RX ADMIN — Medication 200 MCG/KG/MIN: at 10:02

## 2022-02-04 RX ADMIN — SODIUM CHLORIDE: 0.9 INJECTION, SOLUTION INTRAVENOUS at 10:02

## 2022-02-04 RX ADMIN — PROPOFOL 100 MG: 10 INJECTION, EMULSION INTRAVENOUS at 10:02

## 2022-02-04 RX ADMIN — LIDOCAINE HYDROCHLORIDE 75 MG: 20 INJECTION, SOLUTION INTRAVENOUS at 10:02

## 2022-02-04 NOTE — PROVATION PATIENT INSTRUCTIONS
Discharge Summary/Instructions after an Endoscopic Procedure  Patient Name: Lupe Thompson  Patient MRN: [id number]  Patient YOB: 1945 Friday, February 4, 2022  Chemo Benitez MD  Dear patient,  As a result of recent federal legislation (The Federal Cures Act), you may   receive lab or pathology results from your procedure in your [facility]   account before your physician is able to contact you. Your physician or   their representative will relay the results to you with their   recommendations at their soonest availability.  Thank you,  RESTRICTIONS:  During your procedure today, you received medications for sedation.  These   medications may affect your judgment, balance and coordination.  Therefore,   for 24 hours, you have the following restrictions:   - DO NOT drive a car, operate machinery, make legal/financial decisions,   sign important papers or drink alcohol.    ACTIVITY:  Today: no heavy lifting, straining or running due to procedural   sedation/anesthesia.  The following day: return to full activity including work.  DIET:  Eat and drink normally unless instructed otherwise.     TREATMENT FOR COMMON SIDE EFFECTS:  - Mild abdominal pain, nausea, belching, bloating or excessive gas:  rest,   eat lightly and use a heating pad.  - Sore Throat: treat with throat lozenges and/or gargle with warm salt   water.  - Because air was used during the procedure, expelling large amounts of air   from your rectum or belching is normal.  - If a bowel prep was taken, you may not have a bowel movement for 1-3 days.    This is normal.  SYMPTOMS TO WATCH FOR AND REPORT TO YOUR PHYSICIAN:  1. Abdominal pain or bloating, other than gas cramps.  2. Chest pain.  3. Back pain.  4. Signs of infection such as: chills or fever occurring within 24 hours   after the procedure.  5. Rectal bleeding, which would show as bright red, maroon, or black stools.   (A tablespoon of blood from the rectum is not serious, especially  if   hemorrhoids are present.)  6. Vomiting.  7. Weakness or dizziness.  GO DIRECTLY TO THE NEAREST EMERGENCY ROOM IF YOU HAVE ANY OF THE FOLLOWING:      Difficulty breathing              Chills and/or fever over 101 F   Persistent vomiting and/or vomiting blood   Severe abdominal pain   Severe chest pain   Black, tarry stools   Bleeding- more than one tablespoon   Any other symptom or condition that you feel may need urgent attention  Your doctor recommends these additional instructions:  If any biopsies were taken, your doctors clinic will contact you in 1 to 2   weeks with any results.  - Discharge patient to home (ambulatory).   - Resume previous diet.   - Continue present medications.   - Await pathology results.   - Repeat colonoscopy in 3 - 5 years for surveillance.  For questions, problems or results please call your physician - Chemo Benitez MD at Work:  [phone].  [facility] EMERGENCY ROOM PHONE NUMBER: [phone]  IF A COMPLICATION OR EMERGENCY SITUATION ARISES AND YOU ARE UNABLE TO REACH   YOUR PHYSICIAN - GO DIRECTLY TO THE EMERGENCY ROOM.  Chemo Benitez MD  2/4/2022 11:00:56 AM  This report has been verified and signed electronically.  Dear patient,  As a result of recent federal legislation (The Federal Cures Act), you may   receive lab or pathology results from your procedure in your [facility]   account before your physician is able to contact you. Your physician or   their representative will relay the results to you with their   recommendations at their soonest availability.  Thank you,  PROVATION

## 2022-02-04 NOTE — TRANSFER OF CARE
Anesthesia Transfer of Care Note    Patient: Lupe Jewell    Procedure(s) Performed: Procedure(s) (LRB):  COLONOSCOPY (N/A)    Patient location: PACU    Anesthesia Type: general    Transport from OR: Transported from OR on room air with adequate spontaneous ventilation    Post pain: adequate analgesia    Post assessment: no apparent anesthetic complications and tolerated procedure well    Post vital signs: stable    Level of consciousness: sedated and responds to stimulation    Nausea/Vomiting: no nausea/vomiting    Complications: none    Transfer of care protocol was followed      Last vitals:   Visit Vitals  BP (!) 154/90 (BP Location: Left arm, Patient Position: Lying)   Pulse 92   Temp 36.6 °C (97.9 °F) (Temporal)   Resp 16   Ht 5' (1.524 m)   Wt 78 kg (172 lb)   SpO2 95%   Breastfeeding No   BMI 33.59 kg/m²

## 2022-02-04 NOTE — DISCHARGE INSTRUCTIONS
Patient Education       Colonoscopy Discharge Instructions   About this topic   Colonoscopy is done so your doctor can see the inside of your large intestines, also called your colon, and your rectum. It uses a lighted tube called a scope, which has a tiny camera that can be moved through the large intestine. This may be done to:  · Screen for colon cancer or polyps  · Look for the source of rectal bleeding  · Find the cause of changes in your bowel movement  · Find the cause of belly or rectal pain  · Check results from other tests  · Check your response to treatment for other diseases     What care is needed at home?   · Ask your doctor what you need to do when you go home. Make sure you ask questions if you do not understand what the doctor says. This way you will know what you need to do.  · Rest. Do not drive the rest of the day. Do not sign any important papers or make any important decisions for the next 24 hours.  · You may feel groggy. Take extra care when moving about.  · You may have gas or mild cramping. This is normal.  · A small amount of bleeding may happen during the first few days after your procedure.  · Start back on your normal diet unless you need some changes in your diet.  What follow-up care is needed?   · Your doctor will talk to you about your test results. Your doctor may ask you to make visits to the office to check on your progress. Be sure to keep these visits.  · Ask your doctor when you need to have another colonoscopy. The amount of time between tests is based on what was found during this test. People with no polyps may be able to wait 10 years to have another colonoscopy. Other people may need to have this test repeated in 1 year because of the kind of polyps that were found in their colon. Ask your doctor when you need to come back.  What drugs may be needed?   Ask your doctor what drugs you will need to take. Take your drugs as told by your doctor.  Will physical activity be  limited?   Physical activities may be limited for a short time. Rest after the procedure. You may go back to your normal activities within a day or so.  What changes to diet are needed?   · Drink 6 to 8 glasses of water each day.  · Eat food rich in fiber like fresh fruits and vegetables.  What problems could happen?   · Tear inside your colon  · Bleeding can happen up to a few days afterwards  When do I need to call the doctor?   · Fever of 100.4°F (38°C) or higher, chills  · Bleeding during bowel movements (1 teaspoon (5 mL) or more) or maroon stool  · Throwing up more than 3 times in the next 48 hours  · Feeling dizzy  · Feeling weak  · Belly pain that is getting worse  · Not able to have a bowel movement for more than 2 days  · Hard swollen belly  Teach Back: Helping You Understand   The Teach Back Method helps you understand the information we are giving you. After you talk with the staff, tell them in your own words what you learned. This helps to make sure the staff has described each thing clearly. It also helps to explain things that may have been confusing. Before going home, make sure you can do these:  · I can tell you about my procedure.  · I can tell you what signs are normal after my procedure.  · I can tell you what I will do if I throw up more than 3 times in the next 48 hours or I have more belly pain.  Where can I learn more?   American Cancer Society  https://www.cancer.org/cancer/colon-rectal-cancer/oijbkwyvr-vynsnvkwy-vqaiidz/rfuiwrtni-yaijo-zltt.html   American Society of Clinical Oncology  https://www.cancer.net/navigating-cancer-care/diagnosing-cancer/tests-and-procedures/colonoscopy   Last Reviewed Date   2021-03-10  Consumer Information Use and Disclaimer   This information is not specific medical advice and does not replace information you receive from your health care provider. This is only a brief summary of general information. It does NOT include all information about conditions,  illnesses, injuries, tests, procedures, treatments, therapies, discharge instructions or life-style choices that may apply to you. You must talk with your health care provider for complete information about your health and treatment options. This information should not be used to decide whether or not to accept your health care providers advice, instructions or recommendations. Only your health care provider has the knowledge and training to provide advice that is right for you.  Copyright   Copyright © 2021 [employer] and its affiliates and/or licensors. All rights reserved.

## 2022-02-04 NOTE — ANESTHESIA POSTPROCEDURE EVALUATION
Anesthesia Post Evaluation    Patient: Lupe Jewell    Procedure(s) Performed: Procedure(s) (LRB):  COLONOSCOPY (N/A)    Final Anesthesia Type: general      Patient location during evaluation: GI PACU  Patient participation: Yes- Able to Participate  Level of consciousness: awake and alert and oriented  Post-procedure vital signs: reviewed and stable  Pain management: adequate  Airway patency: patent    PONV status at discharge: No PONV  Anesthetic complications: no      Cardiovascular status: hemodynamically stable  Respiratory status: unassisted, spontaneous ventilation and room air  Hydration status: euvolemic  Follow-up not needed.          Vitals Value Taken Time   /61 02/04/22 1138   Temp 36.4 °C (97.5 °F) 02/04/22 1105   Pulse 91 02/04/22 1138   Resp 16 02/04/22 1138   SpO2 96 % 02/04/22 1138         Event Time   Out of Recovery 11:49:03         Pain/Analia Score: Analia Score: 9 (2/4/2022 11:38 AM)

## 2022-02-04 NOTE — H&P
COLONOSCOPY HISTORY AND PHYSICAL EXAM    Procedure : Colonoscopy      INDICATIONS: personal history of colon polyps    Family Hx of CRC: no    Last Colonoscopy:  2013: polyps.  2016: normal  Findings: above       Past Medical History:   Diagnosis Date    ALLERGIC RHINITIS 8/17/2012    Asthma, well controlled 8/17/2012    Chronic asthma     Chronic rhinitis     Diabetes 2/4/2014    Diabetes mellitus     Diabetes mellitus type II     Fever blister     GERD (gastroesophageal reflux disease) 8/17/2012    Glaucoma     Hyperlipemia     Hypertension     Obstructive sleep apnea     Osteoporosis, unspecified      Sedation Problems: NO  Family History   Problem Relation Age of Onset    Glaucoma Mother     Hypertension Mother     Glaucoma Sister     Asthma Sister     Hypertension Sister     No Known Problems Father     No Known Problems Brother     No Known Problems Maternal Aunt     No Known Problems Maternal Uncle     No Known Problems Paternal Aunt     No Known Problems Paternal Uncle     No Known Problems Maternal Grandmother     No Known Problems Maternal Grandfather     No Known Problems Paternal Grandmother     No Known Problems Paternal Grandfather     Hyperlipidemia Daughter     Hypertension Daughter     Depression Daughter     Diabetes Daughter     Obesity Daughter     Hypertension Son     Asthma Son     Hyperlipidemia Sister     Glaucoma Sister     No Known Problems Sister     Melanoma Neg Hx     Psoriasis Neg Hx     Lupus Neg Hx     Eczema Neg Hx     Acne Neg Hx     Blindness Neg Hx     Macular degeneration Neg Hx     Retinal detachment Neg Hx     Amblyopia Neg Hx     Cancer Neg Hx     Cataracts Neg Hx     Strabismus Neg Hx     Stroke Neg Hx     Thyroid disease Neg Hx      Fam Hx of Sedation Problems: NO  Social History     Socioeconomic History    Marital status:    Occupational History    Occupation: retired    Tobacco Use    Smoking  status: Former Smoker     Packs/day: 0.25     Years: 2.00     Pack years: 0.50     Types: Cigarettes     Quit date:      Years since quittin.1    Smokeless tobacco: Never Used   Substance and Sexual Activity    Alcohol use: Yes     Alcohol/week: 12.0 standard drinks     Types: 6 Glasses of wine, 6 Cans of beer per week     Comment: socially    Drug use: No    Sexual activity: Not Currently     Partners: Male   Other Topics Concern    Are you pregnant or think you may be? No    Breast-feeding No       Review of Systems - Negative except   Respiratory ROS: no dyspnea  Cardiovascular ROS: no exertional chest pain  Gastrointestinal ROS: NO abdominal discomfort,  NO rectal bleeding  Musculoskeletal ROS: no muscular pain  Neurological ROS: no recent stroke    Physical Exam:  BP (!) 154/90 (BP Location: Left arm, Patient Position: Lying)   Pulse 92   Temp 97.9 °F (36.6 °C) (Temporal)   Resp 16   Ht 5' (1.524 m)   Wt 78 kg (172 lb)   SpO2 95%   Breastfeeding No   BMI 33.59 kg/m²   General: no distress  Head: normocephalic  Mallampati Score   Neck: supple, symmetrical, trachea midline  Lungs:  clear to auscultation bilaterally and normal respiratory effort  Heart: regular rate and rhythm and no murmur  Abdomen: soft, non-tender non-distented; bowel sounds normal; no masses,  no organomegaly  Extremities: no cyanosis or edema, or clubbing    ASA:  II    PLAN  COLONOSCOPY.    SedationPlan :MAC    The details of the procedure, the possible need for biopsy or polypectomy and the potential risks including bleeding, perforation, missed polyps were discussed in detail.

## 2022-02-06 RX ORDER — ATORVASTATIN CALCIUM 10 MG/1
TABLET, FILM COATED ORAL
Qty: 90 TABLET | Refills: 0 | Status: SHIPPED | OUTPATIENT
Start: 2022-02-06 | End: 2022-05-09

## 2022-02-06 NOTE — TELEPHONE ENCOUNTER
Refill Authorization Note   Lupe Jewell  is requesting a refill authorization.  Brief Assessment and Rationale for Refill:  Approve    -Medication-Related Problems Identified: Requires labs  Medication Therapy Plan:       Medication Reconciliation Completed: No   Comments:   --->Care Gap information included below if applicable.   Orders Placed This Encounter    atorvastatin (LIPITOR) 10 MG tablet      Requested Prescriptions   Signed Prescriptions Disp Refills    atorvastatin (LIPITOR) 10 MG tablet 90 tablet 0     Sig: TAKE 1 TABLET(10 MG) BY MOUTH EVERY DAY       Cardiovascular:  Antilipid - Statins Passed - 2/6/2022 11:50 AM        Passed - Patient is at least 18 years old        Passed - Valid encounter within last 15 months     Recent Visits  Date Type Provider Dept   10/12/21 Office Visit Marisol Restrepo MD [facility]   03/31/21 Office Visit Marisol Restrepo MD [facility]   12/18/20 Office Visit Marisol Restrepo MD [facility]   11/25/20 Office Visit Marisol Restrepo MD [facility]   Showing recent visits within past 720 days and meeting all other requirements  Future Appointments  No visits were found meeting these conditions.  Showing future appointments within next 150 days and meeting all other requirements      Future Appointments              In 1 month MD [facility] - [address], [place]                Passed - ALT is 131 or below and within 360 days     ALT   Date Value Ref Range Status   10/12/2021 14 10 - 44 U/L Final   03/31/2021 13 10 - 44 U/L Final   11/25/2020 12 10 - 44 U/L Final              Passed - AST is 119 or below and within 360 days     AST   Date Value Ref Range Status   10/12/2021 15 10 - 40 U/L Final   03/31/2021 14 10 - 40 U/L Final   11/25/2020 15 10 - 40 U/L Final              Passed - Total Cholesterol within 360 days     Lab Results   Component Value Date    CHOL 154 03/31/2021    CHOL 181 02/07/2020    CHOL  162 08/23/2019              Passed - LDL within 360 days     LDL Cholesterol   Date Value Ref Range Status   03/31/2021 101.6 63.0 - 159.0 mg/dL Final     Comment:     The National Cholesterol Education Program (NCEP) has set the  following guidelines (reference values) for LDL Cholesterol:  Optimal.......................<130 mg/dL  Borderline High...............130-159 mg/dL  High..........................160-189 mg/dL  Very High.....................>190 mg/dL              Passed - HDL within 360 days     HDL   Date Value Ref Range Status   03/31/2021 40 40 - 75 mg/dL Final     Comment:     The National Cholesterol Education Program (NCEP) has set the  following guidelines (reference values) for HDL Cholesterol:  Low...............<40 mg/dL  Optimal...........>60 mg/dL              Passed - Triglycerides within 360 days     Lab Results   Component Value Date    TRIG 62 03/31/2021    TRIG 91 02/07/2020    TRIG 79 08/23/2019                  Appointments  past 12m or future 3m with PCP    Date Provider   Last Visit   10/12/2021 Marisol Restrepo MD   Next Visit   Visit date not found Marisol Restrepo MD   ED visits in past 90 days: 0     Note composed:11:50 AM 02/06/2022

## 2022-02-06 NOTE — TELEPHONE ENCOUNTER
Provider Staff:     Action is required for this patient.   Please see care gap opportunities below in Care Due Message.     Thanks!  [facility]     Appointments      Date Provider   Last Visit   10/12/2021 Marisol Restrepo MD   Next Visit   Visit date not found Marisol Restrepo MD     Note composed:11:51 AM 02/06/2022

## 2022-02-09 LAB
FINAL PATHOLOGIC DIAGNOSIS: NORMAL
GROSS: NORMAL
Lab: NORMAL

## 2022-02-15 ENCOUNTER — TELEPHONE (OUTPATIENT)
Dept: SURGERY | Facility: CLINIC | Age: 77
End: 2022-02-15
Payer: MEDICARE

## 2022-02-15 NOTE — TELEPHONE ENCOUNTER
----- Message from Lashae Rodriguez sent at 2/15/2022  9:06 AM CST -----  Regarding: Results  Contact: [phone]  Calling in regards to speaking to provider about picture #9 from recent procedure. Please call and discuss.

## 2022-02-15 NOTE — TELEPHONE ENCOUNTER
Spoke with patient regarding image #9 on colonoscopy report. Explained to patient that the image represents the scope in retroflexion as it exits the rectum. Patient verbalizes understanding.

## 2022-02-28 ENCOUNTER — EXTERNAL CHRONIC CARE MANAGEMENT (OUTPATIENT)
Dept: PRIMARY CARE CLINIC | Facility: CLINIC | Age: 77
End: 2022-02-28
Payer: MEDICARE

## 2022-02-28 PROCEDURE — 99490 CHRNC CARE MGMT STAFF 1ST 20: CPT | Mod: PBBFAC | Performed by: INTERNAL MEDICINE

## 2022-02-28 PROCEDURE — 99490 CHRNC CARE MGMT STAFF 1ST 20: CPT | Mod: S$PBB,,, | Performed by: INTERNAL MEDICINE

## 2022-02-28 PROCEDURE — 99490 PR CHRONIC CARE MGMT, 1ST 20 MIN: ICD-10-PCS | Mod: S$PBB,,, | Performed by: INTERNAL MEDICINE

## 2022-03-02 DIAGNOSIS — H40.1113 PRIMARY OPEN ANGLE GLAUCOMA OF RIGHT EYE, SEVERE STAGE: ICD-10-CM

## 2022-03-02 DIAGNOSIS — H40.1122 PRIMARY OPEN ANGLE GLAUCOMA OF LEFT EYE, MODERATE STAGE: ICD-10-CM

## 2022-03-02 RX ORDER — BRIMONIDINE TARTRATE 2 MG/ML
1 SOLUTION/ DROPS OPHTHALMIC 3 TIMES DAILY
Qty: 10 ML | Refills: 12 | Status: SHIPPED | OUTPATIENT
Start: 2022-03-02 | End: 2022-09-26

## 2022-03-31 ENCOUNTER — EXTERNAL CHRONIC CARE MANAGEMENT (OUTPATIENT)
Dept: PRIMARY CARE CLINIC | Facility: CLINIC | Age: 77
End: 2022-03-31
Payer: MEDICARE

## 2022-03-31 PROCEDURE — 99490 CHRNC CARE MGMT STAFF 1ST 20: CPT | Mod: PBBFAC | Performed by: INTERNAL MEDICINE

## 2022-03-31 PROCEDURE — 99490 PR CHRONIC CARE MGMT, 1ST 20 MIN: ICD-10-PCS | Mod: S$PBB,,, | Performed by: INTERNAL MEDICINE

## 2022-03-31 PROCEDURE — 99490 CHRNC CARE MGMT STAFF 1ST 20: CPT | Mod: S$PBB,,, | Performed by: INTERNAL MEDICINE

## 2022-04-05 DIAGNOSIS — J30.1 ACUTE NONSEASONAL ALLERGIC RHINITIS DUE TO POLLEN: ICD-10-CM

## 2022-04-05 RX ORDER — FLUTICASONE PROPIONATE 50 MCG
SPRAY, SUSPENSION (ML) NASAL
Qty: 32 G | Refills: 1 | Status: SHIPPED | OUTPATIENT
Start: 2022-04-05 | End: 2023-01-05

## 2022-04-05 NOTE — TELEPHONE ENCOUNTER
Care Due:                  Date            Visit Type   Department     Provider  --------------------------------------------------------------------------------                                EP -                              PRIMARY      [facility]  Last Visit: 10-      CARE ([facility])   MEDICINE       Marisol Restrepo  Next Visit: None Scheduled  None         None Found                                                            Last  Test          Frequency    Reason                     Performed    Due Date  --------------------------------------------------------------------------------    HBA1C.......  6 months...  metFORMIN................  10-   04-    Lipid Panel.  12 months..  atorvastatin.............  03- 03-    Powered by "[employer] by [employer]. Reference number: [id number].   4/05/2022 8:19:50 AM CDT

## 2022-04-05 NOTE — TELEPHONE ENCOUNTER
This Rx Request does not qualify for assessment with the [facility]   Please review protocol details and the Care Due Message for extra clinical information    Reasons Rx Request may be deferred:   Patient has been seen in the ED/Hospital since the last PCP visit    Note composed:10:11 AM 04/05/2022

## 2022-04-16 NOTE — TELEPHONE ENCOUNTER
No new care gaps identified.  Powered by [employer] by [employer]. Reference number: [id number].   4/16/2022 4:12:04 PM CDT

## 2022-04-16 NOTE — TELEPHONE ENCOUNTER
Refill Routing Note   Medication(s) are not appropriate for processing by [facility] for the following reason(s):      - Patient has been seen in the ED/Hospital since the last PCP visit    [facility] action(s):  Defer          Medication reconciliation completed: No     Appointments  past 12m or future 3m with PCP    Date Provider   Last Visit   10/12/2021 Marisol Restrepo MD   Next Visit   Visit date not found Marisol Restrepo MD   ED visits in past 90 days: 0        Note composed:5:54 PM 04/16/2022

## 2022-04-18 RX ORDER — MONTELUKAST SODIUM 10 MG/1
TABLET ORAL
Qty: 90 TABLET | Refills: 1 | Status: SHIPPED | OUTPATIENT
Start: 2022-04-18 | End: 2022-10-25

## 2022-04-30 ENCOUNTER — EXTERNAL CHRONIC CARE MANAGEMENT (OUTPATIENT)
Dept: PRIMARY CARE CLINIC | Facility: CLINIC | Age: 77
End: 2022-04-30
Payer: MEDICARE

## 2022-04-30 PROCEDURE — 99490 CHRNC CARE MGMT STAFF 1ST 20: CPT | Mod: PBBFAC | Performed by: INTERNAL MEDICINE

## 2022-04-30 PROCEDURE — 99490 PR CHRONIC CARE MGMT, 1ST 20 MIN: ICD-10-PCS | Mod: S$PBB,,, | Performed by: INTERNAL MEDICINE

## 2022-04-30 PROCEDURE — 99490 CHRNC CARE MGMT STAFF 1ST 20: CPT | Mod: S$PBB,,, | Performed by: INTERNAL MEDICINE

## 2022-05-06 ENCOUNTER — PATIENT OUTREACH (OUTPATIENT)
Dept: ADMINISTRATIVE | Facility: OTHER | Age: 77
End: 2022-05-06
Payer: MEDICARE

## 2022-05-06 DIAGNOSIS — E11.9 DIABETES MELLITUS WITHOUT COMPLICATION: Primary | ICD-10-CM

## 2022-05-06 NOTE — PROGRESS NOTES
Requested updates within Care Everywhere.  Patient's chart was reviewed for overdue JOSE topics.  Health maintenance:updated  Immunizations:reconciled   Legacy:   Media:  Orders placed:Hemoglobin A1c  Tasked appts:  Labs Linked:  Upcoming appt:

## 2022-05-07 NOTE — TELEPHONE ENCOUNTER
No new care gaps identified.  [facility] Embedded Care Gaps. Reference number: [id number]. 5/07/2022   3:29:58 AM RIVAST

## 2022-05-07 NOTE — TELEPHONE ENCOUNTER
Refill Routing Note   Medication(s) are not appropriate for processing by [facility] for the following reason(s):      - Required laboratory values are outdated  - Patient has been seen in the ED/Hospital since the last PCP visit    [facility] action(s):  Defer          Medication reconciliation completed: No     Appointments  past 12m or future 3m with PCP    Date Provider   Last Visit   10/12/2021 Marisol Restrepo MD   Next Visit   Visit date not found Marisol Restrepo MD   ED visits in past 90 days: 0        Note composed:12:31 PM 05/07/2022

## 2022-05-09 ENCOUNTER — OFFICE VISIT (OUTPATIENT)
Dept: OPHTHALMOLOGY | Facility: CLINIC | Age: 77
End: 2022-05-09
Payer: MEDICARE

## 2022-05-09 ENCOUNTER — TELEPHONE (OUTPATIENT)
Dept: INTERNAL MEDICINE | Facility: CLINIC | Age: 77
End: 2022-05-09
Payer: MEDICARE

## 2022-05-09 DIAGNOSIS — Z98.83 GLAUCOMA FILTERING BLEB OF BOTH EYES: ICD-10-CM

## 2022-05-09 DIAGNOSIS — H40.1122 PRIMARY OPEN ANGLE GLAUCOMA OF LEFT EYE, MODERATE STAGE: Primary | ICD-10-CM

## 2022-05-09 DIAGNOSIS — Z96.1 PSEUDOPHAKIA OF BOTH EYES: ICD-10-CM

## 2022-05-09 DIAGNOSIS — H40.1113 PRIMARY OPEN ANGLE GLAUCOMA OF RIGHT EYE, SEVERE STAGE: ICD-10-CM

## 2022-05-09 DIAGNOSIS — H43.813 POSTERIOR VITREOUS DETACHMENT OF BOTH EYES: ICD-10-CM

## 2022-05-09 DIAGNOSIS — E11.9 TYPE 2 DIABETES MELLITUS WITHOUT OPHTHALMIC MANIFESTATIONS: ICD-10-CM

## 2022-05-09 PROCEDURE — 99999 PR PBB SHADOW E&M-EST. PATIENT-LVL III: ICD-10-PCS | Mod: PBBFAC,,, | Performed by: OPHTHALMOLOGY

## 2022-05-09 PROCEDURE — 92012 PR EYE EXAM, EST PATIENT,INTERMED: ICD-10-PCS | Mod: S$PBB,,, | Performed by: OPHTHALMOLOGY

## 2022-05-09 PROCEDURE — 99999 PR PBB SHADOW E&M-EST. PATIENT-LVL III: CPT | Mod: PBBFAC,,, | Performed by: OPHTHALMOLOGY

## 2022-05-09 PROCEDURE — 99213 OFFICE O/P EST LOW 20 MIN: CPT | Mod: PBBFAC | Performed by: OPHTHALMOLOGY

## 2022-05-09 PROCEDURE — 92012 INTRM OPH EXAM EST PATIENT: CPT | Mod: S$PBB,,, | Performed by: OPHTHALMOLOGY

## 2022-05-09 RX ORDER — ATORVASTATIN CALCIUM 10 MG/1
TABLET, FILM COATED ORAL
Qty: 90 TABLET | Refills: 0 | Status: SHIPPED | OUTPATIENT
Start: 2022-05-09 | End: 2022-11-23

## 2022-05-09 NOTE — Clinical Note
Ben vo  Patient - [phone] Daughter - Leanna [phone] (pt says her own phone is crummy and so you might need to call the daughter if you do not reach her)

## 2022-05-09 NOTE — PROGRESS NOTES
HPI     DLS: 1/28/2022    Pt here for 4 Month Check;  Pt states vision in OD is blurry especially while reading. Pt states OD   also feels irritated like something is in it.    Meds;  Latanoprost QDAY OU   Brimonidine TID OU   Dorzolamide TID OD   Rhopressa QDAY OD     1) POAG   2) PCIOL OU   3) Type 2 DM NO DR   4) LOLA   5) PVD OU   6) Yag  Cap OU     Last edited by Tila Kent on 5/9/2022  3:12 PM. (History)            Assessment /Plan     For exam results, see Encounter Report.    Primary open angle glaucoma of left eye, moderate stage    Primary open angle glaucoma of right eye, severe stage    Type 2 diabetes mellitus without ophthalmic manifestations    Posterior vitreous detachment of both eyes - Both Eyes    Pseudophakia of both eyes    Glaucoma filtering bleb of both eyes        1. POAG (primary open-angle glaucoma) - Severe stage - Both Eyes   Glaucoma (type and duration) POAG,   -  first HVF 1997  -  first photo: 1996     Family history None   Glaucoma meds - (off all gtts os post combined)  (( use to use - Alphagan od , Xalatan od , dorzolamide bid od ))  H/O adverse rxn to glaucoma drops into to BB 2/2 asthma   LASERS SLT 12/8/05 and repeat 7/16/09 OD, and SLT 1/6/06 OS;  SLT os 7/31/14, od 8/14/14 (no response ou) // yag cap od 1/3/2019 /// yag cap os  10/17/2019   GLAUCOMA SURGERIES - combined phaco/IOL trab -os 12/28/2015// elastic sclera - 9 sutures to close - all visible ones cut /// combined - phaco/trab w/ minishunt od - 3/30/2016  OTHER EYE SURGERIES PC IOL OS (combined 1/28/2015) // PC IOL od (combined 3/30/2016)  CDR 0.95 OU   Tbase 22 OU   Tmax 37 (4/11/2016) /38 ( 4/16/2015)   Ttarget 14/14  HVF 23 HVF: 9993-1537 - SAD and  INS  OD, // SAD / IAD  Os (+prog ou 10/2020)   Gonio ext PAS od - 90 degrees open // +1-3 os    /421   OCT 11 test 2004 - 2021: unable to interpret  OD// Dec thru out   HRT 13 test 2005 to 2020 - MR- Dec. SN/N, joshua IT od // dec. N/IN, joshua SN/IT os /// CDR 0.78  od // 0.75 os  Disc photos 1996, 1997, 2003: slides // 2012 , 2015 - OIS    Ttoday  19/12  (down from 26/12 w/ addition of rhopressa od)   Test done today:  IOP check with rhopressa od      2. Diabetes mellitus type II   No BDR     3. Dry eyes - Both Eyes   AT's prn     4. Posterior vitreous detachment of both eyes - Both Eyes   With floaters - stable     5. A lot of family stressors   Mom passed since last visit  dad elderly and in poor health, have sitters  Daughter is unemployed and had to move back home  Daughter recently ill - in ICU and intubated 2/2 pancreatitis (8/2/2015 to 8/11/2015)      6. S/P yag cap ou   -od 10/3/3019 (also Rx ant capsule phimosis od)   -OS - 10/17/2019        Plan  Glaucoma   + blebs ou -  IOP TOO high OD     target is 14 ou and + VF prog ou on 10/2020 -    Adjust glaucoma drops   Latanoprost ou q day   Brimonidine tid ou   Dorzolamide tid OD  ADD RHOPRESSA OD Q DAY - sample given     - poor candidate for repeat SLT od  - lots of PAS and no response last repeat in 8/2014   ?? If pt is a candidate for trial of diamox   consideer bleb needling - has a express minishunt - but may be able to needle bleb and improve function - has free sup conj, but there is a lot of subconj episcleral fibrosis and there may be no flow through the express - most likely can not revieve it -- may need a GDD - ahmed or baerveldt     Cont AT's ou prn     Sees Lisa for glasses     AT's prn     Good resp to trial of rhopressa od 28--> 18- 19 - will see if insurance will cover it     Photo file updated - 1.28.2022 --not pulled 5/9/2022    IOP too high od   + VF progression on MMT  rec GDD - ? ahmed od - schedule

## 2022-05-12 ENCOUNTER — TELEPHONE (OUTPATIENT)
Dept: OPHTHALMOLOGY | Facility: CLINIC | Age: 77
End: 2022-05-12
Payer: MEDICARE

## 2022-05-13 ENCOUNTER — TELEPHONE (OUTPATIENT)
Dept: INTERNAL MEDICINE | Facility: CLINIC | Age: 77
End: 2022-05-13
Payer: MEDICARE

## 2022-05-13 ENCOUNTER — TELEPHONE (OUTPATIENT)
Dept: OPHTHALMOLOGY | Facility: CLINIC | Age: 77
End: 2022-05-13
Payer: MEDICARE

## 2022-05-13 DIAGNOSIS — H40.1122 PRIMARY OPEN ANGLE GLAUCOMA OF LEFT EYE, MODERATE STAGE: Primary | ICD-10-CM

## 2022-05-13 DIAGNOSIS — E11.9 TYPE 2 DIABETES MELLITUS WITHOUT COMPLICATION, WITHOUT LONG-TERM CURRENT USE OF INSULIN: ICD-10-CM

## 2022-05-13 NOTE — TELEPHONE ENCOUNTER
No new care gaps identified.  [facility] Embedded Care Gaps. Reference number: [id number]. 5/13/2022   9:46:50 AM CDT

## 2022-05-13 NOTE — TELEPHONE ENCOUNTER
Refill Routing Note   Medication(s) are not appropriate for processing by [facility] for the following reason(s):      - Required laboratory values are outdated  - Patient has been seen in the ED/Hospital since the last PCP visit    [facility] action(s):  Defer          Medication reconciliation completed: No     Appointments  past 12m or future 3m with PCP    Date Provider   Last Visit   10/12/2021 Marisol Restrepo MD   Next Visit   5/13/2022 Marisol Restrepo MD   ED visits in past 90 days: 0        Note composed:2:53 PM 05/13/2022

## 2022-05-13 NOTE — TELEPHONE ENCOUNTER
----- Message from Asa Holly MA sent at 5/13/2022  9:21 AM CDT -----  Regarding: surgery clearance  Patient is scheduled for glaucoma surgery on 06/29/2022 with Dr. Sanna Wiley and will be done under local anesthesia. Patient will need to be cleared for surgery either by chart or please schedule an appointment for patient accordingly. Thank you for your assistance.

## 2022-05-16 DIAGNOSIS — G47.00 INSOMNIA, UNSPECIFIED TYPE: ICD-10-CM

## 2022-05-16 RX ORDER — METFORMIN HYDROCHLORIDE 500 MG/1
TABLET ORAL
Qty: 180 TABLET | Refills: 0 | Status: SHIPPED | OUTPATIENT
Start: 2022-05-16 | End: 2022-09-02

## 2022-05-16 RX ORDER — AMITRIPTYLINE HYDROCHLORIDE 25 MG/1
TABLET, FILM COATED ORAL
Qty: 90 TABLET | Refills: 0 | Status: SHIPPED | OUTPATIENT
Start: 2022-05-16 | End: 2022-09-02

## 2022-05-16 NOTE — TELEPHONE ENCOUNTER
No new care gaps identified.  [facility] Embedded Care Gaps. Reference number: [id number]. 5/16/2022   9:11:17 AM RIVAST

## 2022-05-16 NOTE — TELEPHONE ENCOUNTER
Refill Routing Note   Medication(s) are not appropriate for processing by [facility] for the following reason(s):      - Indication is outside of scope for [facility]  - Patient has been seen in the ED/Hospital since the last PCP visit    [facility] action(s):  Route          Medication reconciliation completed: No     Appointments  past 12m or future 3m with PCP    Date Provider   Last Visit   10/12/2021 Marisol Restrepo MD   Next Visit   6/8/2022 Marisol Restrepo MD   ED visits in past 90 days: 0        Note composed:3:10 PM 05/16/2022

## 2022-05-26 ENCOUNTER — OFFICE VISIT (OUTPATIENT)
Dept: OBSTETRICS AND GYNECOLOGY | Facility: CLINIC | Age: 77
End: 2022-05-26
Payer: MEDICARE

## 2022-05-26 VITALS
SYSTOLIC BLOOD PRESSURE: 136 MMHG | WEIGHT: 169.75 LBS | HEIGHT: 60 IN | DIASTOLIC BLOOD PRESSURE: 80 MMHG | BODY MASS INDEX: 33.33 KG/M2

## 2022-05-26 DIAGNOSIS — Z12.31 VISIT FOR SCREENING MAMMOGRAM: ICD-10-CM

## 2022-05-26 DIAGNOSIS — Z01.419 WELL WOMAN EXAM WITH ROUTINE GYNECOLOGICAL EXAM: Primary | ICD-10-CM

## 2022-05-26 DIAGNOSIS — N89.8 VAGINAL DISCHARGE: ICD-10-CM

## 2022-05-26 PROCEDURE — G0101 CA SCREEN;PELVIC/BREAST EXAM: HCPCS | Mod: S$PBB,,, | Performed by: NURSE PRACTITIONER

## 2022-05-26 PROCEDURE — 99215 OFFICE O/P EST HI 40 MIN: CPT | Mod: PBBFAC | Performed by: NURSE PRACTITIONER

## 2022-05-26 PROCEDURE — 87481 CANDIDA DNA AMP PROBE: CPT | Mod: 59 | Performed by: NURSE PRACTITIONER

## 2022-05-26 PROCEDURE — G0101 PR CA SCREEN;PELVIC/BREAST EXAM: ICD-10-PCS | Mod: S$PBB,,, | Performed by: NURSE PRACTITIONER

## 2022-05-26 PROCEDURE — 99999 PR PBB SHADOW E&M-EST. PATIENT-LVL V: CPT | Mod: PBBFAC,,, | Performed by: NURSE PRACTITIONER

## 2022-05-26 PROCEDURE — 99999 PR PBB SHADOW E&M-EST. PATIENT-LVL V: ICD-10-PCS | Mod: PBBFAC,,, | Performed by: NURSE PRACTITIONER

## 2022-05-26 NOTE — PROGRESS NOTES
CC: Annual  HPI: Pt is a 76 y.o.  female who presents for routine annual exam. New to me- has not been to the gyn in a while. History of a hysterectomy at age 41 for uterine fibroids. She has noticed an intermittent vaginal discharge- no itching, odor, color is pink at times. No issues today. About to have eye surgery. Reports history of fecal incontinence.     FH:   Breast cancer: none  Colon cancer: none  Ovarian cancer: none  Uterine cancer: none    HPV vaccine: na  COVID vaccine: yes    Last pap smear: unknown  History of abnormal pap smears: no  Colonoscopy: current- repeat in 2025  DEXA: current  Mammogram: due in July  STD history: no  Birth control: na  OB history: G4  Tobacco use: no    ROS:  GENERAL: Feeling well overall. Denies fever or chills.   SKIN: Denies rash or lesions.   HEAD: Denies head injury or headache.   NODES: Denies enlarged lymph nodes.   CHEST: Denies chest pain or shortness of breath.   CARDIOVASCULAR: Denies palpitations or left sided chest pain.   ABDOMEN: No abdominal pain, constipation, diarrhea, nausea, vomiting or rectal bleeding.   URINARY: No dysuria, hematuria, or burning on urination.  REPRODUCTIVE: See HPI.   BREASTS: Denies pain, lumps, or nipple discharge.   HEMATOLOGIC: No easy bruisability or excessive bleeding.   MUSCULOSKELETAL: Denies joint pain or swelling.   NEUROLOGIC: Denies syncope or weakness.   PSYCHIATRIC: Denies depression, anxiety or mood swings.    PE:   APPEARANCE: Well nourished, well developed, Black or  female in no acute distress.  NODES: no cervical, supraclavicular, or inguinal lymphadenopathy  BREASTS: Symmetrical, no skin changes or visible lesions. No palpable masses, nipple discharge or adenopathy bilaterally.  ABDOMEN: Soft. No tenderness or masses. No distention. No hernias palpated. No CVA tenderness.  VULVA: No lesions. Normal external female genitalia.  URETHRAL MEATUS: Normal size and location, no lesions, no  prolapse.  URETHRA: No masses, tenderness, or prolapse.  VAGINA: ATROPHIC No lesions or lacerations noted. No abnormal discharge present. No odor present.   CERVIX: surgically absent  UTERUS: surgically absent  ADNEXA: No tenderness. No fullness or masses palpated in the adnexal regions.   ANUS PERINEUM: Normal.      Diagnosis:  1. Well woman exam with routine gynecological exam    2. Visit for screening mammogram    3. Vaginal discharge        Plan:     Orders Placed This Encounter    Vaginosis Screen by DNA Probe    Mammo Digital Screening Bilat w/ Bola     1. Affirm pending  2. Pap not indicated  3. Mammo due in July 4. Colonoscopy and DEXA current     Patient was counseled today on the new ACS guidelines for cervical cytology screening as well as the current recommendations for breast cancer screening. She was counseled to follow up with her PCP for other routine health maintenance. Counseling session lasted approximately 10 minutes, and all her questions were answered.  For women over the age of 65, you can stop having cervical cancer screenings if you have never had abnormal cervical cells or cervical cancer, and youve had three negative Pap tests in a row. (You also can stop screening if youve had two negative Pap and HPV tests in a row in the past 10 years, with at least one test in the past 5 years.),    Follow-up with me in 2 years

## 2022-05-30 LAB
BACTERIAL VAGINOSIS DNA: NEGATIVE
CANDIDA GLABRATA DNA: NEGATIVE
CANDIDA KRUSEI DNA: NEGATIVE
CANDIDA RRNA VAG QL PROBE: NEGATIVE
T VAGINALIS RRNA GENITAL QL PROBE: NEGATIVE

## 2022-05-31 ENCOUNTER — EXTERNAL CHRONIC CARE MANAGEMENT (OUTPATIENT)
Dept: PRIMARY CARE CLINIC | Facility: CLINIC | Age: 77
End: 2022-05-31
Payer: MEDICARE

## 2022-05-31 PROCEDURE — 99490 PR CHRONIC CARE MGMT, 1ST 20 MIN: ICD-10-PCS | Mod: S$PBB,,, | Performed by: INTERNAL MEDICINE

## 2022-05-31 PROCEDURE — 99490 CHRNC CARE MGMT STAFF 1ST 20: CPT | Mod: S$PBB,,, | Performed by: INTERNAL MEDICINE

## 2022-05-31 PROCEDURE — 99490 CHRNC CARE MGMT STAFF 1ST 20: CPT | Mod: PBBFAC | Performed by: INTERNAL MEDICINE

## 2022-06-03 ENCOUNTER — TELEPHONE (OUTPATIENT)
Dept: INTERNAL MEDICINE | Facility: CLINIC | Age: 77
End: 2022-06-03
Payer: MEDICARE

## 2022-06-03 ENCOUNTER — HOSPITAL ENCOUNTER (EMERGENCY)
Facility: OTHER | Age: 77
Discharge: HOME | End: 2022-06-03
Attending: EMERGENCY MEDICINE
Payer: MEDICARE

## 2022-06-03 ENCOUNTER — NURSE TRIAGE (OUTPATIENT)
Dept: ADMINISTRATIVE | Facility: CLINIC | Age: 77
End: 2022-06-03
Payer: MEDICARE

## 2022-06-03 VITALS
TEMPERATURE: 98 F | DIASTOLIC BLOOD PRESSURE: 63 MMHG | WEIGHT: 169 LBS | OXYGEN SATURATION: 98 % | SYSTOLIC BLOOD PRESSURE: 146 MMHG | BODY MASS INDEX: 33.01 KG/M2 | HEART RATE: 91 BPM | RESPIRATION RATE: 18 BRPM
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
## 2022-06-03 DIAGNOSIS — S42.292A HUMERAL HEAD FRACTURE, LEFT, CLOSED, INITIAL ENCOUNTER: Primary | ICD-10-CM

## 2022-06-03 LAB — HCV AB SERPL QL IA: NEGATIVE

## 2022-06-03 PROCEDURE — 96375 TX/PRO/DX INJ NEW DRUG ADDON: CPT

## 2022-06-03 PROCEDURE — 86803 HEPATITIS C AB TEST: CPT | Performed by: EMERGENCY MEDICINE

## 2022-06-03 PROCEDURE — 63600175 PHARM REV CODE 636 W HCPCS: Performed by: EMERGENCY MEDICINE

## 2022-06-03 PROCEDURE — 96374 THER/PROPH/DIAG INJ IV PUSH: CPT

## 2022-06-03 PROCEDURE — 96376 TX/PRO/DX INJ SAME DRUG ADON: CPT

## 2022-06-03 PROCEDURE — 99284 EMERGENCY DEPT VISIT MOD MDM: CPT | Mod: 25

## 2022-06-03 RX ORDER — ONDANSETRON 2 MG/ML
4 INJECTION INTRAMUSCULAR; INTRAVENOUS
Status: COMPLETED | OUTPATIENT
Start: 2022-06-03 | End: 2022-06-03

## 2022-06-03 RX ORDER — HYDROMORPHONE HYDROCHLORIDE 1 MG/ML
0.5 INJECTION, SOLUTION INTRAMUSCULAR; INTRAVENOUS; SUBCUTANEOUS
Status: COMPLETED | OUTPATIENT
Start: 2022-06-03 | End: 2022-06-03

## 2022-06-03 RX ORDER — FENTANYL CITRATE 50 UG/ML
50 INJECTION, SOLUTION INTRAMUSCULAR; INTRAVENOUS
Status: COMPLETED | OUTPATIENT
Start: 2022-06-03 | End: 2022-06-03

## 2022-06-03 RX ORDER — OXYCODONE AND ACETAMINOPHEN 5; 325 MG/1; MG/1
1 TABLET ORAL EVERY 6 HOURS PRN
Qty: 9 TABLET | Refills: 0 | Status: SHIPPED | OUTPATIENT
Start: 2022-06-03 | End: 2022-06-06

## 2022-06-03 RX ADMIN — ONDANSETRON 4 MG: 2 INJECTION INTRAMUSCULAR; INTRAVENOUS at 09:06

## 2022-06-03 RX ADMIN — HYDROMORPHONE HYDROCHLORIDE 0.5 MG: 1 INJECTION, SOLUTION INTRAMUSCULAR; INTRAVENOUS; SUBCUTANEOUS at 12:06

## 2022-06-03 RX ADMIN — FENTANYL CITRATE 50 MCG: 50 INJECTION, SOLUTION INTRAMUSCULAR; INTRAVENOUS at 08:06

## 2022-06-03 RX ADMIN — FENTANYL CITRATE 50 MCG: 50 INJECTION, SOLUTION INTRAMUSCULAR; INTRAVENOUS at 07:06

## 2022-06-03 RX ADMIN — HYDROMORPHONE HYDROCHLORIDE 0.5 MG: 1 INJECTION, SOLUTION INTRAMUSCULAR; INTRAVENOUS; SUBCUTANEOUS at 09:06

## 2022-06-03 NOTE — ED NOTES
Pt to ED with c/o fall, LUE pain this AM. Pt states she may have tripped over something in her house, fell forward and attempted to brace herself with her arms. Reporting LUE pain, from L FA to L shoulder, EMS reporting deformity noted to L FA with crepitus on palpation. 5/5  strength to L hand, sensation intact, 2+ radial pulse, cap refill <3 sec. Denies head trauma, LOC. Splint and sling in place with EMS. Will continue to monitor.

## 2022-06-03 NOTE — PROGRESS NOTES
Due to severity of pt. Injury, pt. Unable to place area of interest into axillary position due to pain and limited ROM

## 2022-06-03 NOTE — TELEPHONE ENCOUNTER
YanciLupe sethi's daughter calling states Lupe seen in ED this morning, diagnosed with Shoulder sprain/fracture, given pain med and instructed to f/u with Orthopedist. Spoke to nurse and was told Orthopedist dept would contact caller to schedule appt for pt. No call received. Caller concerned due to pt needing f/u. Triage process explained. Pt has no changes since dc from ED. Caller requesting to speak to orthopedist dept to schedule appt.  contacted and states orthopedist dept closed for weekend and will typically f/u with pt for scheduling on Monday. Updated Yanci per 's confirmation. V/u. Instructed caller to contact on call nurse with further questions and/or symptoms. V/u.    Reason for Disposition   Health Information question, no triage required and triager able to answer question    Protocols used: INFORMATION ONLY CALL - NO TRIAGE-A-

## 2022-06-03 NOTE — ED PROVIDER NOTES
Encounter Date: 6/3/2022    SCRIBE #1 NOTE: Kandis WHITTINGTON, am scribing for, and in the presence of, Elba Arambula MD.       History     Chief Complaint   Patient presents with    Fall     Mechanical fall this AM, braced self with BUEs, deformity to L FA, splint in place PTA.     Arm Injury     Time seen by provider: 7:00 AM    Lupe Jewell is a 76 y.o. female, with a PMHx of DM and HTN, who presents to the ED with a left arm injury. The patient reports that she fell this morning face-down and braced the fall with her BUEs. She denies LOC or head injury. The patient notes that she is not on any blood thinners. This is the extent of the patient's complaints today in the Emergency Department.     The history is provided by the patient.      Fall  Pertinent negatives include no fever or nausea.   Arm Injury   Pertinent negatives include no chest pain.     Review of patient's allergies indicates:   Allergen Reactions    Penicillins Rash     Past Medical History:   Diagnosis Date    ALLERGIC RHINITIS 8/17/2012    Asthma, well controlled 8/17/2012    Chronic asthma     Chronic rhinitis     Diabetes 2/4/2014    Diabetes mellitus     Diabetes mellitus type II     Fever blister     GERD (gastroesophageal reflux disease) 8/17/2012    Glaucoma     Hyperlipemia     Hypertension     Obstructive sleep apnea     Osteoporosis, unspecified      Past Surgical History:   Procedure Laterality Date    BLADDER SURGERY      BREAST BIOPSY Left     BREAST SURGERY      left breast biopsy    CATARACT EXTRACTION W/  INTRAOCULAR LENS IMPLANT Left 1/28/2015    WITH TRAB ()    CATARACT EXTRACTION W/  INTRAOCULAR LENS IMPLANT Right 03/30/2016    WITH TRAB ()    COLONOSCOPY N/A 7/1/2016    Procedure: COLONOSCOPY;  Surgeon: Rony Lima MD;  Location: [address];  Service: Endoscopy;  Laterality: N/A;    COLONOSCOPY N/A 2/4/2022    Procedure: COLONOSCOPY;  Surgeon: FARA Sanchez  MD Eli;  Location: [facility] ([address] [place] [address]);  Service: Endoscopy;  Laterality: N/A;  fully vacc-inst mail-tb.Covid test at [facility] on .EC    EYE SURGERY Bilateral     cataract removal and Laser surgery    HYSTERECTOMY      stomach procedure      TRABECULECTOMY Left 2015    COMBINED WITH CE ()    TRABECULECTOMY Right 2016    COMBINED WITH CE ()    TUBAL LIGATION      YAG CAPSULOTOMY Bilateral 10/3/2019 and 10/17/2019         Family History   Problem Relation Age of Onset    Glaucoma Mother     Hypertension Mother     Glaucoma Sister     Asthma Sister     Hypertension Sister     No Known Problems Father     No Known Problems Brother     No Known Problems Maternal Aunt     No Known Problems Maternal Uncle     No Known Problems Paternal Aunt     No Known Problems Paternal Uncle     No Known Problems Maternal Grandmother     No Known Problems Maternal Grandfather     No Known Problems Paternal Grandmother     No Known Problems Paternal Grandfather     Hyperlipidemia Daughter     Hypertension Daughter     Depression Daughter     Diabetes Daughter     Obesity Daughter     Hypertension Son     Asthma Son     Hyperlipidemia Sister     Glaucoma Sister     No Known Problems Sister     Melanoma Neg Hx     Psoriasis Neg Hx     Lupus Neg Hx     Eczema Neg Hx     Acne Neg Hx     Blindness Neg Hx     Macular degeneration Neg Hx     Retinal detachment Neg Hx     Amblyopia Neg Hx     Cancer Neg Hx     Cataracts Neg Hx     Strabismus Neg Hx     Stroke Neg Hx     Thyroid disease Neg Hx      Social History     Tobacco Use    Smoking status: Former Smoker     Packs/day: 0.25     Years: 2.00     Pack years: 0.50     Types: Cigarettes     Quit date:      Years since quittin.4    Smokeless tobacco: Never Used   Substance Use Topics    Alcohol use: Yes     Alcohol/week: 12.0 standard drinks     Types: 6 Glasses of wine, 6 Cans of  beer per week     Comment: socially    Drug use: No     Review of Systems   Constitutional: Negative for fever.   HENT: Negative for sore throat.    Respiratory: Negative for shortness of breath.    Cardiovascular: Negative for chest pain.   Gastrointestinal: Negative for nausea.   Genitourinary: Negative for dysuria.   Musculoskeletal: Negative for back pain.        Positive for LUE deformity.   Skin: Negative for rash.   Neurological: Negative for weakness.   Hematological: Does not bruise/bleed easily.   All other systems reviewed and are negative.      Physical Exam     Initial Vitals [06/03/22 0701]   BP Pulse Resp Temp SpO2   (!) 141/75 69 18 97.8 °F (36.6 °C) 96 %      MAP       --         Physical Exam    Nursing note and vitals reviewed.  Constitutional: She appears well-developed and well-nourished. She does not have a sickly appearance. No distress.   Uncomfortable appearing.   HENT:   Head: Normocephalic and atraumatic.   Right Ear: External ear normal.   Left Ear: External ear normal.   Eyes: Conjunctivae, EOM and lids are normal. Right eye exhibits no discharge. Left eye exhibits no discharge. Right conjunctiva is not injected. Right conjunctiva has no hemorrhage. Left conjunctiva is not injected. Left conjunctiva has no hemorrhage. No scleral icterus.   Neck: Phonation normal. No stridor present. No tracheal deviation present.   Normal range of motion.  Cardiovascular: Normal rate, regular rhythm and normal heart sounds. Exam reveals no friction rub.    No murmur heard.  Pulses:       Radial pulses are 2+ on the right side and 2+ on the left side.        Dorsalis pedis pulses are 2+ on the right side and 2+ on the left side.   Pulmonary/Chest: Breath sounds normal. No respiratory distress. She has no wheezes. She has no rhonchi. She has no rales.   Musculoskeletal:      Cervical back: Normal range of motion.      Comments: LUE in sling.  5/5. Moving BLE and RUE.     Neurological: She is alert and  oriented to person, place, and time. She has normal strength. GCS eye subscore is 4. GCS verbal subscore is 5. GCS motor subscore is 6.   Sensation intact.   Skin: Skin is warm. Capillary refill takes less than 2 seconds.   Psychiatric: She has a normal mood and affect. Her speech is normal and behavior is normal. Judgment and thought content normal. Cognition and memory are normal.         ED Course   Procedures  Labs Reviewed   HEPATITIS C ANTIBODY    Narrative:     Release to patient->Immediate          Imaging Results          CT Shoulder Without Contrast Left (Final result)  Result time 06/03/22 13:43:30    Final result by Lashay Medina MD (06/03/22 13:43:30)                 Impression:      Mildly impacted fracture through the surgical neck of the humerus with slightly distracted fracture through the greater tuberosity.      Electronically signed by: Lashay Medina  Date:    06/03/2022  Time:    13:43             Narrative:    EXAMINATION:  CT SHOULDER WITHOUT CONTRAST LEFT    CLINICAL HISTORY:  Fracture, shoulder;    TECHNIQUE:  Low-dose multislice axial CT images obtained through the left shoulder without the use of intravenous contrast.  Coronal and sagittal reformats were obtained.    COMPARISON:  Left shoulder radiographs earlier today    FINDINGS:  Acute transversely oriented fracture through the surgical neck of the humerus with displacement of fracture fragments approximately 3 mm.  Mild impaction of fracture fragments.    Adjacent soft tissue edema.  Vertically oriented fracture through the greater tuberosity with distraction of fracture fragments approximately 3 mm.    Humeral head is well located with respect to the glenoid.                               X-Ray Wrist Complete Left (Final result)  Result time 06/03/22 08:09:55    Final result by Ghulam Foy MD (06/03/22 08:09:55)                 Impression:      Acute comminuted fracture of the humeral head neck with displaced greater  tuberosity fragment.    On the forearm view, there is suggested of transversely oriented mildly increased attenuation line, nonspecific, but nondisplaced impacted fracture not entirely excluded.  This finding is not confirmed on the dedicated wrist views and as such is indeterminate for acute fracture.  Recommend clinical correlation with point tenderness, with repeat views versus cross-sectional imaging as warranted.    No definite additional acute fractures identified.      Electronically signed by: Ghulam Foy  Date:    06/03/2022  Time:    08:09             Narrative:    EXAMINATION:  XR FOREARM LEFT; XR WRIST COMPLETE 3 VIEWS LEFT; XR HUMERUS 2 VIEW LEFT; XR SHOULDER COMPLETE 2 OR MORE VIEWS LEFT    CLINICAL HISTORY:  fall;    TECHNIQUE:  AP and lateral views of the left forearm were performed.    Three views left wrist.    Two views left humerus.    Two views left shoulder.    COMPARISON:  None    FINDINGS:  Wrist: No definite evidence of acute displaced fracture or dislocation.  On the forearm view, there is suggested of transversely oriented mildly increased attenuation line, nonspecific, but nondisplaced impacted fracture not entirely excluded.  Joint spaces appear maintained.  No definite evidence of radiopaque foreign body.  Degenerative changes at the thumb CMC joint suggested.    Forearm: No definite evidence of acute displaced fracture or dislocation.  On the forearm view, there is suggested of transversely oriented mildly increased attenuation line, nonspecific, but nondisplaced impacted fracture not entirely excluded.  Enthesopathic changes are noted at the triceps insertion on the ulna.  No definite radiopaque foreign body.    Humerus/shoulder: Acute comminuted fracture of the humeral head and neck with displaced greater tuberosity fragment.  No definite radiopaque foreign body.  No definite acute findings in the visualized portions of the chest.                               X-Ray Shoulder 2 or  More Views Left (Final result)  Result time 06/03/22 08:09:55   Procedure changed from X-Ray Shoulder Trauma 3 view Left     Final result by Ghulam Foy MD (06/03/22 08:09:55)                 Impression:      Acute comminuted fracture of the humeral head neck with displaced greater tuberosity fragment.    On the forearm view, there is suggested of transversely oriented mildly increased attenuation line, nonspecific, but nondisplaced impacted fracture not entirely excluded.  This finding is not confirmed on the dedicated wrist views and as such is indeterminate for acute fracture.  Recommend clinical correlation with point tenderness, with repeat views versus cross-sectional imaging as warranted.    No definite additional acute fractures identified.      Electronically signed by: Ghulam Foy  Date:    06/03/2022  Time:    08:09             Narrative:    EXAMINATION:  XR FOREARM LEFT; XR WRIST COMPLETE 3 VIEWS LEFT; XR HUMERUS 2 VIEW LEFT; XR SHOULDER COMPLETE 2 OR MORE VIEWS LEFT    CLINICAL HISTORY:  fall;    TECHNIQUE:  AP and lateral views of the left forearm were performed.    Three views left wrist.    Two views left humerus.    Two views left shoulder.    COMPARISON:  None    FINDINGS:  Wrist: No definite evidence of acute displaced fracture or dislocation.  On the forearm view, there is suggested of transversely oriented mildly increased attenuation line, nonspecific, but nondisplaced impacted fracture not entirely excluded.  Joint spaces appear maintained.  No definite evidence of radiopaque foreign body.  Degenerative changes at the thumb CMC joint suggested.    Forearm: No definite evidence of acute displaced fracture or dislocation.  On the forearm view, there is suggested of transversely oriented mildly increased attenuation line, nonspecific, but nondisplaced impacted fracture not entirely excluded.  Enthesopathic changes are noted at the triceps insertion on the ulna.  No definite radiopaque  foreign body.    Humerus/shoulder: Acute comminuted fracture of the humeral head and neck with displaced greater tuberosity fragment.  No definite radiopaque foreign body.  No definite acute findings in the visualized portions of the chest.                               X-Ray Humerus 2 View Left (Final result)  Result time 06/03/22 08:09:55    Final result by Ghulam Foy MD (06/03/22 08:09:55)                 Impression:      Acute comminuted fracture of the humeral head neck with displaced greater tuberosity fragment.    On the forearm view, there is suggested of transversely oriented mildly increased attenuation line, nonspecific, but nondisplaced impacted fracture not entirely excluded.  This finding is not confirmed on the dedicated wrist views and as such is indeterminate for acute fracture.  Recommend clinical correlation with point tenderness, with repeat views versus cross-sectional imaging as warranted.    No definite additional acute fractures identified.      Electronically signed by: Ghulam Foy  Date:    06/03/2022  Time:    08:09             Narrative:    EXAMINATION:  XR FOREARM LEFT; XR WRIST COMPLETE 3 VIEWS LEFT; XR HUMERUS 2 VIEW LEFT; XR SHOULDER COMPLETE 2 OR MORE VIEWS LEFT    CLINICAL HISTORY:  fall;    TECHNIQUE:  AP and lateral views of the left forearm were performed.    Three views left wrist.    Two views left humerus.    Two views left shoulder.    COMPARISON:  None    FINDINGS:  Wrist: No definite evidence of acute displaced fracture or dislocation.  On the forearm view, there is suggested of transversely oriented mildly increased attenuation line, nonspecific, but nondisplaced impacted fracture not entirely excluded.  Joint spaces appear maintained.  No definite evidence of radiopaque foreign body.  Degenerative changes at the thumb CMC joint suggested.    Forearm: No definite evidence of acute displaced fracture or dislocation.  On the forearm view, there is suggested of  transversely oriented mildly increased attenuation line, nonspecific, but nondisplaced impacted fracture not entirely excluded.  Enthesopathic changes are noted at the triceps insertion on the ulna.  No definite radiopaque foreign body.    Humerus/shoulder: Acute comminuted fracture of the humeral head and neck with displaced greater tuberosity fragment.  No definite radiopaque foreign body.  No definite acute findings in the visualized portions of the chest.                               X-Ray Forearm Left (Final result)  Result time 06/03/22 08:09:55    Final result by Ghulam Foy MD (06/03/22 08:09:55)                 Impression:      Acute comminuted fracture of the humeral head neck with displaced greater tuberosity fragment.    On the forearm view, there is suggested of transversely oriented mildly increased attenuation line, nonspecific, but nondisplaced impacted fracture not entirely excluded.  This finding is not confirmed on the dedicated wrist views and as such is indeterminate for acute fracture.  Recommend clinical correlation with point tenderness, with repeat views versus cross-sectional imaging as warranted.    No definite additional acute fractures identified.      Electronically signed by: Ghulam Foy  Date:    06/03/2022  Time:    08:09             Narrative:    EXAMINATION:  XR FOREARM LEFT; XR WRIST COMPLETE 3 VIEWS LEFT; XR HUMERUS 2 VIEW LEFT; XR SHOULDER COMPLETE 2 OR MORE VIEWS LEFT    CLINICAL HISTORY:  fall;    TECHNIQUE:  AP and lateral views of the left forearm were performed.    Three views left wrist.    Two views left humerus.    Two views left shoulder.    COMPARISON:  None    FINDINGS:  Wrist: No definite evidence of acute displaced fracture or dislocation.  On the forearm view, there is suggested of transversely oriented mildly increased attenuation line, nonspecific, but nondisplaced impacted fracture not entirely excluded.  Joint spaces appear maintained.  No definite  evidence of radiopaque foreign body.  Degenerative changes at the thumb CMC joint suggested.    Forearm: No definite evidence of acute displaced fracture or dislocation.  On the forearm view, there is suggested of transversely oriented mildly increased attenuation line, nonspecific, but nondisplaced impacted fracture not entirely excluded.  Enthesopathic changes are noted at the triceps insertion on the ulna.  No definite radiopaque foreign body.    Humerus/shoulder: Acute comminuted fracture of the humeral head and neck with displaced greater tuberosity fragment.  No definite radiopaque foreign body.  No definite acute findings in the visualized portions of the chest.                                 Medications   fentaNYL 50 mcg/mL injection 50 mcg (50 mcg Intravenous Given 6/3/22 0730)   fentaNYL 50 mcg/mL injection 50 mcg (50 mcg Intravenous Given 6/3/22 0800)   HYDROmorphone injection 0.5 mg (0.5 mg Intravenous Given 6/3/22 0935)   ondansetron injection 4 mg (4 mg Intravenous Given 6/3/22 0934)   HYDROmorphone injection 0.5 mg (0.5 mg Intravenous Given 6/3/22 1239)     Medical Decision Making:   History:   Old Medical Records: I decided to obtain old medical records.  Initial Assessment:   75 y/o female with left shoulder pain that radiates down to her forearm after a GLF.  N/V intact.  Compartments soft.   Independently Interpreted Test(s):   I have ordered and independently interpreted X-rays - see prior notes.  Clinical Tests:   Lab Tests: Ordered and Reviewed  Radiological Study: Ordered and Reviewed  ED Management:   Xrays of the entire LUE obtained and c/w a comminuted fracture of the humeral head as well as questionable impacted distal radial fracture.  She does not have any point tenderness in this area to support a fracture.  She is neurovascularly intact.  I did discuss the case with Dr. Lorenzo who recommended a sling and swath with clinic follow-up next week as well as a CT of the shoulder to have  available for her clinic visit.  I did discuss this plan with the patient and her daughter was at the bedside.  A verbalized understanding and agreement.  The daughter even attempted to schedule an appointment with Dr. Lorenzo for this coming week.  After I left the room was informed that the patient was requesting information for orthopedics within the [facility] system.  And ambulatory referral to orthopedics was placed and they were provided the contact information in her discharge paperwork.  The patient and daughter both counseled supportive care for home.  The patient was given a prescription for Percocet.  Indications for seeking re-evaluation were given.            Scribe Attestation:   Scribe #1: I performed the above scribed service and the documentation accurately describes the services I performed. I attest to the accuracy of the note.               Physician Attestation for Scribe: I, Elba Arambula  , reviewed documentation as scribed in my presence, which is both accurate and complete.    Clinical Impression:   Final diagnoses:  [W19.XXXA] Fall  [S42.292A] Humeral head fracture, left, closed, initial encounter (Primary)          ED Disposition Condition    Discharge Stable        ED Prescriptions     Medication Sig Dispense Start Date End Date Auth. Provider    oxyCODONE-acetaminophen (PERCOCET) 5-325 mg per tablet Take 1 tablet by mouth every 6 (six) hours as needed for Pain (severe pain). 9 tablet 6/3/2022 6/6/2022 Elba Arambula MD        Follow-up Information     Follow up With Specialties Details Why Contact Info Additional Information    [facility] Schedule an appointment as soon as possible for a visit   [address], [address]  [facility] [address] [phone] [facility] - Main Building, 5th Floor Please park in [facility] and take Atrium elevator           Elba Arambula MD  06/03/22 7197

## 2022-06-03 NOTE — TELEPHONE ENCOUNTER
Reason for Disposition   Message left on identified voice mail    Additional Information   Negative: Caller is angry or rude (e.g., hangs up, verbally abusive, yelling)   Negative: Caller hangs up   Negative: Caller has already spoken with the PCP and has no further questions.   Negative: Caller has already spoken with another triager and has no further questions.   Negative: Caller has already spoken with another triager or PCP AND has further questions AND triager able to answer questions.   Negative: No answer.  First attempt to contact caller.  Follow-up call scheduled within 15 minutes.   Negative: Busy signal.  First attempt to contact caller.  Follow-up call scheduled within 15 minutes.    Protocols used: NO CONTACT OR DUPLICATE CONTACT CALL-ROBERTO-  LM x2 at 628pm at [phone]

## 2022-06-03 NOTE — TELEPHONE ENCOUNTER
Called daughter Yanci and let her know that the ED  put in the referral already. Gave the daughter the number to schedule appt.

## 2022-06-06 ENCOUNTER — TELEPHONE (OUTPATIENT)
Dept: ORTHOPEDICS | Facility: CLINIC | Age: 77
End: 2022-06-06
Payer: MEDICARE

## 2022-06-06 NOTE — TELEPHONE ENCOUNTER
----- Message from Annalise Farley sent at 6/3/2022  6:25 PM CDT -----  Contact: Patient daughter  Patient daughter call in stated is an emergency    Patient daughter stated patient in a lot of pain     Referral on active request      Please assist     Patient daughter can be reach at [phone]      Transfer to on call nurse

## 2022-06-06 NOTE — TELEPHONE ENCOUNTER
----- Message from Izzy Hughes RN sent at 6/6/2022 11:33 AM CDT -----  Contact: Patient daughter  Excellent.    Thanks!  ----- Message -----  From: Yasmin Tejada PA-C  Sent: 6/6/2022  11:17 AM CDT  To: Afua Bo MA, Izzy Hughes RN    Shilpa,   Can you please see if this patient can come in tomorrow to trauma at a time Valerie is here.   Thanks,   Yasmin     ----- Message -----  From: Izzy Hughes RN  Sent: 6/6/2022  10:49 AM CDT  To: Yasmin Tejada PA-C    , Yasmin,    Would you mind reviewing pt's chart? Should this go to trauma or [facility]? What is the time frame in which she should be seen?    Thanks!    Izzy  ----- Message -----  From: Annalise Farley  Sent: 6/3/2022   6:43 PM CDT  To: [facility] Orth[facility] Triage    Patient daughter call in stated is an emergency    Patient daughter stated patient in a lot of pain     Referral on active request      Please assist     Patient daughter can be reach at [phone]      Transfer to on call nurse

## 2022-06-06 NOTE — TELEPHONE ENCOUNTER
Spoke with pt's daughter, Yanci.   Scheduled pt to see Yasmin tomorrow at 745 am for left humerus fx.   Yanci verbalized understanding.

## 2022-06-07 ENCOUNTER — HOSPITAL ENCOUNTER (OUTPATIENT)
Dept: RADIOLOGY | Facility: HOSPITAL | Age: 77
Discharge: HOME | End: 2022-06-07
Attending: PHYSICIAN ASSISTANT
Payer: MEDICARE

## 2022-06-07 ENCOUNTER — OFFICE VISIT (OUTPATIENT)
Dept: ORTHOPEDICS | Facility: CLINIC | Age: 77
End: 2022-06-07
Payer: MEDICARE

## 2022-06-07 VITALS
RESPIRATION RATE: 18 BRPM | SYSTOLIC BLOOD PRESSURE: 151 MMHG | HEIGHT: 60 IN | HEART RATE: 103 BPM | BODY MASS INDEX: 33.19 KG/M2 | WEIGHT: 169.06 LBS | DIASTOLIC BLOOD PRESSURE: 84 MMHG

## 2022-06-07 DIAGNOSIS — S42.292A HUMERAL HEAD FRACTURE, LEFT, CLOSED, INITIAL ENCOUNTER: ICD-10-CM

## 2022-06-07 DIAGNOSIS — M25.512 ACUTE PAIN OF LEFT SHOULDER: ICD-10-CM

## 2022-06-07 DIAGNOSIS — M25.512 ACUTE PAIN OF LEFT SHOULDER: Primary | ICD-10-CM

## 2022-06-07 DIAGNOSIS — Z01.811 PRE-OP CHEST EXAM: ICD-10-CM

## 2022-06-07 PROCEDURE — 99214 OFFICE O/P EST MOD 30 MIN: CPT | Mod: S$PBB,,, | Performed by: PHYSICIAN ASSISTANT

## 2022-06-07 PROCEDURE — 73030 XR SHOULDER COMPLETE 2 OR MORE VIEWS LEFT: ICD-10-PCS | Mod: 26,LT,, | Performed by: RADIOLOGY

## 2022-06-07 PROCEDURE — 99999 PR PBB SHADOW E&M-EST. PATIENT-LVL V: ICD-10-PCS | Mod: PBBFAC,,, | Performed by: PHYSICIAN ASSISTANT

## 2022-06-07 PROCEDURE — 73030 X-RAY EXAM OF SHOULDER: CPT | Mod: 26,LT,, | Performed by: RADIOLOGY

## 2022-06-07 PROCEDURE — 99999 PR PBB SHADOW E&M-EST. PATIENT-LVL V: CPT | Mod: PBBFAC,,, | Performed by: PHYSICIAN ASSISTANT

## 2022-06-07 PROCEDURE — 99214 PR OFFICE/OUTPT VISIT, EST, LEVL IV, 30-39 MIN: ICD-10-PCS | Mod: S$PBB,,, | Performed by: PHYSICIAN ASSISTANT

## 2022-06-07 PROCEDURE — 73030 X-RAY EXAM OF SHOULDER: CPT | Mod: TC,LT

## 2022-06-07 PROCEDURE — 99215 OFFICE O/P EST HI 40 MIN: CPT | Mod: PBBFAC | Performed by: PHYSICIAN ASSISTANT

## 2022-06-07 RX ORDER — METHOCARBAMOL 500 MG/1
500 TABLET, FILM COATED ORAL 3 TIMES DAILY
Qty: 42 TABLET | Refills: 0 | Status: SHIPPED | OUTPATIENT
Start: 2022-06-07 | End: 2022-06-21

## 2022-06-07 RX ORDER — OXYCODONE HYDROCHLORIDE 5 MG/1
5 TABLET ORAL EVERY 6 HOURS PRN
Qty: 28 TABLET | Refills: 0 | Status: SHIPPED | OUTPATIENT
Start: 2022-06-07 | End: 2022-06-17 | Stop reason: SDUPTHER

## 2022-06-07 RX ORDER — ACETAMINOPHEN 500 MG
1000 TABLET ORAL EVERY 8 HOURS
Qty: 180 TABLET | Refills: 0 | Status: SHIPPED | OUTPATIENT
Start: 2022-06-07 | End: 2022-06-17 | Stop reason: SDUPTHER

## 2022-06-08 ENCOUNTER — HOSPITAL ENCOUNTER (OUTPATIENT)
Dept: RADIOLOGY | Facility: HOSPITAL | Age: 77
Discharge: HOME | End: 2022-06-08
Attending: PHYSICIAN ASSISTANT
Payer: MEDICARE

## 2022-06-08 ENCOUNTER — TELEPHONE (OUTPATIENT)
Dept: OPHTHALMOLOGY | Facility: CLINIC | Age: 77
End: 2022-06-08
Payer: MEDICARE

## 2022-06-08 ENCOUNTER — OFFICE VISIT (OUTPATIENT)
Dept: INTERNAL MEDICINE | Facility: CLINIC | Age: 77
End: 2022-06-08
Payer: MEDICARE

## 2022-06-08 DIAGNOSIS — J45.901 EXACERBATION OF ASTHMA, UNSPECIFIED ASTHMA SEVERITY, UNSPECIFIED WHETHER PERSISTENT: ICD-10-CM

## 2022-06-08 DIAGNOSIS — E11.9 DIABETES MELLITUS WITHOUT COMPLICATION: ICD-10-CM

## 2022-06-08 DIAGNOSIS — I10 HYPERTENSION, UNSPECIFIED TYPE: Primary | ICD-10-CM

## 2022-06-08 DIAGNOSIS — E11.69 TYPE 2 DIABETES MELLITUS WITH OTHER SPECIFIED COMPLICATION, WITHOUT LONG-TERM CURRENT USE OF INSULIN: ICD-10-CM

## 2022-06-08 DIAGNOSIS — R05.3 CHRONIC COUGH: ICD-10-CM

## 2022-06-08 DIAGNOSIS — Z01.811 PRE-OP CHEST EXAM: ICD-10-CM

## 2022-06-08 DIAGNOSIS — J45.41 MODERATE PERSISTENT ASTHMA WITH ACUTE EXACERBATION: ICD-10-CM

## 2022-06-08 PROCEDURE — 71046 X-RAY EXAM CHEST 2 VIEWS: CPT | Mod: TC

## 2022-06-08 PROCEDURE — 99214 OFFICE O/P EST MOD 30 MIN: CPT | Mod: S$PBB,,, | Performed by: INTERNAL MEDICINE

## 2022-06-08 PROCEDURE — 99215 OFFICE O/P EST HI 40 MIN: CPT | Mod: PBBFAC,25 | Performed by: INTERNAL MEDICINE

## 2022-06-08 PROCEDURE — 71046 X-RAY EXAM CHEST 2 VIEWS: CPT | Mod: 26,,, | Performed by: RADIOLOGY

## 2022-06-08 PROCEDURE — 99999 PR PBB SHADOW E&M-EST. PATIENT-LVL V: ICD-10-PCS | Mod: PBBFAC,,, | Performed by: INTERNAL MEDICINE

## 2022-06-08 PROCEDURE — 99214 PR OFFICE/OUTPT VISIT, EST, LEVL IV, 30-39 MIN: ICD-10-PCS | Mod: S$PBB,,, | Performed by: INTERNAL MEDICINE

## 2022-06-08 PROCEDURE — 71046 XR CHEST PA AND LATERAL: ICD-10-PCS | Mod: 26,,, | Performed by: RADIOLOGY

## 2022-06-08 PROCEDURE — 99999 PR PBB SHADOW E&M-EST. PATIENT-LVL V: CPT | Mod: PBBFAC,,, | Performed by: INTERNAL MEDICINE

## 2022-06-08 RX ORDER — BUDESONIDE AND FORMOTEROL FUMARATE DIHYDRATE 160; 4.5 UG/1; UG/1
AEROSOL RESPIRATORY (INHALATION)
Qty: 10.2 G | Refills: 3 | Status: SHIPPED | OUTPATIENT
Start: 2022-06-08 | End: 2022-11-15 | Stop reason: SDUPTHER

## 2022-06-08 RX ORDER — ALBUTEROL SULFATE 90 UG/1
2 AEROSOL, METERED RESPIRATORY (INHALATION) EVERY 6 HOURS PRN
Qty: 8.5 G | Refills: 3 | Status: SHIPPED | OUTPATIENT
Start: 2022-06-08 | End: 2023-08-14

## 2022-06-08 NOTE — PROGRESS NOTES
Subjective:      Patient ID: Lupe Jewell is a 76 y.o. female.    Chief Complaint: Injury, Pain, and Swelling of the Left Shoulder    HPI    Patient is a 76 year old female with PMHx of DM and HTN who presented to the ED on 06/03/22 with left arm pain after falling from standing.   RADS: Acute transversely oriented fracture through the surgical neck of the humerus with displacement of fracture fragments approximately 3 mm.  Mild impaction of fracture fragments.  Patient was treated in a sling and NWB. She stated that her pain is somewhat controlled. She has spanned out 9 percocet over the past 4 days. She reports some swelling in her fingers as well as right sided chest pain in creased with deep breath. No numbness or tingling.     Review of Systems   Constitutional: Negative for chills and fever.   Cardiovascular: Negative for chest pain.   Respiratory: Negative for cough and shortness of breath.    Skin: Negative for color change, dry skin, itching, nail changes, poor wound healing and rash.   Musculoskeletal: Positive for falls.        Left humerus fracture left chest pain   Neurological: Negative for dizziness.   Psychiatric/Behavioral: Negative for altered mental status. The patient is not nervous/anxious.    All other systems reviewed and are negative.        Objective:            General    Constitutional: She is oriented to person, place, and time. She appears well-developed and well-nourished. No distress.   HENT:   Head: Atraumatic.   Eyes: Conjunctivae are normal.   Cardiovascular: Normal rate.    Pulmonary/Chest: Effort normal.   Neurological: She is alert and oriented to person, place, and time.   Psychiatric: She has a normal mood and affect. Her behavior is normal.             Left Shoulder Exam     Comments:  Presented to clinic in a sling,   busing to upper arm  range of motion elbow not tested due to fracture  full range of motion wrist and fingers  Mild swelling. Able to make full fist    NVI      RADS: reviewed by myself and .     comminuted fracture through the surgical neck of the humerus and greater tuberosity of the humerus can be seen position and alignment is similar to recent exam        Assessment:       Encounter Diagnoses   Name Primary?    Acute pain of left shoulder Yes    Pre-op chest exam     Humeral head fracture, left, closed, initial encounter           Plan:       Dicussed plan with the patient per . At this time will treat conservative.   Continue sling ok to remove for daily range of motion of finger wrist and elbow, no moving the shoulder.  NWB  Pain medication: started on multimodal approach to pain control.   return to clinic in 2 weeks post initial injury at that time x-ray of her shoulder AP scap Y is needed    Discussed that usually for this injury you have permanent decreased range of motion. Also discussed that if there is a change in position of the fracture may refer to surgeon for possible total shoulder.   Discussed future arthritis.

## 2022-06-09 ENCOUNTER — TELEPHONE (OUTPATIENT)
Dept: INTERNAL MEDICINE | Facility: CLINIC | Age: 77
End: 2022-06-09
Payer: MEDICARE

## 2022-06-09 DIAGNOSIS — D72.829 LEUKOCYTOSIS, UNSPECIFIED TYPE: Primary | ICD-10-CM

## 2022-06-09 RX ORDER — DOXYCYCLINE 100 MG/1
100 CAPSULE ORAL 2 TIMES DAILY
Qty: 14 CAPSULE | Refills: 0 | Status: SHIPPED | OUTPATIENT
Start: 2022-06-09 | End: 2022-06-16

## 2022-06-09 NOTE — TELEPHONE ENCOUNTER
Please contact patient and inform her that her white blood cell count is elevated. This could indicate she has an infection. I sent a prescription for an antibiotic to her pharmacy.    I would like to recheck her level in 2 weeks. Lab order in,please schedule

## 2022-06-09 NOTE — TELEPHONE ENCOUNTER
Called and spoke to patient about recent lab results. Informed patient that antibiotics was sent to her pharmacy of choice. Set upa repeat lab to be done in 2 weeks at [facility]. Patient understood directions with verbal read back.

## 2022-06-12 VITALS
TEMPERATURE: 99 F | DIASTOLIC BLOOD PRESSURE: 86 MMHG | WEIGHT: 174.19 LBS | HEART RATE: 96 BPM | OXYGEN SATURATION: 98 % | HEIGHT: 62 IN | BODY MASS INDEX: 32.05 KG/M2 | SYSTOLIC BLOOD PRESSURE: 138 MMHG

## 2022-06-12 NOTE — PROGRESS NOTES
Subjective:       Patient ID: Lupe Jewell is a 76 y.o. female.    Chief Complaint: Hypertension    HPI  She returns for management of hypertension.  She has had hypertension for over a year.  Current treatment has included medications outlined in medication list.  She denies chest pain or shortness of breath.  No palpitations.  Denies left arm or neck pain.  She has diabetes.  Denies polyuria, polydipsia.  She is in need of a preop clearance    Past medical history:  Hypertension, diabetes, asthma, glaucoma, vitamin-D deficiency , osteoporosis, status post hysterectomy, status post [facility] n Y gastrectomy, bladder repair, colon adenoma.  She had a colonoscopy February 2022     Medications:   albuterol, Symbicort, Dyazide, Lipitor 10 mg daily, Xalatan, metformin 500 mg b.i.d., Cozaar 50 mg daily     Allergies:  Penicillin      Review of Systems   Constitutional: Negative for chills, fatigue, fever and unexpected weight change.   Respiratory: Negative for chest tightness and shortness of breath.    Cardiovascular: Negative for chest pain and palpitations.   Gastrointestinal: Negative for abdominal pain and blood in stool.   Neurological: Negative for dizziness, syncope, numbness and headaches.       Objective:      Physical Exam  HENT:      Right Ear: External ear normal.      Left Ear: External ear normal.      Nose: Nose normal.      Mouth/Throat:      Mouth: Mucous membranes are moist.      Pharynx: Oropharynx is clear.   Eyes:      Pupils: Pupils are equal, round, and reactive to light.   Cardiovascular:      Rate and Rhythm: Normal rate and regular rhythm.      Heart sounds: No murmur heard.  Pulmonary:      Breath sounds: Normal breath sounds.   Chest:   Breasts:      Right: No axillary adenopathy.      Left: No axillary adenopathy.       Abdominal:      General: There is no distension.      Palpations: There is no hepatomegaly or splenomegaly.      Tenderness: There is no abdominal tenderness.    Musculoskeletal:      Cervical back: Normal range of motion.   Lymphadenopathy:      Cervical: No cervical adenopathy.      Upper Body:      Right upper body: No axillary adenopathy.      Left upper body: No axillary adenopathy.   Neurological:      Cranial Nerves: No cranial nerve deficit.      Sensory: No sensory deficit.      Motor: Motor function is intact.      Deep Tendon Reflexes: Reflexes are normal and symmetric.         Assessment/Plan       Assessment and plan:  1.  Hypertension:  Check CMP  2. Diabetes:  Check A1c  3. Preop clearance:  Check EKG and CBC

## 2022-06-17 ENCOUNTER — OFFICE VISIT (OUTPATIENT)
Dept: ORTHOPEDICS | Facility: CLINIC | Age: 77
End: 2022-06-17
Payer: MEDICARE

## 2022-06-17 ENCOUNTER — HOSPITAL ENCOUNTER (OUTPATIENT)
Dept: RADIOLOGY | Facility: HOSPITAL | Age: 77
Discharge: HOME | End: 2022-06-17
Attending: PHYSICIAN ASSISTANT
Payer: MEDICARE

## 2022-06-17 VITALS — HEIGHT: 62 IN | BODY MASS INDEX: 32.05 KG/M2 | WEIGHT: 174.19 LBS

## 2022-06-17 DIAGNOSIS — S42.292D HUMERAL HEAD FRACTURE, LEFT, WITH ROUTINE HEALING, SUBSEQUENT ENCOUNTER: ICD-10-CM

## 2022-06-17 DIAGNOSIS — M25.512 ACUTE PAIN OF LEFT SHOULDER: ICD-10-CM

## 2022-06-17 DIAGNOSIS — S42.292D HUMERAL HEAD FRACTURE, LEFT, WITH ROUTINE HEALING, SUBSEQUENT ENCOUNTER: Primary | ICD-10-CM

## 2022-06-17 PROCEDURE — 73030 X-RAY EXAM OF SHOULDER: CPT | Mod: 26,LT,, | Performed by: RADIOLOGY

## 2022-06-17 PROCEDURE — 73030 X-RAY EXAM OF SHOULDER: CPT | Mod: TC,LT

## 2022-06-17 PROCEDURE — 99213 OFFICE O/P EST LOW 20 MIN: CPT | Mod: S$PBB,,, | Performed by: PHYSICIAN ASSISTANT

## 2022-06-17 PROCEDURE — 99999 PR PBB SHADOW E&M-EST. PATIENT-LVL III: ICD-10-PCS | Mod: PBBFAC,,, | Performed by: PHYSICIAN ASSISTANT

## 2022-06-17 PROCEDURE — 99213 PR OFFICE/OUTPT VISIT, EST, LEVL III, 20-29 MIN: ICD-10-PCS | Mod: S$PBB,,, | Performed by: PHYSICIAN ASSISTANT

## 2022-06-17 PROCEDURE — 99213 OFFICE O/P EST LOW 20 MIN: CPT | Mod: PBBFAC | Performed by: PHYSICIAN ASSISTANT

## 2022-06-17 PROCEDURE — 99999 PR PBB SHADOW E&M-EST. PATIENT-LVL III: CPT | Mod: PBBFAC,,, | Performed by: PHYSICIAN ASSISTANT

## 2022-06-17 PROCEDURE — 73030 XR SHOULDER COMPLETE 2 OR MORE VIEWS LEFT: ICD-10-PCS | Mod: 26,LT,, | Performed by: RADIOLOGY

## 2022-06-17 RX ORDER — OXYCODONE HYDROCHLORIDE 5 MG/1
5 TABLET ORAL EVERY 6 HOURS PRN
Qty: 28 TABLET | Refills: 0 | Status: SHIPPED | OUTPATIENT
Start: 2022-06-17 | End: 2022-06-24

## 2022-06-17 RX ORDER — ACETAMINOPHEN 500 MG
1000 TABLET ORAL EVERY 8 HOURS
Qty: 180 TABLET | Refills: 0 | Status: SHIPPED | OUTPATIENT
Start: 2022-06-17 | End: 2022-07-17

## 2022-06-17 NOTE — PROGRESS NOTES
Subjective:      Patient ID: Lupe Jewell is a 76 y.o. female.    Chief Complaint: No chief complaint on file.    HPI    Patient is a 76 year old female with PMHx of DM and HTN who presented to the ED on 06/03/22 with left arm pain after falling from standing.   RADS: Acute transversely oriented fracture through the surgical neck of the humerus with displacement of fracture fragments approximately 3 mm.  Mild impaction of fracture fragments.  Patient was treated in a sling and NWB. She stated that her pain is somewhat controlled. She has spanned out 9 percocet over the past 4 days. She reports some swelling in her fingers as well as right sided chest pain in creased with deep breath. No numbness or tingling.     06/17/22: Over all doing better with less pain but it is still present. She has been compliant with sling. No numbness or tingling.     Review of Systems   Constitutional: Negative for chills and fever.   Cardiovascular: Negative for chest pain.   Respiratory: Negative for cough and shortness of breath.    Skin: Negative for color change, dry skin, itching, nail changes, poor wound healing and rash.   Musculoskeletal: Positive for falls.        Left humerus fracture left chest pain   Neurological: Negative for dizziness.   Psychiatric/Behavioral: Negative for altered mental status. The patient is not nervous/anxious.    All other systems reviewed and are negative.        Objective:      General    Constitutional: She is oriented to person, place, and time. She appears well-developed and well-nourished. No distress.   HENT:   Head: Atraumatic.   Eyes: Conjunctivae are normal.   Cardiovascular: Normal rate.    Pulmonary/Chest: Effort normal.   Neurological: She is alert and oriented to person, place, and time.   Psychiatric: She has a normal mood and affect. Her behavior is normal.             Left Shoulder Exam     Comments:  Presented to clinic in a sling,   busing to upper arm  range of motion elbow not  tested due to fracture  full range of motion wrist and fingers  Mild swelling. Able to make full fist   NVI      RADS: reviewed by myself   comminuted fracture through the surgical neck of the humerus and greater tuberosity of the humerus can be seen position and alignment is similar to recent exam        Assessment:       Encounter Diagnosis   Name Primary?    Humeral head fracture, left, with routine healing, subsequent encounter Yes          Plan:       Dicussed plan with the patient, will treat conservative.   Continue sling ok to remove for daily range of motion of finger wrist and elbow, no moving the shoulder. Ok pendulum   NWB  Pain medication: started on multimodal approach to pain control, no refill needed   return to clinic in 2 weeksat that time x-ray of her shoulder AP scap Y is needed    Discussed that usually for this injury you have permanent decreased range of motion. Also discussed that if there is a change in position of the fracture may refer to surgeon for possible total shoulder.   Discussed future arthritis.

## 2022-06-24 ENCOUNTER — LAB VISIT (OUTPATIENT)
Dept: LAB | Facility: OTHER | Age: 77
End: 2022-06-24
Attending: INTERNAL MEDICINE
Payer: MEDICARE

## 2022-06-24 DIAGNOSIS — D72.829 LEUKOCYTOSIS, UNSPECIFIED TYPE: ICD-10-CM

## 2022-06-24 LAB — WBC # BLD AUTO: 12.83 K/UL (ref 3.9–12.7)

## 2022-06-24 PROCEDURE — 85048 AUTOMATED LEUKOCYTE COUNT: CPT | Performed by: INTERNAL MEDICINE

## 2022-06-24 PROCEDURE — 36415 COLL VENOUS BLD VENIPUNCTURE: CPT | Performed by: INTERNAL MEDICINE

## 2022-06-30 ENCOUNTER — EXTERNAL CHRONIC CARE MANAGEMENT (OUTPATIENT)
Dept: PRIMARY CARE CLINIC | Facility: CLINIC | Age: 77
End: 2022-06-30
Payer: MEDICARE

## 2022-06-30 PROCEDURE — 99490 CHRNC CARE MGMT STAFF 1ST 20: CPT | Mod: S$PBB,,, | Performed by: INTERNAL MEDICINE

## 2022-06-30 PROCEDURE — 99490 CHRNC CARE MGMT STAFF 1ST 20: CPT | Mod: PBBFAC | Performed by: INTERNAL MEDICINE

## 2022-06-30 PROCEDURE — 99490 PR CHRONIC CARE MGMT, 1ST 20 MIN: ICD-10-PCS | Mod: S$PBB,,, | Performed by: INTERNAL MEDICINE

## 2022-07-01 ENCOUNTER — OFFICE VISIT (OUTPATIENT)
Dept: ORTHOPEDICS | Facility: CLINIC | Age: 77
End: 2022-07-01
Payer: MEDICARE

## 2022-07-01 ENCOUNTER — HOSPITAL ENCOUNTER (OUTPATIENT)
Dept: RADIOLOGY | Facility: HOSPITAL | Age: 77
Discharge: HOME | End: 2022-07-01
Attending: PHYSICIAN ASSISTANT
Payer: MEDICARE

## 2022-07-01 VITALS — BODY MASS INDEX: 32.05 KG/M2 | WEIGHT: 174.19 LBS | HEIGHT: 62 IN

## 2022-07-01 DIAGNOSIS — S42.292D HUMERAL HEAD FRACTURE, LEFT, WITH ROUTINE HEALING, SUBSEQUENT ENCOUNTER: ICD-10-CM

## 2022-07-01 DIAGNOSIS — S42.292D HUMERAL HEAD FRACTURE, LEFT, WITH ROUTINE HEALING, SUBSEQUENT ENCOUNTER: Primary | ICD-10-CM

## 2022-07-01 PROCEDURE — 73030 X-RAY EXAM OF SHOULDER: CPT | Mod: 26,LT,, | Performed by: RADIOLOGY

## 2022-07-01 PROCEDURE — 73030 XR SHOULDER COMPLETE 2 OR MORE VIEWS LEFT: ICD-10-PCS | Mod: 26,LT,, | Performed by: RADIOLOGY

## 2022-07-01 PROCEDURE — 99999 PR PBB SHADOW E&M-EST. PATIENT-LVL IV: CPT | Mod: PBBFAC,,, | Performed by: PHYSICIAN ASSISTANT

## 2022-07-01 PROCEDURE — 99214 OFFICE O/P EST MOD 30 MIN: CPT | Mod: PBBFAC | Performed by: PHYSICIAN ASSISTANT

## 2022-07-01 PROCEDURE — 99999 PR PBB SHADOW E&M-EST. PATIENT-LVL IV: ICD-10-PCS | Mod: PBBFAC,,, | Performed by: PHYSICIAN ASSISTANT

## 2022-07-01 PROCEDURE — 73030 X-RAY EXAM OF SHOULDER: CPT | Mod: TC,LT

## 2022-07-01 PROCEDURE — 99213 PR OFFICE/OUTPT VISIT, EST, LEVL III, 20-29 MIN: ICD-10-PCS | Mod: S$PBB,,, | Performed by: PHYSICIAN ASSISTANT

## 2022-07-01 PROCEDURE — 99213 OFFICE O/P EST LOW 20 MIN: CPT | Mod: S$PBB,,, | Performed by: PHYSICIAN ASSISTANT

## 2022-07-01 RX ORDER — METHOCARBAMOL 500 MG/1
500 TABLET, FILM COATED ORAL 3 TIMES DAILY
Qty: 42 TABLET | Refills: 0 | Status: SHIPPED | OUTPATIENT
Start: 2022-07-01 | End: 2022-07-15

## 2022-07-01 RX ORDER — OXYCODONE HYDROCHLORIDE 5 MG/1
5 TABLET ORAL EVERY 8 HOURS PRN
Qty: 21 TABLET | Refills: 0 | Status: SHIPPED | OUTPATIENT
Start: 2022-07-01 | End: 2022-07-08

## 2022-07-01 NOTE — PROGRESS NOTES
Subjective:      Patient ID: Lupe Jewell is a 76 y.o. female.    Chief Complaint: No chief complaint on file.    HPI    Patient is a 76 year old female with PMHx of DM and HTN who presented to the ED on 06/03/22 with left arm pain after falling from standing.   RADS: Acute transversely oriented fracture through the surgical neck of the humerus with displacement of fracture fragments approximately 3 mm.  Mild impaction of fracture fragments.  Patient was treated in a sling and NWB. She stated that her pain is somewhat controlled. She has spanned out 9 percocet over the past 4 days. She reports some swelling in her fingers as well as right sided chest pain in creased with deep breath. No numbness or tingling.     06/17/22: Over all doing better with less pain but it is still present. She has been compliant with sling. No numbness or tingling.     070/1/22: Doing better and better. Still bothers intermittently/ She will take pain medication occasionally. No numbness or tingling.     Review of Systems   Constitutional: Negative for chills and fever.   Cardiovascular: Negative for chest pain.   Respiratory: Negative for cough and shortness of breath.    Skin: Negative for color change, dry skin, itching, nail changes, poor wound healing and rash.   Musculoskeletal: Positive for falls.        Left humerus fracture left chest pain   Neurological: Negative for dizziness.   Psychiatric/Behavioral: Negative for altered mental status. The patient is not nervous/anxious.    All other systems reviewed and are negative.        Objective:      General    Constitutional: She is oriented to person, place, and time. She appears well-developed and well-nourished. No distress.   HENT:   Head: Atraumatic.   Eyes: Conjunctivae are normal.   Cardiovascular: Normal rate.    Pulmonary/Chest: Effort normal.   Neurological: She is alert and oriented to person, place, and time.   Psychiatric: She has a normal mood and affect. Her behavior  is normal.             Left Shoulder Exam     Comments:  Presented to clinic in a sling,   busing to upper arm  Full range of motion elbow   full range of motion wrist and fingers  Mild swelling. Able to make full fist   NVI      RADS: reviewed by myself   comminuted fracture through the surgical neck of the humerus and greater tuberosity of the humerus can be seen position and alignment is similar to recent exam        Assessment:       Encounter Diagnosis   Name Primary?    Humeral head fracture, left, with routine healing, subsequent encounter Yes          Plan:       Dicussed plan with the patient, will treat conservative.   Continue sling ok to remove for daily range of motion of finger wrist and elbow, no moving the shoulder. Order placed for PT progressive range of motion and wean from sling.   NWB  Pain medication: started on multimodal approach to pain control, no refill needed   return to clinic in 6 weeks at that time x-ray of her shoulder AP scap Y is needed    Discussed that usually for this injury you have permanent decreased range of motion. Also discussed that if there is a change in position of the fracture may refer to surgeon for possible total shoulder.   Discussed future arthritis.

## 2022-07-08 ENCOUNTER — PES CALL (OUTPATIENT)
Dept: ADMINISTRATIVE | Facility: CLINIC | Age: 77
End: 2022-07-08
Payer: MEDICARE

## 2022-07-29 ENCOUNTER — CLINICAL SUPPORT (OUTPATIENT)
Dept: REHABILITATION | Facility: OTHER | Age: 77
End: 2022-07-29
Payer: MEDICARE

## 2022-07-29 DIAGNOSIS — M25.512 ACUTE PAIN OF LEFT SHOULDER: ICD-10-CM

## 2022-07-29 DIAGNOSIS — M25.612 DECREASED RANGE OF MOTION OF LEFT SHOULDER: ICD-10-CM

## 2022-07-29 DIAGNOSIS — S42.292D HUMERAL HEAD FRACTURE, LEFT, WITH ROUTINE HEALING, SUBSEQUENT ENCOUNTER: ICD-10-CM

## 2022-07-29 PROCEDURE — 97161 PT EVAL LOW COMPLEX 20 MIN: CPT | Mod: PN | Performed by: PHYSICAL THERAPIST

## 2022-07-29 PROCEDURE — 97110 THERAPEUTIC EXERCISES: CPT | Mod: PN | Performed by: PHYSICAL THERAPIST

## 2022-07-29 NOTE — PLAN OF CARE
[facility]   Physical Therapy Initial Evaluation     Date: 7/29/2022   Name: Lupe Jewell  Clinic Number: [id number]    Therapy Diagnosis:   Encounter Diagnoses   Name Primary?    Humeral head fracture, left, with routine healing, subsequent encounter     Acute pain of left shoulder     Decreased range of motion of left shoulder      Physician: Yasmin Tejada, LISBETH    Physician Orders: PT Eval and Treat   Medical Diagnosis from Referral:   S42.292D (ICD-10-CM) - Humeral head fracture, left, with routine healing, subsequent encounter  Evaluation Date: 7/29/2022  Authorization Period Expiration: 7/1/2023  Plan of Care Expiration: 9/29/2022  Progress Note Due: 8/29/2022  Visit # / Visits authorized: 1/1  FOTO: 1/5    Precautions: Standard     Time In: 11:25 am  Time Out: 12:15 pm  Total Appointment Time (timed & untimed codes): 50 minutes      SUBJECTIVE     Date of onset: 9 wks ago    History of current condition - Lupe reports: tripping at night in the home and falling on her L shoulder on June 3rd. She has received imagining and was diagnosised with a humeral head fracture. Patient was instructed to immobilize in shoulder sling for 10 weeks before receiving physical therapy. She removed the sling early yesterday and has been doing minor household tasks, lifting light objects, and self care. Denies any neck pain, N/T.     Falls: yes    Imaging,    FINDINGS:  Healing fracture of the humeral head and neck remain unchanged position as compared to the previous study       Prior Therapy: yes  Social History: lives with daughter. Assists with picking up grand children from school and camp.   Occupation: Retired  Prior Level of Function: Able to complete all ADLs Independently (dressing, lifting, cooking, laundry)  Current Level of Function: Difficulty with dressing, lifting, cooking, laundry, assist from daughter and son    Pain:  Current 0/10, worst 6/10, best 0/10   Location: left  Shoulder  Description: Sharp  Aggravating Factors: Lifting and reaching overhead  Easing Factors: Rest    Patients goals: Regain independence with ADLs and decrease pain (dressing, lifting, cooking)      Medical History:   Past Medical History:   Diagnosis Date    ALLERGIC RHINITIS 8/17/2012    Asthma, well controlled 8/17/2012    Chronic asthma     Chronic rhinitis     Diabetes 2/4/2014    Diabetes mellitus     Diabetes mellitus type II     Fever blister     GERD (gastroesophageal reflux disease) 8/17/2012    Glaucoma     Hyperlipemia     Hypertension     Obstructive sleep apnea     Osteoporosis, unspecified        Surgical History:   Lupe Jewell  has a past surgical history that includes Hysterectomy; stomach procedure; Bladder surgery; Colonoscopy (N/A, 7/1/2016); Cataract extraction w/  intraocular lens implant (Left, 1/28/2015); Cataract extraction w/  intraocular lens implant (Right, 03/30/2016); Trabeculectomy (Left, 12/28/2015); Trabeculectomy (Right, 03/30/2016); YAG CAPSULOTOMY (Bilateral, 10/3/2019 and 10/17/2019); Breast surgery; Eye surgery (Bilateral); Tubal ligation; Breast biopsy (Left); and Colonoscopy (N/A, 2/4/2022).    Medications:   Lupe has a current medication list which includes the following prescription(s): albuterol-ipratropium, albuterol, amitriptyline, atorvastatin, azelastine, brimonidine 0.2%, budesonide-formoterol 160-4.5 mcg, cholecalciferol (vitamin d3), ciclopirox, dorzolamide, fluticasone propionate, latanoprost, losartan, metformin, montelukast, rhopressa, promethazine-dextromethorphan, selenium sulfide, tacrolimus, triamcinolone acetonide 0.025%, triamcinolone acetonide 0.1%, and triamterene-hydrochlorothiazide 37.5-25 mg.    Allergies:   Review of patient's allergies indicates:   Allergen Reactions    Penicillins Rash          OBJECTIVE     Observation: Elevated shoulder held in adduction without sling donned  Posture:rounded shoulders  Palpation: TTP  humeral head L    Shoulder AROM (PROM)  Left  Right    Flexion:    (60)*  160   Abduction    (80)  140  Internal Rotation in pos:  (20)  80  External Rotation in pos:  (45)  80          * indicates pain with movement, Measured in degrees    CSROM:     STRENGTH LEFT RIGHT   Flexion NT 4+/5   Abduction NT 4-/5   Extension NT 5/5   IR (neutral) NT 5/5   ER (neutral) NT 5/5   biceps NT 5/5   triceps NT 5/5       Joint Mobility: NT    Sensation: intact  Pulses: intact      Limitation/Restriction for FOTO Shoulder Survey    Therapist reviewed FOTO scores for Lupe Jewell on 7/29/2022.   FOTO documents entered into [employer] - see Media section.    Limitation Score: 53%         TREATMENT     Total Treatment time (time-based codes) separate from Evaluation: 25 minutes      Lupe received the treatments listed below:      therapeutic exercises to develop strength, endurance, ROM and flexibility for 25 minutes including:     +Explained, demonstrated, and performed HEP   -active elbow flexion/ extension x 20   -active wrist flexion/ extension x 20   -active elbow pronation/ supination x 20   - squeezes x 20   -pendulum hang x 1 min  PROM with PT x 10 minutes Flexion and ER in POS per tolerance    PATIENT EDUCATION AND HOME EXERCISES     Education provided:   - HEP. Education on self care (Dressing and deoderant application)  -Education on precautions and sling wear until discharged by orthopedics     Written Home Exercises Provided: yes. Exercises were reviewed and Lupe was able to demonstrate them prior to the end of the session.  Lupe demonstrated good  understanding of the education provided. See EMR under Patient Instructions for exercises provided during therapy sessions.    ASSESSMENT     Lupe is a 76 y.o. female referred to outpatient Physical Therapy with a medical diagnosis of left humeral head fracture s/p fall 9 wks ago. Patient presents with decrease range of motion in all planes of motion for the GH complex  with empty painful end feel. Negative for neck pain and Intact pulses and sensation. Focus on passive range of motion with education on self care management and pain management. Will start AAROM per orders next visit.     Patient prognosis is Fair.   Patient will benefit from skilled outpatient Physical Therapy to address the deficits stated above and in the chart below, provide patient /family education, and to maximize patientt's level of independence.     Plan of care discussed with patient: Yes  Patient's spiritual, cultural and educational needs considered and patient is agreeable to the plan of care and goals as stated below:     Anticipated Barriers for therapy: osteoporosis and diabetes    Medical Necessity is demonstrated by the following  History  Co-morbidities and personal factors that may impact the plan of care Co-morbidities:   Diabetes, Osteoporosis, and High BMI    Personal Factors:   age     high   Examination  Body Structures and Functions, activity limitations and participation restrictions that may impact the plan of care Body Regions:   upper extremities    Body Systems:    ROM  strength  gross coordinated movement    Participation Restrictions:   Picking up grandchildren from camp    Activity limitations:   Learning and applying knowledge  no deficits    General Tasks and Commands  no deficits    Communication  no deficits    Mobility  lifting and carrying objects  driving (bike, car, motorcycle)    Self care  washing oneself (bathing, drying, washing hands)  caring for body parts (brushing teeth, shaving, grooming)  toileting  dressing  eating  drinking    Domestic Life  cooking  doing house work (cleaning house, washing dishes, laundry)    Interactions/Relationships  no deficits    Life Areas  no deficits    Community and Social Life  no deficits         high   Clinical Presentation stable and uncomplicated low   Decision Making/ Complexity Score: low     Goals:  Short Term Goals: 3 weeks    1. Patient will increase PROM flexion from 60 to 100 degrees or greater in order to progress to functional reaching tasks  2. Patient will decrease pain score from 5/10 to 3/10 or less on the VAS in ADL's  3. Patient will increase shoulder strength in L shoulder all planes to a 4-/5 on MMT in order to complete household chores     Long Term Goals: 6 weeks   1. Patient to have decreased subjective report of disability as noted by a score of 40% or less on the FOTO questionnaire   2. Patient will be able to carry 5 lbs 80 ft or greater for ease with carrying groceries and household chores   3. Patient will increase AROM flexion to 120 degrees in order to improve functional reach    PLAN   Plan of care Certification: 7/29/2022 to 9/29/2022.    Outpatient Physical Therapy 2 times weekly for 6 weeks to include the following interventions: Manual Therapy, Moist Heat/ Ice, Neuromuscular Re-ed, Patient Education, Self Care, Therapeutic Activities and Therapeutic Exercise.     Sanna Abrams, PT

## 2022-07-31 ENCOUNTER — EXTERNAL CHRONIC CARE MANAGEMENT (OUTPATIENT)
Dept: PRIMARY CARE CLINIC | Facility: CLINIC | Age: 77
End: 2022-07-31
Payer: MEDICARE

## 2022-07-31 PROCEDURE — 99490 CHRNC CARE MGMT STAFF 1ST 20: CPT | Mod: PBBFAC | Performed by: INTERNAL MEDICINE

## 2022-07-31 PROCEDURE — 99490 PR CHRONIC CARE MGMT, 1ST 20 MIN: ICD-10-PCS | Mod: S$PBB,,, | Performed by: INTERNAL MEDICINE

## 2022-07-31 PROCEDURE — 99490 CHRNC CARE MGMT STAFF 1ST 20: CPT | Mod: S$PBB,,, | Performed by: INTERNAL MEDICINE

## 2022-08-04 ENCOUNTER — CLINICAL SUPPORT (OUTPATIENT)
Dept: REHABILITATION | Facility: OTHER | Age: 77
End: 2022-08-04
Payer: MEDICARE

## 2022-08-04 DIAGNOSIS — M25.612 DECREASED RANGE OF MOTION OF LEFT SHOULDER: ICD-10-CM

## 2022-08-04 DIAGNOSIS — M25.512 ACUTE PAIN OF LEFT SHOULDER: Primary | ICD-10-CM

## 2022-08-04 PROCEDURE — 97110 THERAPEUTIC EXERCISES: CPT | Mod: PN | Performed by: PHYSICAL THERAPIST

## 2022-08-04 NOTE — PROGRESS NOTES
[facility]   Physical Therapy Treatment Note     Name: Lupe Jewell  Clinic Number: [id number]    Therapy Diagnosis:   Encounter Diagnoses   Name Primary?    Acute pain of left shoulder Yes    Decreased range of motion of left shoulder      Physician: Yasmin Tejada PA-C    Visit Date: 8/4/2022    Physician Orders: Treat   Medical Diagnosis from Referral:   S42.292D (ICD-10-CM) - Humeral head fracture, left, with routine healing, subsequent encounter  Evaluation Date: 7/29/2022  Authorization Period Expiration: 7/1/2023  Plan of Care Expiration: 9/29/2022  Progress Note Due: 8/29/2022  Visit # / Visits authorized: 1/1  FOTO: 1/5     Precautions: Standard     PTA Visit #: 0/5     Time In: 10:00 am  Time Out: 10:52 am  Total Billable Time: 52 minutes    SUBJECTIVE     Pt reports: increased ease with dressing and self care from education provide at evaluation. Difficulty with reaching behind back and overhead. Pt expressed difficulty with sleeping with sling. No pain reported.  She was compliant with home exercise program.  Response to previous treatment: none  Functional change: none    Pain: 0/10  Location: L shoulder    OBJECTIVE     Objective Measures updated at progress report unless specified.     Treatment     Lupe received the treatments listed below:      therapeutic exercises to develop strength, endurance, ROM and flexibility for 44 minutes including:  + Seated Dough Rollers 2 min  + Seated Red  squeezer on blue arm foam (BAF) x 30  + Seated Wrist flexor 1lbs on BAF 5 sec x 5   + Seated Wrist extensor 1lbs on BAF 5 sec x 5  +Seated wrist twists on BAF 1 min  + Seated Bicep Curls 3 x 10   + Seated Ball roll outs 2 min    + Standing Pendelums Flexion 1 min  +Standing Pendelums Circles 1 min  +Stadning Pendelums circles on ball 1 min  +Scap Retraction x 20 rep  +Shoulder Shrugs x 20 rep    manual therapy techniques: Joint mobilizations, Manual traction, Myofacial release and  Soft tissue Mobilization were applied to the: L Shoulder for 8 minutes, including:  +PROM Flexion, Abduction, IR, ER    neuromuscular re-education activities to improve: Balance, Coordination, Kinesthetic and Proprioception for 00 minutes. The following activities were included:    therapeutic activities to improve functional performance for 00 minutes, including:    Patient Education and Home Exercises     Home Exercises Provided and Patient Education Provided     Education provided:   - Continue HEP    Written Home Exercises Provided: yes. Exercises were reviewed and Lupe was able to demonstrate them prior to the end of the session.  Lupe demonstrated good  understanding of the education provided. See EMR under Patient Instructions for exercises provided during therapy sessions    ASSESSMENT   Session consisted of increasing GH flexion and abduction through PROM and AAROM with strengthen on distal forearm and scapular muscular. Patient tolerated exercises well with mild discomfort on shoulder with blue arm foam. Mediated with added airex to seat to decrease shoulder elevation.      Lupe Is progressing well towards her goals.   Pt prognosis is Fair.     Pt will continue to benefit from skilled outpatient physical therapy to address the deficits listed in the problem list box on initial evaluation, provide pt/family education and to maximize pt's level of independence in the home and community environment.     Pt's spiritual, cultural and educational needs considered and pt agreeable to plan of care and goals.     Anticipated barriers to physical therapy: osteoporosis and diabetes    Goals:   Short Term Goals: 3 weeks   1. Patient will increase PROM flexion from 60 to 100 degrees or greater in order to progress to functional reaching tasks  2. Patient will decrease pain score from 5/10 to 3/10 or less on the VAS in ADL's  3. Patient will increase shoulder strength in L shoulder all planes to a 4-/5 on MMT in  order to complete household chores      Long Term Goals: 6 weeks   1. Patient to have decreased subjective report of disability as noted by a score of 40% or less on the FOTO questionnaire   2. Patient will be able to carry 5 lbs 80 ft or greater for ease with carrying groceries and household chores   3. Patient will increase AROM flexion to 120 degrees in order to improve functional reach    PLAN     Continue on progressing GH Range of Motion through PROM and AAROM. Progress to GH strengthen once cleared by physician.    Sanna Abrams, PT

## 2022-08-09 ENCOUNTER — DOCUMENTATION ONLY (OUTPATIENT)
Dept: REHABILITATION | Facility: OTHER | Age: 77
End: 2022-08-09
Payer: MEDICARE

## 2022-08-09 NOTE — PROGRESS NOTES
Physical Therapy Treatment Note    Patient was scheduled for physical therapy at [facility] for 8/9/2022. Pt failed to appear for appointment without prior notification for today.       Sanna Abrams, PT

## 2022-08-12 ENCOUNTER — OFFICE VISIT (OUTPATIENT)
Dept: OPHTHALMOLOGY | Facility: CLINIC | Age: 77
End: 2022-08-12
Payer: MEDICARE

## 2022-08-12 DIAGNOSIS — H40.1113 PRIMARY OPEN ANGLE GLAUCOMA OF RIGHT EYE, SEVERE STAGE: Primary | ICD-10-CM

## 2022-08-12 DIAGNOSIS — H40.1122 PRIMARY OPEN ANGLE GLAUCOMA OF LEFT EYE, MODERATE STAGE: ICD-10-CM

## 2022-08-12 DIAGNOSIS — H43.813 POSTERIOR VITREOUS DETACHMENT OF BOTH EYES: ICD-10-CM

## 2022-08-12 DIAGNOSIS — Z98.83 GLAUCOMA FILTERING BLEB OF BOTH EYES: ICD-10-CM

## 2022-08-12 DIAGNOSIS — H04.123 DRY EYES, BILATERAL: ICD-10-CM

## 2022-08-12 DIAGNOSIS — E11.9 TYPE 2 DIABETES MELLITUS WITHOUT OPHTHALMIC MANIFESTATIONS: ICD-10-CM

## 2022-08-12 DIAGNOSIS — Z96.1 PSEUDOPHAKIA OF BOTH EYES: ICD-10-CM

## 2022-08-12 PROCEDURE — 99999 PR PBB SHADOW E&M-EST. PATIENT-LVL II: CPT | Mod: PBBFAC,,, | Performed by: OPHTHALMOLOGY

## 2022-08-12 PROCEDURE — 99499 UNLISTED E&M SERVICE: CPT | Mod: S$PBB,,, | Performed by: OPHTHALMOLOGY

## 2022-08-12 PROCEDURE — 99499 NO LOS: ICD-10-PCS | Mod: S$PBB,,, | Performed by: OPHTHALMOLOGY

## 2022-08-12 PROCEDURE — 99212 OFFICE O/P EST SF 10 MIN: CPT | Mod: PBBFAC | Performed by: OPHTHALMOLOGY

## 2022-08-12 PROCEDURE — 99999 PR PBB SHADOW E&M-EST. PATIENT-LVL II: ICD-10-PCS | Mod: PBBFAC,,, | Performed by: OPHTHALMOLOGY

## 2022-08-12 NOTE — PROGRESS NOTES
HPI     Pre-op Exam            Comments: Ahmed od          Comments    Preop Ahmed OD (sx 9/21/22)    1) POAG  2) PCIOL OU  3) Type 2 DM NO DR  4) LOLA  5) PVD OU  6) Yag Cap OU    MEDS:  Brimonidine TID OU  Latanoprost QHS O U  Dorzolamide TID OD  Rhopressa QDAY OD  Pres Free AT's  PRN OU              Last edited by Tia Garcia MA on 8/12/2022 10:22 AM.            Assessment /Plan     For exam results, see Encounter Report.    Primary open angle glaucoma of right eye, severe stage    Primary open angle glaucoma of left eye, moderate stage    Type 2 diabetes mellitus without ophthalmic manifestations    Posterior vitreous detachment of both eyes - Both Eyes    Pseudophakia of both eyes    Dry eyes, bilateral    Glaucoma filtering bleb of both eyes          1. POAG (primary open-angle glaucoma) - Severe stage - Both Eyes   Glaucoma (type and duration) POAG,   -  first HVF 1997  -  first photo: 1996     Family history None   Glaucoma meds - (off all gtts os post combined)  (( use to use - Alphagan od , Xalatan od , dorzolamide bid od ))  H/O adverse rxn to glaucoma drops into to BB 2/2 asthma   LASERS SLT 12/8/05 and repeat 7/16/09 OD, and SLT 1/6/06 OS;  SLT os 7/31/14, od 8/14/14 (no response ou) // yag cap od 1/3/2019 /// yag cap os  10/17/2019   GLAUCOMA SURGERIES - combined phaco/IOL trab -os 12/28/2015// elastic sclera - 9 sutures to close - all visible ones cut /// combined - phaco/trab w/ minishunt od - 3/30/2016  OTHER EYE SURGERIES PC IOL OS (combined 1/28/2015) // PC IOL od (combined 3/30/2016)  CDR 0.95 OU   Tbase 22 OU   Tmax 37 (4/11/2016) /38 ( 4/16/2015)   Ttarget 14/14  HVF 23 HVF: 6141-6271 - SAD and  INS  OD, // SAD / IAD  Os (+prog ou 10/2020)   Gonio ext PAS od - 90 degrees open // +1-3 os    /421   OCT 11 test 2004 - 2021: unable to interpret  OD// Dec thru out   HRT 13 test 2005 to 2020 - MR- Dec. SN/N, joshua IT od // dec. N/IN, joshua SN/IT os /// CDR 0.78 od // 0.75 os  Disc photos 1996,  1997, 2003: slides // 2012 , 2015 - OIS    Ttoday  20/16  (down from 26/12 w/ addition of rhopressa od)   Test done today:  pre-op ahmed OD      2. Diabetes mellitus type II   No BDR     3. Dry eyes - Both Eyes   AT's prn     4. Posterior vitreous detachment of both eyes - Both Eyes   With floaters - stable     5. A lot of family stressors   Mom passed since last visit  dad elderly and in poor health, have sitters  Daughter is unemployed and had to move back home  Daughter recently ill - in ICU and intubated 2/2 pancreatitis (8/2/2015 to 8/11/2015)      6. S/P yag cap ou   -od 10/3/3019 (also Rx ant capsule phimosis od)   -OS - 10/17/2019        Plan  Glaucoma   + blebs ou -  IOP TOO high OD     target is 14 ou and + VF prog ou on 10/2020 -    Adjust glaucoma drops   Latanoprost ou q day   Brimonidine tid ou   Dorzolamide tid OD  ADD RHOPRESSA OD Q DAY - sample given     - poor candidate for repeat SLT od  - lots of PAS and no response last repeat in 8/2014   ?? If pt is a candidate for trial of diamox   consideer bleb needling - has a express minishunt - but may be able to needle bleb and improve function - has free sup conj, but there is a lot of subconj episcleral fibrosis and there may be no flow through the express - most likely can not revieve it -- may need a GDD - ahmed or baerveldt     Cont AT's ou prn     Sees Lisa for glasses     AT's prn     Good resp to trial of rhopressa od 28--> 18- 19 - will see if insurance will cover it     Photo file updated - 1.28.2022 --not pulled 5/9/2022    IOP too high od - unable to control with MMT   + VF progression on MMT  rec GDD -  ahmed od - schedule    pre-op OD    Risks and benefits discussed and consent signed.

## 2022-08-15 ENCOUNTER — OFFICE VISIT (OUTPATIENT)
Dept: ORTHOPEDICS | Facility: CLINIC | Age: 77
End: 2022-08-15
Payer: MEDICARE

## 2022-08-15 ENCOUNTER — HOSPITAL ENCOUNTER (OUTPATIENT)
Dept: RADIOLOGY | Facility: HOSPITAL | Age: 77
Discharge: HOME | End: 2022-08-15
Attending: PHYSICIAN ASSISTANT
Payer: MEDICARE

## 2022-08-15 VITALS — HEIGHT: 62 IN | WEIGHT: 174.19 LBS | BODY MASS INDEX: 32.05 KG/M2

## 2022-08-15 DIAGNOSIS — S42.292D HUMERAL HEAD FRACTURE, LEFT, WITH ROUTINE HEALING, SUBSEQUENT ENCOUNTER: ICD-10-CM

## 2022-08-15 DIAGNOSIS — S42.292D HUMERAL HEAD FRACTURE, LEFT, WITH ROUTINE HEALING, SUBSEQUENT ENCOUNTER: Primary | ICD-10-CM

## 2022-08-15 PROCEDURE — 99213 PR OFFICE/OUTPT VISIT, EST, LEVL III, 20-29 MIN: ICD-10-PCS | Mod: S$PBB,,, | Performed by: PHYSICIAN ASSISTANT

## 2022-08-15 PROCEDURE — 99213 OFFICE O/P EST LOW 20 MIN: CPT | Mod: S$PBB,,, | Performed by: PHYSICIAN ASSISTANT

## 2022-08-15 PROCEDURE — 99999 PR PBB SHADOW E&M-EST. PATIENT-LVL III: CPT | Mod: PBBFAC,,, | Performed by: PHYSICIAN ASSISTANT

## 2022-08-15 PROCEDURE — 73030 X-RAY EXAM OF SHOULDER: CPT | Mod: 26,LT,, | Performed by: RADIOLOGY

## 2022-08-15 PROCEDURE — 73030 X-RAY EXAM OF SHOULDER: CPT | Mod: TC,LT

## 2022-08-15 PROCEDURE — 99999 PR PBB SHADOW E&M-EST. PATIENT-LVL III: ICD-10-PCS | Mod: PBBFAC,,, | Performed by: PHYSICIAN ASSISTANT

## 2022-08-15 PROCEDURE — 99213 OFFICE O/P EST LOW 20 MIN: CPT | Mod: PBBFAC | Performed by: PHYSICIAN ASSISTANT

## 2022-08-15 PROCEDURE — 73030 XR SHOULDER COMPLETE 2 OR MORE VIEWS LEFT: ICD-10-PCS | Mod: 26,LT,, | Performed by: RADIOLOGY

## 2022-08-15 NOTE — PROGRESS NOTES
Subjective:      Patient ID: Lupe Jewell is a 76 y.o. female.    Chief Complaint: No chief complaint on file.    HPI    Patient is a 76 year old female with PMHx of DM and HTN who presented to the ED on 06/03/22 with left arm pain after falling from standing.   RADS: Acute transversely oriented fracture through the surgical neck of the humerus with displacement of fracture fragments approximately 3 mm.  Mild impaction of fracture fragments.  Patient was treated in a sling and NWB. She stated that her pain is somewhat controlled. She has spanned out 9 percocet over the past 4 days. She reports some swelling in her fingers as well as right sided chest pain in creased with deep breath. No numbness or tingling.     06/17/22: Over all doing better with less pain but it is still present. She has been compliant with sling. No numbness or tingling.     070/1/22: Doing better and better. Still bothers intermittently/ She will take pain medication occasionally. No numbness or tingling.     08/15/22: Over all doing well. She is attending PT and has had increased range of motion. She stated that she still has some deficit but is ok. No numbness or tingling.     Review of Systems   Constitutional: Negative for chills and fever.   Cardiovascular: Negative for chest pain.   Respiratory: Negative for cough and shortness of breath.    Skin: Negative for color change, dry skin, itching, nail changes, poor wound healing and rash.   Musculoskeletal: Positive for falls.        Left humerus fracture left chest pain   Neurological: Negative for dizziness.   Psychiatric/Behavioral: Negative for altered mental status. The patient is not nervous/anxious.    All other systems reviewed and are negative.        Objective:      General    Constitutional: She is oriented to person, place, and time. She appears well-developed and well-nourished. No distress.   HENT:   Head: Atraumatic.   Eyes: Conjunctivae are normal.   Cardiovascular: Normal  rate.    Pulmonary/Chest: Effort normal.   Neurological: She is alert and oriented to person, place, and time.   Psychiatric: She has a normal mood and affect. Her behavior is normal.             Left Shoulder Exam     Comments:  Presented to clinic in a sling,   busing to upper arm  Full range of motion elbow   full range of motion wrist and fingers  Shoulder flexion 90, abduction 70   Mild swelling. Able to make full fist   NVI      RADS: reviewed by myself   comminuted fracture through the surgical neck of the humerus and greater tuberosity of the humerus can be seen position and alignment is similar to recent exam        Assessment:       Encounter Diagnosis   Name Primary?    Humeral head fracture, left, with routine healing, subsequent encounter Yes          Plan:       Dicussed plan with the patient, will treat conservative.    Order placed for PT ROMAT, progressive weight bear   Pain medication: no refill needed   return to clinic in 6 weeks if any increase in pain at that time x-ray of her shoulder AP scap Y is needed    Discussed that usually for this injury you have permanent decreased range of motion. Also discussed that if there is a change in position of the fracture may refer to surgeon for possible total shoulder.   Discussed future arthritis.

## 2022-08-18 ENCOUNTER — CLINICAL SUPPORT (OUTPATIENT)
Dept: REHABILITATION | Facility: OTHER | Age: 77
End: 2022-08-18
Payer: MEDICARE

## 2022-08-18 DIAGNOSIS — M25.512 ACUTE PAIN OF LEFT SHOULDER: Primary | ICD-10-CM

## 2022-08-18 DIAGNOSIS — M25.612 DECREASED RANGE OF MOTION OF LEFT SHOULDER: ICD-10-CM

## 2022-08-18 PROCEDURE — 97110 THERAPEUTIC EXERCISES: CPT | Mod: PN | Performed by: PHYSICAL THERAPIST

## 2022-08-18 PROCEDURE — 97140 MANUAL THERAPY 1/> REGIONS: CPT | Mod: PN | Performed by: PHYSICAL THERAPIST

## 2022-08-18 NOTE — PROGRESS NOTES
[facility]   Physical Therapy Treatment Note     Name: Lupe Jewell  Clinic Number: [id number]    Therapy Diagnosis:   Encounter Diagnoses   Name Primary?    Acute pain of left shoulder Yes    Decreased range of motion of left shoulder      Physician: Yasmin Tejada PA-C    Visit Date: 8/18/2022    Physician Orders: Treat   Medical Diagnosis from Referral:   S42.292D (ICD-10-CM) - Humeral head fracture, left, with routine healing, subsequent encounter  Evaluation Date: 7/29/2022  Authorization Period Expiration: 7/1/2023  Plan of Care Expiration: 9/29/2022  Progress Note Due: 8/29/2022  Visit # / Visits authorized: 1/1  FOTO: 1/5     Precautions: Standard     PTA Visit #: 0/5     Time In: 11:30 am  Time Out: 12:10 pm  Total Billable Time: 40 minutes    SUBJECTIVE     Pt reports: check up and Xray with doctor on Monday went well. No longer in the sling an can progress to weight bearing. No pain today but difficulty expressed with reaching behind back and over head.     She was compliant with home exercise program.  Response to previous treatment: none  Functional change: none    Pain: 0/10  Location: L shoulder    OBJECTIVE     8/18/2022       Shoulder AROM (PROM)                   Left                  Right     Flexion:                                               (115)*                160        Internal Rotation in pos:                      (45)                  80  External Rotation in pos:                    (46)                  80                                                   * indicates pain with movement, Measured in degrees    Treatment     Lupe received the treatments listed below:      therapeutic exercises to develop strength, endurance, ROM and flexibility for 32 minutes including:  Seated Dough Rollers 2 min  Seated Red  squeezer on blue arm foam (BAF) x 30  Seated Wrist flexor 1lbs on BAF 5 sec x 5   Seated Wrist extensor 1lbs on BAF 5 sec x 5  Seated wrist  twists on BAF 1 min  Seated Bicep Curls 3 x 10   Seated Ball roll outs 2 min    Standing Pendelums Flexion 1 min  Standing Pendelums Side to side 1 min  Standing Pendelums Circles 1 min  Standing Pendelums circles on ball 1 min  Scap Retraction x 20 rep  Shoulder Shrugs x 20 rep    +Standing ER iso into ball (5 sec hold x 10)  +Standing IR iso into ball (5 sec hold x 10)  +Standing ABD iso into ball (5 sec hold x 10)  +Standing 90-90 punch iso into ball (5 sec hold x 10)  +Standing 90-90 Shoulder Extension iso into ball (5 sec hold x 10)    +Standing walkout IR iso w/ YTB 1 x 10  +Standing walkout ER iso w/ YTB 1 x 10    +SL AROM Flexion 2 x 10  +SL ER w/ #1 2    manual therapy techniques: Joint mobilizations, Manual traction, Myofacial release and Soft tissue Mobilization were applied to the: L Shoulder for 8 minutes, including:  +PROM Flexion, Abduction, IR, ER    neuromuscular re-education activities to improve: Balance, Coordination, Kinesthetic and Proprioception for 00 minutes. The following activities were included:    therapeutic activities to improve functional performance for 00 minutes, including:    Patient Education and Home Exercises     Home Exercises Provided and Patient Education Provided     Education provided:   - Continue HEP    Written Home Exercises Provided: yes. Exercises were reviewed and Lupe was able to demonstrate them prior to the end of the session.  Lupe demonstrated good  understanding of the education provided. See EMR under Patient Instructions for exercises provided during therapy sessions    ASSESSMENT   Pt tolerated exercise well. Session focused on strengthening of shoulder girdle through WB activities. Tactile cueing needed with ball isometric activities for wrist and elbow alignment. Modifications through assistance from towel roll and one step walkouts needed for isometric walkout. Increased range of motion noted with GH joint in all planes of motion after mobilization.      Lupe Is progressing well towards her goals.   Pt prognosis is Fair.     Pt will continue to benefit from skilled outpatient physical therapy to address the deficits listed in the problem list box on initial evaluation, provide pt/family education and to maximize pt's level of independence in the home and community environment.     Pt's spiritual, cultural and educational needs considered and pt agreeable to plan of care and goals.     Anticipated barriers to physical therapy: osteoporosis and diabetes    Goals:   Short Term Goals: 3 weeks   1. Patient will increase PROM flexion from 60 to 100 degrees or greater in order to progress to functional reaching tasks  2. Patient will decrease pain score from 5/10 to 3/10 or less on the VAS in ADL's  3. Patient will increase shoulder strength in L shoulder all planes to a 4-/5 on MMT in order to complete household chores      Long Term Goals: 6 weeks   1. Patient to have decreased subjective report of disability as noted by a score of 40% or less on the FOTO questionnaire   2. Patient will be able to carry 5 lbs 80 ft or greater for ease with carrying groceries and household chores   3. Patient will increase AROM flexion to 120 degrees in order to improve functional reach    PLAN     Continue progressing GH Range of Motion through PROM and AAROM and strengthening of GH musculature through WB next visit.     Sanna Abrams, PT

## 2022-08-23 ENCOUNTER — DOCUMENTATION ONLY (OUTPATIENT)
Dept: REHABILITATION | Facility: OTHER | Age: 77
End: 2022-08-23
Payer: MEDICARE

## 2022-08-23 NOTE — PROGRESS NOTES
Physical Therapy Treatment Note    Patient was scheduled for physical therapy at [facility] for 8/23/2022. Pt failed to appear for appointment without prior notification for today.     Sanna Abrams, PT

## 2022-08-25 ENCOUNTER — DOCUMENTATION ONLY (OUTPATIENT)
Dept: REHABILITATION | Facility: OTHER | Age: 77
End: 2022-08-25
Payer: MEDICARE

## 2022-08-25 NOTE — PROGRESS NOTES
Patient was scheduled for a follow up physical therapy appointment at [facility] location on 8/25/2022. Patient failed to appear for the appointment without prior notification today. Several attempts were made to contact pt via telecommunications with no contact being made.          Edward Humphreys, PTA  8/25/2022

## 2022-08-27 RX ORDER — MOXIFLOXACIN 5 MG/ML
1 SOLUTION/ DROPS OPHTHALMIC
Status: CANCELLED | OUTPATIENT
Start: 2022-08-27

## 2022-08-27 RX ORDER — SODIUM CHLORIDE 0.9 % (FLUSH) 0.9 %
10 SYRINGE (ML) INJECTION
Status: DISCONTINUED | OUTPATIENT
Start: 2022-08-27 | End: 2022-09-26

## 2022-08-27 NOTE — H&P
Subjective:       Patient ID: Lupe Jewell is a 76 y.o. female.    Chief Complaint: Pre-op Exam (Ahmed od)    HPI  Review of Systems   Constitutional: Negative.    HENT: Negative.     Eyes:  Positive for visual disturbance.   Respiratory: Negative.     Cardiovascular: Negative.    Neurological: Negative.    Psychiatric/Behavioral: Negative.         Objective:      Physical Exam  Constitutional:       Appearance: She is well-developed.   HENT:      Head: Normocephalic.   Eyes:      Comments: See eye exam   Cardiovascular:      Rate and Rhythm: Normal rate and regular rhythm.   Pulmonary:      Effort: Pulmonary effort is normal.   Neurological:      Mental Status: She is oriented to person, place, and time.       Assessment:       Problem List Items Addressed This Visit       Posterior vitreous detachment of both eyes - Both Eyes     Other Visit Diagnoses       Primary open angle glaucoma of right eye, severe stage    -  Primary    Primary open angle glaucoma of left eye, moderate stage        Type 2 diabetes mellitus without ophthalmic manifestations        Pseudophakia of both eyes        Dry eyes, bilateral        Glaucoma filtering bleb of both eyes                  Plan:       GDD OD - [facility]

## 2022-08-30 ENCOUNTER — CLINICAL SUPPORT (OUTPATIENT)
Dept: REHABILITATION | Facility: OTHER | Age: 77
End: 2022-08-30
Payer: MEDICARE

## 2022-08-30 DIAGNOSIS — M25.612 DECREASED RANGE OF MOTION OF LEFT SHOULDER: ICD-10-CM

## 2022-08-30 DIAGNOSIS — M25.512 ACUTE PAIN OF LEFT SHOULDER: Primary | ICD-10-CM

## 2022-08-30 PROCEDURE — 97110 THERAPEUTIC EXERCISES: CPT | Mod: PN | Performed by: PHYSICAL THERAPIST

## 2022-08-30 NOTE — PROGRESS NOTES
[facility]   Physical Therapy Treatment Note     Name: Lupe Jewell  Clinic Number: [id number]    Therapy Diagnosis:   Encounter Diagnoses   Name Primary?    Acute pain of left shoulder Yes    Decreased range of motion of left shoulder      Physician: Yasmin Tejada PA-C    Visit Date: 8/30/2022    Physician Orders: Treat   Medical Diagnosis from Referral:   S42.292D (ICD-10-CM) - Humeral head fracture, left, with routine healing, subsequent encounter  Evaluation Date: 7/29/2022  Authorization Period Expiration: 7/1/2023  Plan of Care Expiration: 9/29/2022  Progress Note Due: 8/29/2022  Visit # / Visits authorized: 1/1  FOTO: 1/5     Precautions: Standard     PTA Visit #: 0/5     Time In: 11:25 am  Time Out: 12:05 pm  Total Billable Time: 25 minutes    SUBJECTIVE     Pt reports: Continued difficulty reaching behind her back    She was compliant with home exercise program.  Response to previous treatment: none  Functional change: none    Pain: 3/10  Location: L shoulder    OBJECTIVE     8/30/2022       Shoulder AROM (PROM)                   Left                  Right     Flexion:                                               (125)*                160        Internal Rotation in pos:                      (45)                  80  External Rotation in pos:                    (57)                  80                                                   * indicates pain with movement, Measured in degrees    8/18/2022  FOTO: 47%    Treatment     Lupe received the treatments listed below:      therapeutic exercises to develop strength, endurance, ROM and flexibility for 32 minutes including:  Seated Dough Rollers 2 min  Seated Red  squeezer on blue arm foam (BAF) x 30  Seated Wrist flexor 1lbs on BAF 5 sec x 5   Seated Wrist extensor 1lbs on BAF 5 sec x 5  Seated wrist twists on BAF 1 min  Seated Bicep Curls 3 x 10   Seated Ball roll outs 2 min    Standing Pendelums Flexion 1 min  Standing  Pendelums Side to side 1 min  Standing Pendelums Circles 1 min  Standing Pendelums circles on ball 1 min  Scap Retraction x 20 rep  Shoulder Shrugs x 20 rep    Standing ER iso into ball (5 sec hold x 10)  Standing IR iso into ball (5 sec hold x 10)  Standing ABD iso into ball (5 sec hold x 10)  Standing 90-90 punch iso into ball (5 sec hold x 10)  Standing 90-90 Shoulder Extension iso into ball (5 sec hold x 10)    Standing walkout IR iso w/ YTB 1 x 10  Standing walkout ER iso w/ YTB 1 x 10    Supine dowel flexion x 20  SL AROM Flexion 2 x 10  SL ER 3 x 10    manual therapy techniques: Joint mobilizations, Manual traction, Myofacial release and Soft tissue Mobilization were applied to the: L Shoulder for 8 minutes, including:  PROM Flexion, Abduction, IR, ER    neuromuscular re-education activities to improve: Balance, Coordination, Kinesthetic and Proprioception for 00 minutes. The following activities were included:    therapeutic activities to improve functional performance for 00 minutes, including:    Patient Education and Home Exercises     Home Exercises Provided and Patient Education Provided     Education provided:   - Continue HEP    Written Home Exercises Provided: yes. Exercises were reviewed and Lupe was able to demonstrate them prior to the end of the session.  Lupe demonstrated good  understanding of the education provided. See EMR under Patient Instructions for exercises provided during therapy sessions    ASSESSMENT   Pt tolerated exercise well. Pt progressing with improvements in shoulder flexion/ ER PROM and subjective report of disability as noted by recent objective measures.     Lupe Is progressing well towards her goals.   Pt prognosis is Fair.     Pt will continue to benefit from skilled outpatient physical therapy to address the deficits listed in the problem list box on initial evaluation, provide pt/family education and to maximize pt's level of independence in the home and community  environment.     Pt's spiritual, cultural and educational needs considered and pt agreeable to plan of care and goals.     Anticipated barriers to physical therapy: osteoporosis and diabetes    Goals:   Short Term Goals: 3 weeks   1. Patient will increase PROM flexion from 60 to 100 degrees or greater in order to progress to functional reaching tasks  2. Patient will decrease pain score from 5/10 to 3/10 or less on the VAS in ADL's  3. Patient will increase shoulder strength in L shoulder all planes to a 4-/5 on MMT in order to complete household chores      Long Term Goals: 6 weeks   1. Patient to have decreased subjective report of disability as noted by a score of 40% or less on the FOTO questionnaire   2. Patient will be able to carry 5 lbs 80 ft or greater for ease with carrying groceries and household chores   3. Patient will increase AROM flexion to 120 degrees in order to improve functional reach    PLAN     Continue progressing GH Range of Motion through PROM and AAROM and strengthening of GH musculature through WB next visit.     Sanna Abrams, PT

## 2022-08-31 ENCOUNTER — EXTERNAL CHRONIC CARE MANAGEMENT (OUTPATIENT)
Dept: PRIMARY CARE CLINIC | Facility: CLINIC | Age: 77
End: 2022-08-31
Payer: MEDICARE

## 2022-08-31 PROCEDURE — 99490 PR CHRONIC CARE MGMT, 1ST 20 MIN: ICD-10-PCS | Mod: S$PBB,,, | Performed by: INTERNAL MEDICINE

## 2022-08-31 PROCEDURE — 99490 CHRNC CARE MGMT STAFF 1ST 20: CPT | Mod: PBBFAC | Performed by: INTERNAL MEDICINE

## 2022-08-31 PROCEDURE — 99490 CHRNC CARE MGMT STAFF 1ST 20: CPT | Mod: S$PBB,,, | Performed by: INTERNAL MEDICINE

## 2022-09-01 ENCOUNTER — DOCUMENTATION ONLY (OUTPATIENT)
Dept: REHABILITATION | Facility: OTHER | Age: 77
End: 2022-09-01
Payer: MEDICARE

## 2022-09-01 DIAGNOSIS — G47.00 INSOMNIA, UNSPECIFIED TYPE: ICD-10-CM

## 2022-09-01 DIAGNOSIS — E11.9 TYPE 2 DIABETES MELLITUS WITHOUT COMPLICATION, WITHOUT LONG-TERM CURRENT USE OF INSULIN: ICD-10-CM

## 2022-09-01 NOTE — PROGRESS NOTES
Physical Therapy Treatment Note    Patient was scheduled for physical therapy at [facility] for 9/1/2022. Pt failed to appear for appointment without prior notification for today.      Sanna Abrams, PT

## 2022-09-01 NOTE — TELEPHONE ENCOUNTER
No new care gaps identified.  [facility] Embedded Care Gaps. Reference number: [id number]. 9/01/2022   5:55:12 PM CDT

## 2022-09-02 RX ORDER — METFORMIN HYDROCHLORIDE 500 MG/1
TABLET ORAL
Qty: 180 TABLET | Refills: 0 | Status: SHIPPED | OUTPATIENT
Start: 2022-09-02 | End: 2022-09-06 | Stop reason: SDUPTHER

## 2022-09-02 RX ORDER — AMITRIPTYLINE HYDROCHLORIDE 25 MG/1
TABLET, FILM COATED ORAL
Qty: 90 TABLET | Refills: 0 | Status: SHIPPED | OUTPATIENT
Start: 2022-09-02 | End: 2022-09-06

## 2022-09-02 NOTE — TELEPHONE ENCOUNTER
Refill Decision Note   Lupe Jewell  is requesting a refill authorization.  Brief Assessment and Rationale for Refill:  Approve     Medication Therapy Plan:       Medication Reconciliation Completed: No   Comments:     No Care Gaps recommended.     Note composed:10:59 AM 09/02/2022

## 2022-09-06 ENCOUNTER — HOSPITAL ENCOUNTER (OUTPATIENT)
Dept: RADIOLOGY | Facility: HOSPITAL | Age: 77
Discharge: HOME | End: 2022-09-06
Attending: NURSE PRACTITIONER
Payer: MEDICARE

## 2022-09-06 ENCOUNTER — CLINICAL SUPPORT (OUTPATIENT)
Dept: REHABILITATION | Facility: OTHER | Age: 77
End: 2022-09-06
Payer: MEDICARE

## 2022-09-06 VITALS — HEIGHT: 68 IN | BODY MASS INDEX: 23.95 KG/M2 | WEIGHT: 158 LBS

## 2022-09-06 DIAGNOSIS — M25.612 DECREASED RANGE OF MOTION OF LEFT SHOULDER: ICD-10-CM

## 2022-09-06 DIAGNOSIS — M25.512 ACUTE PAIN OF LEFT SHOULDER: Primary | ICD-10-CM

## 2022-09-06 DIAGNOSIS — Z12.31 VISIT FOR SCREENING MAMMOGRAM: ICD-10-CM

## 2022-09-06 PROCEDURE — 77063 BREAST TOMOSYNTHESIS BI: CPT | Mod: TC

## 2022-09-06 PROCEDURE — 97110 THERAPEUTIC EXERCISES: CPT | Mod: PN | Performed by: PHYSICAL THERAPIST

## 2022-09-06 PROCEDURE — 77067 SCR MAMMO BI INCL CAD: CPT | Mod: 26,,, | Performed by: RADIOLOGY

## 2022-09-06 PROCEDURE — 77067 MAMMO DIGITAL SCREENING BILAT WITH TOMO: ICD-10-PCS | Mod: 26,,, | Performed by: RADIOLOGY

## 2022-09-06 PROCEDURE — 77063 MAMMO DIGITAL SCREENING BILAT WITH TOMO: ICD-10-PCS | Mod: 26,,, | Performed by: RADIOLOGY

## 2022-09-06 PROCEDURE — 77063 BREAST TOMOSYNTHESIS BI: CPT | Mod: 26,,, | Performed by: RADIOLOGY

## 2022-09-06 PROCEDURE — 77067 SCR MAMMO BI INCL CAD: CPT | Mod: TC

## 2022-09-06 RX ORDER — METFORMIN HYDROCHLORIDE 500 MG/1
500 TABLET ORAL 2 TIMES DAILY WITH MEALS
Qty: 180 TABLET | Refills: 0 | Status: SHIPPED | OUTPATIENT
Start: 2022-09-06 | End: 2023-03-09

## 2022-09-06 RX ORDER — AMITRIPTYLINE HYDROCHLORIDE 25 MG/1
25 TABLET, FILM COATED ORAL NIGHTLY
Qty: 90 TABLET | Refills: 2 | Status: SHIPPED | OUTPATIENT
Start: 2022-09-06 | End: 2023-08-30

## 2022-09-06 NOTE — PROGRESS NOTES
[facility]   Physical Therapy Treatment Note     Name: Lupe Jewell  Clinic Number: [id number]    Therapy Diagnosis:   Encounter Diagnoses   Name Primary?    Acute pain of left shoulder Yes    Decreased range of motion of left shoulder      Physician: Yasmin Tejada PA-C    Visit Date: 9/6/2022    Physician Orders: Treat   Medical Diagnosis from Referral:   S42.292D (ICD-10-CM) - Humeral head fracture, left, with routine healing, subsequent encounter  Evaluation Date: 7/29/2022  Authorization Period Expiration: 7/1/2023  Plan of Care Expiration: 9/29/2022  Progress Note Done: 8/30/2022  Visit # / Visits authorized: 1/1  FOTO: 1/5     Precautions: Standard     PTA Visit #: 0/5     Time In: 10:45 am  Time Out: 11:35 pm  Total Billable Time: 25 minutes    SUBJECTIVE     Pt reports: very pleased with her progress and getting better each visit. She reports having to miss some due to her asthma and other health conditions. She is also scheduled to have eye surgery this month and would like to discharge on the 16th.     She was compliant with home exercise program.  Response to previous treatment: none  Functional change: none    Pain: 3/10  Location: L shoulder    OBJECTIVE     8/30/2022       Shoulder AROM (PROM)                   Left                  Right     Flexion:                                               (125)*                160        Internal Rotation in pos:                      (45)                  80  External Rotation in pos:                    (57)                  80                                                   * indicates pain with movement, Measured in degrees    9/6/2022  FOTO: 37%    Treatment     Lupe received the treatments listed below:      therapeutic exercises to develop strength, endurance, ROM and flexibility for 50 (25' 1:1) minutes including:  Seated Dough Rollers 2 min  Seated Red  squeezer x 30  Seated Wrist flexor 1lbs on BAF 5 sec x 5   Seated  Wrist extensor 1lbs on BAF 5 sec x 5  Seated wrist twists on BAF 1 min  Seated Bicep Curls 3 x 10 , 1#  Seated Ball roll outs 2 min    Standing Pendelums Flexion 1 min  Standing Pendelums Side to side 1 min  Standing Pendelums Circles 1 min  Standing Pendelums circles on ball 1 min  Scap Retraction x 20 rep  Shoulder Shrugs x 20 rep    Standing ER iso into ball (5 sec hold x 10)  Standing IR iso into ball (5 sec hold x 10)  Standing ABD iso into ball (5 sec hold x 10)  Standing 90-90 punch iso into ball (5 sec hold x 10)  Standing 90-90 Shoulder Extension iso into ball (5 sec hold x 10)    Standing walkout IR iso w/ YTB 1 x 10  Standing walkout ER iso w/ YTB 1 x 10    Supine dowel flexion x 20  SL AROM Flexion 2 x 10  SL ER 3 x 10    manual therapy techniques: Joint mobilizations, Manual traction, Myofacial release and Soft tissue Mobilization were applied to the: L Shoulder for 00 minutes, including:  PROM Flexion, Abduction, IR, ER    neuromuscular re-education activities to improve: Balance, Coordination, Kinesthetic and Proprioception for 00 minutes. The following activities were included:    therapeutic activities to improve functional performance for 00 minutes, including:    Patient Education and Home Exercises     Home Exercises Provided and Patient Education Provided     Education provided:   - Continue HEP    Written Home Exercises Provided: yes. Exercises were reviewed and Lupe was able to demonstrate them prior to the end of the session.  Lupe demonstrated good  understanding of the education provided. See EMR under Patient Instructions for exercises provided during therapy sessions    ASSESSMENT   Pt tolerated treatment well. Progressing with decreased subjective report of disability with long term FOTO goal met.     Lupe Is progressing well towards her goals.   Pt prognosis is Fair.     Pt will continue to benefit from skilled outpatient physical therapy to address the deficits listed in the problem  list box on initial evaluation, provide pt/family education and to maximize pt's level of independence in the home and community environment.     Pt's spiritual, cultural and educational needs considered and pt agreeable to plan of care and goals.     Anticipated barriers to physical therapy: osteoporosis and diabetes    Goals:   Short Term Goals: 3 weeks   1. Patient will increase PROM flexion from 60 to 100 degrees or greater in order to progress to functional reaching tasks (in progress, not met)  2. Patient will decrease pain score from 5/10 to 3/10 or less on the VAS in ADL's (in progress, not met)  3. Patient will increase shoulder strength in L shoulder all planes to a 4-/5 on MMT in order to complete household chores (in progress, not met)     Long Term Goals: 6 weeks   1. Patient to have decreased subjective report of disability as noted by a score of 40% or less on the FOTO questionnaire (met 9/6/2022)  2. Patient will be able to carry 5 lbs 80 ft or greater for ease with carrying groceries and household chores (in progress, not met)  3. Patient will increase AROM flexion to 120 degrees in order to improve functional reach (in progress, not met)    PLAN     Continue progressing GH Active Range of Motion and strength. Plan to transition to discharge independence with HEP prior to glaucoma surgery on 9/21    Sanna Abrams, PT

## 2022-09-06 NOTE — TELEPHONE ENCOUNTER
Medications ordered under Patricia Bo MD in error. Orders canceled and reordered under pt's PCP Marisol Restrepo per protocol.

## 2022-09-07 ENCOUNTER — TELEPHONE (OUTPATIENT)
Dept: OBSTETRICS AND GYNECOLOGY | Facility: CLINIC | Age: 77
End: 2022-09-07
Payer: MEDICARE

## 2022-09-07 NOTE — TELEPHONE ENCOUNTER
----- Message from Vicky Orantes NP sent at 9/7/2022 12:33 PM CDT -----  Please notify pt of normal mammogram. Repeat in 1 year.

## 2022-09-07 NOTE — TELEPHONE ENCOUNTER
Called patient, left message in regards to results. Informed patient to give office call back for results.

## 2022-09-20 ENCOUNTER — TELEPHONE (OUTPATIENT)
Dept: OPHTHALMOLOGY | Facility: CLINIC | Age: 77
End: 2022-09-20
Payer: MEDICARE

## 2022-09-20 NOTE — PRE-PROCEDURE INSTRUCTIONS
PreOp Instructions given:   - Verbal medication information (what to hold and what to take)   - NPO guidelines 2400  - Arrival place directions given; time to be given the day before procedure by the   Surgeon's Office 1000 [facility]  - Bathing with antibacterial soap   - Don't wear any jewelry or bring any valuables AM of surgery   - No makeup or moisturizer to face   - No perfume/cologne, powder, lotions or aftershave   Pt. verbalized understanding.   Pt denies any h/o Anesthesia/Sedation complications or side effects.

## 2022-09-21 ENCOUNTER — ANESTHESIA (OUTPATIENT)
Dept: SURGERY | Facility: HOSPITAL | Age: 77
End: 2022-09-21
Payer: MEDICARE

## 2022-09-21 ENCOUNTER — ANESTHESIA EVENT (OUTPATIENT)
Dept: SURGERY | Facility: HOSPITAL | Age: 77
End: 2022-09-21
Payer: MEDICARE

## 2022-09-21 ENCOUNTER — HOSPITAL ENCOUNTER (OUTPATIENT)
Facility: HOSPITAL | Age: 77
Discharge: HOME | End: 2022-09-21
Attending: OPHTHALMOLOGY | Admitting: OPHTHALMOLOGY
Payer: MEDICARE

## 2022-09-21 VITALS
SYSTOLIC BLOOD PRESSURE: 134 MMHG | HEART RATE: 94 BPM | DIASTOLIC BLOOD PRESSURE: 60 MMHG | WEIGHT: 158 LBS | TEMPERATURE: 98 F | RESPIRATION RATE: 18 BRPM | OXYGEN SATURATION: 98 % | BODY MASS INDEX: 24.02 KG/M2

## 2022-09-21 DIAGNOSIS — H40.1113 PRIMARY OPEN ANGLE GLAUCOMA OF RIGHT EYE, SEVERE STAGE: Primary | ICD-10-CM

## 2022-09-21 LAB
POCT GLUCOSE: 112 MG/DL (ref 70–110)
POCT GLUCOSE: 126 MG/DL (ref 70–110)

## 2022-09-21 PROCEDURE — 71000015 HC POSTOP RECOV 1ST HR: Performed by: OPHTHALMOLOGY

## 2022-09-21 PROCEDURE — 82962 GLUCOSE BLOOD TEST: CPT | Mod: 91 | Performed by: OPHTHALMOLOGY

## 2022-09-21 PROCEDURE — 63600175 PHARM REV CODE 636 W HCPCS: Performed by: NURSE ANESTHETIST, CERTIFIED REGISTERED

## 2022-09-21 PROCEDURE — 82962 GLUCOSE BLOOD TEST: CPT | Performed by: OPHTHALMOLOGY

## 2022-09-21 PROCEDURE — 36000706: Performed by: OPHTHALMOLOGY

## 2022-09-21 PROCEDURE — C1783 OCULAR IMP, AQUEOUS DRAIN DE: HCPCS | Performed by: OPHTHALMOLOGY

## 2022-09-21 PROCEDURE — 71000044 HC DOSC ROUTINE RECOVERY FIRST HOUR: Performed by: OPHTHALMOLOGY

## 2022-09-21 PROCEDURE — D9220A PRA ANESTHESIA: Mod: ANES,,, | Performed by: ANESTHESIOLOGY

## 2022-09-21 PROCEDURE — 27800903 OPTIME MED/SURG SUP & DEVICES OTHER IMPLANTS: Performed by: OPHTHALMOLOGY

## 2022-09-21 PROCEDURE — 66180 AQUEOUS SHUNT EYE W/GRAFT: CPT | Mod: RT,,, | Performed by: OPHTHALMOLOGY

## 2022-09-21 PROCEDURE — D9220A PRA ANESTHESIA: Mod: CRNA,,, | Performed by: NURSE ANESTHETIST, CERTIFIED REGISTERED

## 2022-09-21 PROCEDURE — D9220A PRA ANESTHESIA: ICD-10-PCS | Mod: CRNA,,, | Performed by: NURSE ANESTHETIST, CERTIFIED REGISTERED

## 2022-09-21 PROCEDURE — D9220A PRA ANESTHESIA: ICD-10-PCS | Mod: ANES,,, | Performed by: ANESTHESIOLOGY

## 2022-09-21 PROCEDURE — 25000003 PHARM REV CODE 250: Performed by: OPHTHALMOLOGY

## 2022-09-21 PROCEDURE — 66180 PR WATER SHUNT-EXTRAOCUL RESERV: ICD-10-PCS | Mod: RT,,, | Performed by: OPHTHALMOLOGY

## 2022-09-21 PROCEDURE — 36000707: Performed by: OPHTHALMOLOGY

## 2022-09-21 PROCEDURE — 27200651 HC AIRWAY, LMA: Performed by: ANESTHESIOLOGY

## 2022-09-21 PROCEDURE — 37000009 HC ANESTHESIA EA ADD 15 MINS: Performed by: OPHTHALMOLOGY

## 2022-09-21 PROCEDURE — 25000003 PHARM REV CODE 250: Performed by: NURSE ANESTHETIST, CERTIFIED REGISTERED

## 2022-09-21 PROCEDURE — 63600175 PHARM REV CODE 636 W HCPCS: Performed by: ANESTHESIOLOGY

## 2022-09-21 PROCEDURE — 25000003 PHARM REV CODE 250

## 2022-09-21 PROCEDURE — 37000008 HC ANESTHESIA 1ST 15 MINUTES: Performed by: OPHTHALMOLOGY

## 2022-09-21 DEVICE — IMPLANTABLE DEVICE: Type: IMPLANTABLE DEVICE | Site: EYE | Status: FUNCTIONAL

## 2022-09-21 DEVICE — GRAFT VISIONGRAFT SPL HLF MOON: Type: IMPLANTABLE DEVICE | Site: EYE | Status: FUNCTIONAL

## 2022-09-21 RX ORDER — FENTANYL CITRATE 50 UG/ML
INJECTION, SOLUTION INTRAMUSCULAR; INTRAVENOUS
Status: DISCONTINUED | OUTPATIENT
Start: 2022-09-21 | End: 2022-09-21

## 2022-09-21 RX ORDER — PROPOFOL 10 MG/ML
VIAL (ML) INTRAVENOUS
Status: DISCONTINUED | OUTPATIENT
Start: 2022-09-21 | End: 2022-09-21

## 2022-09-21 RX ORDER — ACETAMINOPHEN 325 MG/1
650 TABLET ORAL EVERY 6 HOURS PRN
Status: DISCONTINUED | OUTPATIENT
Start: 2022-09-21 | End: 2022-09-21 | Stop reason: HOSPADM

## 2022-09-21 RX ORDER — HYDRALAZINE HYDROCHLORIDE 20 MG/ML
10 INJECTION INTRAMUSCULAR; INTRAVENOUS
Status: DISCONTINUED | OUTPATIENT
Start: 2022-09-21 | End: 2022-09-21 | Stop reason: HOSPADM

## 2022-09-21 RX ORDER — MIDAZOLAM HYDROCHLORIDE 1 MG/ML
INJECTION, SOLUTION INTRAMUSCULAR; INTRAVENOUS
Status: DISCONTINUED | OUTPATIENT
Start: 2022-09-21 | End: 2022-09-21

## 2022-09-21 RX ORDER — MOXIFLOXACIN 5 MG/ML
1 SOLUTION/ DROPS OPHTHALMIC
Status: DISCONTINUED | OUTPATIENT
Start: 2022-09-21 | End: 2022-09-21 | Stop reason: HOSPADM

## 2022-09-21 RX ORDER — LIDOCAINE HYDROCHLORIDE 20 MG/ML
INJECTION, SOLUTION EPIDURAL; INFILTRATION; INTRACAUDAL; PERINEURAL
Status: COMPLETED
Start: 2022-09-21 | End: 2022-09-21

## 2022-09-21 RX ORDER — PREDNISOLONE ACETATE 10 MG/ML
SUSPENSION/ DROPS OPHTHALMIC
Status: DISCONTINUED
Start: 2022-09-21 | End: 2022-09-21 | Stop reason: HOSPADM

## 2022-09-21 RX ORDER — MOXIFLOXACIN 5 MG/ML
SOLUTION/ DROPS OPHTHALMIC
Status: DISCONTINUED | OUTPATIENT
Start: 2022-09-21 | End: 2022-09-21 | Stop reason: HOSPADM

## 2022-09-21 RX ORDER — GENTAMICIN SULFATE 40 MG/ML
INJECTION, SOLUTION INTRAMUSCULAR; INTRAVENOUS
Status: DISCONTINUED
Start: 2022-09-21 | End: 2022-09-21 | Stop reason: HOSPADM

## 2022-09-21 RX ORDER — HYDRALAZINE HYDROCHLORIDE 20 MG/ML
INJECTION INTRAMUSCULAR; INTRAVENOUS
Status: DISCONTINUED | OUTPATIENT
Start: 2022-09-21 | End: 2022-09-21

## 2022-09-21 RX ORDER — LIDOCAINE HYDROCHLORIDE 10 MG/ML
INJECTION, SOLUTION EPIDURAL; INFILTRATION; INTRACAUDAL; PERINEURAL
Status: DISCONTINUED
Start: 2022-09-21 | End: 2022-09-21 | Stop reason: HOSPADM

## 2022-09-21 RX ORDER — ATROPINE SULFATE 10 MG/ML
SOLUTION/ DROPS OPHTHALMIC
Status: DISCONTINUED
Start: 2022-09-21 | End: 2022-09-21 | Stop reason: HOSPADM

## 2022-09-21 RX ORDER — LIDOCAINE HYDROCHLORIDE 20 MG/ML
INJECTION, SOLUTION INFILTRATION; PERINEURAL
Status: DISCONTINUED | OUTPATIENT
Start: 2022-09-21 | End: 2022-09-21 | Stop reason: HOSPADM

## 2022-09-21 RX ORDER — FENTANYL CITRATE 50 UG/ML
25 INJECTION, SOLUTION INTRAMUSCULAR; INTRAVENOUS EVERY 5 MIN PRN
Status: DISCONTINUED | OUTPATIENT
Start: 2022-09-21 | End: 2022-09-21 | Stop reason: HOSPADM

## 2022-09-21 RX ORDER — ONDANSETRON 2 MG/ML
INJECTION INTRAMUSCULAR; INTRAVENOUS
Status: DISCONTINUED | OUTPATIENT
Start: 2022-09-21 | End: 2022-09-21

## 2022-09-21 RX ORDER — ATROPINE SULFATE 10 MG/ML
SOLUTION/ DROPS OPHTHALMIC
Status: DISCONTINUED | OUTPATIENT
Start: 2022-09-21 | End: 2022-09-21 | Stop reason: HOSPADM

## 2022-09-21 RX ORDER — ONDANSETRON 2 MG/ML
4 INJECTION INTRAMUSCULAR; INTRAVENOUS DAILY PRN
Status: DISCONTINUED | OUTPATIENT
Start: 2022-09-21 | End: 2022-09-21 | Stop reason: HOSPADM

## 2022-09-21 RX ORDER — LIDOCAINE HYDROCHLORIDE 20 MG/ML
INJECTION, SOLUTION EPIDURAL; INFILTRATION; INTRACAUDAL; PERINEURAL
Status: DISCONTINUED | OUTPATIENT
Start: 2022-09-21 | End: 2022-09-21

## 2022-09-21 RX ORDER — PREDNISOLONE ACETATE 10 MG/ML
SUSPENSION/ DROPS OPHTHALMIC
Status: DISCONTINUED | OUTPATIENT
Start: 2022-09-21 | End: 2022-09-21 | Stop reason: HOSPADM

## 2022-09-21 RX ORDER — LIDOCAINE HYDROCHLORIDE 10 MG/ML
INJECTION, SOLUTION EPIDURAL; INFILTRATION; INTRACAUDAL; PERINEURAL
Status: DISCONTINUED | OUTPATIENT
Start: 2022-09-21 | End: 2022-09-21 | Stop reason: HOSPADM

## 2022-09-21 RX ADMIN — HYDRALAZINE HYDROCHLORIDE 10 MG: 20 INJECTION, SOLUTION INTRAMUSCULAR; INTRAVENOUS at 10:09

## 2022-09-21 RX ADMIN — MIDAZOLAM HYDROCHLORIDE 2 MG: 1 INJECTION, SOLUTION INTRAMUSCULAR; INTRAVENOUS at 11:09

## 2022-09-21 RX ADMIN — HYDRALAZINE HYDROCHLORIDE 8 MG: 20 INJECTION, SOLUTION INTRAMUSCULAR; INTRAVENOUS at 12:09

## 2022-09-21 RX ADMIN — FENTANYL CITRATE 25 MCG: 50 INJECTION, SOLUTION INTRAMUSCULAR; INTRAVENOUS at 01:09

## 2022-09-21 RX ADMIN — FENTANYL CITRATE 25 MCG: 50 INJECTION, SOLUTION INTRAMUSCULAR; INTRAVENOUS at 12:09

## 2022-09-21 RX ADMIN — LIDOCAINE HYDROCHLORIDE 100 MG: 20 INJECTION, SOLUTION EPIDURAL; INFILTRATION; INTRACAUDAL; PERINEURAL at 12:09

## 2022-09-21 RX ADMIN — MOXIFLOXACIN OPHTHALMIC 1 DROP: 5 SOLUTION/ DROPS OPHTHALMIC at 10:09

## 2022-09-21 RX ADMIN — MOXIFLOXACIN OPHTHALMIC 1 DROP: 5 SOLUTION/ DROPS OPHTHALMIC at 09:09

## 2022-09-21 RX ADMIN — SODIUM CHLORIDE: 0.9 INJECTION, SOLUTION INTRAVENOUS at 11:09

## 2022-09-21 RX ADMIN — PROPOFOL 100 MG: 10 INJECTION, EMULSION INTRAVENOUS at 12:09

## 2022-09-21 RX ADMIN — FENTANYL CITRATE 50 MCG: 50 INJECTION, SOLUTION INTRAMUSCULAR; INTRAVENOUS at 12:09

## 2022-09-21 RX ADMIN — ONDANSETRON 4 MG: 2 INJECTION INTRAMUSCULAR; INTRAVENOUS at 12:09

## 2022-09-21 RX ADMIN — ACETAMINOPHEN 650 MG: 325 TABLET ORAL at 02:09

## 2022-09-21 NOTE — INTERVAL H&P NOTE
H&P completed on 9/21/2022 has been reviewed, the patient examined and I concur with the findings and plan.

## 2022-09-21 NOTE — H&P
Subjective:       Patient ID: Lupe Jewell is a 76 y.o. female.    Chief Complaint: No chief complaint on file.    HPI  Review of Systems   Constitutional: Negative.    HENT: Negative.     Eyes:  Positive for visual disturbance.   Respiratory: Negative.     Cardiovascular: Negative.    Neurological: Negative.    Psychiatric/Behavioral: Negative.         Objective:      Physical Exam  Constitutional:       Appearance: She is well-developed.   HENT:      Head: Normocephalic.   Eyes:      Comments: See eye exam   Cardiovascular:      Rate and Rhythm: Normal rate and regular rhythm.   Pulmonary:      Effort: Pulmonary effort is normal.   Neurological:      Mental Status: She is oriented to person, place, and time.       Assessment:       Problem List Items Addressed This Visit    None  Visit Diagnoses       Primary open angle glaucoma of right eye, severe stage        Relevant Orders    Place in Outpatient (Completed)    Vital signs, per unit routine               Plan:       GDD OD

## 2022-09-21 NOTE — ANESTHESIA PROCEDURE NOTES
Intubation    Date/Time: 9/21/2022 12:18 PM  Performed by: Kayla Ruth CRNA  Authorized by: Nona Hsu MD     Intubation:     Induction:  Intravenous    Intubated:  Postinduction    Mask Ventilation:  Easy mask    Attempts:  1    Attempted By:  CRNA    Difficult Airway Encountered?: No      Complications:  None    Airway Device:  Intubating LMA    Airway Device Size:  3.5    Style/Cuff Inflation:  Cuffed (inflated to minimal occlusive pressure)    Secured at:  The lips    Placement Verified By:  Capnometry    Complicating Factors:  None    Findings Post-Intubation:  BS equal bilateral and atraumatic/condition of teeth unchanged

## 2022-09-21 NOTE — H&P
Subjective:       Patient ID: Lupe Jewell is a 76 y.o. female.    Chief Complaint: Pre-op Exam (Ahmed od)    HPI  Review of Systems   Constitutional: Negative.    HENT: Negative.     Eyes:  Positive for visual disturbance.   Respiratory: Negative.     Cardiovascular: Negative.    Neurological: Negative.    Psychiatric/Behavioral: Negative.         Objective:      Physical Exam  Constitutional:       Appearance: She is well-developed.   HENT:      Head: Normocephalic.   Eyes:      Comments: See eye exam   Cardiovascular:      Rate and Rhythm: Normal rate and regular rhythm.   Pulmonary:      Effort: Pulmonary effort is normal.   Neurological:      Mental Status: She is oriented to person, place, and time.       Assessment:       Problem List Items Addressed This Visit       Posterior vitreous detachment of both eyes - Both Eyes     Other Visit Diagnoses       Primary open angle glaucoma of right eye, severe stage    -  Primary    Primary open angle glaucoma of left eye, moderate stage        Type 2 diabetes mellitus without ophthalmic manifestations        Pseudophakia of both eyes        Dry eyes, bilateral        Glaucoma filtering bleb of both eyes                  Plan:       GDD od

## 2022-09-21 NOTE — ANESTHESIA PREPROCEDURE EVALUATION
09/21/2022  Lupe Jewell is a 76 y.o., female.  Pre-operative evaluation for Procedure(s) (LRB):  INSERTION, DEVICE, FOR GLAUCOMA (Right)      Patient Active Problem List   Diagnosis    Nuclear sclerosis - Both Eyes    Type 2 diabetes mellitus with other specified complication, without long-term current use of insulin    Dry eyes - Both Eyes    Posterior vitreous detachment of both eyes - Both Eyes    Asthma, well controlled    AR (allergic rhinitis)    GERD (gastroesophageal reflux disease)    Presbyopia    Diabetes    Primary open angle glaucoma of right eye    Primary open angle glaucoma of left eye    Senile nuclear sclerosis    Post-operative state    Bronchitis    Fecal incontinence    Dermatitis    Nuclear senile cataract of right eye    Special screening for malignant neoplasms, colon    Acute pain of left shoulder    Decreased range of motion of left shoulder       Review of patient's allergies indicates:   Allergen Reactions    Penicillins Rash        No current facility-administered medications on file prior to encounter.     Current Outpatient Medications on File Prior to Encounter   Medication Sig Dispense Refill    losartan (COZAAR) 50 MG tablet TAKE 1 TABLET(50 MG) BY MOUTH EVERY DAY 90 tablet 3    montelukast (SINGULAIR) 10 mg tablet TAKE 1 TABLET(10 MG) BY MOUTH EVERY EVENING 90 tablet 1    triamterene-hydrochlorothiazide 37.5-25 mg (MAXZIDE-25) 37.5-25 mg per tablet TAKE 1 TABLET BY MOUTH EVERY DAY 90 tablet 3    atorvastatin (LIPITOR) 10 MG tablet TAKE 1 TABLET(10 MG) BY MOUTH EVERY DAY (Patient taking differently: Take 10 mg by mouth every evening.) 90 tablet 0    brimonidine 0.2% (ALPHAGAN) 0.2 % Drop Place 1 drop into both eyes 3 (three) times daily. 10 mL 12    ciclopirox (LOPROX) 0.77 % Crea Apply topically 2 (two) times daily. 90 g 2    fluticasone  propionate (FLONASE) 50 mcg/actuation nasal spray SHAKE LIQUID AND USE 2 SPRAYS(100 MCG) IN EACH NOSTRIL TWICE DAILY 32 g 1    latanoprost 0.005 % ophthalmic solution Place 1 drop into both eyes once daily. 2.5 mL 12    netarsudiL (RHOPRESSA) 0.02 % ophthalmic solution Place 1 drop into both eyes once daily. (Patient taking differently: Place 1 drop into the right eye once daily.) 2.5 mL 12    promethazine-dextromethorphan (PROMETHAZINE-DM) 6.25-15 mg/5 mL Syrp TAKE 5 ML BY MOUTH EVERY NIGHT AS NEEDED 118 mL 0       Past Surgical History:   Procedure Laterality Date    BLADDER SURGERY      BREAST BIOPSY Left     BREAST SURGERY      left breast biopsy    CATARACT EXTRACTION W/  INTRAOCULAR LENS IMPLANT Left 2015    WITH TRAB ()    CATARACT EXTRACTION W/  INTRAOCULAR LENS IMPLANT Right 2016    WITH TRAB ()    COLONOSCOPY N/A 2016    Procedure: COLONOSCOPY;  Surgeon: Rony Lima MD;  Location: [facility] ([address] [place] [address]);  Service: Endoscopy;  Laterality: N/A;    COLONOSCOPY N/A 2022    Procedure: COLONOSCOPY;  Surgeon: FARA Benitez MD;  Location: [facility] ([address] [place] [address]);  Service: Endoscopy;  Laterality: N/A;  fully vacc-inst mail-tb.Covid test at [facility] on .EC    EYE SURGERY Bilateral     cataract removal and Laser surgery    HYSTERECTOMY      stomach procedure      TRABECULECTOMY Left 2015    COMBINED WITH CE ()    TRABECULECTOMY Right 2016    COMBINED WITH CE ()    TUBAL LIGATION      YAG CAPSULOTOMY Bilateral 10/3/2019 and 10/17/2019           Social History     Socioeconomic History    Marital status:    Occupational History    Occupation: retired    Tobacco Use    Smoking status: Former     Packs/day: 0.25     Years: 2.00     Pack years: 0.50     Types: Cigarettes     Quit date:      Years since quittin.7    Smokeless tobacco: Never   Substance and Sexual Activity     Alcohol use: Yes     Alcohol/week: 12.0 standard drinks     Types: 6 Glasses of wine, 6 Cans of beer per week     Comment: socially    Drug use: No    Sexual activity: Not Currently     Partners: Male   Other Topics Concern    Are you pregnant or think you may be? No    Breast-feeding No         Vital Signs Range (Last 24H):         CBC: No results for input(s): WBC, RBC, HGB, HCT, PLT, MCV, MCH, MCHC in the last 72 hours.    CMP: No results for input(s): NA, K, CL, CO2, BUN, CREATININE, GLU, MG, PHOS, CALCIUM, ALBUMIN, PROT, ALKPHOS, ALT, AST, BILITOT in the last 72 hours.    INR  No results for input(s): PT, INR, PROTIME, APTT in the last 72 hours.        Diagnostic Studies:      EKG:  Sinus rhythm with Premature atrial complexes   Right ventricular conduction delay   Nonspecific ST and/or T wave abnormalities   Prolonged QT   Abnormal ECG   When compared with ECG of 23-APR-2015 09:06,   RSR' pattern in V1 is now Present   Confirmed by SOCORRO SAMUEL MD ([id number]) on 6/30/2017 1:31:34 PM         Pre-op Assessment    I have reviewed the Patient Summary Reports.     I have reviewed the Nursing Notes. I have reviewed the NPO Status.   I have reviewed the Medications.     Review of Systems  Anesthesia Hx:  No problems with previous Anesthesia  History of prior surgery of interest to airway management or planning: Denies Family Hx of Anesthesia complications.   Denies Personal Hx of Anesthesia complications.   Social:  No Alcohol Use, Non-Smoker    Hematology/Oncology:  Hematology Normal   Oncology Normal     EENT/Dental:   chronic allergic rhinitis cataract   Cardiovascular:   Hypertension    Pulmonary:   Asthma asymptomatic Sleep Apnea    Renal/:  Renal/ Normal     Hepatic/GI:   GERD    Musculoskeletal:  Musculoskeletal Normal    Neurological:  Neurology Normal    Endocrine:   Diabetes    Dermatological:  Skin Normal    Psych:  Psychiatric Normal           Physical Exam  General: Well nourished, Cooperative, Alert  and Oriented    Airway:  Mallampati: III / I  Mouth Opening: Normal  TM Distance: Normal  Tongue: Normal  Neck ROM: Normal ROM    Dental:  Caps / Implants    Chest/Lungs:  Clear to auscultation, Normal Respiratory Rate    Heart:  Rate: Normal  Rhythm: Regular Rhythm  Sounds: Normal        Anesthesia Plan  Type of Anesthesia, risks & benefits discussed:    Anesthesia Type: Gen ETT, Gen Supraglottic Airway, Gen Natural Airway  Intra-op Monitoring Plan: Standard ASA Monitors  Post Op Pain Control Plan: multimodal analgesia  Induction:  IV  Informed Consent: Informed consent signed with the Patient and all parties understand the risks and agree with anesthesia plan.  All questions answered.   ASA Score: 3  Day of Surgery Review of History & Physical: H&P Update referred to the surgeon/provider.    Ready For Surgery From Anesthesia Perspective.     .

## 2022-09-21 NOTE — OP NOTE
OP Note    PREOPERATIVE DIAGNOSIS: Poorly controlled advanced glaucoma of the right eye Primary open angle glaucoma of right eye, severe stage    POSTOPERATIVE DIAGNOSIS: Poorly controlled advanced glaucoma of the right eye. Primary open angle glaucoma of right eye, severe stage    PROCEDURE PERFORMED: Ahmed glaucoma shunt with a corneagen patch graft in the right eye    SURGEON: Sanna Wiley MD    ASSISTANT:  none      COMPLICATIONS: None.    ESTIMATED BLOOD LOSS: Minimal.    ANESTHESIA: General    DATE OF PROCEDURE 9/21/2022    PROCEDURE IN DETIAL: The patient is a 76 y.o. old female with poorly controlled glaucoma.  The intraocular pressure was deemed too high for the health the optic nerve and visual field status. Discussions have been carried out with this patient regarding the pertinent options, surgical risks and benefits of the procedure and expectations.   The patient  And/or family voiced good understanding and wished to proceed with the above procedure.    The patient and correct eye to be operated on were identified by the operating surgeon and the patient was brought into the operating room. The patient received general anesthesia (LMA)  and then prepped and draped in the standard sterile fashion.  A  superotemporal fornix-based conjunctival peritomy was performed with Vannas and Wilmar scissor dissection.  The  implant was inspected and tested with 30-gauge cannula on a BSS syringe and demonstrated to be patent.  A sub-Tenons pocket was formed, the rectus muscles were identified on successive throws with muscle hooks and the shunt implant was then placed in the sub-Tenons space without difficulty.  The implant was fixed to the globe 9 to 10 mm posterior to the limbus using 6-0 Vicryl sutures.  The tubing was then cut to size and the eye was first entered at the paracentesis site and then the eye was reentered to form a stent tract site using a 23-gauge butterfly needle.  The shunt tubing  was  placed in the anterior chamber in good position through this tract without difficulty.  The silicone stent was fixated to the globe using interrupted 10-0 nylon suture and a pericardial patch graft was cut to shape, fitting  well over the entrance site and tubing length and fixed to the globe with 10-0 Vicryl suture.  The conjunctiva was then secured to the limbus in a watertight fashion using 8-0 and 10-0 Vicryl sutures.  The anterior chamber was well formed throughout the case and there were no complications.  Following the procedure, a collagen shield soaked in vigamox and prednisone 1% along with a drop homatropine 5% was placed on the cornea.  The eye was closed, patched, and a Gilliland shield was placed.  The patient was taken to the recovery room in good and stable condition.  The patient tolerated the procedure well.  The patient  was instructed to refrain from any heavy lifting, bending, stooping, or straining activities, and to follow up in the morning for routine postoperative care with Dr. Sanna Wiley.

## 2022-09-21 NOTE — DISCHARGE SUMMARY
[facility] (1st Fl)  Discharge Note  Short Stay    Procedure(s) (LRB):  INSERTION, DEVICE, FOR GLAUCOMA (Right)    OUTCOME: Patient tolerated treatment/procedure well without complication and is now ready for discharge.    DISPOSITION: Home or Self Care    FINAL DIAGNOSIS:  Primary open angle glaucoma of right eye, severe stage    FOLLOWUP: In clinic    DISCHARGE INSTRUCTIONS:  No discharge procedures on file.     TIME SPENT ON DISCHARGE: 10 minutes

## 2022-09-21 NOTE — TRANSFER OF CARE
Anesthesia Transfer of Care Note    Patient: Lupe Jewell    Procedure(s) Performed: Procedure(s) (LRB):  INSERTION, DEVICE, FOR GLAUCOMA (Right)    Patient location: PACU    Anesthesia Type: general    Transport from OR: Transported from OR on 6-10 L/min O2 by face mask with adequate spontaneous ventilation    Post pain: adequate analgesia    Post assessment: no apparent anesthetic complications and tolerated procedure well    Post vital signs: stable    Level of consciousness: awake    Nausea/Vomiting: no nausea/vomiting    Complications: none    Transfer of care protocol was followed      Last vitals:   Visit Vitals  BP (!) 172/78 (BP Location: Left arm, Patient Position: Lying)   Pulse 78   Temp 36.9 °C (98.4 °F) (Temporal)   Resp 18   Wt 71.7 kg (158 lb)   SpO2 97%   Breastfeeding No   BMI 24.02 kg/m²

## 2022-09-22 ENCOUNTER — OFFICE VISIT (OUTPATIENT)
Dept: OPHTHALMOLOGY | Facility: CLINIC | Age: 77
End: 2022-09-22
Payer: MEDICARE

## 2022-09-22 DIAGNOSIS — Z98.83 GLAUCOMA FILTERING BLEB OF BOTH EYES: ICD-10-CM

## 2022-09-22 DIAGNOSIS — Z96.89 HISTORY OF GLAUCOMA TUBE SHUNT PROCEDURE: ICD-10-CM

## 2022-09-22 DIAGNOSIS — H43.813 POSTERIOR VITREOUS DETACHMENT OF BOTH EYES: ICD-10-CM

## 2022-09-22 DIAGNOSIS — H04.123 DRY EYES, BILATERAL: ICD-10-CM

## 2022-09-22 DIAGNOSIS — H40.1113 PRIMARY OPEN ANGLE GLAUCOMA OF RIGHT EYE, SEVERE STAGE: ICD-10-CM

## 2022-09-22 DIAGNOSIS — E11.9 TYPE 2 DIABETES MELLITUS WITHOUT OPHTHALMIC MANIFESTATIONS: ICD-10-CM

## 2022-09-22 DIAGNOSIS — Z48.89 ENCOUNTER FOR POSTOPERATIVE CARE: Primary | ICD-10-CM

## 2022-09-22 DIAGNOSIS — H40.1122 PRIMARY OPEN ANGLE GLAUCOMA OF LEFT EYE, MODERATE STAGE: ICD-10-CM

## 2022-09-22 DIAGNOSIS — Z96.1 PSEUDOPHAKIA OF BOTH EYES: ICD-10-CM

## 2022-09-22 PROCEDURE — 99999 PR PBB SHADOW E&M-EST. PATIENT-LVL III: CPT | Mod: PBBFAC,,, | Performed by: OPHTHALMOLOGY

## 2022-09-22 PROCEDURE — 99024 PR POST-OP FOLLOW-UP VISIT: ICD-10-PCS | Mod: POP,,, | Performed by: OPHTHALMOLOGY

## 2022-09-22 PROCEDURE — 99213 OFFICE O/P EST LOW 20 MIN: CPT | Mod: PBBFAC | Performed by: OPHTHALMOLOGY

## 2022-09-22 PROCEDURE — 99999 PR PBB SHADOW E&M-EST. PATIENT-LVL III: ICD-10-PCS | Mod: PBBFAC,,, | Performed by: OPHTHALMOLOGY

## 2022-09-22 PROCEDURE — 99024 POSTOP FOLLOW-UP VISIT: CPT | Mod: POP,,, | Performed by: OPHTHALMOLOGY

## 2022-09-22 RX ORDER — MOXIFLOXACIN 5 MG/ML
1 SOLUTION/ DROPS OPHTHALMIC 4 TIMES DAILY
COMMUNITY
Start: 2022-09-22 | End: 2022-09-26

## 2022-09-22 RX ORDER — PREDNISOLONE ACETATE 10 MG/ML
1 SUSPENSION/ DROPS OPHTHALMIC 4 TIMES DAILY
COMMUNITY
Start: 2022-09-22 | End: 2022-09-26 | Stop reason: SDUPTHER

## 2022-09-22 NOTE — PROGRESS NOTES
HPI    DLS: 8/12/2022    Pt here for 1 day post Ahmed OD- 9/21/2022  Pt states her OD feels uncomfortable like something is in it.     Meds;    1) POAG   2) PCIOL OU   3) Type 2 DM NO DR   4) LOLA   5) PVD OU   6) Yag Cap OU     MEDS: (pre-op ahmed od)   Latanoprost QHS OU   Dorzolamide TID OU   Rhopressa QDAY OD   Pres Free AT's  PRN OU          Last edited by Sanna Wiley MD on 9/22/2022 10:01 AM.            Assessment /Plan     For exam results, see Encounter Report.    Encounter for postoperative care    Primary open angle glaucoma of right eye, severe stage    Primary open angle glaucoma of left eye, moderate stage    Type 2 diabetes mellitus without ophthalmic manifestations    Posterior vitreous detachment of both eyes - Both Eyes    Pseudophakia of both eyes    Dry eyes, bilateral    Glaucoma filtering bleb of both eyes    History of glaucoma tube shunt procedure    1. POAG (primary open-angle glaucoma) - Severe stage - Both Eyes   Glaucoma (type and duration) POAG,   -  first HVF 1997  -  first photo: 1996     Family history None   Glaucoma meds - (off all gtts os post combined)  (( use to use - Alphagan od , Xalatan od , dorzolamide bid od ))  H/O adverse rxn to glaucoma drops into to BB 2/2 asthma // brimonidine - irritation - tearing   LASERS SLT 12/8/05 and repeat 7/16/09 OD, and SLT 1/6/06 OS;  SLT os 7/31/14, od 8/14/14 (no response ou) // yag cap od 1/3/2019 /// yag cap os  10/17/2019   GLAUCOMA SURGERIES - combined phaco/IOL trab -os 12/28/2015// elastic sclera - 9 sutures to close - all visible ones cut /// combined - phaco/trab w/ minishunt od - 3/30/2016  OTHER EYE SURGERIES PC IOL OS (combined 1/28/2015) // PC IOL od (combined 3/30/2016)  CDR 0.95 OU   Tbase 22 OU   Tmax 37 (4/11/2016) /38 ( 4/16/2015)   Ttarget 14/14  HVF 23 HVF: 4674-9476 - SAD and  INS  OD, // SAD / IAD  Os (+prog ou 10/2020)   Gonio ext PAS od - 90 degrees open // +1-3 os    /421   OCT 11 test 2004 - 2021:  unable to interpret  OD// Dec thru out   HRT 13 test 2005 to 2020 - MR- Dec. SN/N, bord IT od // dec. N/IN, bord SN/IT os /// CDR 0.78 od // 0.75 os  Disc photos 1996, 1997, 2003: slides // 2012 , 2015 - OIS    Ttoday  20/16  (down from 26/12 w/ addition of rhopressa od)   Test done today:  pre-op ahmed OD      2. Diabetes mellitus type II   No BDR     3. Dry eyes - Both Eyes   AT's prn     4. Posterior vitreous detachment of both eyes - Both Eyes   With floaters - stable     5. A lot of family stressors   Mom passed since last visit  dad elderly and in poor health, have sitters  Daughter is unemployed and had to move back home  Daughter recently ill - in ICU and intubated 2/2 pancreatitis (8/2/2015 to 8/11/2015)      6. S/P yag cap ou   -od 10/3/3019 (also Rx ant capsule phimosis od)   -OS - 10/17/2019        Plan  Glaucoma   + blebs ou -  IOP TOO high OD     target is 14 ou and + VF prog ou on 10/2020 -    Adjust glaucoma drops   Latanoprost ou q day   Brimonidine tid ou   Dorzolamide tid OD  ADD RHOPRESSA OD Q DAY - sample given     - poor candidate for repeat SLT od  - lots of PAS and no response last repeat in 8/2014   ?? If pt is a candidate for trial of diamox   consideer bleb needling - has a express minishunt - but may be able to needle bleb and improve function - has free sup conj, but there is a lot of subconj episcleral fibrosis and there may be no flow through the express - most likely can not revieve it -- may need a GDD - ahmed or baerveldt     Cont AT's ou prn     Sees Lisa for glasses     AT's prn     Good resp to trial of rhopressa od 28--> 18- 19 - will see if insurance will cover it     Photo file updated - 1.28.2022 --not pulled 5/9/2022    IOP too high od - unable to control with MMT   + VF progression on MMT  rec GDD -  ahmed od - schedule    pre-op OD    S/P ahmed OD  Date:9/21/2022   POD # 1  anterior chamber depth  deep - fully formed - provisc   Bleb appearance  elevated over ahmed plate    sidell neg   IOP  14    Meds: - operated eye  Pred acetate qid  vigamox qid  Atropine bid    Hold all glaucoma drops OD     Cont glaucoma drops os   Dorzolamide tid os   Latanoprost 1 x day os     OFF brimonidine - ? Allergy - tearing / irritation     F/U  4 days - sooner prn  -  IOP and AC depth and choroidal check

## 2022-09-22 NOTE — ANESTHESIA POSTPROCEDURE EVALUATION
Anesthesia Post Evaluation    Patient: Lupe Jewell    Procedure(s) Performed: Procedure(s) (LRB):  INSERTION, DEVICE, FOR GLAUCOMA (Right)    Final Anesthesia Type: general      Patient location during evaluation: PACU  Patient participation: Yes- Able to Participate  Level of consciousness: awake and alert  Post-procedure vital signs: reviewed and stable  Pain management: adequate  Airway patency: patent    PONV status at discharge: No PONV  Anesthetic complications: no      Cardiovascular status: blood pressure returned to baseline  Respiratory status: unassisted, spontaneous ventilation and room air  Hydration status: euvolemic  Follow-up not needed.          Vitals Value Taken Time   /60 09/21/22 1416   Temp 36.9 °C (98.4 °F) 09/21/22 1348   Pulse 96 09/21/22 1424   Resp 18 09/21/22 1415   SpO2 96 % 09/21/22 1424   Vitals shown include unvalidated device data.      No case tracking events are documented in the log.      Pain/Analia Score: Pain Rating Prior to Med Admin: 4 (9/21/2022  2:22 PM)  Analia Score: 10 (9/21/2022  2:00 PM)

## 2022-09-25 NOTE — PROGRESS NOTES
HPI     Post-op Evaluation            Comments: Pt states no changes or complaints today          Comments    S/p Ahmed OD 9/21/22    1) POAG  2) PCIOL OU  3) Type 2 DM NO DR  4) LOLA  5) PVD OU  6) Yag Cap OU    MEDS:  PA QID OD  Vigamox QID OD  Atropine BID OD  Dorzolamide TID OS  Latanoprost QHS OS   Pres Free AT's  PRN OU              Last edited by Tia Garcia MA on 9/26/2022 11:13 AM.            Assessment /Plan     For exam results, see Encounter Report.    Encounter for postoperative care    Primary open angle glaucoma of right eye, severe stage    Primary open angle glaucoma of left eye, moderate stage    Type 2 diabetes mellitus without ophthalmic manifestations    Posterior vitreous detachment of both eyes - Both Eyes    Pseudophakia of both eyes    Dry eyes, bilateral    Glaucoma filtering bleb of both eyes    History of glaucoma tube shunt procedure    1. POAG (primary open-angle glaucoma) - Severe stage - Both Eyes   Glaucoma (type and duration) POAG,   -  first HVF 1997  -  first photo: 1996     Family history None   Glaucoma meds - (off all gtts os post combined)  (( use to use - Alphagan od , Xalatan od , dorzolamide bid od ))  H/O adverse rxn to glaucoma drops into to BB 2/2 asthma // brimonidine - irritation - tearing   LASERS SLT 12/8/05 and repeat 7/16/09 OD, and SLT 1/6/06 OS;  SLT os 7/31/14, od 8/14/14 (no response ou) // yag cap od 1/3/2019 /// yag cap os  10/17/2019   GLAUCOMA SURGERIES - combined phaco/IOL trab -os 12/28/2015// elastic sclera - 9 sutures to close - all visible ones cut /// combined - phaco/trab w/ minishunt od - 3/30/2016  OTHER EYE SURGERIES PC IOL OS (combined 1/28/2015) // PC IOL od (combined 3/30/2016)  CDR 0.95 OU   Tbase 22 OU   Tmax 37 (4/11/2016) /38 ( 4/16/2015)   Ttarget 14/14  HVF 23 HVF: 3011-3977 - SAD and  INS  OD, // SAD / IAD  Os (+prog ou 10/2020)   Gonio ext PAS od - 90 degrees open // +1-3 os    /421   OCT 11 test 2004 - 2021: unable to  interpret  OD// Dec thru out   HRT 13 test 2005 to 2020 - MR- Dec. SN/N, bord IT od // dec. N/IN, bord SN/IT os /// CDR 0.78 od // 0.75 os  Disc photos 1996, 1997, 2003: slides // 2012 , 2015 - OIS    Ttoday  2/10  (down from 26/12 w/ addition of rhopressa od)   Test done today:  post -op ahmed OD - 9/21/2022     2. Diabetes mellitus type II   No BDR     3. Dry eyes - Both Eyes   AT's prn     4. Posterior vitreous detachment of both eyes - Both Eyes   With floaters - stable     5. A lot of family stressors   Mom passed since last visit  dad elderly and in poor health, have sitters  Daughter is unemployed and had to move back home  Daughter recently ill - in ICU and intubated 2/2 pancreatitis (8/2/2015 to 8/11/2015)      6. S/P yag cap ou   -od 10/3/3019 (also Rx ant capsule phimosis od)   -OS - 10/17/2019        Plan  Glaucoma   + blebs ou -  IOP TOO high OD     target is 14 ou and + VF prog ou on 10/2020 -    Adjust glaucoma drops   Latanoprost ou q day   Brimonidine tid ou   Dorzolamide tid OD  ADD RHOPRESSA OD Q DAY - sample given     - poor candidate for repeat SLT od  - lots of PAS and no response last repeat in 8/2014   ?? If pt is a candidate for trial of diamox   consideer bleb needling - has a express minishunt - but may be able to needle bleb and improve function - has free sup conj, but there is a lot of subconj episcleral fibrosis and there may be no flow through the express - most likely can not revieve it -- may need a GDD - ahmed or baerveldt     Cont AT's ou prn     Sees Lisa for glasses     AT's prn     Good resp to trial of rhopressa od 28--> 18- 19 - will see if insurance will cover it     Photo file updated - 1.28.2022 --not pulled 5/9/2022    IOP too high od - unable to control with MMT   + VF progression on MMT  rec GDD -  ahmed od - schedule    pre-op OD    S/P ahmed OD  Date:9/21/2022   POD # 5  anterior chamber depth  deep - fully formed - provisc   Bleb appearance  elevated over ahmed  plate   sidell neg   IOP  2 - very low // AC deep // no choroidals     Shield at night   Glasses day   No lifting / no bending / no eye rubbing / no straining     Meds: - operated eye  Pred acetate qid - continue - Rx sent   vigamox qid - stop when runs out   Atropine bid - continue - Rx sent     Hold all glaucoma drops OD     Cont glaucoma drops os   Dorzolamide tid os   Latanoprost 1 x day os     OFF brimonidine - ? Allergy - tearing / irritation     F/U  1 week with nancy/ Tyler Miner  F/U me 1.5 to 2 weeks

## 2022-09-26 ENCOUNTER — OFFICE VISIT (OUTPATIENT)
Dept: OPHTHALMOLOGY | Facility: CLINIC | Age: 77
End: 2022-09-26
Payer: MEDICARE

## 2022-09-26 DIAGNOSIS — Z96.1 PSEUDOPHAKIA OF BOTH EYES: ICD-10-CM

## 2022-09-26 DIAGNOSIS — Z98.83 GLAUCOMA FILTERING BLEB OF BOTH EYES: ICD-10-CM

## 2022-09-26 DIAGNOSIS — H40.1122 PRIMARY OPEN ANGLE GLAUCOMA OF LEFT EYE, MODERATE STAGE: ICD-10-CM

## 2022-09-26 DIAGNOSIS — Z48.89 ENCOUNTER FOR POSTOPERATIVE CARE: Primary | ICD-10-CM

## 2022-09-26 DIAGNOSIS — Z96.89 HISTORY OF GLAUCOMA TUBE SHUNT PROCEDURE: ICD-10-CM

## 2022-09-26 DIAGNOSIS — H04.123 DRY EYES, BILATERAL: ICD-10-CM

## 2022-09-26 DIAGNOSIS — H43.813 POSTERIOR VITREOUS DETACHMENT OF BOTH EYES: ICD-10-CM

## 2022-09-26 DIAGNOSIS — E11.9 TYPE 2 DIABETES MELLITUS WITHOUT OPHTHALMIC MANIFESTATIONS: ICD-10-CM

## 2022-09-26 DIAGNOSIS — H40.1113 PRIMARY OPEN ANGLE GLAUCOMA OF RIGHT EYE, SEVERE STAGE: ICD-10-CM

## 2022-09-26 PROCEDURE — 99024 POSTOP FOLLOW-UP VISIT: CPT | Mod: POP,,, | Performed by: OPHTHALMOLOGY

## 2022-09-26 PROCEDURE — 99999 PR PBB SHADOW E&M-EST. PATIENT-LVL III: CPT | Mod: PBBFAC,,, | Performed by: OPHTHALMOLOGY

## 2022-09-26 PROCEDURE — 99024 PR POST-OP FOLLOW-UP VISIT: ICD-10-PCS | Mod: POP,,, | Performed by: OPHTHALMOLOGY

## 2022-09-26 PROCEDURE — 99999 PR PBB SHADOW E&M-EST. PATIENT-LVL III: ICD-10-PCS | Mod: PBBFAC,,, | Performed by: OPHTHALMOLOGY

## 2022-09-26 PROCEDURE — 99213 OFFICE O/P EST LOW 20 MIN: CPT | Mod: PBBFAC | Performed by: OPHTHALMOLOGY

## 2022-09-26 RX ORDER — ATROPINE SULFATE 10 MG/ML
1 SOLUTION/ DROPS OPHTHALMIC 2 TIMES DAILY
Qty: 5 ML | Refills: 0 | Status: SHIPPED | OUTPATIENT
Start: 2022-09-26 | End: 2022-11-11 | Stop reason: ALTCHOICE

## 2022-09-26 RX ORDER — PREDNISOLONE ACETATE 10 MG/ML
1 SUSPENSION/ DROPS OPHTHALMIC 4 TIMES DAILY
Qty: 10 ML | Refills: 0 | Status: SHIPPED | OUTPATIENT
Start: 2022-09-26 | End: 2023-01-03 | Stop reason: SDUPTHER

## 2022-09-26 RX ORDER — ATROPINE SULFATE 10 MG/ML
1 SOLUTION/ DROPS OPHTHALMIC 2 TIMES DAILY
COMMUNITY
Start: 2022-09-22 | End: 2022-09-26 | Stop reason: SDUPTHER

## 2022-09-26 NOTE — Clinical Note
"Needs a post op F/U with gabi 10/3/2022 // [facility]  [facility] call [facility] she does NOT use "[facility]" "

## 2022-09-30 ENCOUNTER — EXTERNAL CHRONIC CARE MANAGEMENT (OUTPATIENT)
Dept: PRIMARY CARE CLINIC | Facility: CLINIC | Age: 77
End: 2022-09-30
Payer: MEDICARE

## 2022-09-30 PROCEDURE — 99490 CHRNC CARE MGMT STAFF 1ST 20: CPT | Mod: S$PBB,,, | Performed by: INTERNAL MEDICINE

## 2022-09-30 PROCEDURE — 99490 CHRNC CARE MGMT STAFF 1ST 20: CPT | Mod: PBBFAC | Performed by: INTERNAL MEDICINE

## 2022-09-30 PROCEDURE — 99490 PR CHRONIC CARE MGMT, 1ST 20 MIN: ICD-10-PCS | Mod: S$PBB,,, | Performed by: INTERNAL MEDICINE

## 2022-10-03 ENCOUNTER — OFFICE VISIT (OUTPATIENT)
Dept: OPHTHALMOLOGY | Facility: CLINIC | Age: 77
End: 2022-10-03
Payer: MEDICARE

## 2022-10-03 DIAGNOSIS — Z96.1 PSEUDOPHAKIA OF BOTH EYES: ICD-10-CM

## 2022-10-03 DIAGNOSIS — H40.1113 PRIMARY OPEN ANGLE GLAUCOMA OF RIGHT EYE, SEVERE STAGE: ICD-10-CM

## 2022-10-03 DIAGNOSIS — Z48.89 ENCOUNTER FOR POSTOPERATIVE CARE: Primary | ICD-10-CM

## 2022-10-03 DIAGNOSIS — E11.9 TYPE 2 DIABETES MELLITUS WITHOUT OPHTHALMIC MANIFESTATIONS: ICD-10-CM

## 2022-10-03 DIAGNOSIS — H40.1122 PRIMARY OPEN ANGLE GLAUCOMA OF LEFT EYE, MODERATE STAGE: ICD-10-CM

## 2022-10-03 PROCEDURE — 99024 PR POST-OP FOLLOW-UP VISIT: ICD-10-PCS | Mod: POP,,, | Performed by: STUDENT IN AN ORGANIZED HEALTH CARE EDUCATION/TRAINING PROGRAM

## 2022-10-03 PROCEDURE — 99024 POSTOP FOLLOW-UP VISIT: CPT | Mod: POP,,, | Performed by: STUDENT IN AN ORGANIZED HEALTH CARE EDUCATION/TRAINING PROGRAM

## 2022-10-03 PROCEDURE — 99213 OFFICE O/P EST LOW 20 MIN: CPT | Mod: PBBFAC | Performed by: STUDENT IN AN ORGANIZED HEALTH CARE EDUCATION/TRAINING PROGRAM

## 2022-10-03 PROCEDURE — 99999 PR PBB SHADOW E&M-EST. PATIENT-LVL III: ICD-10-PCS | Mod: PBBFAC,,, | Performed by: STUDENT IN AN ORGANIZED HEALTH CARE EDUCATION/TRAINING PROGRAM

## 2022-10-03 PROCEDURE — 99999 PR PBB SHADOW E&M-EST. PATIENT-LVL III: CPT | Mod: PBBFAC,,, | Performed by: STUDENT IN AN ORGANIZED HEALTH CARE EDUCATION/TRAINING PROGRAM

## 2022-10-03 NOTE — PROGRESS NOTES
HPI    S/p Ahmed OD 9/21/22    1) POAG  2) PCIOL OU  3) Type 2 DM NO DR  4) LOLA  5) PVD OU  6) Yag Cap OU    MEDS:  PA QID OD  Vigamox QID OD  Atropine BID OD  Dorzolamide TID OS  Latanoprost QHS OS   Pres Free AT's  PRN OU      Last edited by Luisa Yin on 10/3/2022 10:41 AM.            Assessment /Plan     For exam results, see Encounter Report.    Encounter for postoperative care    Primary open angle glaucoma of right eye, severe stage    Primary open angle glaucoma of left eye, moderate stage    Type 2 diabetes mellitus without ophthalmic manifestations    Pseudophakia of both eyes      1. POAG (primary open-angle glaucoma) - Severe stage - Both Eyes   Glaucoma (type and duration) POAG,   -  first HVF 1997  -  first photo: 1996     Family history None   Glaucoma meds - (off all gtts os post combined)  (( use to use - Alphagan od , Xalatan od , dorzolamide bid od ))  H/O adverse rxn to glaucoma drops into to BB 2/2 asthma // brimonidine - irritation - tearing   LASERS SLT 12/8/05 and repeat 7/16/09 OD, and SLT 1/6/06 OS;  SLT os 7/31/14, od 8/14/14 (no response ou) // yag cap od 1/3/2019 /// yag cap os  10/17/2019   GLAUCOMA SURGERIES - combined phaco/IOL trab -os 12/28/2015// elastic sclera - 9 sutures to close - all visible ones cut /// combined - phaco/trab w/ minishunt od - 3/30/2016  OTHER EYE SURGERIES PC IOL OS (combined 1/28/2015) // PC IOL od (combined 3/30/2016)  CDR 0.95 OU   Tbase 22 OU   Tmax 37 (4/11/2016) /38 ( 4/16/2015)   Ttarget 14/14  HVF 23 HVF: 4385-0361 - SAD and  INS  OD, // SAD / IAD  Os (+prog ou 10/2020)   Gonio ext PAS od - 90 degrees open // +1-3 os    /421   OCT 11 test 2004 - 2021: unable to interpret  OD// Dec thru out   HRT 13 test 2005 to 2020 - MR- Dec. SN/N, joshua IT od // dec. N/IN, joshua SN/IT os /// CDR 0.78 od // 0.75 os  Disc photos 1996, 1997, 2003: slides // 2012 , 2015 - OIS    Ttoday  2/10  (down from 26/12 w/ addition of rhopressa od)   Test done today:   post -op ahmed OD - 9/21/2022     2. Diabetes mellitus type II   No BDR     3. Dry eyes - Both Eyes   AT's prn     4. Posterior vitreous detachment of both eyes - Both Eyes   With floaters - stable     5. A lot of family stressors   Mom passed since last visit  dad elderly and in poor health, have sitters  Daughter is unemployed and had to move back home  Daughter recently ill - in ICU and intubated 2/2 pancreatitis (8/2/2015 to 8/11/2015)      6. S/P yag cap ou   -od 10/3/3019 (also Rx ant capsule phimosis od)   -OS - 10/17/2019        Plan  Glaucoma   + blebs ou -  IOP TOO high OD     target is 14 ou and + VF prog ou on 10/2020 -    Adjust glaucoma drops   Latanoprost ou q day   Brimonidine tid ou   Dorzolamide tid OD  ADD RHOPRESSA OD Q DAY - sample given     - poor candidate for repeat SLT od  - lots of PAS and no response last repeat in 8/2014   ?? If pt is a candidate for trial of diamox   consideer bleb needling - has a express minishunt - but may be able to needle bleb and improve function - has free sup conj, but there is a lot of subconj episcleral fibrosis and there may be no flow through the express - most likely can not revieve it -- may need a GDD - ahmed or baerveldt     Cont AT's ou prn     Sees Lisa for glasses     AT's prn     Good resp to trial of rhopressa od 28--> 18- 19 - will see if insurance will cover it     Photo file updated - 1.28.2022 --not pulled 5/9/2022    IOP too high od - unable to control with MMT   + VF progression on MMT  rec GDD -  ahmed od - schedule    pre-op OD    S/P ahmed OD  Date:9/21/2022   POD # 5  anterior chamber depth  deep - fully formed - provisc   Bleb appearance  elevated over ahmed plate   sidell neg   IOP  2 - very low // AC deep // no choroidals     Shield at night   Glasses day   No lifting / no bending / no eye rubbing / no straining     Meds: - operated eye  Pred acetate qid - continue - Rx sent   vigamox qid - stop when runs out   Atropine bid - continue -  Rx sent     Hold all glaucoma drops OD     Cont glaucoma drops os   Dorzolamide tid os   Latanoprost 1 x day os     OFF brimonidine - ? Allergy - tearing / irritation     F/U  1 week with w/ Tyler Miner  F/U me 1.5 to 2 weeks       10/3/2022  S/P ahmed OD  Date:9/21/2022   POW1-2  anterior chamber depth: deep  Bleb appearance  elevated over ahmed plate   sidell neg   IOP  5- very low // AC deep // no choroidals    Continue Atropine BID, PF QID, and glaucoma drops (dorz TID lprost QHS)     HPI    S/p Ahmed OD 9/21/22    1) POAG  2) PCIOL OU  3) Type 2 DM NO DR  4) LOLA  5) PVD OU  6) Yag Cap OU    MEDS:  PA QID OD  Vigamox QID OD  Atropine BID OD  Dorzolamide TID OS  Latanoprost QHS OS   Pres Free AT's  PRN OU      Last edited by Luisa Yin on 10/3/2022 10:41 AM.            Assessment /Plan     For exam results, see Encounter Report.    Encounter for postoperative care    Primary open angle glaucoma of right eye, severe stage    Primary open angle glaucoma of left eye, moderate stage    Type 2 diabetes mellitus without ophthalmic manifestations    Pseudophakia of both eyes

## 2022-10-04 NOTE — PROGRESS NOTES
HPI    DLS: 10/03/2022 With Dr. Perez    Pt here for S/P Ahmed OD- 9/21/2022  Pt states no eye pain this morning but was having some pain yesterday in   her OD.     Meds;  PA QID OD   Vigamox QID OD   Atropine BID OD   Dorzolamide TID OS   Latanoprost QHS OS   Pres Free AT's  PRN OU      1) POAG   2) PCIOL OU   3) Type 2 DM NO DR   4) LOLA   5) PVD OU   6) Yag Cap OU       Last edited by Tila Kent on 10/7/2022 11:11 AM.            Assessment /Plan     For exam results, see Encounter Report.    Encounter for postoperative care    Primary open angle glaucoma of right eye, severe stage    Primary open angle glaucoma of left eye, moderate stage    Type 2 diabetes mellitus without ophthalmic manifestations    Dry eyes, bilateral    Pseudophakia of both eyes    Glaucoma filtering bleb of both eyes    History of glaucoma tube shunt procedure      HPI    S/p Ahmed OD 9/21/22    1) POAG  2) PCIOL OU  3) Type 2 DM NO DR  4) LOLA  5) PVD OU  6) Yag Cap OU    MEDS:  PA QID OD  Vigamox QID OD  Atropine BID OD  Dorzolamide TID OS  Latanoprost QHS OS   Pres Free AT's  PRN OU      1. POAG (primary open-angle glaucoma) - Severe stage - Both Eyes   Glaucoma (type and duration) POAG,   -  first HVF 1997  -  first photo: 1996     Family history None   Glaucoma meds - (off all gtts os post combined)  (( use to use - Alphagan od , Xalatan od , dorzolamide bid od ))  H/O adverse rxn to glaucoma drops into to BB 2/2 asthma // brimonidine - irritation - tearing   LASERS SLT 12/8/05 and repeat 7/16/09 OD, and SLT 1/6/06 OS;  SLT os 7/31/14, od 8/14/14 (no response ou) // yag cap od 1/3/2019 /// yag cap os  10/17/2019   GLAUCOMA SURGERIES - combined phaco/IOL trab -os 12/28/2015// elastic sclera - 9 sutures to close - all visible ones cut /// combined - phaco/trab w/ minishunt od - 3/30/2016  OTHER EYE SURGERIES PC IOL OS (combined 1/28/2015) // PC IOL od (combined 3/30/2016)  CDR 0.95 OU   Tbase 22 OU   Tmax 37 (4/11/2016) /38 (  4/16/2015)   Ttarget 14/14  HVF 23 HVF: 1126-1658 - SAD and  INS  OD, // SAD / IAD  Os (+prog ou 10/2020)   Gonio ext PAS od - 90 degrees open // +1-3 os    /421   OCT 11 test 2004 - 2021: unable to interpret  OD// Dec thru out   HRT 13 test 2005 to 2020 - MR- Dec. SN/N, bord IT od // dec. N/IN, bord SN/IT os /// CDR 0.78 od // 0.75 os  Disc photos 1996, 1997, 2003: slides // 2012 , 2015 - OIS    Ttoday  10/9  (down from 26/12 w/ addition of rhopressa od)   Test done today:  post -op ahmed OD - 9/21/2022     2. Diabetes mellitus type II   No BDR     3. Dry eyes - Both Eyes   AT's prn     4. Posterior vitreous detachment of both eyes - Both Eyes   With floaters - stable     5. A lot of family stressors   Mom passed since last visit  dad elderly and in poor health, have sitters  Daughter is unemployed and had to move back home  Daughter recently ill - in ICU and intubated 2/2 pancreatitis (8/2/2015 to 8/11/2015)      6. S/P yag cap ou   -od 10/3/3019 (also Rx ant capsule phimosis od)   -OS - 10/17/2019        Plan  Glaucoma   + blebs ou -  IOP TOO high OD     target is 14 ou and + VF prog ou on 10/2020 -    Adjust glaucoma drops   Latanoprost ou q day   Brimonidine tid ou   Dorzolamide tid OD  ADD RHOPRESSA OD Q DAY - sample given     - poor candidate for repeat SLT od  - lots of PAS and no response last repeat in 8/2014   ?? If pt is a candidate for trial of diamox   consideer bleb needling - has a express minishunt - but may be able to needle bleb and improve function - has free sup conj, but there is a lot of subconj episcleral fibrosis and there may be no flow through the express - most likely can not revieve it -- may need a GDD - ahmed or baerveldt     Cont AT's ou prn     Sees Lisa for glasses     AT's prn     Good resp to trial of rhopressa od 28--> 18- 19 - will see if insurance will cover it     Photo file updated - 1.28.2022 --not pulled 5/9/2022    IOP too high od - unable to control with MMT   +  VF progression on MMT  rec GDD -  ahmed od - schedule    pre-op OD    S/P ahmed OD  Date:9/21/2022   POW 2   anterior chamber depth  deep - fully formed - provisc   Bleb appearance  elevated over ahmed plate   sidell neg   IOP  10   AC deep // no choroidals     Shield at night   Glasses day   No lifting / no bending / no eye rubbing / no straining     Meds: - operated eye  Pred acetate - decrease to tid   Atropine decrease to 1 x day - stop if runs out     Hold all glaucoma drops OD     Cont glaucoma drops os   Dorzolamide tid os   Latanoprost 1 x day os     OFF brimonidine - ? Allergy - tearing / irritation     F/U 1.5 weeks - ? On a day Dr Perez is here

## 2022-10-07 ENCOUNTER — OFFICE VISIT (OUTPATIENT)
Dept: OPHTHALMOLOGY | Facility: CLINIC | Age: 77
End: 2022-10-07
Payer: MEDICARE

## 2022-10-07 DIAGNOSIS — H40.1122 PRIMARY OPEN ANGLE GLAUCOMA OF LEFT EYE, MODERATE STAGE: ICD-10-CM

## 2022-10-07 DIAGNOSIS — H40.1113 PRIMARY OPEN ANGLE GLAUCOMA OF RIGHT EYE, SEVERE STAGE: ICD-10-CM

## 2022-10-07 DIAGNOSIS — H04.123 DRY EYES, BILATERAL: ICD-10-CM

## 2022-10-07 DIAGNOSIS — Z96.1 PSEUDOPHAKIA OF BOTH EYES: ICD-10-CM

## 2022-10-07 DIAGNOSIS — Z96.89 HISTORY OF GLAUCOMA TUBE SHUNT PROCEDURE: ICD-10-CM

## 2022-10-07 DIAGNOSIS — Z98.83 GLAUCOMA FILTERING BLEB OF BOTH EYES: ICD-10-CM

## 2022-10-07 DIAGNOSIS — Z48.89 ENCOUNTER FOR POSTOPERATIVE CARE: Primary | ICD-10-CM

## 2022-10-07 DIAGNOSIS — E11.9 TYPE 2 DIABETES MELLITUS WITHOUT OPHTHALMIC MANIFESTATIONS: ICD-10-CM

## 2022-10-07 PROCEDURE — 99999 PR PBB SHADOW E&M-EST. PATIENT-LVL III: ICD-10-PCS | Mod: PBBFAC,,, | Performed by: OPHTHALMOLOGY

## 2022-10-07 PROCEDURE — 99024 PR POST-OP FOLLOW-UP VISIT: ICD-10-PCS | Mod: POP,,, | Performed by: OPHTHALMOLOGY

## 2022-10-07 PROCEDURE — 99213 OFFICE O/P EST LOW 20 MIN: CPT | Mod: PBBFAC | Performed by: OPHTHALMOLOGY

## 2022-10-07 PROCEDURE — 99024 POSTOP FOLLOW-UP VISIT: CPT | Mod: POP,,, | Performed by: OPHTHALMOLOGY

## 2022-10-07 PROCEDURE — 99999 PR PBB SHADOW E&M-EST. PATIENT-LVL III: CPT | Mod: PBBFAC,,, | Performed by: OPHTHALMOLOGY

## 2022-10-18 ENCOUNTER — OFFICE VISIT (OUTPATIENT)
Dept: OPHTHALMOLOGY | Facility: CLINIC | Age: 77
End: 2022-10-18
Payer: MEDICARE

## 2022-10-18 DIAGNOSIS — Z98.83 GLAUCOMA FILTERING BLEB OF BOTH EYES: ICD-10-CM

## 2022-10-18 DIAGNOSIS — Z96.1 PSEUDOPHAKIA OF BOTH EYES: ICD-10-CM

## 2022-10-18 DIAGNOSIS — H04.123 DRY EYES, BILATERAL: ICD-10-CM

## 2022-10-18 DIAGNOSIS — H40.1113 PRIMARY OPEN ANGLE GLAUCOMA OF RIGHT EYE, SEVERE STAGE: ICD-10-CM

## 2022-10-18 DIAGNOSIS — Z48.89 ENCOUNTER FOR POSTOPERATIVE CARE: Primary | ICD-10-CM

## 2022-10-18 DIAGNOSIS — Z96.89 HISTORY OF GLAUCOMA TUBE SHUNT PROCEDURE: ICD-10-CM

## 2022-10-18 DIAGNOSIS — E11.9 TYPE 2 DIABETES MELLITUS WITHOUT OPHTHALMIC MANIFESTATIONS: ICD-10-CM

## 2022-10-18 DIAGNOSIS — H40.1122 PRIMARY OPEN ANGLE GLAUCOMA OF LEFT EYE, MODERATE STAGE: ICD-10-CM

## 2022-10-18 PROCEDURE — 99024 POSTOP FOLLOW-UP VISIT: CPT | Mod: POP,,, | Performed by: OPHTHALMOLOGY

## 2022-10-18 PROCEDURE — 99213 OFFICE O/P EST LOW 20 MIN: CPT | Mod: PBBFAC | Performed by: OPHTHALMOLOGY

## 2022-10-18 PROCEDURE — 99999 PR PBB SHADOW E&M-EST. PATIENT-LVL III: ICD-10-PCS | Mod: PBBFAC,,, | Performed by: OPHTHALMOLOGY

## 2022-10-18 PROCEDURE — 99024 PR POST-OP FOLLOW-UP VISIT: ICD-10-PCS | Mod: POP,,, | Performed by: OPHTHALMOLOGY

## 2022-10-18 PROCEDURE — 99999 PR PBB SHADOW E&M-EST. PATIENT-LVL III: CPT | Mod: PBBFAC,,, | Performed by: OPHTHALMOLOGY

## 2022-10-18 NOTE — PROGRESS NOTES
HPI    DLS: 10/07/2022    Pt here for S/P Ahmed OD- 9/21/2022  Pt states no eye pain but seems like the light is brighter in her OD.    Meds;  PA TID OD  Atropine daily OD  Dorzolamide TID OS  Latanoprost QHS OS  PF AT's PRN OS ONLY  Last edited by Sanna Wiley MD on 10/18/2022 12:57 PM.            Assessment /Plan     For exam results, see Encounter Report.    Encounter for postoperative care    Primary open angle glaucoma of right eye, severe stage    Primary open angle glaucoma of left eye, moderate stage    Type 2 diabetes mellitus without ophthalmic manifestations    Dry eyes, bilateral    Pseudophakia of both eyes    Glaucoma filtering bleb of both eyes    History of glaucoma tube shunt procedure        1. POAG (primary open-angle glaucoma) - Severe stage - Both Eyes   Glaucoma (type and duration) POAG,   -  first HVF 1997  -  first photo: 1996     Family history None   Glaucoma meds - (off all gtts os post combined)  (( use to use - Alphagan od , Xalatan od , dorzolamide bid od ))  H/O adverse rxn to glaucoma drops into to BB 2/2 asthma // brimonidine - irritation - tearing   LASERS SLT 12/8/05 and repeat 7/16/09 OD, and SLT 1/6/06 OS;  SLT os 7/31/14, od 8/14/14 (no response ou) // yag cap od 1/3/2019 /// yag cap os  10/17/2019   GLAUCOMA SURGERIES - combined phaco/IOL trab -os 12/28/2015// elastic sclera - 9 sutures to close - all visible ones cut /// combined - phaco/trab w/ minishunt od - 3/30/2016  OTHER EYE SURGERIES PC IOL OS (combined 1/28/2015) // PC IOL od (combined 3/30/2016)  CDR 0.95 OU   Tbase 22 OU   Tmax 37 (4/11/2016) /38 ( 4/16/2015)   Ttarget 14/14  HVF 23 HVF: 5772-3863 - SAD and  INS  OD, // SAD / IAD  Os (+prog ou 10/2020)   Gonio ext PAS od - 90 degrees open // +1-3 os    /421   OCT 11 test 2004 - 2021: unable to interpret  OD// Dec thru out   HRT 13 test 2005 to 2020 - MR- Dec. SN/N, bord IT od // dec. N/IN, joshua SN/IT os /// CDR 0.78 od // 0.75 os  Disc photos 1996,  1997, 2003: slides // 2012 , 2015 - OIS    Ttoday  10/910 (down from 26/12 w/ addition of rhopressa od)   Test done today:  post -op ahmed OD - 9/21/2022     2. Diabetes mellitus type II   No BDR     3. Dry eyes - Both Eyes   AT's prn     4. Posterior vitreous detachment of both eyes - Both Eyes   With floaters - stable     5. A lot of family stressors   Mom passed since last visit  dad elderly and in poor health, have sitters  Daughter is unemployed and had to move back home  Daughter recently ill - in ICU and intubated 2/2 pancreatitis (8/2/2015 to 8/11/2015)      6. S/P yag cap ou   -od 10/3/3019 (also Rx ant capsule phimosis od)   -OS - 10/17/2019        Plan  Glaucoma   + blebs ou -  IOP TOO high OD     target is 14 ou and + VF prog ou on 10/2020 -    Adjust glaucoma drops   Latanoprost ou q day   Brimonidine tid ou   Dorzolamide tid OD  ADD RHOPRESSA OD Q DAY - sample given     - poor candidate for repeat SLT od  - lots of PAS and no response last repeat in 8/2014   ?? If pt is a candidate for trial of diamox   consideer bleb needling - has a express minishunt - but may be able to needle bleb and improve function - has free sup conj, but there is a lot of subconj episcleral fibrosis and there may be no flow through the express - most likely can not revieve it -- may need a GDD - ahmed or baerveldt     Cont AT's ou prn     Sees Lisa for glasses     AT's prn     Good resp to trial of rhopressa od 28--> 18- 19 - will see if insurance will cover it     Photo file updated - 1.28.2022 --not pulled 5/9/2022    IOP too high od - unable to control with MMT   + VF progression on MMT  rec GDD -  ahmed od - schedule    pre-op OD    S/P ahmed OD  Date:9/21/2022   POm1  anterior chamber depth  deep   Bleb appearance  elevated over ahmed plate   sidell neg   IOP  10 AC deep // no choroidals     Shield at night   Glasses day   No lifting / no bending / no eye rubbing / no straining     Meds: - operated eye  Pred acetate -  tid - decrease to BID  Atropine decrease daily- stop    Hold all glaucoma drops OD     Cont glaucoma drops os   Dorzolamide tid os   Latanoprost 1 x day os     OFF brimonidine - ? Allergy - tearing / irritation        RTC in 3 weeks  for POM2 check

## 2022-10-20 ENCOUNTER — DOCUMENTATION ONLY (OUTPATIENT)
Dept: REHABILITATION | Facility: OTHER | Age: 77
End: 2022-10-20
Payer: MEDICARE

## 2022-10-20 DIAGNOSIS — M25.512 ACUTE PAIN OF LEFT SHOULDER: Primary | ICD-10-CM

## 2022-10-20 DIAGNOSIS — M25.612 DECREASED RANGE OF MOTION OF LEFT SHOULDER: ICD-10-CM

## 2022-10-20 NOTE — PROGRESS NOTES
[facility]  PT Discharge Note    Name: Lupe Jewell  [facility] Number: [id number]    Therapy Diagnosis:   Encounter Diagnoses   Name Primary?    Acute pain of left shoulder Yes    Decreased range of motion of left shoulder      Physician: No ref. provider found    Physician Orders: Treat   Medical Diagnosis from Referral:   S42.292D (ICD-10-CM) - Humeral head fracture, left, with routine healing, subsequent encounter  Evaluation Date: 7/29/2022      Date of Last visit: 9/6/2022  Total Visits Received: 5    ASSESSMENT      Goals partially met    Discharge reason: Patient has not attended therapy since 9/6/2022    Discharge FOTO Score: 37%        PLAN   This patient is discharged from Physical Therapy      Sanna Abrams, PT

## 2022-10-25 RX ORDER — MONTELUKAST SODIUM 10 MG/1
TABLET ORAL
Qty: 90 TABLET | Refills: 2 | Status: SHIPPED | OUTPATIENT
Start: 2022-10-25 | End: 2023-05-26 | Stop reason: SDUPTHER

## 2022-10-25 NOTE — TELEPHONE ENCOUNTER
Care Due:                  Date            Visit Type   Department     Provider  --------------------------------------------------------------------------------                                             [facility]  Last Visit: 06-      PRE-OP       MEDICINE       Marisol Restrepo  Next Visit: None Scheduled  None         None Found                                                            Last  Test          Frequency    Reason                     Performed    Due Date  --------------------------------------------------------------------------------    HBA1C.......  6 months...  metFORMIN................  06- 12-    Lipid Panel.  12 months..  atorvastatin.............  03- 03-    [employer] Embedded Care Gaps. Reference number: [id number]. 10/25/2022   1:48:05 PM CDT

## 2022-10-25 NOTE — TELEPHONE ENCOUNTER
Refill Decision Note   Lupe Jewell  is requesting a refill authorization.  Brief Assessment and Rationale for Refill:  Approve    -Medication-Related Problems Identified: Requires labs  Medication Therapy Plan:       Medication Reconciliation Completed: No   Comments:     Provider Staff:     Action is required for this patient.   Please see care gap opportunities below in Care Due Message.     Thanks!  [facility]     Appointments      Date Provider   Last Visit   6/8/2022 Marisol Restrepo MD   Next Visit   Visit date not found Marisol Restrepo MD     Note composed:2:19 PM 10/25/2022           Note composed:2:19 PM 10/25/2022

## 2022-10-31 ENCOUNTER — EXTERNAL CHRONIC CARE MANAGEMENT (OUTPATIENT)
Dept: PRIMARY CARE CLINIC | Facility: CLINIC | Age: 77
End: 2022-10-31
Payer: MEDICARE

## 2022-10-31 PROCEDURE — 99490 CHRNC CARE MGMT STAFF 1ST 20: CPT | Mod: PBBFAC | Performed by: INTERNAL MEDICINE

## 2022-10-31 PROCEDURE — 99490 CHRNC CARE MGMT STAFF 1ST 20: CPT | Mod: S$PBB,,, | Performed by: INTERNAL MEDICINE

## 2022-10-31 PROCEDURE — 99490 PR CHRONIC CARE MGMT, 1ST 20 MIN: ICD-10-PCS | Mod: S$PBB,,, | Performed by: INTERNAL MEDICINE

## 2022-11-09 NOTE — PROGRESS NOTES
HPI     Post-op Evaluation            Comments: Pt states no complaints today           Comments    S/p Ahmed OD 9/21/22    1) POAG  2) PCIOL OU  3) Type 2 DM NO DR  4) LOLA  5) PVD OU  6) Yag Cap OU    MEDS:  PA BID OD    Dorzolamide TID OS  Latanoprost QHS OS   Pres Free AT's  PRN OU              Last edited by Tia Garcia MA on 11/11/2022 10:44 AM.            Assessment /Plan     For exam results, see Encounter Report.    Encounter for postoperative care    Primary open angle glaucoma of right eye, severe stage    Primary open angle glaucoma of left eye, moderate stage    Type 2 diabetes mellitus without ophthalmic manifestations    Dry eyes, bilateral    Glaucoma filtering bleb of both eyes        1. POAG (primary open-angle glaucoma) - Severe stage - Both Eyes   Glaucoma (type and duration) POAG,   -  first HVF 1997  -  first photo: 1996     Family history None   Glaucoma meds - (off all gtts os post combined)  (( use to use - Alphagan od , Xalatan od , dorzolamide bid od ))  H/O adverse rxn to glaucoma drops into to BB 2/2 asthma // brimonidine - irritation - tearing   LASERS SLT 12/8/05 and repeat 7/16/09 OD, and SLT 1/6/06 OS;  SLT os 7/31/14, od 8/14/14 (no response ou) // yag cap od 1/3/2019 /// yag cap os  10/17/2019   GLAUCOMA SURGERIES - combined phaco/IOL trab -os 12/28/2015// elastic sclera - 9 sutures to close - all visible ones cut /// combined - phaco/trab w/ minishunt od - 3/30/2016  OTHER EYE SURGERIES PC IOL OS (combined 1/28/2015) // PC IOL od (combined 3/30/2016)  CDR 0.95 OU   Tbase 22 OU   Tmax 37 (4/11/2016) /38 ( 4/16/2015)   Ttarget 14/14  HVF 23 HVF: 7474-2057 - SAD and  INS  OD, // SAD / IAD  Os (+prog ou 10/2020)   Gonio ext PAS od - 90 degrees open // +1-3 os    /421   OCT 11 test 2004 - 2021: unable to interpret  OD// Dec thru out   HRT 13 test 2005 to 2020 - MR- Dec. SN/N, joshua IT od // dec. N/IN, joshua SN/IT os /// CDR 0.78 od // 0.75 os  Disc photos 1996, 1997, 2003:  slides // 2012 , 2015 - OIS    Ttoday  12/9 (down from 26/12 w/ addition of rhopressa od)   Test done today:  post -op ahmed OD - 9/21/2022     2. Diabetes mellitus type II   No BDR     3. Dry eyes - Both Eyes   AT's prn     4. Posterior vitreous detachment of both eyes - Both Eyes   With floaters - stable     5. A lot of family stressors   Mom passed since last visit  dad elderly and in poor health, have sitters  Daughter is unemployed and had to move back home  Daughter recently ill - in ICU and intubated 2/2 pancreatitis (8/2/2015 to 8/11/2015)      6. S/P yag cap ou   -od 10/3/3019 (also Rx ant capsule phimosis od)   -OS - 10/17/2019        Plan  Glaucoma   + blebs ou -  IOP TOO high OD     target is 14 ou and + VF prog ou on 10/2020 -    Adjust glaucoma drops   Latanoprost ou q day   Brimonidine tid ou   Dorzolamide tid OD  ADD RHOPRESSA OD Q DAY - sample given     - poor candidate for repeat SLT od  - lots of PAS and no response last repeat in 8/2014   ?? If pt is a candidate for trial of diamox   consideer bleb needling - has a express minishunt - but may be able to needle bleb and improve function - has free sup conj, but there is a lot of subconj episcleral fibrosis and there may be no flow through the express - most likely can not revieve it -- may need a GDD - ahmed or baerveldt     Cont AT's ou prn     Sees Lisa for glasses     AT's prn     Good resp to trial of rhopressa od 28--> 18- 19 - will see if insurance will cover it     Photo file updated - 1.28.2022 --not pulled 5/9/2022    IOP too high od - unable to control with MMT   + VF progression on MMT  rec GDD -  ahmed od - schedule    pre-op OD    S/P ahmed OD  Date:9/21/2022   POW 6-7   anterior chamber depth  deep   Bleb appearance  elevated over ahmed plate   sidell neg   IOP  12  AC deep // no choroidals     Meds: - operated eye  Pred acetate - tdecrease to 1 x day for 2 weeks then stop     Hold all glaucoma drops OD     Cont glaucoma drops os    Dorzolamide tid os   Latanoprost 1 x day os     OFF brimonidine - ? Allergy - tearing / irritation      F/U 1 month - post op ar/mr    Back to dr gordon for refraction

## 2022-11-10 ENCOUNTER — TELEPHONE (OUTPATIENT)
Dept: INTERNAL MEDICINE | Facility: CLINIC | Age: 77
End: 2022-11-10
Payer: MEDICARE

## 2022-11-10 NOTE — TELEPHONE ENCOUNTER
----- Message from Mayda Serrato sent at 11/10/2022 12:43 PM CST -----  Regarding: Questions and concerns  Contact: [phone]  Patients daughter Leanna is calling. Patient is due to have a covid booster on tomorrow. Patient has been having breathing problems, congested for 3-4 weeks. Would like to know if patient should take the booster or wait. Please call Leanna at [phone]    Thank You

## 2022-11-10 NOTE — TELEPHONE ENCOUNTER
Called and spoke to daughter Leanna about her mothers breathing. Patients daughter states patients she has been having an increase in wheezing and using her nebulizer more than usual. Continues to use rescue inhaler for episodes of SOB. Encouraged patient to be seen at an UC or ER for evaluation due to Hx of asthma and bronchitis.Did schedule an appt with another provider for next week. Also patient is requesting a refill of the cough syrup if possible. Promethazine-DM. Patient denies fever and chills at this time. Scheduled COVID test but daughter states they are going to hold on that until see by MD.

## 2022-11-11 ENCOUNTER — OFFICE VISIT (OUTPATIENT)
Dept: OPHTHALMOLOGY | Facility: CLINIC | Age: 77
End: 2022-11-11
Payer: MEDICARE

## 2022-11-11 DIAGNOSIS — Z48.89 ENCOUNTER FOR POSTOPERATIVE CARE: Primary | ICD-10-CM

## 2022-11-11 DIAGNOSIS — Z98.83 GLAUCOMA FILTERING BLEB OF BOTH EYES: ICD-10-CM

## 2022-11-11 DIAGNOSIS — H04.123 DRY EYES, BILATERAL: ICD-10-CM

## 2022-11-11 DIAGNOSIS — E11.9 TYPE 2 DIABETES MELLITUS WITHOUT OPHTHALMIC MANIFESTATIONS: ICD-10-CM

## 2022-11-11 DIAGNOSIS — H40.1113 PRIMARY OPEN ANGLE GLAUCOMA OF RIGHT EYE, SEVERE STAGE: ICD-10-CM

## 2022-11-11 DIAGNOSIS — H40.1122 PRIMARY OPEN ANGLE GLAUCOMA OF LEFT EYE, MODERATE STAGE: ICD-10-CM

## 2022-11-11 PROCEDURE — 99024 PR POST-OP FOLLOW-UP VISIT: ICD-10-PCS | Mod: POP,,, | Performed by: OPHTHALMOLOGY

## 2022-11-11 PROCEDURE — 99999 PR PBB SHADOW E&M-EST. PATIENT-LVL III: ICD-10-PCS | Mod: PBBFAC,,, | Performed by: OPHTHALMOLOGY

## 2022-11-11 PROCEDURE — 99024 POSTOP FOLLOW-UP VISIT: CPT | Mod: POP,,, | Performed by: OPHTHALMOLOGY

## 2022-11-11 PROCEDURE — 99213 OFFICE O/P EST LOW 20 MIN: CPT | Mod: PBBFAC | Performed by: OPHTHALMOLOGY

## 2022-11-11 PROCEDURE — 99999 PR PBB SHADOW E&M-EST. PATIENT-LVL III: CPT | Mod: PBBFAC,,, | Performed by: OPHTHALMOLOGY

## 2022-11-15 ENCOUNTER — OFFICE VISIT (OUTPATIENT)
Dept: INTERNAL MEDICINE | Facility: CLINIC | Age: 77
End: 2022-11-15
Payer: MEDICARE

## 2022-11-15 VITALS
HEART RATE: 94 BPM | DIASTOLIC BLOOD PRESSURE: 90 MMHG | OXYGEN SATURATION: 99 % | WEIGHT: 163.13 LBS | BODY MASS INDEX: 24.72 KG/M2 | HEIGHT: 68 IN | SYSTOLIC BLOOD PRESSURE: 132 MMHG | RESPIRATION RATE: 18 BRPM

## 2022-11-15 DIAGNOSIS — J45.901 EXACERBATION OF ASTHMA, UNSPECIFIED ASTHMA SEVERITY, UNSPECIFIED WHETHER PERSISTENT: ICD-10-CM

## 2022-11-15 DIAGNOSIS — R05.9 COUGH, UNSPECIFIED TYPE: Primary | ICD-10-CM

## 2022-11-15 DIAGNOSIS — J45.41 MODERATE PERSISTENT ASTHMA WITH ACUTE EXACERBATION: ICD-10-CM

## 2022-11-15 PROCEDURE — 99214 PR OFFICE/OUTPT VISIT, EST, LEVL IV, 30-39 MIN: ICD-10-PCS | Mod: S$PBB,GC,,

## 2022-11-15 PROCEDURE — 99999 PR PBB SHADOW E&M-EST. PATIENT-LVL III: ICD-10-PCS | Mod: PBBFAC,GC,,

## 2022-11-15 PROCEDURE — 99213 OFFICE O/P EST LOW 20 MIN: CPT | Mod: PBBFAC

## 2022-11-15 PROCEDURE — 99999 PR PBB SHADOW E&M-EST. PATIENT-LVL III: CPT | Mod: PBBFAC,GC,,

## 2022-11-15 PROCEDURE — 99214 OFFICE O/P EST MOD 30 MIN: CPT | Mod: S$PBB,GC,,

## 2022-11-15 RX ORDER — BENZONATATE 200 MG/1
200 CAPSULE ORAL 3 TIMES DAILY PRN
Qty: 30 CAPSULE | Refills: 3 | Status: CANCELLED | OUTPATIENT
Start: 2022-11-15 | End: 2022-11-25

## 2022-11-15 RX ORDER — METHYLPREDNISOLONE 4 MG/1
TABLET ORAL
Qty: 21 TABLET | Refills: 0 | Status: SHIPPED | OUTPATIENT
Start: 2022-11-16 | End: 2022-11-21

## 2022-11-15 RX ORDER — BUDESONIDE AND FORMOTEROL FUMARATE DIHYDRATE 160; 4.5 UG/1; UG/1
AEROSOL RESPIRATORY (INHALATION)
Qty: 10.2 G | Refills: 3 | Status: SHIPPED | OUTPATIENT
Start: 2022-11-15 | End: 2023-06-14

## 2022-11-15 RX ORDER — AZITHROMYCIN 250 MG/1
TABLET, FILM COATED ORAL
Qty: 6 TABLET | Refills: 0 | Status: SHIPPED | OUTPATIENT
Start: 2022-11-16 | End: 2022-11-21

## 2022-11-15 NOTE — PROGRESS NOTES
.Clinic Note  11/15/2022      Subjective:           Chief Complaint: Cough (Productive cough ) and Asthma    Patient ID: Lupe Jewell is a 77 y.o. female being seen for an established visit.    Pt is a 77 year old female with PMH of allergic rhinitis, asthma, bronchitis, and DM2 presenting with 4 week history of worsening cough and congestion. Pt states she has dealt with issues of asthma and bronchitis since she was little, but over the last month her symptoms have been worsening. Pt has been requiring to use her duonebs daily for the last few weeks as well as rescue inhaler. Pt has not taken her symbicort for the last few weeks due to running out of it. Pt reports coughing up clear sputum that has begun to start being yellow/green in appearance, mainly in the morning but some throughout the day as well as a scratchy throat. Pt has not seen a pulmonologist in years and is mainly managed by her PCP. Pt has not smoked cigarettes since 1974. Pt has grandchildren she is around regularly that are sick on a regular basis. Pt reports having taken steroids and antibiotics in the past with no issues, except she has a penicillin allergy. Pt states steroids have usually helped her issues in the past. Pt does not check her sugars at home but her last A1c was 6.6.      Review of Systems   Constitutional:  Positive for fatigue. Negative for chills and fever.   HENT:  Positive for congestion, sinus pressure, sinus pain and sore throat (scratchy). Negative for trouble swallowing.    Eyes:  Positive for visual disturbance (just had surgery). Negative for photophobia.   Respiratory:  Positive for cough, shortness of breath (chronic, but worsed last couple of weeks) and wheezing.    Cardiovascular:  Negative for chest pain and leg swelling.   Gastrointestinal:  Negative for nausea and vomiting.   Genitourinary:  Negative for difficulty urinating, dyspareunia and dysuria.   Musculoskeletal:  Negative for back pain and myalgias.    Neurological:  Positive for light-headedness (when coughing). Negative for dizziness.     Patient's Medications   New Prescriptions    AZITHROMYCIN (Z-DIMITRI) 250 MG TABLET    Take 2 tablets by mouth on day 1; Take 1 tablet by mouth on days 2-5    METHYLPREDNISOLONE (MEDROL DOSEPACK) 4 MG TABLET    use as directed   Previous Medications    ALBUTEROL (PROVENTIL/VENTOLIN HFA) 90 MCG/ACTUATION INHALER    Inhale 2 puffs into the lungs every 6 (six) hours as needed for Wheezing. Rescue    ALBUTEROL-IPRATROPIUM (DUO-NEB) 2.5 MG-0.5 MG/3 ML NEBULIZER SOLUTION    INHALE 3 ML VIA NEBULIZER EVERY 6 HOURS AS NEEDED FOR WHEEZING OR SHORTNESS OF BREATH    AMITRIPTYLINE (ELAVIL) 25 MG TABLET    Take 1 tablet (25 mg total) by mouth every evening.    ATORVASTATIN (LIPITOR) 10 MG TABLET    TAKE 1 TABLET(10 MG) BY MOUTH EVERY DAY    AZELASTINE (ASTELIN) 137 MCG (0.1 %) NASAL SPRAY    USE 2 SPRAYS IN EACH NOSTRIL TWICE DAILY    CICLOPIROX (LOPROX) 0.77 % CREA    Apply topically 2 (two) times daily.    DORZOLAMIDE (TRUSOPT) 2 % OPHTHALMIC SOLUTION    Place 1 drop into the right eye 3 (three) times daily.    FLUTICASONE PROPIONATE (FLONASE) 50 MCG/ACTUATION NASAL SPRAY    SHAKE LIQUID AND USE 2 SPRAYS(100 MCG) IN EACH NOSTRIL TWICE DAILY    LATANOPROST 0.005 % OPHTHALMIC SOLUTION    Place 1 drop into both eyes once daily.    LOSARTAN (COZAAR) 50 MG TABLET    TAKE 1 TABLET(50 MG) BY MOUTH EVERY DAY    METFORMIN (GLUCOPHAGE) 500 MG TABLET    Take 1 tablet (500 mg total) by mouth 2 (two) times daily with meals.    MONTELUKAST (SINGULAIR) 10 MG TABLET    TAKE 1 TABLET(10 MG) BY MOUTH EVERY EVENING    PREDNISOLONE ACETATE (PRED FORTE) 1 % DRPS    Place 1 drop into the right eye 4 (four) times daily.    PROMETHAZINE-DEXTROMETHORPHAN (PROMETHAZINE-DM) 6.25-15 MG/5 ML SYRP    TAKE 5 ML BY MOUTH EVERY NIGHT AS NEEDED    TRIAMTERENE-HYDROCHLOROTHIAZIDE 37.5-25 MG (MAXZIDE-25) 37.5-25 MG PER TABLET    TAKE 1 TABLET BY MOUTH EVERY DAY   Modified  "Medications    Modified Medication Previous Medication    BUDESONIDE-FORMOTEROL 160-4.5 MCG (SYMBICORT) 160-4.5 MCG/ACTUATION HFAA budesonide-formoterol 160-4.5 mcg (SYMBICORT) 160-4.5 mcg/actuation HFAA       INHALE 2 PUFFS BY MOUTH EVERY 12 HOURS    INHALE 2 PUFFS BY MOUTH EVERY 12 HOURS   Discontinued Medications    No medications on file       Patient Active Problem List    Diagnosis Date Noted    Special screening for malignant neoplasms, colon 07/01/2016    Nuclear senile cataract of right eye 03/30/2016    Bronchitis 04/09/2015    Fecal incontinence 04/09/2015    Dermatitis 04/09/2015    Post-operative state 02/02/2015    Senile nuclear sclerosis 01/28/2015    Primary open angle glaucoma of right eye, severe stage 10/27/2014    Primary open angle glaucoma of left eye 10/27/2014    Diabetes 02/04/2014    Presbyopia 11/08/2012    Nuclear sclerosis - Both Eyes 08/17/2012    Dry eyes - Both Eyes 08/17/2012    Posterior vitreous detachment of both eyes - Both Eyes 08/17/2012    Asthma, well controlled 08/17/2012    AR (allergic rhinitis) 08/17/2012    GERD (gastroesophageal reflux disease) 08/17/2012    Type 2 diabetes mellitus with other specified complication, without long-term current use of insulin            Objective:      BP (!) 132/90 (BP Location: Right arm, Patient Position: Sitting, BP Method: Large (Manual))   Pulse 94   Resp 18   Ht 5' 8" (1.727 m)   Wt 74 kg (163 lb 2.3 oz)   SpO2 99%   BMI 24.81 kg/m²   Estimated body mass index is 24.81 kg/m² as calculated from the following:    Height as of this encounter: 5' 8" (1.727 m).    Weight as of this encounter: 74 kg (163 lb 2.3 oz).    Physical Exam  Vitals and nursing note reviewed.   Constitutional:       General: She is not in acute distress.     Appearance: Normal appearance. She is normal weight.   HENT:      Head: Normocephalic and atraumatic.      Nose: Congestion present.      Mouth/Throat:      Mouth: Mucous membranes are moist.      " Pharynx: Posterior oropharyngeal erythema present.   Eyes:      General: No scleral icterus.     Comments: Redness noted in pt's L eye. Pt has complaints of allergies that bother her eyes.    Cardiovascular:      Rate and Rhythm: Normal rate and regular rhythm.      Heart sounds: No murmur heard.  Pulmonary:      Effort: Pulmonary effort is normal. No respiratory distress.      Breath sounds: Wheezing (throughout) present.   Abdominal:      General: Abdomen is flat. Bowel sounds are normal. There is no distension.      Palpations: Abdomen is soft.      Tenderness: There is no abdominal tenderness.   Musculoskeletal:      Right lower leg: No edema.      Left lower leg: No edema.   Skin:     Findings: No lesion or rash.   Neurological:      General: No focal deficit present.      Mental Status: She is alert. Mental status is at baseline.   Psychiatric:         Mood and Affect: Mood normal.         Behavior: Behavior normal. Behavior is cooperative.         Thought Content: Thought content normal.         Judgment: Judgment normal.       Assessment & Plan:   Lupe was seen today for cough and asthma.    Diagnoses and all orders for this visit:    Cough, unspecified type    Moderate persistent asthma with acute exacerbation  -     budesonide-formoterol 160-4.5 mcg (SYMBICORT) 160-4.5 mcg/actuation HFAA; INHALE 2 PUFFS BY MOUTH EVERY 12 HOURS  -     Ambulatory referral/consult to Pulmonology; Future    Exacerbation of asthma, unspecified asthma severity, unspecified whether persistent  -     methylPREDNISolone (MEDROL DOSEPACK) 4 mg tablet; use as directed  discussed with patient about the effects it could have on her blood sugars.  -     azithromycin (Z-DIMITRI) 250 MG tablet; Take 2 tablets by mouth on day 1; Take 1 tablet by mouth on days 2-5      Patient seen and plan of care discussed with Dr. Jamil     Follow-up with PCP at scheduled appointment    lEliot Love DO, PGY1  [facility]

## 2022-11-23 ENCOUNTER — TELEPHONE (OUTPATIENT)
Dept: INTERNAL MEDICINE | Facility: CLINIC | Age: 77
End: 2022-11-23
Payer: MEDICARE

## 2022-11-30 ENCOUNTER — EXTERNAL CHRONIC CARE MANAGEMENT (OUTPATIENT)
Dept: PRIMARY CARE CLINIC | Facility: CLINIC | Age: 77
End: 2022-11-30
Payer: MEDICARE

## 2022-11-30 PROCEDURE — 99490 PR CHRONIC CARE MGMT, 1ST 20 MIN: ICD-10-PCS | Mod: S$PBB,,, | Performed by: INTERNAL MEDICINE

## 2022-11-30 PROCEDURE — 99490 CHRNC CARE MGMT STAFF 1ST 20: CPT | Mod: PBBFAC | Performed by: INTERNAL MEDICINE

## 2022-11-30 PROCEDURE — 99490 CHRNC CARE MGMT STAFF 1ST 20: CPT | Mod: S$PBB,,, | Performed by: INTERNAL MEDICINE

## 2022-12-01 ENCOUNTER — TELEPHONE (OUTPATIENT)
Dept: INTERNAL MEDICINE | Facility: CLINIC | Age: 77
End: 2022-12-01
Payer: MEDICARE

## 2022-12-01 NOTE — TELEPHONE ENCOUNTER
----- Message from Deborah Hicks sent at 12/1/2022  1:41 PM CST -----  Contact: [phone]  Pt is calling she is asking if the dr is wanting her to see an ENT and a Pulmonary dr please give return call

## 2022-12-12 ENCOUNTER — OFFICE VISIT (OUTPATIENT)
Dept: OPHTHALMOLOGY | Facility: CLINIC | Age: 77
End: 2022-12-12
Payer: MEDICARE

## 2022-12-12 DIAGNOSIS — H43.813 POSTERIOR VITREOUS DETACHMENT OF BOTH EYES: ICD-10-CM

## 2022-12-12 DIAGNOSIS — Z96.89 HISTORY OF GLAUCOMA TUBE SHUNT PROCEDURE: ICD-10-CM

## 2022-12-12 DIAGNOSIS — H40.1122 PRIMARY OPEN ANGLE GLAUCOMA OF LEFT EYE, MODERATE STAGE: ICD-10-CM

## 2022-12-12 DIAGNOSIS — H04.123 DRY EYES, BILATERAL: ICD-10-CM

## 2022-12-12 DIAGNOSIS — H40.1113 PRIMARY OPEN ANGLE GLAUCOMA OF RIGHT EYE, SEVERE STAGE: Primary | ICD-10-CM

## 2022-12-12 DIAGNOSIS — Z98.83 GLAUCOMA FILTERING BLEB OF BOTH EYES: ICD-10-CM

## 2022-12-12 DIAGNOSIS — Z96.1 PSEUDOPHAKIA OF BOTH EYES: ICD-10-CM

## 2022-12-12 DIAGNOSIS — H26.491 PCO (POSTERIOR CAPSULAR OPACIFICATION), RIGHT: ICD-10-CM

## 2022-12-12 DIAGNOSIS — E11.9 TYPE 2 DIABETES MELLITUS WITHOUT OPHTHALMIC MANIFESTATIONS: ICD-10-CM

## 2022-12-12 DIAGNOSIS — H26.492 PCO (POSTERIOR CAPSULAR OPACIFICATION), LEFT: ICD-10-CM

## 2022-12-12 PROCEDURE — 99024 POSTOP FOLLOW-UP VISIT: CPT | Mod: POP,,, | Performed by: OPHTHALMOLOGY

## 2022-12-12 PROCEDURE — 99213 OFFICE O/P EST LOW 20 MIN: CPT | Mod: PBBFAC | Performed by: OPHTHALMOLOGY

## 2022-12-12 PROCEDURE — 99024 PR POST-OP FOLLOW-UP VISIT: ICD-10-PCS | Mod: POP,,, | Performed by: OPHTHALMOLOGY

## 2022-12-12 PROCEDURE — 99999 PR PBB SHADOW E&M-EST. PATIENT-LVL III: ICD-10-PCS | Mod: PBBFAC,,, | Performed by: OPHTHALMOLOGY

## 2022-12-12 PROCEDURE — 99999 PR PBB SHADOW E&M-EST. PATIENT-LVL III: CPT | Mod: PBBFAC,,, | Performed by: OPHTHALMOLOGY

## 2022-12-12 NOTE — PROGRESS NOTES
HPI    DLS: 11/11/22    Pt here for post op check.  Pt without complaints today OU. Pt states pain   OD yesterday but denies eye pain OD today.  Pt states she still has   brightness OD but sees clearer now. Pt has an appointment with Dr Gonzalez on   2/15/23 for a refraction.     S/p Ahmed OD 9/21/22     1) POAG   2) PCIOL OU   3) Type 2 DM NO DR   4) LOLA   5) PVD OU   6) Yag Cap OU     MEDS:   Dorzolamide TID OS   Latanoprost QHS OS   Pres Free AT's  PRN OU     Last edited by Migdalia Langston MA on 12/12/2022  2:36 PM.              Assessment /Plan     For exam results, see Encounter Report.    Primary open angle glaucoma of right eye, severe stage    Primary open angle glaucoma of left eye, moderate stage    Type 2 diabetes mellitus without ophthalmic manifestations    Dry eyes, bilateral    Glaucoma filtering bleb of both eyes    Pseudophakia of both eyes    History of glaucoma tube shunt procedure    Posterior vitreous detachment of both eyes - Both Eyes    PCO (posterior capsular opacification), left    PCO (posterior capsular opacification), right      1. POAG (primary open-angle glaucoma) - Severe stage - Both Eyes   Glaucoma (type and duration) POAG,   -  first HVF 1997  -  first photo: 1996     Family history None   Glaucoma meds - (off all gtts os post combined)  (( use to use - Alphagan od , Xalatan od , dorzolamide bid od ))  H/O adverse rxn to glaucoma drops into to BB 2/2 asthma // brimonidine - irritation - tearing   LASERS SLT 12/8/05 and repeat 7/16/09 OD, and SLT 1/6/06 OS;  SLT os 7/31/14, od 8/14/14 (no response ou) // yag cap od 1/3/2019 /// yag cap os  10/17/2019   GLAUCOMA SURGERIES - combined phaco/IOL trab -os 12/28/2015// elastic sclera - 9 sutures to close - all visible ones cut /// combined - phaco/trab w/ minishunt od - 3/30/2016  OTHER EYE SURGERIES PC IOL OS (combined 1/28/2015) // PC IOL od (combined 3/30/2016)  CDR 0.95 OU   Tbase 22 OU   Tmax 37 (4/11/2016) /38 ( 4/16/2015)    Ttarget 14/14  HVF 23 HVF: 8317-5375 - SAD and  INS  OD, // SAD / IAD  Os (+prog ou 10/2020)   Gonio ext PAS od - 90 degrees open // +1-3 os    /421   OCT 11 test 2004 - 2021: unable to interpret  OD// Dec thru out   HRT 13 test 2005 to 2020 - MR- Dec. SN/N, bord IT od // dec. N/IN, bord SN/IT os /// CDR 0.78 od // 0.75 os  Disc photos 1996, 1997, 2003: slides // 2012 , 2015 - OIS    Ttoday  12/12 (down from 26/12 w/ addition of rhopressa od)   Test done today:  post -op ahmed OD - 9/21/2022     2. Diabetes mellitus type II   No BDR     3. Dry eyes - Both Eyes   AT's prn     4. Posterior vitreous detachment of both eyes - Both Eyes   With floaters - stable     5. A lot of family stressors   Mom passed since last visit  dad elderly and in poor health, have sitters  Daughter is unemployed and had to move back home  Daughter recently ill - in ICU and intubated 2/2 pancreatitis (8/2/2015 to 8/11/2015)      6. S/P yag cap ou   -od 10/3/3019 (also Rx ant capsule phimosis od)   -OS - 10/17/2019        Plan  Glaucoma   + blebs ou -  IOP TOO high OD     target is 14 ou and + VF prog ou on 10/2020 -    Adjust glaucoma drops   Latanoprost ou q day   Brimonidine tid ou   Dorzolamide tid OD  ADD RHOPRESSA OD Q DAY - sample given     - poor candidate for repeat SLT od  - lots of PAS and no response last repeat in 8/2014   ?? If pt is a candidate for trial of diamox   consideer bleb needling - has a express minishunt - but may be able to needle bleb and improve function - has free sup conj, but there is a lot of subconj episcleral fibrosis and there may be no flow through the express - most likely can not revieve it -- may need a GDD - ahmed or baerveldt     Cont AT's ou prn     Sees Lisa for glasses     AT's prn     Good resp to trial of rhopressa od 28--> 18- 19 - will see if insurance will cover it     Photo file updated - 1.28.2022 --not pulled 5/9/2022    IOP too high od - unable to control with MMT   + VF  progression on MMT  rec GDD -  ahmed od - schedule  - done 9/21/2022      S/P ahmed OD  Date:9/21/2022   POm3  anterior chamber depth  deep   Bleb appearance  elevated over ahmed plate   sidell neg   IOP  12 AC deep // no choroidals     Meds: - operated eye  Hold all glaucoma drops OD     Cont glaucoma drops os   Dorzolamide tid os   Latanoprost 1 x day os     OFF brimonidine - ? Allergy - tearing / irritation      F/U 4 mo for testing: HVF OCT DFE    Back to dr gordon scheduled - February

## 2022-12-28 DIAGNOSIS — H90.3 BILATERAL SENSORINEURAL HEARING LOSS: Primary | ICD-10-CM

## 2022-12-31 ENCOUNTER — EXTERNAL CHRONIC CARE MANAGEMENT (OUTPATIENT)
Dept: PRIMARY CARE CLINIC | Facility: CLINIC | Age: 77
End: 2022-12-31
Payer: MEDICARE

## 2022-12-31 PROCEDURE — 99439 CHRNC CARE MGMT STAF EA ADDL: CPT | Mod: S$PBB,,, | Performed by: INTERNAL MEDICINE

## 2022-12-31 PROCEDURE — 99439 CHRNC CARE MGMT STAF EA ADDL: CPT | Mod: PBBFAC | Performed by: INTERNAL MEDICINE

## 2022-12-31 PROCEDURE — 99439 PR CHRONIC CARE MGMT, EA ADDTL 20 MIN: ICD-10-PCS | Mod: S$PBB,,, | Performed by: INTERNAL MEDICINE

## 2022-12-31 PROCEDURE — 99490 CHRNC CARE MGMT STAFF 1ST 20: CPT | Mod: S$PBB,,, | Performed by: INTERNAL MEDICINE

## 2022-12-31 PROCEDURE — 99490 CHRNC CARE MGMT STAFF 1ST 20: CPT | Mod: PBBFAC | Performed by: INTERNAL MEDICINE

## 2022-12-31 PROCEDURE — 99490 PR CHRONIC CARE MGMT, 1ST 20 MIN: ICD-10-PCS | Mod: S$PBB,,, | Performed by: INTERNAL MEDICINE

## 2023-01-03 ENCOUNTER — OFFICE VISIT (OUTPATIENT)
Dept: OPHTHALMOLOGY | Facility: CLINIC | Age: 78
End: 2023-01-03
Payer: MEDICARE

## 2023-01-03 DIAGNOSIS — Z48.89 ENCOUNTER FOR POSTOPERATIVE CARE: ICD-10-CM

## 2023-01-03 DIAGNOSIS — H20.00 ACUTE IRITIS, RIGHT EYE: Primary | ICD-10-CM

## 2023-01-03 DIAGNOSIS — H40.1122 PRIMARY OPEN ANGLE GLAUCOMA OF LEFT EYE, MODERATE STAGE: ICD-10-CM

## 2023-01-03 DIAGNOSIS — Z98.83 GLAUCOMA FILTERING BLEB OF BOTH EYES: ICD-10-CM

## 2023-01-03 DIAGNOSIS — H04.123 DRY EYES, BILATERAL: ICD-10-CM

## 2023-01-03 DIAGNOSIS — Z96.1 PSEUDOPHAKIA OF BOTH EYES: ICD-10-CM

## 2023-01-03 DIAGNOSIS — E11.9 TYPE 2 DIABETES MELLITUS WITHOUT OPHTHALMIC MANIFESTATIONS: ICD-10-CM

## 2023-01-03 DIAGNOSIS — H40.1113 PRIMARY OPEN ANGLE GLAUCOMA OF RIGHT EYE, SEVERE STAGE: ICD-10-CM

## 2023-01-03 DIAGNOSIS — Z96.89 HISTORY OF GLAUCOMA TUBE SHUNT PROCEDURE: ICD-10-CM

## 2023-01-03 PROCEDURE — 92012 PR EYE EXAM, EST PATIENT,INTERMED: ICD-10-PCS | Mod: S$PBB,,, | Performed by: OPHTHALMOLOGY

## 2023-01-03 PROCEDURE — 99999 PR PBB SHADOW E&M-EST. PATIENT-LVL I: CPT | Mod: PBBFAC,,, | Performed by: OPHTHALMOLOGY

## 2023-01-03 PROCEDURE — 92012 INTRM OPH EXAM EST PATIENT: CPT | Mod: S$PBB,,, | Performed by: OPHTHALMOLOGY

## 2023-01-03 PROCEDURE — 99211 OFF/OP EST MAY X REQ PHY/QHP: CPT | Mod: PBBFAC | Performed by: OPHTHALMOLOGY

## 2023-01-03 PROCEDURE — 99999 PR PBB SHADOW E&M-EST. PATIENT-LVL I: ICD-10-PCS | Mod: PBBFAC,,, | Performed by: OPHTHALMOLOGY

## 2023-01-03 RX ORDER — PREDNISOLONE ACETATE 10 MG/ML
SUSPENSION/ DROPS OPHTHALMIC
Qty: 10 ML | Refills: 0 | Status: SHIPPED | OUTPATIENT
Start: 2023-01-03 | End: 2023-05-18

## 2023-01-03 NOTE — PROGRESS NOTES
HPI    DLS: 12/12/2022    Pt states her OD has been hurting off and on for about 2 weeks now and not   getting any better. Pt states her OD is tender to touch and seems like   advil is not really helping.     Meds;  Dorzolamide TID OS   Latanoprost QHS OS   Pres Free AT's  PRN OU       1) POAG   2) PCIOL OU   3) Type 2 DM NO DR   4) LOLA   5) PVD OU   6) Yag Cap OU      Last edited by Tila Kent on 1/3/2023  2:27 PM.            Assessment /Plan     For exam results, see Encounter Report.    Acute iritis, right eye    Primary open angle glaucoma of right eye, severe stage    Primary open angle glaucoma of left eye, moderate stage    Type 2 diabetes mellitus without ophthalmic manifestations    Dry eyes, bilateral    Glaucoma filtering bleb of both eyes    Pseudophakia of both eyes    History of glaucoma tube shunt procedure    Encounter for postoperative care  -     prednisoLONE acetate (PRED FORTE) 1 % DrpS; Right eye - one drop  4 x day for 1 week, then 3 x day for 1 week, then 2 x day for 1 week, then 1 x day for 1 week, then stop  Dispense: 10 mL; Refill: 0      HPI    DLS: 12/12/2022    Pt states her OD has been hurting off and on for about 2 weeks now and not   getting any better. Pt states her OD is tender to touch and seems like   advil is not really helping.     Meds;  Dorzolamide TID OS   Latanoprost QHS OS   Pres Free AT's  PRN OU       1) POAG   2) PCIOL OU   3) Type 2 DM NO DR   4) LOLA   5) PVD OU   6) Yag Cap OU      Last edited by Tila Kent on 1/3/2023  2:27 PM.              Assessment /Plan     For exam results, see Encounter Report.    Acute iritis, right eye    Primary open angle glaucoma of right eye, severe stage    Primary open angle glaucoma of left eye, moderate stage    Type 2 diabetes mellitus without ophthalmic manifestations    Dry eyes, bilateral    Glaucoma filtering bleb of both eyes    Pseudophakia of both eyes    History of glaucoma tube shunt procedure    Encounter for  postoperative care  -     prednisoLONE acetate (PRED FORTE) 1 % DrpS; Right eye - one drop  4 x day for 1 week, then 3 x day for 1 week, then 2 x day for 1 week, then 1 x day for 1 week, then stop  Dispense: 10 mL; Refill: 0      1. POAG (primary open-angle glaucoma) - Severe stage - Both Eyes   Glaucoma (type and duration) POAG,   -  first HVF 1997  -  first photo: 1996     Family history None   Glaucoma meds - (off all gtts os post combined)  (( use to use - Alphagan od , Xalatan od , dorzolamide bid od ))  H/O adverse rxn to glaucoma drops into to BB 2/2 asthma // brimonidine - irritation - tearing   LASERS SLT 12/8/05 and repeat 7/16/09 OD, and SLT 1/6/06 OS;  SLT os 7/31/14, od 8/14/14 (no response ou) // yag cap od 1/3/2019 /// yag cap os  10/17/2019   GLAUCOMA SURGERIES - combined phaco/IOL trab -os 12/28/2015// elastic sclera - 9 sutures to close - all visible ones cut /// combined - phaco/trab w/ minishunt od - 3/30/2016  OTHER EYE SURGERIES PC IOL OS (combined 1/28/2015) // PC IOL od (combined 3/30/2016)  CDR 0.95 OU   Tbase 22 OU   Tmax 37 (4/11/2016) /38 ( 4/16/2015)   Ttarget 14/14  HVF 23 HVF: 2286-3956 - SAD and  INS  OD, // SAD / IAD  Os (+prog ou 10/2020)   Gonio ext PAS od - 90 degrees open // +1-3 os    /421   OCT 11 test 2004 - 2021: unable to interpret  OD// Dec thru out   HRT 13 test 2005 to 2020 - MR- Dec. SN/N, bord IT od // dec. N/IN, bord SN/IT os /// CDR 0.78 od // 0.75 os  Disc photos 1996, 1997, 2003: slides // 2012 , 2015 - OIS    Ttoday  12/12 (down from 26/12 w/ addition of rhopressa od)   Test done today:  post -op ahmed OD - 9/21/2022     2. Diabetes mellitus type II   No BDR     3. Dry eyes - Both Eyes   AT's prn     4. Posterior vitreous detachment of both eyes - Both Eyes   With floaters - stable     5. A lot of family stressors   Mom passed since last visit  dad elderly and in poor health, have sitters  Daughter is unemployed and had to move back home  Daughter recently  ill - in ICU and intubated 2/2 pancreatitis (8/2/2015 to 8/11/2015)      6. S/P yag cap ou   -od 10/3/3019 (also Rx ant capsule phimosis od)   -OS - 10/17/2019        Plan  Glaucoma   + blebs ou -  IOP TOO high OD     target is 14 ou and + VF prog ou on 10/2020 -    Adjust glaucoma drops   Latanoprost ou q day   Brimonidine tid ou   Dorzolamide tid OD  ADD RHOPRESSA OD Q DAY - sample given     - poor candidate for repeat SLT od  - lots of PAS and no response last repeat in 8/2014   ?? If pt is a candidate for trial of diamox   consideer bleb needling - has a express minishunt - but may be able to needle bleb and improve function - has free sup conj, but there is a lot of subconj episcleral fibrosis and there may be no flow through the express - most likely can not revieve it -- may need a GDD - ahmed or baerveldt     Cont AT's ou prn     Sees Lisa for glasses     AT's prn     Good resp to trial of rhopressa od 28--> 18- 19 - will see if insurance will cover it     Photo file updated - 1.28.2022 --not pulled 5/9/2022    IOP too high od - unable to control with MMT   + VF progression on MMT  rec GDD -  ahmed od - schedule  - done 9/21/2022      S/P ahmed OD  Date:9/21/2022   POm3  anterior chamber depth  deep   Bleb appearance  elevated over ahmed plate   sidell neg   IOP  12 AC deep // no choroidals     Meds: - operated eye  Hold all glaucoma drops OD     Cont glaucoma drops os   Dorzolamide tid os   Latanoprost 1 x day os     OFF brimonidine - ? Allergy - tearing / irritation     01/03/2023  - S/p Ahmed OD POW15   - pain od, scratchy.   -no signs of blebitis  - mild rebound iritis od - + 1 flare and trace cell and CB flush    PA taper weekly - 4/3/2/1      Keep prvious  F/U 3- 4 mo for testing: HVF OCT DFE- call sooner prn if eye pain does not resolve     Back to dr gordon scheduled - February

## 2023-01-05 ENCOUNTER — OFFICE VISIT (OUTPATIENT)
Dept: OTOLARYNGOLOGY | Facility: CLINIC | Age: 78
End: 2023-01-05
Payer: MEDICARE

## 2023-01-05 ENCOUNTER — CLINICAL SUPPORT (OUTPATIENT)
Dept: OTOLARYNGOLOGY | Facility: CLINIC | Age: 78
End: 2023-01-05
Payer: MEDICARE

## 2023-01-05 VITALS
SYSTOLIC BLOOD PRESSURE: 141 MMHG | HEART RATE: 90 BPM | DIASTOLIC BLOOD PRESSURE: 71 MMHG | WEIGHT: 158.38 LBS | TEMPERATURE: 98 F | BODY MASS INDEX: 31.09 KG/M2 | HEIGHT: 60 IN

## 2023-01-05 DIAGNOSIS — H90.3 BILATERAL SENSORINEURAL HEARING LOSS: Primary | ICD-10-CM

## 2023-01-05 DIAGNOSIS — J45.40 MODERATE PERSISTENT ASTHMA, UNSPECIFIED WHETHER COMPLICATED: ICD-10-CM

## 2023-01-05 DIAGNOSIS — Z88.9 HISTORY OF MULTIPLE ALLERGIES: ICD-10-CM

## 2023-01-05 DIAGNOSIS — R05.9 COUGH, UNSPECIFIED TYPE: ICD-10-CM

## 2023-01-05 DIAGNOSIS — H91.93 DIFFICULTY HEARING, BILATERAL: Primary | ICD-10-CM

## 2023-01-05 DIAGNOSIS — R29.2 ABNORMAL ACOUSTIC REFLEX: ICD-10-CM

## 2023-01-05 DIAGNOSIS — K21.9 GASTROESOPHAGEAL REFLUX DISEASE, UNSPECIFIED WHETHER ESOPHAGITIS PRESENT: ICD-10-CM

## 2023-01-05 DIAGNOSIS — Z98.890 HISTORY OF NISSEN FUNDOPLICATION: ICD-10-CM

## 2023-01-05 DIAGNOSIS — Z87.898 HISTORY OF NASAL CONGESTION: ICD-10-CM

## 2023-01-05 DIAGNOSIS — H90.3 BILATERAL SENSORINEURAL HEARING LOSS: ICD-10-CM

## 2023-01-05 PROCEDURE — 99215 OFFICE O/P EST HI 40 MIN: CPT | Mod: S$GLB,,, | Performed by: OTOLARYNGOLOGY

## 2023-01-05 PROCEDURE — 99215 PR OFFICE/OUTPT VISIT, EST, LEVL V, 40-54 MIN: ICD-10-PCS | Mod: S$GLB,,, | Performed by: OTOLARYNGOLOGY

## 2023-01-05 PROCEDURE — 92550 PR TYMPANOMETRY AND REFLEX THRESHOLD MEASUREMENTS: ICD-10-PCS | Mod: S$GLB,,, | Performed by: AUDIOLOGIST-HEARING AID FITTER

## 2023-01-05 PROCEDURE — 92557 COMPREHENSIVE HEARING TEST: CPT | Mod: S$GLB,,, | Performed by: AUDIOLOGIST-HEARING AID FITTER

## 2023-01-05 PROCEDURE — 92550 TYMPANOMETRY & REFLEX THRESH: CPT | Mod: S$GLB,,, | Performed by: AUDIOLOGIST-HEARING AID FITTER

## 2023-01-05 PROCEDURE — 92557 PR COMPREHENSIVE HEARING TEST: ICD-10-PCS | Mod: S$GLB,,, | Performed by: AUDIOLOGIST-HEARING AID FITTER

## 2023-01-05 RX ORDER — FLUTICASONE PROPIONATE 50 MCG
SPRAY, SUSPENSION (ML) NASAL
Qty: 16 G | Refills: 2 | Status: SHIPPED | OUTPATIENT
Start: 2023-01-05 | End: 2023-06-14 | Stop reason: SDUPTHER

## 2023-01-05 RX ORDER — AZELASTINE 1 MG/ML
SPRAY, METERED NASAL
Qty: 30 ML | Refills: 2 | Status: SHIPPED | OUTPATIENT
Start: 2023-01-05 | End: 2023-06-14 | Stop reason: SDUPTHER

## 2023-01-05 NOTE — Clinical Note
Your patient, Lupe Jewell, was recently seen for an audiogram.  My assessment and recommendations are enclosed.  If you should have any questions or concerns, please contact me at [phone].   Sincerely, Karolina Hopkins, CCC-A Audiologist [facility]

## 2023-01-05 NOTE — PATIENT INSTRUCTIONS
Medically cleared for hearing aid trial.  Hearing protection.    Start azelastine nasal spray and use 2 sprays in each nostril twice a day.  Okay to continue fluticasone nasal spray daily as well.    Reflux precautions.    Continue montelukast and Symbicort and follow up as per pulmonology.    Follow up here 4-6 weeks for recheck and scope.

## 2023-01-06 ENCOUNTER — IMMUNIZATION (OUTPATIENT)
Dept: PHARMACY | Facility: CLINIC | Age: 78
End: 2023-01-06
Payer: MEDICARE

## 2023-01-06 DIAGNOSIS — Z23 NEED FOR VACCINATION: Primary | ICD-10-CM

## 2023-01-06 NOTE — PROGRESS NOTES
Karolina Hopkins, CCC-A  Audiologist - [facility] [address] [place] [address]  [email or web address]  [phone]    Patient: Lupe Jewell   MRN: [id number]  [address] [facility]   Home Phone [phone]   Work Phone Not on file.   Mobile [phone]   : 1945  [place]: 2023      AUDIOLOGICAL EVALUATION      RECOMMENDATIONS:   It is recommended that Lupe Jewell:  Follow up medically with Dr. Jensen to assess her hearing loss.  Receive binaural hearing aids to improve speech understanding.  Continue to receive audiological monitoring annually.  Use precaution and/or hearing protection in noisy environments.    If you should have any questions or concerns regarding the above information, please do not hesitate to contact me at [phone].      _______________________________  Karolina Hopkins, ULICES-A  Audiologist

## 2023-01-08 NOTE — PROGRESS NOTES
Subjective:       Patient ID: Lupe Jewell is a 77 y.o. female.    Chief Complaint: Follow-up (Re hearing loss)    Scheduled today for follow-up of hearing loss, last seen as a new patient August 2021 for this and noted bilateral sensorineural hearing loss.  Deferred amplification trial and missed one year follow-up until now.  Thinks there may be some further decline in hearing gradually.  No tinnitus.  No acoustical trauma.  No other otologic complaints.      Also wants to discuss ongoing cough.  History of asthma since childhood and due to see pulmonology soon.  Has been on Symbicort twice daily past few years taking it on and off due to cost.  Back on it past few months and required Medrol Dosepak per PCP in November with [facility]-Kyle.  Takes albuterol once or twice a week for years.  No tobacco.  No pets.  Tested positive for multiple inhalant allergies in the past.  Also triggered by heat, humidity, smoke.  Positive history of GERD to the point that required Nissen with great improvement though still some breakthrough.  Particularly notes lots of coughing first thing in the morning.  Feels a tickle in her lower throat and cough sounds wet, secretions clear.  Wonders if sinonasal component.  Has been on twice daily fluticasone per allergy immunology.  Nose is open currently but stuffy on and off with no other sinonasal complaints and sense of smell fine.  Additional medical history includes type 2 DM and glaucoma.        Review of Systems     Constitutional: Negative for fever.      HENT: Positive for hearing loss.  Negative for ear discharge, ear pain, ringing in the ears, sore throat and stuffy nose.      Respiratory:  Negative for shortness of breath.      Cardiovascular:  Negative for chest pain.     Gastrointestinal:  Negative for abdominal pain.     Neurological: Negative for dizziness.      Hematologic: Negative for swollen glands.              Objective:        Vitals:    01/05/23 1121   BP: (!) 141/71    Pulse: 90   Temp: 97.5 °F (36.4 °C)     Body mass index is 30.94 kg/m².  Physical Exam  Constitutional:       General: She is not in acute distress.     Appearance: She is well-developed.   HENT:      Head: Normocephalic and atraumatic.      Right Ear: Tympanic membrane, ear canal and external ear normal.      Left Ear: Tympanic membrane, ear canal and external ear normal.      Ears:      Comments:   Increased cerumen, right greater than left, cleared with curette and otherwise within normal limits AU.     Nose: No nasal deformity, mucosal edema or rhinorrhea.      Mouth/Throat:      Mouth: Mucous membranes are moist.      Pharynx: No pharyngeal swelling, oropharyngeal exudate or posterior oropharyngeal erythema.   Neck:      Trachea: Phonation normal.   Pulmonary:      Effort: Pulmonary effort is normal. No respiratory distress.      Comments:   Occasional expiratory wheezes.  Lymphadenopathy:      Cervical: No cervical adenopathy.   Skin:     General: Skin is warm and dry.   Neurological:      Mental Status: She is alert and oriented to person, place, and time.   Psychiatric:         Speech: Speech normal.         Behavior: Behavior normal.       Tests / Results:              Reviewed/discussed today's audiogram and compared to prior.        Assessment:       1. Difficulty hearing, bilateral    2. Bilateral sensorineural hearing loss    3. Cough, unspecified type    4. Moderate persistent asthma, unspecified whether complicated    5. Gastroesophageal reflux disease, unspecified whether esophagitis present    6. History of Nissen fundoplication    7. History of multiple allergies    8. History of nasal congestion          Plan:       Reviewed all above and considerations and recommendations and answered questions.      Medically cleared for hearing aid trial.    Hearing protection.      Start azelastine nasal spray and use 2 sprays in each nostril twice daily.    Okay to continue fluticasone nasal spray but use  once daily.      Reflux precautions.      Continue montelukast and Symbicort and follow-up as per pulmonology.      Follow-up 4-6 weeks for recheck and scope.      Face-to-face time greater than 40 minutes majority of time spent counseling regarding all of the above.

## 2023-01-12 ENCOUNTER — TELEPHONE (OUTPATIENT)
Dept: PULMONOLOGY | Facility: CLINIC | Age: 78
End: 2023-01-12
Payer: MEDICARE

## 2023-01-12 DIAGNOSIS — J45.909 ASTHMA, UNSPECIFIED ASTHMA SEVERITY, UNSPECIFIED WHETHER COMPLICATED, UNSPECIFIED WHETHER PERSISTENT: Primary | ICD-10-CM

## 2023-01-18 ENCOUNTER — LAB VISIT (OUTPATIENT)
Dept: LAB | Facility: HOSPITAL | Age: 78
End: 2023-01-18
Attending: INTERNAL MEDICINE
Payer: MEDICARE

## 2023-01-18 ENCOUNTER — OFFICE VISIT (OUTPATIENT)
Dept: PULMONOLOGY | Facility: CLINIC | Age: 78
End: 2023-01-18
Payer: MEDICARE

## 2023-01-18 VITALS
HEIGHT: 60 IN | DIASTOLIC BLOOD PRESSURE: 76 MMHG | SYSTOLIC BLOOD PRESSURE: 134 MMHG | HEART RATE: 102 BPM | BODY MASS INDEX: 31.34 KG/M2 | OXYGEN SATURATION: 97 % | WEIGHT: 159.63 LBS

## 2023-01-18 DIAGNOSIS — J45.50 SEVERE PERSISTENT ASTHMA WITHOUT COMPLICATION: Primary | ICD-10-CM

## 2023-01-18 DIAGNOSIS — J30.9 ALLERGIC RHINITIS, UNSPECIFIED SEASONALITY, UNSPECIFIED TRIGGER: ICD-10-CM

## 2023-01-18 DIAGNOSIS — J45.41 MODERATE PERSISTENT ASTHMA WITH ACUTE EXACERBATION: ICD-10-CM

## 2023-01-18 DIAGNOSIS — J45.50 SEVERE PERSISTENT ASTHMA WITHOUT COMPLICATION: ICD-10-CM

## 2023-01-18 DIAGNOSIS — J45.901 EXACERBATION OF ASTHMA, UNSPECIFIED ASTHMA SEVERITY, UNSPECIFIED WHETHER PERSISTENT: ICD-10-CM

## 2023-01-18 LAB
BASOPHILS # BLD AUTO: 0.09 K/UL (ref 0–0.2)
BASOPHILS NFR BLD: 0.7 % (ref 0–1.9)
DIFFERENTIAL METHOD: ABNORMAL
EOSINOPHIL # BLD AUTO: 0.4 K/UL (ref 0–0.5)
EOSINOPHIL NFR BLD: 2.9 % (ref 0–8)
ERYTHROCYTE [DISTWIDTH] IN BLOOD BY AUTOMATED COUNT: 12.9 % (ref 11.5–14.5)
HCT VFR BLD AUTO: 35.4 % (ref 37–48.5)
HGB BLD-MCNC: 11.8 G/DL (ref 12–16)
IGE SERPL-ACNC: 87 IU/ML (ref 0–100)
IMM GRANULOCYTES # BLD AUTO: 0.11 K/UL (ref 0–0.04)
IMM GRANULOCYTES NFR BLD AUTO: 0.9 % (ref 0–0.5)
LYMPHOCYTES # BLD AUTO: 4 K/UL (ref 1–4.8)
LYMPHOCYTES NFR BLD: 32.9 % (ref 18–48)
MCH RBC QN AUTO: 28.1 PG (ref 27–31)
MCHC RBC AUTO-ENTMCNC: 33.3 G/DL (ref 32–36)
MCV RBC AUTO: 84 FL (ref 82–98)
MONOCYTES # BLD AUTO: 0.7 K/UL (ref 0.3–1)
MONOCYTES NFR BLD: 6.2 % (ref 4–15)
NEUTROPHILS # BLD AUTO: 6.8 K/UL (ref 1.8–7.7)
NEUTROPHILS NFR BLD: 56.4 % (ref 38–73)
NRBC BLD-RTO: 0 /100 WBC
PLATELET # BLD AUTO: 398 K/UL (ref 150–450)
PMV BLD AUTO: 10.8 FL (ref 9.2–12.9)
RBC # BLD AUTO: 4.2 M/UL (ref 4–5.4)
WBC # BLD AUTO: 12.01 K/UL (ref 3.9–12.7)

## 2023-01-18 PROCEDURE — 99204 PR OFFICE/OUTPT VISIT, NEW, LEVL IV, 45-59 MIN: ICD-10-PCS | Mod: S$PBB,,, | Performed by: INTERNAL MEDICINE

## 2023-01-18 PROCEDURE — 82785 ASSAY OF IGE: CPT | Performed by: INTERNAL MEDICINE

## 2023-01-18 PROCEDURE — 36415 COLL VENOUS BLD VENIPUNCTURE: CPT | Performed by: INTERNAL MEDICINE

## 2023-01-18 PROCEDURE — 99213 OFFICE O/P EST LOW 20 MIN: CPT | Mod: PBBFAC | Performed by: INTERNAL MEDICINE

## 2023-01-18 PROCEDURE — 85025 COMPLETE CBC W/AUTO DIFF WBC: CPT | Performed by: INTERNAL MEDICINE

## 2023-01-18 PROCEDURE — 99204 OFFICE O/P NEW MOD 45 MIN: CPT | Mod: S$PBB,,, | Performed by: INTERNAL MEDICINE

## 2023-01-18 PROCEDURE — 99999 PR PBB SHADOW E&M-EST. PATIENT-LVL III: CPT | Mod: PBBFAC,,, | Performed by: INTERNAL MEDICINE

## 2023-01-18 PROCEDURE — 99999 PR PBB SHADOW E&M-EST. PATIENT-LVL III: ICD-10-PCS | Mod: PBBFAC,,, | Performed by: INTERNAL MEDICINE

## 2023-01-18 RX ORDER — IPRATROPIUM BROMIDE AND ALBUTEROL SULFATE 2.5; .5 MG/3ML; MG/3ML
SOLUTION RESPIRATORY (INHALATION)
Qty: 180 ML | Refills: 11 | Status: SHIPPED | OUTPATIENT
Start: 2023-01-18 | End: 2024-02-07

## 2023-01-18 NOTE — PROGRESS NOTES
History & Physical  [facility]    SUBJECTIVE:     Chief Complaint:   asthma    History of Present Illness:  Lupe Jewell is a 77 y.o. female who presents for evaluation of asthma. She had asthma really bad as a child, but her asthma symptoms improved around 10 years-old. Her asthma symptoms returned in her 30s. Her symptoms include: wheezing, coughing, & shortness of breath. Cough symptoms are particularly prominent. Her cough is usually productive of mucus, typically clear mucus unless she has a cold.    She takes albuterol as needed, symbicort 160 x2 puffs BID, singulair, nebulizer prn.    She also notes chronic rhinitis. She uses flonase nasal spray 1 spray each nostril BID, astelin 2 sprays each nostril BID.    In addition to the inhalers, she has found abx & steroids to be helpful.    She notes lots of allergies including: mold, dust, cats, dogs, some trees...    She has seen allergist & ENT in the past.    She has a history of GERD s/p nissen fundoplication in 2005.    She is a lifetime non-smoker (other than brief smoking in college). She was hospitalized one time for pneumonia. No other hospitalizations.    No history of heart disease or cancer.    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 She is a native of [place].    Review of patient's allergies indicates:   Allergen Reactions    Penicillins Rash     Past Medical History:   Diagnosis Date    ALLERGIC RHINITIS 8/17/2012    Asthma, well controlled 8/17/2012    Chronic asthma     Chronic rhinitis     Diabetes 2/4/2014    Diabetes mellitus     Diabetes mellitus type II     Fever blister     GERD (gastroesophageal reflux disease) 8/17/2012    Glaucoma     Hyperlipemia     Hypertension     Obstructive sleep apnea     Osteoporosis, unspecified      Past Surgical History:   Procedure Laterality Date    BLADDER SURGERY      BREAST BIOPSY Left     BREAST SURGERY      left breast biopsy    CATARACT EXTRACTION W/  INTRAOCULAR LENS IMPLANT  Left 01/28/2015    WITH TRAB ()    CATARACT EXTRACTION W/  INTRAOCULAR LENS IMPLANT Right 03/30/2016    WITH TRAB ()    COLONOSCOPY N/A 07/01/2016    Procedure: COLONOSCOPY;  Surgeon: Rony Lima MD;  Location: [facility] ENDO ([address] [place] [address]);  Service: Endoscopy;  Laterality: N/A;    COLONOSCOPY N/A 02/04/2022    Procedure: COLONOSCOPY;  Surgeon: FARA Benitez MD;  Location: [facility] ENDO ([address] [place] [address]);  Service: Endoscopy;  Laterality: N/A;  fully vacc-inst mail-tb.Covid test at [facility], [place], operated by [facility] on 2/1.EC    EYE SURGERY Bilateral     cataract removal and Laser surgery    HYSTERECTOMY      IMPLANTATION OF DEVICE FOR GLAUCOMA Right 09/21/2022    AHMED ()    stomach procedure      TRABECULECTOMY Left 12/28/2015    COMBINED WITH CE ()    TRABECULECTOMY Right 03/30/2016    COMBINED WITH CE ()    TUBAL LIGATION      YAG CAPSULOTOMY Bilateral 10/3/2019 and 10/17/2019         Family History   Problem Relation Age of Onset    Glaucoma Mother     Hypertension Mother     Glaucoma Sister     Asthma Sister     Hypertension Sister     No Known Problems Father     No Known Problems Brother     No Known Problems Maternal Aunt     No Known Problems Maternal Uncle     No Known Problems Paternal Aunt     No Known Problems Paternal Uncle     No Known Problems Maternal Grandmother     No Known Problems Maternal Grandfather     No Known Problems Paternal Grandmother     No Known Problems Paternal Grandfather     Hyperlipidemia Daughter     Hypertension Daughter     Depression Daughter     Diabetes Daughter     Obesity Daughter     Hypertension Son     Asthma Son     Hyperlipidemia Sister     Glaucoma Sister     No Known Problems Sister     Melanoma Neg Hx     Psoriasis Neg Hx     Lupus Neg Hx     Eczema Neg Hx     Acne Neg Hx     Blindness Neg Hx     Macular degeneration Neg Hx     Retinal detachment Neg Hx     Amblyopia Neg Hx     Cancer Neg Hx     Cataracts Neg Hx      Strabismus Neg Hx     Stroke Neg Hx     Thyroid disease Neg Hx      Social History     Socioeconomic History    Marital status:    Occupational History    Occupation: retired    Tobacco Use    Smoking status: Former     Packs/day: 0.25     Years: 2.00     Pack years: 0.50     Types: Cigarettes     Quit date:      Years since quittin.0    Smokeless tobacco: Never   Substance and Sexual Activity    Alcohol use: Yes     Alcohol/week: 12.0 standard drinks     Types: 6 Glasses of wine, 6 Cans of beer per week     Comment: socially    Drug use: No    Sexual activity: Not Currently     Partners: Male   Other Topics Concern    Are you pregnant or think you may be? No    Breast-feeding No     Review of Systems:  negative    OBJECTIVE:     Vital Signs  Vitals:    23 1303   BP: 134/76   BP Location: Left arm   Patient Position: Sitting   Pulse: 102   SpO2: 97%   Weight: 72.4 kg (159 lb 9.8 oz)   Height: 5' (1.524 m)     Body mass index is 31.17 kg/m².    Physical Exam:  General: no distress  Eyes:  conjunctivae/corneas clear  Nose: no discharge  Neck: trachea midline with no masses appreciated  Lungs:  normal respiratory effort, no wheezes, no rales  Heart: regular rate and rhythm and no murmur  Abdomen: non-distended  Extremities: no cyanosis, no edema, no clubbing  Skin: No rashes or lesions. good skin turgor  Neurologic: alert, oriented, thought content appropriate    Laboratory:  Lab Results   Component Value Date    WBC 12.83 (H) 2022    HGB 11.3 (L) 2022    HCT 34.7 (L) 2022    MCV 88 2022     2022     IgE: 400s, 200s    Chest Imaging, My Impression:   CXR 2022: normal    Diagnostic Results:  ECG: Reviewed    ASSESSMENT/PLAN:     1) Severe persistent asthma  - ACT test is 15 today  - Obtain PFT  - Check IgE & eos to assess candidacy for treatment with biologics.    2) Allergic rhinitis  She uses flonase nasal spray 1 spray each nostril BID, astelin  2 sprays each nostril BID.    Total professional time spent for the encounter: 45 minutes  Time was spent preparing to see the patient, reviewing results of prior testing, obtaining and/or reviewing separately obtained history, performing a medically appropriate examination and interview, counseling and educating the patient/family, ordering medications/tests/procedures, referring and communicating with other health care professionals, documenting clinical information in the electronic health record, and independently interpreting results.      Hampton Rampart, MD  [facility]

## 2023-01-24 ENCOUNTER — HOSPITAL ENCOUNTER (OUTPATIENT)
Dept: PULMONOLOGY | Facility: CLINIC | Age: 78
Discharge: HOME | End: 2023-01-24
Payer: MEDICARE

## 2023-01-24 DIAGNOSIS — J45.50 SEVERE PERSISTENT ASTHMA WITHOUT COMPLICATION: ICD-10-CM

## 2023-01-24 LAB
DLCO ADJ PRE: 15.4 ML/(MIN*MMHG) (ref 12.04–23.51)
DLCO SINGLE BREATH LLN: 12.04
DLCO SINGLE BREATH PRE REF: 82.1 %
DLCO SINGLE BREATH REF: 17.78
DLCOC SBVA LLN: 2.51
DLCOC SBVA PRE REF: 131.4 %
DLCOC SBVA REF: 4.16
DLCOC SINGLE BREATH LLN: 12.04
DLCOC SINGLE BREATH PRE REF: 86.7 %
DLCOC SINGLE BREATH REF: 17.78
DLCOCSBVAULN: 5.82
DLCOCSINGLEBREATHULN: 23.51
DLCOSINGLEBREATHULN: 23.51
DLCOVA LLN: 2.51
DLCOVA PRE REF: 124.5 %
DLCOVA PRE: 5.18 ML/(MIN*MMHG*L) (ref 2.51–5.82)
DLCOVA REF: 4.16
DLCOVAULN: 5.82
DLVAADJ PRE: 5.47 ML/(MIN*MMHG*L) (ref 2.51–5.82)
ERV LLN: -16449.5
ERV PRE REF: 77 %
ERV REF: 0.5
ERVULN: ABNORMAL
FEF 25 75 LLN: 0.45
FEF 25 75 PRE REF: 63.7 %
FEF 25 75 REF: 1.32
FET100 CHG: 22.3 %
FEV05 LLN: 0.56
FEV05 REF: 1.41
FEV1 CHG: 2.9 %
FEV1 FVC LLN: 64
FEV1 FVC PRE REF: 93.5 %
FEV1 FVC REF: 78
FEV1 LLN: 1.02
FEV1 PRE REF: 66.8 %
FEV1 REF: 1.51
FEV1 VOL CHG: 0.03
FRCPLETH LLN: 1.67
FRCPLETH PREREF: 92.9 %
FRCPLETH REF: 2.49
FRCPLETHULN: 3.31
FVC CHG: 3.1 %
FVC LLN: 1.33
FVC PRE REF: 70.9 %
FVC REF: 1.94
FVC VOL CHG: 0.04
IVC PRE: 1.47 L (ref 1.33–2.59)
IVC SINGLE BREATH LLN: 1.33
IVC SINGLE BREATH PRE REF: 75.6 %
IVC SINGLE BREATH REF: 1.94
IVCSINGLEBREATHULN: 2.59
LLN IC: -16448.53
PEF LLN: 1.82
PEF PRE REF: 107 %
PEF REF: 3.59
PHYSICIAN COMMENT: ABNORMAL
POST FEF 25 75: 0.72 L/S (ref 0.45–2.71)
POST FET 100: 6.42 SEC
POST FEV1 FVC: 72.98 % (ref 64.3–90.41)
POST FEV1: 1.04 L (ref 1.02–1.97)
POST FEV5: 0.81 L (ref 0.56–2.27)
POST FVC: 1.42 L (ref 1.33–2.59)
POST PEF: 3.5 L/S (ref 1.82–5.35)
PRE DLCO: 14.59 ML/(MIN*MMHG) (ref 12.04–23.51)
PRE ERV: 0.39 L (ref -16449.5–16450.5)
PRE FEF 25 75: 0.84 L/S (ref 0.45–2.71)
PRE FET 100: 5.25 SEC
PRE FEV05 REF: 52.3 %
PRE FEV1 FVC: 73.18 % (ref 64.3–90.41)
PRE FEV1: 1.01 L (ref 1.02–1.97)
PRE FEV5: 0.74 L (ref 0.56–2.27)
PRE FRC PL: 2.31 L (ref 1.67–3.31)
PRE FVC: 1.38 L (ref 1.33–2.59)
PRE IC: 1.11 L (ref -16448.53–16451.47)
PRE PEF: 3.84 L/S (ref 1.82–5.35)
PRE REF IC: 75.6 %
PRE RV: 1.93 L (ref 1.41–2.57)
PRE TLC: 3.43 L (ref 3.28–5.26)
PRE VTG: 2.46 L
RAW PRE REF: 152.1 %
RAW PRE: 4.65 CMH2O*S/L (ref 3.06–3.06)
RAW REF: 3.06
REF IC: 1.47
RV LLN: 1.41
RV PRE REF: 96.9 %
RV REF: 1.99
RVTLC LLN: 36
RVTLC PRE REF: 124.7 %
RVTLC PRE: 56.28 % (ref 35.55–54.73)
RVTLC REF: 45
RVTLCULN: 55
RVULN: 2.57
SGAW PRE REF: 79.3 %
SGAW PRE: 0.08 1/(CMH2O*S) (ref 0.1–0.1)
SGAW REF: 0.1
TLC LLN: 3.28
TLC PRE REF: 80.3 %
TLC REF: 4.27
TLC ULN: 5.26
ULN IC: ABNORMAL
VA PRE: 2.81 L (ref 4.12–4.12)
VA SINGLE BREATH LLN: 4.12
VA SINGLE BREATH PRE REF: 68.3 %
VA SINGLE BREATH REF: 4.12
VASINGLEBREATHULN: 4.12
VC LLN: 1.33
VC PRE REF: 77.2 %
VC PRE: 1.5 L (ref 1.33–2.59)
VC REF: 1.94
VC ULN: 2.59

## 2023-01-24 PROCEDURE — 94060 EVALUATION OF WHEEZING: CPT | Mod: PBBFAC | Performed by: INTERNAL MEDICINE

## 2023-01-24 PROCEDURE — 94729 PR C02/MEMBANE DIFFUSE CAPACITY: ICD-10-PCS | Mod: 26,S$PBB,, | Performed by: INTERNAL MEDICINE

## 2023-01-24 PROCEDURE — 94726 PULM FUNCT TST PLETHYSMOGRAP: ICD-10-PCS | Mod: 26,S$PBB,, | Performed by: INTERNAL MEDICINE

## 2023-01-24 PROCEDURE — 94060 PR EVAL OF BRONCHOSPASM: ICD-10-PCS | Mod: 26,S$PBB,, | Performed by: INTERNAL MEDICINE

## 2023-01-24 PROCEDURE — 94726 PLETHYSMOGRAPHY LUNG VOLUMES: CPT | Mod: PBBFAC | Performed by: INTERNAL MEDICINE

## 2023-01-24 PROCEDURE — 94729 DIFFUSING CAPACITY: CPT | Mod: 26,S$PBB,, | Performed by: INTERNAL MEDICINE

## 2023-01-24 PROCEDURE — 94729 DIFFUSING CAPACITY: CPT | Mod: PBBFAC | Performed by: INTERNAL MEDICINE

## 2023-01-24 PROCEDURE — 94726 PLETHYSMOGRAPHY LUNG VOLUMES: CPT | Mod: 26,S$PBB,, | Performed by: INTERNAL MEDICINE

## 2023-01-24 PROCEDURE — 94060 EVALUATION OF WHEEZING: CPT | Mod: 26,S$PBB,, | Performed by: INTERNAL MEDICINE

## 2023-01-30 ENCOUNTER — PES CALL (OUTPATIENT)
Dept: ADMINISTRATIVE | Facility: CLINIC | Age: 78
End: 2023-01-30
Payer: MEDICARE

## 2023-01-31 ENCOUNTER — EXTERNAL CHRONIC CARE MANAGEMENT (OUTPATIENT)
Dept: PRIMARY CARE CLINIC | Facility: CLINIC | Age: 78
End: 2023-01-31
Payer: MEDICARE

## 2023-01-31 PROCEDURE — 99490 CHRNC CARE MGMT STAFF 1ST 20: CPT | Mod: PBBFAC | Performed by: INTERNAL MEDICINE

## 2023-01-31 PROCEDURE — 99490 PR CHRONIC CARE MGMT, 1ST 20 MIN: ICD-10-PCS | Mod: S$PBB,,, | Performed by: INTERNAL MEDICINE

## 2023-01-31 PROCEDURE — 99490 CHRNC CARE MGMT STAFF 1ST 20: CPT | Mod: S$PBB,,, | Performed by: INTERNAL MEDICINE

## 2023-02-22 ENCOUNTER — TELEPHONE (OUTPATIENT)
Dept: OTOLARYNGOLOGY | Facility: CLINIC | Age: 78
End: 2023-02-22
Payer: MEDICARE

## 2023-02-22 NOTE — TELEPHONE ENCOUNTER
----- Message from Jesus Rodriguez sent at 2/22/2023  1:22 PM CST -----      Name of Who is Calling: PAIGE CARR [[id number]]      What is the request in detail: Pt called to reschedule appt.Please contact to further discuss and advise.          Can the clinic reply by [facility]: N      What [facility]: [phone]

## 2023-02-28 ENCOUNTER — EXTERNAL CHRONIC CARE MANAGEMENT (OUTPATIENT)
Dept: PRIMARY CARE CLINIC | Facility: CLINIC | Age: 78
End: 2023-02-28
Payer: MEDICARE

## 2023-02-28 PROCEDURE — 99490 CHRNC CARE MGMT STAFF 1ST 20: CPT | Mod: S$PBB,,, | Performed by: INTERNAL MEDICINE

## 2023-02-28 PROCEDURE — 99490 CHRNC CARE MGMT STAFF 1ST 20: CPT | Mod: PBBFAC | Performed by: INTERNAL MEDICINE

## 2023-02-28 PROCEDURE — 99490 PR CHRONIC CARE MGMT, 1ST 20 MIN: ICD-10-PCS | Mod: S$PBB,,, | Performed by: INTERNAL MEDICINE

## 2023-03-08 ENCOUNTER — OFFICE VISIT (OUTPATIENT)
Dept: OTOLARYNGOLOGY | Facility: CLINIC | Age: 78
End: 2023-03-08
Payer: MEDICARE

## 2023-03-08 VITALS
DIASTOLIC BLOOD PRESSURE: 84 MMHG | HEIGHT: 60 IN | HEART RATE: 87 BPM | TEMPERATURE: 98 F | SYSTOLIC BLOOD PRESSURE: 137 MMHG | BODY MASS INDEX: 31.22 KG/M2 | WEIGHT: 159 LBS

## 2023-03-08 DIAGNOSIS — Z98.890 HISTORY OF NISSEN FUNDOPLICATION: ICD-10-CM

## 2023-03-08 DIAGNOSIS — Z88.9 HISTORY OF MULTIPLE ALLERGIES: ICD-10-CM

## 2023-03-08 DIAGNOSIS — J32.9 SINUSITIS, UNSPECIFIED CHRONICITY, UNSPECIFIED LOCATION: ICD-10-CM

## 2023-03-08 DIAGNOSIS — J45.40 MODERATE PERSISTENT ASTHMA, UNSPECIFIED WHETHER COMPLICATED: ICD-10-CM

## 2023-03-08 DIAGNOSIS — K21.9 GASTROESOPHAGEAL REFLUX DISEASE, UNSPECIFIED WHETHER ESOPHAGITIS PRESENT: ICD-10-CM

## 2023-03-08 DIAGNOSIS — R05.9 COUGH, UNSPECIFIED TYPE: ICD-10-CM

## 2023-03-08 PROCEDURE — 99215 OFFICE O/P EST HI 40 MIN: CPT | Mod: 25,S$GLB,, | Performed by: OTOLARYNGOLOGY

## 2023-03-08 PROCEDURE — 87070 CULTURE OTHR SPECIMN AEROBIC: CPT | Performed by: OTOLARYNGOLOGY

## 2023-03-08 PROCEDURE — 99215 PR OFFICE/OUTPT VISIT, EST, LEVL V, 40-54 MIN: ICD-10-PCS | Mod: 25,S$GLB,, | Performed by: OTOLARYNGOLOGY

## 2023-03-08 PROCEDURE — 87185 SC STD ENZYME DETCJ PER NZM: CPT | Performed by: OTOLARYNGOLOGY

## 2023-03-08 PROCEDURE — 87077 CULTURE AEROBIC IDENTIFY: CPT | Performed by: OTOLARYNGOLOGY

## 2023-03-08 PROCEDURE — 31575 DIAGNOSTIC LARYNGOSCOPY: CPT | Mod: S$GLB,,, | Performed by: OTOLARYNGOLOGY

## 2023-03-08 PROCEDURE — 31575 LARYNGOSCOPY: ICD-10-PCS | Mod: S$GLB,,, | Performed by: OTOLARYNGOLOGY

## 2023-03-08 RX ORDER — CLARITHROMYCIN 500 MG/1
500 TABLET, FILM COATED ORAL 2 TIMES DAILY
Qty: 20 TABLET | Refills: 0 | Status: SHIPPED | OUTPATIENT
Start: 2023-03-08 | End: 2023-03-16 | Stop reason: SDUPTHER

## 2023-03-08 NOTE — PATIENT INSTRUCTIONS
Proceed with chest x-ray as ordered.    Start antibiotics and take twice a day with food.    Follow up 2 weeks.

## 2023-03-08 NOTE — PROCEDURES
Laryngoscopy    Date/Time: 3/8/2023 11:00 AM  Performed by: Latesha Jensen MD  Authorized by: Latesha Jenesn MD     Consent Done?:  Yes (Verbal)  Anesthesia:     Local anesthetic:  Topical anesthetic    Patient tolerance:  Patient tolerated the procedure well with no immediate complications    Decongestion performed?: Yes    Laryngoscopy:     Areas examined:  Nasal cavities, vocal cords, nasopharynx, oropharynx, hypopharynx and larynx  Nose External:      No external nasal deformity  Nose Intranasal:      Mucosa no polyps     Mucosa ulcers not present     No mucosa lesions present  Nasopharynx:      No mucosa lesions     Adenoids not present     Posterior choanae patent  Larynx/hypopharynx:      No epiglottis lesions     No epiglottis edema     No AE folds lesions     No vocal cord polyps     Equal and normal bilateral     No hypopharynx lesions     No piriform sinus pooling     No piriform sinus lesions     Bilateral nasal and nasopharyngolaryngoscopy was carried out using monitor screen demonstrating findings throughout.  Positive findings include pale yellow turbid secretions noted left middle meatus draining into nasopharynx.  Endoscopic culture obtained and then suctioned.  Tolerated well.

## 2023-03-11 LAB — BACTERIA NPH AEROBE CULT: ABNORMAL

## 2023-03-16 ENCOUNTER — HOSPITAL ENCOUNTER (OUTPATIENT)
Dept: RADIOLOGY | Facility: OTHER | Age: 78
Discharge: HOME | End: 2023-03-16
Attending: OTOLARYNGOLOGY
Payer: MEDICARE

## 2023-03-16 DIAGNOSIS — R05.9 COUGH, UNSPECIFIED TYPE: ICD-10-CM

## 2023-03-16 PROCEDURE — 71046 X-RAY EXAM CHEST 2 VIEWS: CPT | Mod: TC,FY

## 2023-03-16 PROCEDURE — 71046 X-RAY EXAM CHEST 2 VIEWS: CPT | Mod: 26,,, | Performed by: RADIOLOGY

## 2023-03-16 PROCEDURE — 71046 XR CHEST PA AND LATERAL: ICD-10-PCS | Mod: 26,,, | Performed by: RADIOLOGY

## 2023-03-16 RX ORDER — CLARITHROMYCIN 500 MG/1
500 TABLET, FILM COATED ORAL 2 TIMES DAILY
Qty: 20 TABLET | Refills: 0 | Status: SHIPPED | OUTPATIENT
Start: 2023-03-16 | End: 2023-03-26

## 2023-03-16 NOTE — TELEPHONE ENCOUNTER
Called Ms. Ochoa and discussed with her.  Will send refill to her pharmacy for clarithromycin.  Advised chest x-ray is negative.  Understands needs to keep follow-up in 2 weeks.

## 2023-03-30 ENCOUNTER — OFFICE VISIT (OUTPATIENT)
Dept: OTOLARYNGOLOGY | Facility: CLINIC | Age: 78
End: 2023-03-30
Payer: MEDICARE

## 2023-03-30 VITALS
TEMPERATURE: 97 F | SYSTOLIC BLOOD PRESSURE: 151 MMHG | HEART RATE: 112 BPM | WEIGHT: 161.31 LBS | DIASTOLIC BLOOD PRESSURE: 88 MMHG | HEIGHT: 60 IN | BODY MASS INDEX: 31.67 KG/M2

## 2023-03-30 DIAGNOSIS — J32.9 SINUSITIS, UNSPECIFIED CHRONICITY, UNSPECIFIED LOCATION: ICD-10-CM

## 2023-03-30 DIAGNOSIS — Z88.9 HISTORY OF MULTIPLE ALLERGIES: ICD-10-CM

## 2023-03-30 DIAGNOSIS — Z98.890 HISTORY OF NISSEN FUNDOPLICATION: ICD-10-CM

## 2023-03-30 DIAGNOSIS — J45.40 MODERATE PERSISTENT ASTHMA, UNSPECIFIED WHETHER COMPLICATED: ICD-10-CM

## 2023-03-30 DIAGNOSIS — K21.9 GASTROESOPHAGEAL REFLUX DISEASE, UNSPECIFIED WHETHER ESOPHAGITIS PRESENT: ICD-10-CM

## 2023-03-30 DIAGNOSIS — R05.9 COUGH, UNSPECIFIED TYPE: ICD-10-CM

## 2023-03-30 PROCEDURE — 99215 OFFICE O/P EST HI 40 MIN: CPT | Mod: 25,S$GLB,, | Performed by: OTOLARYNGOLOGY

## 2023-03-30 PROCEDURE — 99215 PR OFFICE/OUTPT VISIT, EST, LEVL V, 40-54 MIN: ICD-10-PCS | Mod: 25,S$GLB,, | Performed by: OTOLARYNGOLOGY

## 2023-03-30 PROCEDURE — 31231 NASAL/SINUS ENDOSCOPY: ICD-10-PCS | Mod: S$GLB,,, | Performed by: OTOLARYNGOLOGY

## 2023-03-30 PROCEDURE — 31231 NASAL ENDOSCOPY DX: CPT | Mod: S$GLB,,, | Performed by: OTOLARYNGOLOGY

## 2023-03-30 RX ORDER — LEVOFLOXACIN 500 MG/1
500 TABLET, FILM COATED ORAL DAILY
Qty: 10 TABLET | Refills: 0 | Status: SHIPPED | OUTPATIENT
Start: 2023-03-30 | End: 2023-04-09

## 2023-03-30 RX ORDER — PROMETHAZINE HYDROCHLORIDE AND DEXTROMETHORPHAN HYDROBROMIDE 6.25; 15 MG/5ML; MG/5ML
5 SYRUP ORAL EVERY 6 HOURS PRN
Qty: 180 ML | Refills: 0 | Status: SHIPPED | OUTPATIENT
Start: 2023-03-30 | End: 2023-05-18

## 2023-03-30 NOTE — PROCEDURES
Laryngoscopy    Date/Time: 3/30/2023 9:30 AM  Performed by: Latesha Jensen MD  Authorized by: Latesha Jensen MD     Consent Done?:  Yes (Verbal)  Anesthesia:     Local anesthetic:  Topical anesthetic    Patient tolerance:  Patient tolerated the procedure well with no immediate complications    Decongestion performed?: Yes    Laryngoscopy:     Areas examined:  Nasal cavities, vocal cords, nasopharynx, oropharynx, hypopharynx and larynx

## 2023-03-30 NOTE — PATIENT INSTRUCTIONS
Start generic Levaquin and take one daily as prescribed.    Use saline rinse daily.    Continue azelastine nasal sprays 2 sprays in each nostril twice a day.    Reflux precautions/control reflux as discussed.    Phenergan DM cough syrup as needed.    Follow up 2 weeks.

## 2023-03-31 ENCOUNTER — EXTERNAL CHRONIC CARE MANAGEMENT (OUTPATIENT)
Dept: PRIMARY CARE CLINIC | Facility: CLINIC | Age: 78
End: 2023-03-31
Payer: MEDICARE

## 2023-03-31 PROCEDURE — 99490 PR CHRONIC CARE MGMT, 1ST 20 MIN: ICD-10-PCS | Mod: S$PBB,,, | Performed by: INTERNAL MEDICINE

## 2023-03-31 PROCEDURE — 99490 CHRNC CARE MGMT STAFF 1ST 20: CPT | Mod: PBBFAC | Performed by: INTERNAL MEDICINE

## 2023-03-31 PROCEDURE — 99490 CHRNC CARE MGMT STAFF 1ST 20: CPT | Mod: S$PBB,,, | Performed by: INTERNAL MEDICINE

## 2023-04-11 PROBLEM — Z88.9 HISTORY OF MULTIPLE ALLERGIES: Status: ACTIVE | Noted: 2023-04-11

## 2023-04-12 NOTE — PROCEDURES
Nasal/sinus endoscopy    Date/Time: 3/30/2023 9:30 AM  Performed by: Latesha Jensen MD  Authorized by: Latesha Jensen MD     Consent Done?:  Yes (Verbal)  Anesthesia:     Local anesthetic:  Topical anesthetic    Patient tolerance:  Patient tolerated the procedure well with no immediate complications  Nose:     Procedure Performed:  Nasal Endoscopy  External:      No external nasal deformity  Intranasal:      Mucosa no polyps     Mucosa ulcers not present     No mucosa lesions present  Nasopharynx:      No mucosa lesions     Adenoids not present     Posterior choanae patent     Bilateral nasal endoscopy was performed using monitor screen demonstrating findings throughout.  Definite improvement is noted though still some drainage at left middle meatus into nasopharynx which is now white to pale yellow and suctioned.  Tolerated well.

## 2023-04-12 NOTE — PROGRESS NOTES
Subjective:       Patient ID: Lupe Jewell is a 77 y.o. female.    Chief Complaint: Follow-up (After antibiotics and discuss results.  Much better.)    Returns for follow-up.  Took 2 courses, 20 days of Biaxin without difficulty and feeling much better now.  Some improvement in the first course and gradually better during the 2nd course and much better past few days.  States 80 or 90% better.  Still some cough but not green.  Has always had a cough to some degree for years.  Some interaction with children.  Reports had Pneumovax in 2015.  Nose is mostly open but stuffy on and off.  States a little snotty last week.  Resumed Astelin yesterday and better.  Thinks GERD doing pretty good, still occasional breakthrough noted mainly when coughing which is better.      Allergic to penicillin which caused rash.         Review of Systems     Constitutional: Negative for fever.      HENT: Negative for ear discharge, ear pain, sore throat and stuffy nose.      Respiratory:  Positive for cough. Negative for shortness of breath.      Cardiovascular:  Negative for chest pain.     Gastrointestinal:  Negative for abdominal pain.     Neurological: Negative for dizziness.      Hematologic: Negative for swollen glands.              Objective:        Vitals:    03/30/23 0915   BP: (!) 151/88   Pulse: (!) 112   Temp: 97 °F (36.1 °C)     Body mass index is 31.5 kg/m².  Physical Exam  Constitutional:       General: She is not in acute distress.     Appearance: She is well-developed.   HENT:      Head: Normocephalic and atraumatic.      Right Ear: Tympanic membrane, ear canal and external ear normal.      Left Ear: Tympanic membrane, ear canal and external ear normal.      Nose: No nasal deformity, mucosal edema or rhinorrhea.      Mouth/Throat:      Mouth: Mucous membranes are moist.      Pharynx: No pharyngeal swelling, oropharyngeal exudate or posterior oropharyngeal erythema.   Neck:      Trachea: Phonation normal.   Pulmonary:       Effort: Pulmonary effort is normal. No respiratory distress.      Comments:   Occasional wet cough.    Lymphadenopathy:      Cervical: No cervical adenopathy.   Skin:     General: Skin is warm and dry.   Neurological:      Mental Status: She is alert and oriented to person, place, and time.   Psychiatric:         Speech: Speech normal.         Behavior: Behavior normal.       Tests / Results:    Endoscopic culture obtained at last visit positive for Haemophilus influenzae beta lactamase negative.    Chest x-ray done 03/16/2023 demonstrates no acute abnormality.    Labs 1/18/23 CBC with no eosinophilia and normal IgE.        Assessment:       1. Cough, unspecified type    2. Sinusitis, unspecified chronicity, unspecified location    3. Moderate persistent asthma, unspecified whether complicated    4. History of multiple allergies    5. Gastroesophageal reflux disease, unspecified whether esophagitis present    6. History of Nissen fundoplication        Plan:       Reviewed above and follow-up endoscopy and would like to proceed.    See procedure note.      Reviewed above and considerations, options, answered questions.  Would like to proceed as follows:    Start generic Levaquin as prescribed.    Use saline rinse daily.    Continue azelastine nasal spray 2 sprays in each nostril twice daily.    Reflux precautions, control reflux as discussed.    Phenergan DM cough syrup as needed.      Follow-up 2 weeks.      Continue Rx and follow-ups as per pulmonology.  See/reestablish with Allergy-Immunology.

## 2023-04-12 NOTE — PROGRESS NOTES
Subjective:       Patient ID: Lupe Jewell is a 77 y.o. female.    Chief Complaint: Other (chronic cough)    Last seen 01/05/2023 with history as follows:      Scheduled today for follow-up of hearing loss, last seen as a new patient August 2021 for this and noted bilateral sensorineural hearing loss.  Deferred amplification trial and missed one year follow-up until now.  Thinks there may be some further decline in hearing gradually.  No tinnitus.  No acoustical trauma.  No other otologic complaints.         Also wants to discuss ongoing cough.  History of asthma since childhood and due to see pulmonology soon.  Has been on Symbicort twice daily past few years taking it on and off due to cost.  Back on it past few months and required Medrol Dosepak per PCP in November with [facility].  Takes albuterol once or twice a week for years.  No tobacco.  No pets.  Tested positive for multiple inhalant allergies in the past.  Also triggered by heat, humidity, smoke.  Positive history of GERD to the point that required Nissen with great improvement though still some breakthrough.  Particularly notes lots of coughing first thing in the morning.  Feels a tickle in her lower throat and cough sounds wet, secretions clear.  Wonders if sinonasal component.  Has been on twice daily fluticasone per allergy immunology.  Nose is open currently but stuffy on and off with no other sinonasal complaints and sense of smell fine.  Additional medical history includes type 2 DM and glaucoma.    Interval history:      After her last visit began using Astelin and continued fluticasone as well as saline rinses and montelukast and Symbicort.  Had pulmonology new patient visit and workup in progress, and due to reestablish with allergy immunology again as well.  Scheduled today to discuss and evaluate further from an ENT standpoint.  Still lots of coughing as before and symptoms essentially same as above with no new complaints.  Aware of/working on  reflux precautions.  Has occasional breakthrough which seems triggered by lots of coughing.         Review of Systems     Constitutional: Negative for fever.      HENT: Positive for hearing loss.  Negative for ear discharge, ear pain, ringing in the ears, sore throat, stuffy nose and trouble swallowing.      Respiratory:  Positive for cough. Negative for shortness of breath.      Cardiovascular:  Negative for chest pain.     Gastrointestinal:  Negative for abdominal pain.     Neurological: Negative for dizziness.      Hematologic: Negative for swollen glands.              Objective:        Vitals:    03/08/23 1148   BP: 137/84   Pulse: 87   Temp: 97.7 °F (36.5 °C)     Body mass index is 31.05 kg/m².  Physical Exam  Constitutional:       General: She is not in acute distress.     Appearance: She is well-developed.   HENT:      Head: Normocephalic and atraumatic.      Right Ear: Tympanic membrane, ear canal and external ear normal.      Left Ear: Tympanic membrane, ear canal and external ear normal.      Nose: No nasal deformity, mucosal edema or rhinorrhea.      Mouth/Throat:      Mouth: Mucous membranes are moist.      Pharynx: No pharyngeal swelling, oropharyngeal exudate or posterior oropharyngeal erythema.   Neck:      Trachea: Phonation normal.   Pulmonary:      Effort: Pulmonary effort is normal. No respiratory distress.      Comments:   Chest auscultation reveals occasional rattly cough.    Lymphadenopathy:      Cervical: No cervical adenopathy.   Skin:     General: Skin is warm and dry.   Neurological:      Mental Status: She is alert and oriented to person, place, and time.   Psychiatric:         Speech: Speech normal.         Behavior: Behavior normal.       Tests / Results:        Assessment:       1. Cough, unspecified type    2. Sinusitis, unspecified chronicity, unspecified location    3. Moderate persistent asthma, unspecified whether complicated    4. History of multiple allergies    5.  Gastroesophageal reflux disease, unspecified whether esophagitis present    6. History of Nissen fundoplication        Plan:       Reviewed above and office endoscopy and she would like to proceed.    See procedure note.      Reviewed above and recommendations and answered questions.    Start Biaxin as prescribed pending response and culture results.    Proceed with chest x-ray as ordered.    Needs follow-up 2 weeks.    Continue saline, Astelin, fluticasone.    Continue meds and follow-up as per pulmonology.   See/reestablish with allergy immunology.  Continue reflux precautions and acid reducer as per GI/PCP.

## 2023-04-30 ENCOUNTER — EXTERNAL CHRONIC CARE MANAGEMENT (OUTPATIENT)
Dept: PRIMARY CARE CLINIC | Facility: CLINIC | Age: 78
End: 2023-04-30
Payer: MEDICARE

## 2023-04-30 PROCEDURE — 99490 CHRNC CARE MGMT STAFF 1ST 20: CPT | Mod: S$PBB,,, | Performed by: INTERNAL MEDICINE

## 2023-04-30 PROCEDURE — 99490 PR CHRONIC CARE MGMT, 1ST 20 MIN: ICD-10-PCS | Mod: S$PBB,,, | Performed by: INTERNAL MEDICINE

## 2023-04-30 PROCEDURE — 99490 CHRNC CARE MGMT STAFF 1ST 20: CPT | Mod: PBBFAC | Performed by: INTERNAL MEDICINE

## 2023-05-10 ENCOUNTER — PES CALL (OUTPATIENT)
Dept: ADMINISTRATIVE | Facility: CLINIC | Age: 78
End: 2023-05-10
Payer: MEDICARE

## 2023-05-10 ENCOUNTER — OFFICE VISIT (OUTPATIENT)
Dept: OTOLARYNGOLOGY | Facility: CLINIC | Age: 78
End: 2023-05-10
Payer: MEDICARE

## 2023-05-10 VITALS
WEIGHT: 164.81 LBS | DIASTOLIC BLOOD PRESSURE: 78 MMHG | BODY MASS INDEX: 32.36 KG/M2 | SYSTOLIC BLOOD PRESSURE: 126 MMHG | HEIGHT: 60 IN | HEART RATE: 98 BPM | TEMPERATURE: 98 F

## 2023-05-10 DIAGNOSIS — Z88.9 HISTORY OF MULTIPLE ALLERGIES: ICD-10-CM

## 2023-05-10 DIAGNOSIS — K21.9 GASTROESOPHAGEAL REFLUX DISEASE, UNSPECIFIED WHETHER ESOPHAGITIS PRESENT: ICD-10-CM

## 2023-05-10 DIAGNOSIS — Z98.890 HISTORY OF NISSEN FUNDOPLICATION: ICD-10-CM

## 2023-05-10 DIAGNOSIS — J32.9 CHRONIC SINUSITIS, UNSPECIFIED LOCATION: ICD-10-CM

## 2023-05-10 DIAGNOSIS — J45.40 MODERATE PERSISTENT ASTHMA, UNSPECIFIED WHETHER COMPLICATED: ICD-10-CM

## 2023-05-10 DIAGNOSIS — J32.9 SINUSITIS, UNSPECIFIED CHRONICITY, UNSPECIFIED LOCATION: ICD-10-CM

## 2023-05-10 PROCEDURE — 31231 NASAL/SINUS ENDOSCOPY: ICD-10-PCS | Mod: S$GLB,,, | Performed by: OTOLARYNGOLOGY

## 2023-05-10 PROCEDURE — 31231 NASAL ENDOSCOPY DX: CPT | Mod: S$GLB,,, | Performed by: OTOLARYNGOLOGY

## 2023-05-10 PROCEDURE — 99214 OFFICE O/P EST MOD 30 MIN: CPT | Mod: 25,S$GLB,, | Performed by: OTOLARYNGOLOGY

## 2023-05-10 PROCEDURE — 87070 CULTURE OTHR SPECIMN AEROBIC: CPT | Performed by: OTOLARYNGOLOGY

## 2023-05-10 PROCEDURE — 99214 PR OFFICE/OUTPT VISIT, EST, LEVL IV, 30-39 MIN: ICD-10-PCS | Mod: 25,S$GLB,, | Performed by: OTOLARYNGOLOGY

## 2023-05-10 RX ORDER — CLARITHROMYCIN 500 MG/1
500 TABLET, FILM COATED ORAL 2 TIMES DAILY
Qty: 28 TABLET | Refills: 1 | Status: SHIPPED | OUTPATIENT
Start: 2023-05-10 | End: 2023-09-22

## 2023-05-10 NOTE — PATIENT INSTRUCTIONS
Start generic Biaxin and take with food twice a day pending response and culture results.    Schedule sinus CT in 4-5 weeks.    Use saline, azelastine, and fluticasone sprays twice a day.    Follow up after CT unless problems prior.      See Allergy/Immunology.

## 2023-05-13 LAB — BACTERIA NPH AEROBE CULT: NORMAL

## 2023-05-16 NOTE — PROGRESS NOTES
HPI     Glaucoma     Additional comments: HVF and OCT review today and pt c/o intermittent   pain OD that comes and goes           Comments    DLS: 1/3/23    1) POAG   2) PCIOL OU   3) Type 2 DM NO DR   4) LOLA   5) PVD OU   6) Yag Cap OU     MEDS:   Dorzolamide TID OS   Latanoprost QHS OS   Pres Free AT's  PRN OU             Last edited by Tia Garcia MA on 5/18/2023  3:24 PM.              Assessment /Plan     For exam results, see Encounter Report.    Primary open angle glaucoma of right eye, severe stage    Primary open angle glaucoma of left eye, moderate stage    Type 2 diabetes mellitus without ophthalmic manifestations    Dry eyes, bilateral    Glaucoma filtering bleb of both eyes    Pseudophakia of both eyes    History of glaucoma tube shunt procedure        1. POAG (primary open-angle glaucoma) - Severe stage - Both Eyes   Glaucoma (type and duration) POAG,   -  first HVF 1997  -  first photo: 1996     Family history None   Glaucoma meds - (off all gtts os post combined)  (( use to use - Alphagan od , Xalatan od , dorzolamide bid od ))  H/O adverse rxn to glaucoma drops into to BB 2/2 asthma // brimonidine - irritation - tearing   LASERS SLT 12/8/05 and repeat 7/16/09 OD, and SLT 1/6/06 OS;  SLT os 7/31/14, od 8/14/14 (no response ou) // yag cap od 1/3/2019 /// yag cap os  10/17/2019   GLAUCOMA SURGERIES - combined phaco/IOL trab -os 12/28/2015// elastic sclera - 9 sutures to close - all visible ones cut /// combined - phaco/trab w/ minishunt od - 3/30/2016 // ahmed OD 9/21/2022  OTHER EYE SURGERIES PC IOL OS (combined 1/28/2015) // PC IOL od (combined 3/30/2016)  CDR 0.95 OU   Tbase 22 OU   Tmax 37 (4/11/2016) /38 ( 4/16/2015)   Ttarget 14/14  HVF 24 HVF: 1971-8776 - SAD and  INS  OD, // SAD / IAD  Os (+prog ou 10/2020)   Gonio ext PAS od - 90 degrees open // +1-3 os    /421   OCT 11 test 2004 - 2021: unable to interpret  OD// Dec thru out   HRT 13 test 2005 to 2020 - MR- Dec. SN/N, bord IT od //  dec. N/IN, joshua SN/IT os /// CDR 0.78 od // 0.75 os  Disc photos 1996, 1997, 2003: slides // 2012 , 2015 - OIS    Ttoday  8 off gtts post ahmed /12  on gtts r  Test done today:  HVF - ou // DFE // OCT post -op ahmed OD - 9/21/2022      2. Diabetes mellitus type II   No BDR     3. Dry eyes - Both Eyes   AT's prn     4. Posterior vitreous detachment of both eyes - Both Eyes   With floaters - stable     5. A lot of family stressors   Mom passed since last visit  dad elderly and in poor health, have sitters  Daughter is unemployed and had to move back home  Daughter recently ill - in ICU and intubated 2/2 pancreatitis (8/2/2015 to 8/11/2015)      6. S/P yag cap ou   -od 10/3/3019 (also Rx ant capsule phimosis od)   -OS - 10/17/2019        Plan  Glaucoma   + blebs ou -  S/P ahmed od - 1/3/2023    Cont AT's ou prn     Sees Lisa for glasses     AT's prn     Photo file updated -5/18/2023    S/P ahmed OD  Date:9/21/2022   POM 8   anterior chamber depth  deep   Bleb appearance  elevated over ahmed plate   sidell neg   IOP  8 AC deep // no choroidals - NO drops     Cont glaucoma drops os   Dorzolamide tid os   Latanoprost 1 x day os     OFF brimonidine - ? Allergy - tearing / irritation     dr gordon- glasses    F/U 3- 4 months - IOP / gonio   In 6-8 months repeat VF BUT SWITCH   to a 10-2 stim V od and a 24-2 ss os

## 2023-05-18 ENCOUNTER — CLINICAL SUPPORT (OUTPATIENT)
Dept: OPHTHALMOLOGY | Facility: CLINIC | Age: 78
End: 2023-05-18
Payer: MEDICARE

## 2023-05-18 ENCOUNTER — OFFICE VISIT (OUTPATIENT)
Dept: OPHTHALMOLOGY | Facility: CLINIC | Age: 78
End: 2023-05-18
Payer: MEDICARE

## 2023-05-18 DIAGNOSIS — H40.1113 PRIMARY OPEN ANGLE GLAUCOMA OF RIGHT EYE, SEVERE STAGE: Primary | ICD-10-CM

## 2023-05-18 DIAGNOSIS — E11.9 TYPE 2 DIABETES MELLITUS WITHOUT OPHTHALMIC MANIFESTATIONS: ICD-10-CM

## 2023-05-18 DIAGNOSIS — Z96.1 PSEUDOPHAKIA OF BOTH EYES: ICD-10-CM

## 2023-05-18 DIAGNOSIS — Z96.89 HISTORY OF GLAUCOMA TUBE SHUNT PROCEDURE: ICD-10-CM

## 2023-05-18 DIAGNOSIS — H40.1122 PRIMARY OPEN ANGLE GLAUCOMA OF LEFT EYE, MODERATE STAGE: ICD-10-CM

## 2023-05-18 DIAGNOSIS — Z98.83 GLAUCOMA FILTERING BLEB OF BOTH EYES: ICD-10-CM

## 2023-05-18 DIAGNOSIS — H04.123 DRY EYES, BILATERAL: ICD-10-CM

## 2023-05-18 PROCEDURE — 92133 CPTRZD OPH DX IMG PST SGM ON: CPT | Mod: PBBFAC | Performed by: OPHTHALMOLOGY

## 2023-05-18 PROCEDURE — 92083 EXTENDED VISUAL FIELD XM: CPT | Mod: PBBFAC | Performed by: OPHTHALMOLOGY

## 2023-05-18 PROCEDURE — 99214 PR OFFICE/OUTPT VISIT, EST, LEVL IV, 30-39 MIN: ICD-10-PCS | Mod: S$PBB,,, | Performed by: OPHTHALMOLOGY

## 2023-05-18 PROCEDURE — 99999 PR PBB SHADOW E&M-EST. PATIENT-LVL III: CPT | Mod: PBBFAC,,, | Performed by: OPHTHALMOLOGY

## 2023-05-18 PROCEDURE — 99213 OFFICE O/P EST LOW 20 MIN: CPT | Mod: PBBFAC | Performed by: OPHTHALMOLOGY

## 2023-05-18 PROCEDURE — 92083 HUMPHREY VISUAL FIELD - OU - BOTH EYES: ICD-10-PCS | Mod: 26,S$PBB,, | Performed by: OPHTHALMOLOGY

## 2023-05-18 PROCEDURE — 99999 PR PBB SHADOW E&M-EST. PATIENT-LVL III: ICD-10-PCS | Mod: PBBFAC,,, | Performed by: OPHTHALMOLOGY

## 2023-05-18 PROCEDURE — 92133 POSTERIOR SEGMENT OCT OPTIC NERVE(OCULAR COHERENCE TOMOGRAPHY) - OU - BOTH EYES: ICD-10-PCS | Mod: 26,S$PBB,, | Performed by: OPHTHALMOLOGY

## 2023-05-18 PROCEDURE — 99214 OFFICE O/P EST MOD 30 MIN: CPT | Mod: S$PBB,,, | Performed by: OPHTHALMOLOGY

## 2023-05-19 NOTE — PROGRESS NOTES
HVF done ou. Rel/fix fair od poor os coop. Good ou./ lid taped os/ chart checked for latex allergy./ -1.25 + 1.50 x 74/od .75 + 1.50 x 85/os-[facility]

## 2023-05-24 ENCOUNTER — OFFICE VISIT (OUTPATIENT)
Dept: OPTOMETRY | Facility: CLINIC | Age: 78
End: 2023-05-24
Payer: MEDICARE

## 2023-05-24 DIAGNOSIS — H52.13 MYOPIA OF BOTH EYES WITH REGULAR ASTIGMATISM: ICD-10-CM

## 2023-05-24 DIAGNOSIS — H16.223 KERATOCONJUNCTIVITIS SICCA, NOT SPECIFIED AS SJÖGREN'S, BILATERAL: Primary | ICD-10-CM

## 2023-05-24 DIAGNOSIS — H52.223 MYOPIA OF BOTH EYES WITH REGULAR ASTIGMATISM: ICD-10-CM

## 2023-05-24 PROCEDURE — 99999 PR PBB SHADOW E&M-EST. PATIENT-LVL III: CPT | Mod: PBBFAC,,, | Performed by: OPTOMETRIST

## 2023-05-24 PROCEDURE — 99203 OFFICE O/P NEW LOW 30 MIN: CPT | Mod: S$PBB,,, | Performed by: OPTOMETRIST

## 2023-05-24 PROCEDURE — 99999 PR PBB SHADOW E&M-EST. PATIENT-LVL III: ICD-10-PCS | Mod: PBBFAC,,, | Performed by: OPTOMETRIST

## 2023-05-24 PROCEDURE — 99203 PR OFFICE/OUTPT VISIT, NEW, LEVL III, 30-44 MIN: ICD-10-PCS | Mod: S$PBB,,, | Performed by: OPTOMETRIST

## 2023-05-24 PROCEDURE — 99213 OFFICE O/P EST LOW 20 MIN: CPT | Mod: PBBFAC | Performed by: OPTOMETRIST

## 2023-05-24 NOTE — PROGRESS NOTES
HPI    VA OU blurry at near and distance gradually for a year  Dry Eyes OU. ATs unsure of Brand Preservative Free   Last edited by Donte Gonzalez, OD on 5/24/2023  3:42 PM.            Assessment /Plan     For exam results, see Encounter Report.    Keratoconjunctivitis sicca, not specified as Sjögren's, bilateral  -Reviewed importance of tears on VA  -Systane Complete PF    Myopia of both eyes with regular astigmatism  Eyeglass Final Rx       Eyeglass Final Rx         Sphere Cylinder Axis Dist VA Add    Right -1.00 +1.25 055 20/30- +2.75    Left -1.00 +2.00 080 20/40 +2.75      Type: PAL    Expiration Date: 5/24/2024                      RTC 1 yr

## 2023-05-26 ENCOUNTER — LAB VISIT (OUTPATIENT)
Dept: LAB | Facility: HOSPITAL | Age: 78
End: 2023-05-26
Attending: INTERNAL MEDICINE
Payer: MEDICARE

## 2023-05-26 ENCOUNTER — OFFICE VISIT (OUTPATIENT)
Dept: INTERNAL MEDICINE | Facility: CLINIC | Age: 78
End: 2023-05-26
Payer: MEDICARE

## 2023-05-26 DIAGNOSIS — E11.69 TYPE 2 DIABETES MELLITUS WITH OTHER SPECIFIED COMPLICATION, WITHOUT LONG-TERM CURRENT USE OF INSULIN: ICD-10-CM

## 2023-05-26 DIAGNOSIS — E11.9 TYPE 2 DIABETES MELLITUS WITHOUT COMPLICATION, WITHOUT LONG-TERM CURRENT USE OF INSULIN: ICD-10-CM

## 2023-05-26 DIAGNOSIS — R45.84 ANHEDONIA: ICD-10-CM

## 2023-05-26 DIAGNOSIS — I10 HYPERTENSION, UNSPECIFIED TYPE: Primary | ICD-10-CM

## 2023-05-26 DIAGNOSIS — I10 HYPERTENSION, UNSPECIFIED TYPE: ICD-10-CM

## 2023-05-26 LAB
ALBUMIN SERPL BCP-MCNC: 4 G/DL (ref 3.5–5.2)
ALP SERPL-CCNC: 77 U/L (ref 55–135)
ALT SERPL W/O P-5'-P-CCNC: 11 U/L (ref 10–44)
ANION GAP SERPL CALC-SCNC: 13 MMOL/L (ref 8–16)
AST SERPL-CCNC: 14 U/L (ref 10–40)
BILIRUB SERPL-MCNC: 0.6 MG/DL (ref 0.1–1)
BUN SERPL-MCNC: 13 MG/DL (ref 8–23)
CALCIUM SERPL-MCNC: 9.6 MG/DL (ref 8.7–10.5)
CHLORIDE SERPL-SCNC: 104 MMOL/L (ref 95–110)
CHOLEST SERPL-MCNC: 165 MG/DL (ref 120–199)
CHOLEST/HDLC SERPL: 3.2 {RATIO} (ref 2–5)
CO2 SERPL-SCNC: 23 MMOL/L (ref 23–29)
CREAT SERPL-MCNC: 0.8 MG/DL (ref 0.5–1.4)
EST. GFR  (NO RACE VARIABLE): >60 ML/MIN/1.73 M^2
ESTIMATED AVG GLUCOSE: 111 MG/DL (ref 68–131)
GLUCOSE SERPL-MCNC: 100 MG/DL (ref 70–110)
HBA1C MFR BLD: 5.5 % (ref 4–5.6)
HDLC SERPL-MCNC: 51 MG/DL (ref 40–75)
HDLC SERPL: 30.9 % (ref 20–50)
LDLC SERPL CALC-MCNC: 104 MG/DL (ref 63–159)
NONHDLC SERPL-MCNC: 114 MG/DL
POTASSIUM SERPL-SCNC: 4.1 MMOL/L (ref 3.5–5.1)
PROT SERPL-MCNC: 7.5 G/DL (ref 6–8.4)
SODIUM SERPL-SCNC: 140 MMOL/L (ref 136–145)
TRIGL SERPL-MCNC: 50 MG/DL (ref 30–150)
VIT B12 SERPL-MCNC: 246 PG/ML (ref 210–950)

## 2023-05-26 PROCEDURE — 82607 VITAMIN B-12: CPT | Performed by: INTERNAL MEDICINE

## 2023-05-26 PROCEDURE — 99214 OFFICE O/P EST MOD 30 MIN: CPT | Mod: S$PBB,,, | Performed by: INTERNAL MEDICINE

## 2023-05-26 PROCEDURE — 99999 PR PBB SHADOW E&M-EST. PATIENT-LVL IV: ICD-10-PCS | Mod: PBBFAC,,, | Performed by: INTERNAL MEDICINE

## 2023-05-26 PROCEDURE — 80053 COMPREHEN METABOLIC PANEL: CPT | Performed by: INTERNAL MEDICINE

## 2023-05-26 PROCEDURE — 36415 COLL VENOUS BLD VENIPUNCTURE: CPT | Performed by: INTERNAL MEDICINE

## 2023-05-26 PROCEDURE — 99999 PR PBB SHADOW E&M-EST. PATIENT-LVL IV: CPT | Mod: PBBFAC,,, | Performed by: INTERNAL MEDICINE

## 2023-05-26 PROCEDURE — 83036 HEMOGLOBIN GLYCOSYLATED A1C: CPT | Performed by: INTERNAL MEDICINE

## 2023-05-26 PROCEDURE — 99214 OFFICE O/P EST MOD 30 MIN: CPT | Mod: PBBFAC | Performed by: INTERNAL MEDICINE

## 2023-05-26 PROCEDURE — 80061 LIPID PANEL: CPT | Performed by: INTERNAL MEDICINE

## 2023-05-26 PROCEDURE — 99214 PR OFFICE/OUTPT VISIT, EST, LEVL IV, 30-39 MIN: ICD-10-PCS | Mod: S$PBB,,, | Performed by: INTERNAL MEDICINE

## 2023-05-26 RX ORDER — METFORMIN HYDROCHLORIDE 500 MG/1
500 TABLET ORAL 2 TIMES DAILY WITH MEALS
Qty: 180 TABLET | Refills: 1 | Status: SHIPPED | OUTPATIENT
Start: 2023-05-26 | End: 2023-11-27 | Stop reason: SDUPTHER

## 2023-05-26 RX ORDER — ATORVASTATIN CALCIUM 10 MG/1
10 TABLET, FILM COATED ORAL DAILY
Qty: 90 TABLET | Refills: 1 | Status: SHIPPED | OUTPATIENT
Start: 2023-05-26 | End: 2023-11-27 | Stop reason: SDUPTHER

## 2023-05-26 RX ORDER — MONTELUKAST SODIUM 10 MG/1
10 TABLET ORAL NIGHTLY
Qty: 90 TABLET | Refills: 1 | Status: SHIPPED | OUTPATIENT
Start: 2023-05-26 | End: 2023-11-27 | Stop reason: SDUPTHER

## 2023-05-26 RX ORDER — LOSARTAN POTASSIUM 50 MG/1
50 TABLET ORAL DAILY
Qty: 90 TABLET | Refills: 1 | Status: SHIPPED | OUTPATIENT
Start: 2023-05-26 | End: 2023-11-27 | Stop reason: SDUPTHER

## 2023-05-31 ENCOUNTER — EXTERNAL CHRONIC CARE MANAGEMENT (OUTPATIENT)
Dept: PRIMARY CARE CLINIC | Facility: CLINIC | Age: 78
End: 2023-05-31
Payer: MEDICARE

## 2023-05-31 VITALS
OXYGEN SATURATION: 96 % | BODY MASS INDEX: 31.39 KG/M2 | TEMPERATURE: 99 F | HEIGHT: 61 IN | DIASTOLIC BLOOD PRESSURE: 86 MMHG | WEIGHT: 166.25 LBS | SYSTOLIC BLOOD PRESSURE: 128 MMHG | HEART RATE: 96 BPM

## 2023-05-31 PROCEDURE — 99490 PR CHRONIC CARE MGMT, 1ST 20 MIN: ICD-10-PCS | Mod: S$PBB,,, | Performed by: INTERNAL MEDICINE

## 2023-05-31 PROCEDURE — 99490 CHRNC CARE MGMT STAFF 1ST 20: CPT | Mod: PBBFAC | Performed by: INTERNAL MEDICINE

## 2023-05-31 PROCEDURE — 99490 CHRNC CARE MGMT STAFF 1ST 20: CPT | Mod: S$PBB,,, | Performed by: INTERNAL MEDICINE

## 2023-05-31 NOTE — PROGRESS NOTES
Subjective:       Patient ID: Lupe Jewell is a 77 y.o. female.    Chief Complaint: Hypertension    HPI  She returns for management of hypertension.  She has had hypertension for over a year.  Current treatment has included medications outlined in medication list.  She denies chest pain or shortness of breath.  No palpitations.  Denies left arm or neck pain.  She has diabetes.  Denies polyuria, polydipsia     Past medical history:  Hypertension, diabetes, asthma, glaucoma,  osteoporosis, status post hysterectomy, status post [place]elita n Y gastrectomy, bladder repair, colon adenoma.  She had a colonoscopy February 2022     Medications:   albuterol, Symbicort, Dyazide, Lipitor 10 mg daily, Xalatan, metformin 500 mg b.i.d., Cozaar 50 mg daily     Allergies:  Penicillin       Review of Systems   Constitutional:  Negative for chills, fatigue, fever and unexpected weight change.   Respiratory:  Negative for chest tightness and shortness of breath.    Cardiovascular:  Negative for chest pain and palpitations.   Gastrointestinal:  Negative for abdominal pain and blood in stool.   Neurological:  Negative for dizziness, syncope, numbness and headaches.     Objective:      Physical Exam  HENT:      Right Ear: External ear normal.      Left Ear: External ear normal.      Nose: Nose normal.      Mouth/Throat:      Mouth: Mucous membranes are moist.      Pharynx: Oropharynx is clear.   Eyes:      Pupils: Pupils are equal, round, and reactive to light.   Cardiovascular:      Rate and Rhythm: Normal rate and regular rhythm.      Heart sounds: No murmur heard.  Pulmonary:      Breath sounds: Normal breath sounds.   Abdominal:      General: There is no distension.      Palpations: There is no hepatomegaly or splenomegaly.      Tenderness: There is no abdominal tenderness.   Musculoskeletal:      Cervical back: Normal range of motion.   Lymphadenopathy:      Cervical: No cervical adenopathy.      Upper Body:      Right upper body: No  axillary adenopathy.      Left upper body: No axillary adenopathy.   Neurological:      Cranial Nerves: No cranial nerve deficit.      Sensory: No sensory deficit.      Motor: Motor function is intact.      Deep Tendon Reflexes: Reflexes are normal and symmetric.       Assessment/Plan       Assessment and plan:  1.  Hypertension:  Controlled.  Continue Cozaar.  Check CMP and lipid panel  2.  Diabetes:  Check A1c  3. Discussed Pap smear, pelvic exam.  She declined all

## 2023-06-14 ENCOUNTER — HOSPITAL ENCOUNTER (OUTPATIENT)
Dept: RADIOLOGY | Facility: OTHER | Age: 78
Discharge: HOME | End: 2023-06-14
Attending: OTOLARYNGOLOGY
Payer: MEDICARE

## 2023-06-14 DIAGNOSIS — J45.41 MODERATE PERSISTENT ASTHMA WITH ACUTE EXACERBATION: ICD-10-CM

## 2023-06-14 DIAGNOSIS — J32.9 CHRONIC SINUSITIS, UNSPECIFIED LOCATION: ICD-10-CM

## 2023-06-14 PROCEDURE — 70486 CT MAXILLOFACIAL W/O DYE: CPT | Mod: 26,,, | Performed by: RADIOLOGY

## 2023-06-14 PROCEDURE — 70486 CT MAXILLOFACIAL W/O DYE: CPT | Mod: TC

## 2023-06-14 PROCEDURE — 70486 CT SINUSES WITHOUT CONTRAST: ICD-10-PCS | Mod: 26,,, | Performed by: RADIOLOGY

## 2023-06-14 RX ORDER — BUDESONIDE AND FORMOTEROL FUMARATE DIHYDRATE 160; 4.5 UG/1; UG/1
AEROSOL RESPIRATORY (INHALATION)
Qty: 30.6 G | Refills: 3 | Status: SHIPPED | OUTPATIENT
Start: 2023-06-14

## 2023-06-14 RX ORDER — BUDESONIDE AND FORMOTEROL FUMARATE DIHYDRATE 160; 4.5 UG/1; UG/1
AEROSOL RESPIRATORY (INHALATION)
Qty: 10.2 G | Refills: 3 | OUTPATIENT
Start: 2023-06-14

## 2023-06-14 NOTE — TELEPHONE ENCOUNTER
No care due was identified.  [facility] Embedded Care Due Messages. Reference number: [id number].   6/14/2023 2:25:45 PM CDT

## 2023-06-14 NOTE — TELEPHONE ENCOUNTER
No care due was identified.  [facility] Embedded Care Due Messages. Reference number: [id number].   6/14/2023 4:53:16 PM CDT

## 2023-06-14 NOTE — TELEPHONE ENCOUNTER
Refill Routing Note   Medication(s) are not appropriate for processing by [facility] for the following reason(s):       Drug-disease interaction : budesonide-formoterol 160-4.5 mcg and Primary open angle glaucoma of left eye; Glaucoma filtering bleb of both eyes; Primary open angle glaucoma of left eye, moderate stage; Primary open angle glaucoma of right eye, severe stage    [facility] action(s):  Defer           Appointments  past 12m or future 3m with PCP    Date Provider   Last Visit   5/26/2023 Marisol Restrepo MD   Next Visit   11/27/2023 Marisol Restrepo MD   ED visits in past 90 days: 0        Note composed:3:55 PM 06/14/2023

## 2023-06-14 NOTE — TELEPHONE ENCOUNTER
Refill Decision Note   Lupe Jewell  is requesting a refill authorization.  Brief Assessment and Rationale for Refill:  Approve     Medication Therapy Plan:  PCP previously prescribed rx.      Alert overridden per protocol: Yes   Pharmacist review requested: Yes   Comments:     Note composed:6:13 PM 06/14/2023

## 2023-06-15 RX ORDER — AZELASTINE 1 MG/ML
SPRAY, METERED NASAL
Qty: 30 ML | Refills: 2 | Status: SHIPPED | OUTPATIENT
Start: 2023-06-15 | End: 2023-10-02 | Stop reason: SDUPTHER

## 2023-06-15 RX ORDER — FLUTICASONE PROPIONATE 50 MCG
SPRAY, SUSPENSION (ML) NASAL
Qty: 16 G | Refills: 2 | Status: SHIPPED | OUTPATIENT
Start: 2023-06-15 | End: 2023-10-02 | Stop reason: SDUPTHER

## 2023-06-15 NOTE — TELEPHONE ENCOUNTER
Refill Decision Note   Lupe Jewell  is requesting a refill authorization.  Brief Assessment and Rationale for Refill:  Quick Discontinue     Medication Therapy Plan:         Comments:     Note composed:7:30 PM 06/14/2023

## 2023-06-20 NOTE — PROCEDURES
Nasal/sinus endoscopy    Date/Time: 5/10/2023 11:00 AM  Performed by: Latesha Jensen MD  Authorized by: Latesha Jensen MD     Consent Done?:  Yes (Verbal)  Anesthesia:     Local anesthetic:  Topical anesthetic    Patient tolerance:  Patient tolerated the procedure well with no immediate complications  Nose:     Procedure Performed:  Nasal Endoscopy  External:      No external nasal deformity  Intranasal:      Mucosa no polyps     Mucosa ulcers not present     No mucosa lesions present  Nasopharynx:      No mucosa lesions     Adenoids not present     Posterior choanae patent     Follow-up bilateral nasal endoscopy was carried out using monitor screen demonstrating findings throughout which reveals purulent secretions left middle meatus draining into nasopharynx.  Endoscopic culture obtained and suctioned.  Tolerated well.

## 2023-06-20 NOTE — PROGRESS NOTES
Subjective:       Patient ID: Lupe Jewell is a 77 y.o. female.    Chief Complaint: Follow-up    Returns for follow-up last seen 03/30/2023, notes reviewed.  Took the course of Levaquin as prescribed and missed 2 week follow-up.  Improved but had infection/flare up once or twice since.  Has 10-year-old grandson with asthma and frequent flare ups/infections which she tends to contract.  Presently doing okay.  Nose is a little stuffy, not bad.  Ran out of Astelin a few weeks ago, but still on fluticasone twice daily and Singulair daily and occasional Allegra.  Chest is okay, at baseline.  On Symbicort daily and requires Ventolin about every 2 or 3 weeks.  States no GERD/LPR except if coughing and does not awaken her.  Has not circled back to Allergy/immunology yet.        Review of Systems     Constitutional: Negative for fever.      HENT: Negative for ear discharge and ear pain.      Respiratory:  Negative for shortness of breath.      Cardiovascular:  Negative for chest pain.     Gastrointestinal:  Negative for abdominal pain.     Neurological: Negative for dizziness.      Hematologic: Negative for swollen glands.              Objective:        Vitals:    05/10/23 1138   BP: 126/78   Pulse: 98   Temp: 97.7 °F (36.5 °C)     Body mass index is 32.19 kg/m².  Physical Exam  Constitutional:       General: She is not in acute distress.     Appearance: She is well-developed.   HENT:      Head: Normocephalic and atraumatic.      Right Ear: Tympanic membrane, ear canal and external ear normal.      Left Ear: Tympanic membrane, ear canal and external ear normal.      Nose: No nasal deformity, mucosal edema or rhinorrhea.      Mouth/Throat:      Mouth: Mucous membranes are moist.      Pharynx: No pharyngeal swelling, oropharyngeal exudate or posterior oropharyngeal erythema.   Neck:      Trachea: Phonation normal.   Pulmonary:      Effort: Pulmonary effort is normal. No respiratory distress.      Comments:    Occasional wet  cough.  Lymphadenopathy:      Cervical: No cervical adenopathy.   Skin:     General: Skin is warm and dry.   Neurological:      Mental Status: She is alert and oriented to person, place, and time.   Psychiatric:         Speech: Speech normal.         Behavior: Behavior normal.       Tests / Results:        Assessment:       1. Sinusitis, unspecified chronicity, unspecified location    2. Chronic sinusitis, unspecified location    3. Moderate persistent asthma, unspecified whether complicated    4. History of multiple allergies    5. Gastroesophageal reflux disease, unspecified whether esophagitis present    6. History of Nissen fundoplication        Plan:       Reviewed above and office endoscopy and she would like to proceed.    See procedure note.    Reviewed above and recommendations and answered questions.    Will proceed as follows:    Start generic Biaxin and take with food twice daily pending response and culture results, two 14 day supply Rx'd.     Schedule sinus CT in 4-5 weeks.      Use saline, azelastine, and fluticasone nasal sprays twice daily.      Follow-up after CT unless problems prior.      See allergy/immunology.

## 2023-06-21 ENCOUNTER — OFFICE VISIT (OUTPATIENT)
Dept: OTOLARYNGOLOGY | Facility: CLINIC | Age: 78
End: 2023-06-21
Payer: MEDICARE

## 2023-06-21 VITALS
WEIGHT: 166 LBS | HEART RATE: 118 BPM | TEMPERATURE: 97 F | SYSTOLIC BLOOD PRESSURE: 117 MMHG | DIASTOLIC BLOOD PRESSURE: 72 MMHG | BODY MASS INDEX: 32.59 KG/M2 | HEIGHT: 60 IN

## 2023-06-21 DIAGNOSIS — Z88.9 HISTORY OF MULTIPLE ALLERGIES: ICD-10-CM

## 2023-06-21 DIAGNOSIS — K21.9 GASTROESOPHAGEAL REFLUX DISEASE, UNSPECIFIED WHETHER ESOPHAGITIS PRESENT: ICD-10-CM

## 2023-06-21 DIAGNOSIS — Z98.890 HISTORY OF NISSEN FUNDOPLICATION: ICD-10-CM

## 2023-06-21 DIAGNOSIS — J45.40 MODERATE PERSISTENT ASTHMA, UNSPECIFIED WHETHER COMPLICATED: ICD-10-CM

## 2023-06-21 DIAGNOSIS — J32.4 CHRONIC PANSINUSITIS: Primary | ICD-10-CM

## 2023-06-21 PROCEDURE — 99214 PR OFFICE/OUTPT VISIT, EST, LEVL IV, 30-39 MIN: ICD-10-PCS | Mod: S$GLB,,, | Performed by: OTOLARYNGOLOGY

## 2023-06-21 PROCEDURE — 99214 OFFICE O/P EST MOD 30 MIN: CPT | Mod: S$GLB,,, | Performed by: OTOLARYNGOLOGY

## 2023-06-21 RX ORDER — PREDNISONE 10 MG/1
TABLET ORAL
Qty: 27 TABLET | Refills: 0 | Status: SHIPPED | OUTPATIENT
Start: 2023-06-21 | End: 2023-09-22 | Stop reason: ALTCHOICE

## 2023-06-21 RX ORDER — LEVOFLOXACIN 500 MG/1
500 TABLET, FILM COATED ORAL DAILY
Qty: 10 TABLET | Refills: 0 | Status: SHIPPED | OUTPATIENT
Start: 2023-06-21 | End: 2023-07-01

## 2023-06-21 NOTE — PATIENT INSTRUCTIONS
Take prednisone taper starting in am with food.    Take generic Levaquin as prescribed.    Continue saline rinses, azelastine and fluticasone sprays.    Resume Symbicort per PCP.    Continue montelukast daily.    Follow up in 2 weeks.      Proceed with Allergy/Immunology appointment.

## 2023-06-30 ENCOUNTER — EXTERNAL CHRONIC CARE MANAGEMENT (OUTPATIENT)
Dept: PRIMARY CARE CLINIC | Facility: CLINIC | Age: 78
End: 2023-06-30
Payer: MEDICARE

## 2023-06-30 PROCEDURE — 99490 CHRNC CARE MGMT STAFF 1ST 20: CPT | Mod: S$PBB,,, | Performed by: INTERNAL MEDICINE

## 2023-06-30 PROCEDURE — 99490 PR CHRONIC CARE MGMT, 1ST 20 MIN: ICD-10-PCS | Mod: S$PBB,,, | Performed by: INTERNAL MEDICINE

## 2023-06-30 PROCEDURE — 99490 CHRNC CARE MGMT STAFF 1ST 20: CPT | Mod: PBBFAC | Performed by: INTERNAL MEDICINE

## 2023-07-05 NOTE — PROGRESS NOTES
Subjective:       Patient ID: Lupe Jewell is a 77 y.o. female.    Chief Complaint: Follow-up (Follow up and go over CT / possible scope.)    Here for results and follow-up.  After last visit took the 4 week course of Biaxin without difficulty and then had sinus CT.  Also has been using saline rinse and both Astelin and fluticasone.  States had been doing better until about 5 days ago when began with increased nasal mucus and blowing out discolored secretions.  Cough had improved as well but now some increase.  Has been out of Symbicort as of about 6 days ago.  Still on montelukast daily.  Currently no GERD symptoms.        Review of Systems     Constitutional: Negative for fever.      HENT: Negative for ear discharge, ear pain and sore throat.      Respiratory:  Negative for shortness of breath.      Cardiovascular:  Negative for chest pain.     Gastrointestinal:  Negative for abdominal pain.     Neurological: Negative for dizziness.      Hematologic: Negative for swollen glands.              Objective:        Vitals:    06/21/23 1050   BP: 117/72   Pulse: (!) 118   Temp: 97.2 °F (36.2 °C)     Body mass index is 32.42 kg/m².  Physical Exam  Constitutional:       General: She is not in acute distress.     Appearance: She is well-developed.   HENT:      Head: Normocephalic and atraumatic.      Right Ear: Tympanic membrane, ear canal and external ear normal.      Left Ear: Tympanic membrane, ear canal and external ear normal.      Nose: No nasal deformity, mucosal edema or rhinorrhea.      Mouth/Throat:      Mouth: Mucous membranes are moist.      Pharynx: No pharyngeal swelling, oropharyngeal exudate or posterior oropharyngeal erythema.   Neck:      Trachea: Phonation normal.   Pulmonary:      Effort: Pulmonary effort is normal. No respiratory distress.      Comments:    Lungs are CTAB except for wet bronchitic cough at times.  Lymphadenopathy:      Cervical: No cervical adenopathy.   Skin:     General: Skin is  warm and dry.   Neurological:      Mental Status: She is alert and oriented to person, place, and time.   Psychiatric:         Speech: Speech normal.         Behavior: Behavior normal.       Tests / Results:    Sinus CT done 06/14/2023 report and images reviewed/discussed.        Assessment:       1. Chronic pansinusitis    2. Moderate persistent asthma, unspecified whether complicated    3. History of multiple allergies    4. Gastroesophageal reflux disease, unspecified whether esophagitis present    5. History of Nissen fundoplication        Plan:       Reviewed above and options and would like to proceed as follows:    Prednisone 9 day taper as prescribed with risks and precautions and instructions reviewed.      Generic Levaquin as prescribed.      Continue saline rinses, azelastine and fluticasone sprays.      Resume Symbicort per PCP with oral rinse and gargle after.      Continue montelukast daily.      Follow-up in 2 weeks.      Proceed with allergy/immunology appointment again discussed.

## 2023-07-20 DIAGNOSIS — H40.1113 PRIMARY OPEN ANGLE GLAUCOMA OF RIGHT EYE, SEVERE STAGE: ICD-10-CM

## 2023-07-20 RX ORDER — DORZOLAMIDE HCL 20 MG/ML
1 SOLUTION/ DROPS OPHTHALMIC 3 TIMES DAILY
Qty: 10 ML | Refills: 12 | Status: CANCELLED | OUTPATIENT
Start: 2023-07-20

## 2023-07-21 DIAGNOSIS — H40.1113 PRIMARY OPEN ANGLE GLAUCOMA OF RIGHT EYE, SEVERE STAGE: ICD-10-CM

## 2023-07-21 RX ORDER — DORZOLAMIDE HCL 20 MG/ML
1 SOLUTION/ DROPS OPHTHALMIC 3 TIMES DAILY
Qty: 10 ML | Refills: 12 | Status: SHIPPED | OUTPATIENT
Start: 2023-07-21

## 2023-07-24 ENCOUNTER — TELEPHONE (OUTPATIENT)
Dept: OTOLARYNGOLOGY | Facility: CLINIC | Age: 78
End: 2023-07-24

## 2023-07-24 RX ORDER — AZELASTINE 1 MG/ML
SPRAY, METERED NASAL
Qty: 30 ML | Refills: 2 | Status: CANCELLED | OUTPATIENT
Start: 2023-07-24

## 2023-07-24 RX ORDER — FLUTICASONE PROPIONATE 50 MCG
SPRAY, SUSPENSION (ML) NASAL
Qty: 16 G | Refills: 2 | Status: CANCELLED | OUTPATIENT
Start: 2023-07-24

## 2023-07-26 ENCOUNTER — OFFICE VISIT (OUTPATIENT)
Dept: OTOLARYNGOLOGY | Facility: CLINIC | Age: 78
End: 2023-07-26
Payer: MEDICARE

## 2023-07-26 VITALS
DIASTOLIC BLOOD PRESSURE: 78 MMHG | WEIGHT: 163.81 LBS | TEMPERATURE: 98 F | SYSTOLIC BLOOD PRESSURE: 131 MMHG | HEART RATE: 93 BPM | BODY MASS INDEX: 32.16 KG/M2 | HEIGHT: 60 IN

## 2023-07-26 DIAGNOSIS — Z87.09 HISTORY OF RECURRENT RESPIRATORY INFECTION: ICD-10-CM

## 2023-07-26 DIAGNOSIS — K21.9 GASTROESOPHAGEAL REFLUX DISEASE, UNSPECIFIED WHETHER ESOPHAGITIS PRESENT: ICD-10-CM

## 2023-07-26 DIAGNOSIS — Z88.9 HISTORY OF MULTIPLE ALLERGIES: ICD-10-CM

## 2023-07-26 DIAGNOSIS — J45.40 MODERATE PERSISTENT ASTHMA, UNSPECIFIED WHETHER COMPLICATED: ICD-10-CM

## 2023-07-26 DIAGNOSIS — Z98.890 HISTORY OF NISSEN FUNDOPLICATION: ICD-10-CM

## 2023-07-26 DIAGNOSIS — J32.4 CHRONIC PANSINUSITIS: Primary | ICD-10-CM

## 2023-07-26 PROCEDURE — 31231 NASAL ENDOSCOPY DX: CPT | Mod: S$GLB,,, | Performed by: OTOLARYNGOLOGY

## 2023-07-26 PROCEDURE — 99214 PR OFFICE/OUTPT VISIT, EST, LEVL IV, 30-39 MIN: ICD-10-PCS | Mod: 25,S$GLB,, | Performed by: OTOLARYNGOLOGY

## 2023-07-26 PROCEDURE — 99214 OFFICE O/P EST MOD 30 MIN: CPT | Mod: 25,S$GLB,, | Performed by: OTOLARYNGOLOGY

## 2023-07-26 PROCEDURE — 31231 NASAL/SINUS ENDOSCOPY: ICD-10-PCS | Mod: S$GLB,,, | Performed by: OTOLARYNGOLOGY

## 2023-07-26 NOTE — PATIENT INSTRUCTIONS
Continue saline rinses, azelastine and fluticasone sprays.    Continue Symbicort per PCP.    Continue montelukast daily.    Keep appointment with Allergy/Immunology.    See Rhinology as discussed.

## 2023-07-31 ENCOUNTER — EXTERNAL CHRONIC CARE MANAGEMENT (OUTPATIENT)
Dept: PRIMARY CARE CLINIC | Facility: CLINIC | Age: 78
End: 2023-07-31
Payer: MEDICARE

## 2023-07-31 PROCEDURE — 99490 PR CHRONIC CARE MGMT, 1ST 20 MIN: ICD-10-PCS | Mod: S$PBB,,, | Performed by: INTERNAL MEDICINE

## 2023-07-31 PROCEDURE — 99490 CHRNC CARE MGMT STAFF 1ST 20: CPT | Mod: S$PBB,,, | Performed by: INTERNAL MEDICINE

## 2023-07-31 PROCEDURE — 99490 CHRNC CARE MGMT STAFF 1ST 20: CPT | Mod: PBBFAC | Performed by: INTERNAL MEDICINE

## 2023-08-12 DIAGNOSIS — J45.41 MODERATE PERSISTENT ASTHMA WITH ACUTE EXACERBATION: ICD-10-CM

## 2023-08-12 DIAGNOSIS — J45.901 EXACERBATION OF ASTHMA, UNSPECIFIED ASTHMA SEVERITY, UNSPECIFIED WHETHER PERSISTENT: ICD-10-CM

## 2023-08-12 NOTE — TELEPHONE ENCOUNTER
No care due was identified.  [facility] Embedded Care Due Messages. Reference number: [id number].   8/12/2023 2:48:33 PM CDT

## 2023-08-13 NOTE — TELEPHONE ENCOUNTER
Refill Routing Note   Medication(s) are not appropriate for processing by [facility] for the following reason(s):      Due for refill >6 months ago    [facility] action(s):  Defer Care Due:  None identified            Appointments  past 12m or future 3m with PCP    Date Provider   Last Visit   5/26/2023 Marisol Restrepo MD   Next Visit   11/27/2023 Marisol Restrepo MD   ED visits in past 90 days: 0        Note composed:4:04 PM 08/13/2023

## 2023-08-14 RX ORDER — ALBUTEROL SULFATE 90 UG/1
AEROSOL, METERED RESPIRATORY (INHALATION)
Qty: 8.5 G | Refills: 3 | Status: SHIPPED | OUTPATIENT
Start: 2023-08-14 | End: 2023-11-27 | Stop reason: SDUPTHER

## 2023-08-30 ENCOUNTER — TELEPHONE (OUTPATIENT)
Dept: INTERNAL MEDICINE | Facility: CLINIC | Age: 78
End: 2023-08-30
Payer: MEDICARE

## 2023-08-30 DIAGNOSIS — Z12.31 SCREENING MAMMOGRAM FOR BREAST CANCER: Primary | ICD-10-CM

## 2023-08-30 NOTE — TELEPHONE ENCOUNTER
----- Message from Angelo Dominguez sent at 8/30/2023  1:34 PM CDT -----  Regarding: Mammogram order needed  Contact: [phone]  Hi, pt called to request an order for a Mammogram. Pls call the pt at [phone] to confirm the order has been placed.

## 2023-08-31 ENCOUNTER — EXTERNAL CHRONIC CARE MANAGEMENT (OUTPATIENT)
Dept: PRIMARY CARE CLINIC | Facility: CLINIC | Age: 78
End: 2023-08-31
Payer: MEDICARE

## 2023-08-31 PROCEDURE — 99490 CHRNC CARE MGMT STAFF 1ST 20: CPT | Mod: PBBFAC | Performed by: INTERNAL MEDICINE

## 2023-08-31 PROCEDURE — 99490 CHRNC CARE MGMT STAFF 1ST 20: CPT | Mod: S$PBB,,, | Performed by: INTERNAL MEDICINE

## 2023-08-31 PROCEDURE — 99490 PR CHRONIC CARE MGMT, 1ST 20 MIN: ICD-10-PCS | Mod: S$PBB,,, | Performed by: INTERNAL MEDICINE

## 2023-09-13 ENCOUNTER — OFFICE VISIT (OUTPATIENT)
Dept: ALLERGY | Facility: CLINIC | Age: 78
End: 2023-09-13
Payer: MEDICARE

## 2023-09-13 ENCOUNTER — LAB VISIT (OUTPATIENT)
Dept: LAB | Facility: HOSPITAL | Age: 78
End: 2023-09-13
Attending: ALLERGY & IMMUNOLOGY
Payer: MEDICARE

## 2023-09-13 VITALS
WEIGHT: 166.88 LBS | SYSTOLIC BLOOD PRESSURE: 178 MMHG | BODY MASS INDEX: 32.59 KG/M2 | OXYGEN SATURATION: 91 % | DIASTOLIC BLOOD PRESSURE: 85 MMHG | HEART RATE: 103 BPM

## 2023-09-13 DIAGNOSIS — J32.4 CHRONIC PANSINUSITIS: ICD-10-CM

## 2023-09-13 DIAGNOSIS — J45.40 MODERATE PERSISTENT ASTHMA, UNSPECIFIED WHETHER COMPLICATED: Primary | ICD-10-CM

## 2023-09-13 DIAGNOSIS — J45.40 MODERATE PERSISTENT ASTHMA, UNSPECIFIED WHETHER COMPLICATED: ICD-10-CM

## 2023-09-13 DIAGNOSIS — J30.1 NON-SEASONAL ALLERGIC RHINITIS DUE TO POLLEN: ICD-10-CM

## 2023-09-13 LAB
BASOPHILS # BLD AUTO: 0.08 K/UL (ref 0–0.2)
BASOPHILS NFR BLD: 0.6 % (ref 0–1.9)
DIFFERENTIAL METHOD: ABNORMAL
EOSINOPHIL # BLD AUTO: 0.2 K/UL (ref 0–0.5)
EOSINOPHIL NFR BLD: 1.4 % (ref 0–8)
ERYTHROCYTE [DISTWIDTH] IN BLOOD BY AUTOMATED COUNT: 13.2 % (ref 11.5–14.5)
HCT VFR BLD AUTO: 34.8 % (ref 37–48.5)
HGB BLD-MCNC: 11.5 G/DL (ref 12–16)
IGA SERPL-MCNC: 328 MG/DL (ref 40–350)
IGG SERPL-MCNC: 1085 MG/DL (ref 650–1600)
IGM SERPL-MCNC: 29 MG/DL (ref 50–300)
IMM GRANULOCYTES # BLD AUTO: 0.15 K/UL (ref 0–0.04)
IMM GRANULOCYTES NFR BLD AUTO: 1.2 % (ref 0–0.5)
LYMPHOCYTES # BLD AUTO: 3.7 K/UL (ref 1–4.8)
LYMPHOCYTES NFR BLD: 29.5 % (ref 18–48)
MCH RBC QN AUTO: 28 PG (ref 27–31)
MCHC RBC AUTO-ENTMCNC: 33 G/DL (ref 32–36)
MCV RBC AUTO: 85 FL (ref 82–98)
MONOCYTES # BLD AUTO: 0.8 K/UL (ref 0.3–1)
MONOCYTES NFR BLD: 6.7 % (ref 4–15)
NEUTROPHILS # BLD AUTO: 7.6 K/UL (ref 1.8–7.7)
NEUTROPHILS NFR BLD: 60.6 % (ref 38–73)
NRBC BLD-RTO: 0 /100 WBC
PLATELET # BLD AUTO: 405 K/UL (ref 150–450)
PMV BLD AUTO: 11.2 FL (ref 9.2–12.9)
RBC # BLD AUTO: 4.1 M/UL (ref 4–5.4)
WBC # BLD AUTO: 12.58 K/UL (ref 3.9–12.7)

## 2023-09-13 PROCEDURE — 86003 ALLG SPEC IGE CRUDE XTRC EA: CPT | Performed by: ALLERGY & IMMUNOLOGY

## 2023-09-13 PROCEDURE — 99204 OFFICE O/P NEW MOD 45 MIN: CPT | Mod: S$PBB,,, | Performed by: ALLERGY & IMMUNOLOGY

## 2023-09-13 PROCEDURE — 99999 PR PBB SHADOW E&M-EST. PATIENT-LVL IV: ICD-10-PCS | Mod: PBBFAC,,, | Performed by: ALLERGY & IMMUNOLOGY

## 2023-09-13 PROCEDURE — 86003 ALLG SPEC IGE CRUDE XTRC EA: CPT | Mod: 59 | Performed by: ALLERGY & IMMUNOLOGY

## 2023-09-13 PROCEDURE — 82784 ASSAY IGA/IGD/IGG/IGM EACH: CPT | Performed by: ALLERGY & IMMUNOLOGY

## 2023-09-13 PROCEDURE — 99204 PR OFFICE/OUTPT VISIT, NEW, LEVL IV, 45-59 MIN: ICD-10-PCS | Mod: S$PBB,,, | Performed by: ALLERGY & IMMUNOLOGY

## 2023-09-13 PROCEDURE — 85025 COMPLETE CBC W/AUTO DIFF WBC: CPT | Performed by: ALLERGY & IMMUNOLOGY

## 2023-09-13 PROCEDURE — 99999 PR PBB SHADOW E&M-EST. PATIENT-LVL IV: CPT | Mod: PBBFAC,,, | Performed by: ALLERGY & IMMUNOLOGY

## 2023-09-13 PROCEDURE — 36415 COLL VENOUS BLD VENIPUNCTURE: CPT | Performed by: ALLERGY & IMMUNOLOGY

## 2023-09-13 PROCEDURE — 99214 OFFICE O/P EST MOD 30 MIN: CPT | Mod: PBBFAC | Performed by: ALLERGY & IMMUNOLOGY

## 2023-09-13 PROCEDURE — 86317 IMMUNOASSAY INFECTIOUS AGENT: CPT | Mod: 59 | Performed by: ALLERGY & IMMUNOLOGY

## 2023-09-13 RX ORDER — CEFDINIR 300 MG/1
300 CAPSULE ORAL 2 TIMES DAILY
Qty: 28 CAPSULE | Refills: 0 | Status: SHIPPED | OUTPATIENT
Start: 2023-09-13 | End: 2023-09-27

## 2023-09-13 NOTE — PROGRESS NOTES
Last seen by me 2/4/14      Mrs. Jewell is a 77-year-old -American female with a long history of:   1. Allergic asthma.   2. Allergic rhinoconjunctivitis with prick skin test positivity on June 19, 2003 to multiple common inhalants including both dust mites 4+, cockroach 1+, several trees, several weeds, and several molds. The grasses were negative. The histamine was 3+ and her saline was negative.   3. GERD, status post Thai fundoplication in January 2005.   4. Hypertension.   5. Glaucoma.   6. Type 2 diabetes.   7. Obstructive sleep apnea.     Presents today w daughter for re-eval asthma and chronic, recurrent sinusitis.  Has been on abx (and typically steroids also) 4-5 times over the last year for sinusitis. Only temp improvement after 4 weeks biaxin. Subsequent sinus CT c/w chronic sinusitis.  She is bothered by frequent, recurrent cough and shortness of breath w exertion. Notes temp improvement w abx but sx's always recur.  No prev sins surgeries. Has been referred to rhinologist to consider.  Wheeze most commonly triggered by URI.  Takes symbicort 160 and singulair routinely. Albuterol prn. Also takes flonase and astelin routinely--both 2 sen twice daily.        Past Medical History:   Diagnosis Date    ALLERGIC RHINITIS 08/17/2012    Asthma, well controlled 08/17/2012    Chronic asthma     Chronic rhinitis     Diabetes 02/04/2014    Diabetes mellitus     Diabetes mellitus type II     Fever blister     GERD (gastroesophageal reflux disease) 08/17/2012    Glaucoma     Hyperlipemia     Hypertension     Obstructive sleep apnea     Osteoporosis, unspecified     Recurrent upper respiratory infection (URI)          Review of Systems   Constitutional:  Negative for chills and fever.   HENT:  Positive for congestion. Negative for ear discharge, ear pain and nosebleeds.    Eyes:  Negative for discharge and redness.   Respiratory:  Positive for cough.    Cardiovascular:  Negative for chest pain.    Gastrointestinal:  Negative for diarrhea, nausea and vomiting.   Skin:  Negative for rash.   Neurological:  Negative for dizziness.   Psychiatric/Behavioral:  The patient is not nervous/anxious.                             Physical Exam  Vitals and nursing note reviewed.   Constitutional:       General: She is not in acute distress.     Appearance: She is well-developed.   HENT:      Head: Normocephalic.      Right Ear: Tympanic membrane and external ear normal.      Left Ear: Tympanic membrane and external ear normal.      Nose: No septal deviation, mucosal edema or rhinorrhea.      Right Sinus: No maxillary sinus tenderness or frontal sinus tenderness.      Left Sinus: No maxillary sinus tenderness or frontal sinus tenderness.      Mouth/Throat:      Pharynx: Uvula midline. No uvula swelling.   Eyes:      General:         Right eye: No discharge.         Left eye: No discharge.      Conjunctiva/sclera: Conjunctivae normal.   Cardiovascular:      Rate and Rhythm: Normal rate and regular rhythm.   Pulmonary:      Effort: Pulmonary effort is normal. No respiratory distress.      Breath sounds: Normal breath sounds. No wheezing.   Abdominal:      General: Bowel sounds are normal.      Palpations: Abdomen is soft.      Tenderness: There is no abdominal tenderness.   Musculoskeletal:         General: No tenderness. Normal range of motion.      Cervical back: Normal range of motion and neck supple.   Lymphadenopathy:      Cervical: No cervical adenopathy.   Skin:     General: Skin is warm.      Findings: No erythema or rash.   Neurological:      Mental Status: She is alert and oriented to person, place, and time.   Psychiatric:         Behavior: Behavior normal.         Thought Content: Thought content normal.         Judgment: Judgment normal.         Pneumovax 2010, 2014, 2015  PCV13 2015 1/24/23 nl spirometry  3/16/23 nl CXR    Imp:  1. Asthma  2. Allergic Rhinoconjunctivitis,  3. Chronic sinusitis, consider poor  Pneumovax response    Plan:   1. Continue symbicort 160 , singulair routinely. Prn albuterol.   2. Astelin and flonase 2 sen twice daily each  3. Cefdinir for sinusitis  4. immunoCAPs,  quant immunoglobulins, pneumo titers  Fu pending results. If pos immunonCAPs, biologics could be consideration        Total of 45 minutes on encounter the day of the visit. This includes face to face time and non-face to face time preparing to see the patient (eg, review of tests), obtaining and/or reviewing separately obtained history, documenting clinical information in the electronic or other health record, independently interpreting results and communicating results to the patient/family/caregiver, or care coordinator.

## 2023-09-16 NOTE — PROGRESS NOTES
HPI     Glaucoma            Comments: 4 month ck and pt feels eyes are getting worse and vision is   foggy amd dimmer          Decreased Visual Acuity            Comments: Pt feels vision is getting worse           Comments    DLS: 5/18/23    1) Severe POAG OU  2) PCIOL OU   3) Type 2 DM NO DR   4) LOLA   5) PVD OU   6) Yag Cap OU     MEDS:   Dorzolamide TID OS   Latanoprost QHS OS   Pres Free AT's  PRN OU             Last edited by Tia Garcia MA on 9/22/2023 10:09 AM.            Assessment /Plan     For exam results, see Encounter Report.    Primary open angle glaucoma of right eye, severe stage    Primary open angle glaucoma of left eye, moderate stage    Type 2 diabetes mellitus without ophthalmic manifestations    Dry eyes, bilateral    Pseudophakia of both eyes    Glaucoma filtering bleb of both eyes    History of glaucoma tube shunt procedure        1. POAG (primary open-angle glaucoma) - Severe stage - Both Eyes      Now with  od   Glaucoma (type and duration) POAG,   -  first HVF 1997  -  first photo: 1996     Family history None   Glaucoma meds - (off all gtts os post combined)  (( use to use - Alphagan od , Xalatan od , dorzolamide bid od ))  H/O adverse rxn to glaucoma drops into to BB 2/2 asthma // brimonidine - irritation - tearing   LASERS SLT 12/8/05 and repeat 7/16/09 OD, and SLT 1/6/06 OS;  SLT os 7/31/14, od 8/14/14 (no response ou) // yag cap od 1/3/2019 /// yag cap os  10/17/2019   GLAUCOMA SURGERIES - combined phaco/IOL trab -os 12/28/2015// elastic sclera - 9 sutures to close - all visible ones cut /// combined - phaco/trab w/ minishunt od - 3/30/2016 // ahmed OD 9/21/2022  OTHER EYE SURGERIES PC IOL OS (combined 1/28/2015) // PC IOL od (combined 3/30/2016)  CDR 0.95 OU   Tbase 22 OU   Tmax 37 (4/11/2016) /38 ( 4/16/2015)   Ttarget 14/14  HVF 24 HVF: 4830-5412 - SAD and  INS  OD, // SAD / IAD  Os (+prog ou 10/2020)   Gonio ext PAS od - 90 degrees open // +1-3 os    /421   OCT  11 test 2004 - 2021: unable to interpret  OD// Dec thru out   HRT 13 test 2005 to 2020 - MR- Dec. SN/N, bord IT od // dec. N/IN, bord SN/IT os /// CDR 0.78 od // 0.75 os  Disc photos 1996, 1997, 2003: slides // 2012 , 2015 - OIS    Ttoday  7  off gtts post ahmed /12  on gtts   Test done today:IOP      2. Diabetes mellitus type II   No BDR     3. Dry eyes - Both Eyes   AT's prn     4. Posterior vitreous detachment of both eyes - Both Eyes   With floaters - stable     5. A lot of family stressors   Mom passed since last visit  dad elderly and in poor health, have sitters  Daughter is unemployed and had to move back home  Daughter recently ill - in ICU and intubated 2/2 pancreatitis (8/2/2015 to 8/11/2015)      6. S/P yag cap ou   -od 10/3/3019 (also Rx ant capsule phimosis od)   -OS - 10/17/2019        Plan  Glaucoma   + blebs ou -  S/P ahmed od -  9/21/2022     Cont AT's ou prn     Sees Lisa for glasses     AT's prn     Photo file updated -5/18/2023     S/P ahmed OD  Date:9/21/2022   POY 1   anterior chamber depth  deep   Bleb appearance  elevated over ahmed plate   sidell neg   IOP  7 AC deep // no choroidals - NO drops     Cont glaucoma drops os   Dorzolamide tid os   Latanoprost 1 x day os     OFF brimonidine - ? Allergy - tearing / irritation     dr gordon- glasses      In 3-4 months repeat VF BUT SWITCH   to a 10-2 stim V od and a 24-2 ss os  // DFE // OCT

## 2023-09-18 LAB
A ALTERNATA IGE QN: 0.82 KU/L
A FUMIGATUS IGE QN: 0.26 KU/L
BAHIA GRASS IGE QN: <0.1 KU/L
CAT DANDER IGE QN: <0.1 KU/L
CEDAR IGE QN: <0.1 KU/L
COMMON RAGWEED IGE QN: <0.1 KU/L
D FARINAE IGE QN: 0.12 KU/L
D PTERONYSS IGE QN: 0.13 KU/L
DEPRECATED A ALTERNATA IGE RAST QL: ABNORMAL
DEPRECATED A FUMIGATUS IGE RAST QL: ABNORMAL
DEPRECATED BAHIA GRASS IGE RAST QL: NORMAL
DEPRECATED CAT DANDER IGE RAST QL: NORMAL
DEPRECATED CEDAR IGE RAST QL: NORMAL
DEPRECATED COMMON RAGWEED IGE RAST QL: NORMAL
DEPRECATED D FARINAE IGE RAST QL: ABNORMAL
DEPRECATED D PTERONYSS IGE RAST QL: ABNORMAL
DEPRECATED DOG DANDER IGE RAST QL: NORMAL
DEPRECATED ELDER IGE RAST QL: NORMAL
DEPRECATED ENGL PLANTAIN IGE RAST QL: NORMAL
DEPRECATED PECAN/HICK TREE IGE RAST QL: NORMAL
DEPRECATED ROACH IGE RAST QL: NORMAL
DEPRECATED TIMOTHY IGE RAST QL: NORMAL
DEPRECATED WHITE OAK IGE RAST QL: NORMAL
DOG DANDER IGE QN: <0.1 KU/L
ELDER IGE QN: <0.1 KU/L
ENGL PLANTAIN IGE QN: <0.1 KU/L
PECAN/HICK TREE IGE QN: <0.1 KU/L
ROACH IGE QN: <0.1 KU/L
TIMOTHY IGE QN: <0.1 KU/L
WHITE OAK IGE QN: <0.1 KU/L

## 2023-09-19 ENCOUNTER — HOSPITAL ENCOUNTER (OUTPATIENT)
Dept: RADIOLOGY | Facility: OTHER | Age: 78
Discharge: HOME | End: 2023-09-19
Attending: INTERNAL MEDICINE
Payer: MEDICARE

## 2023-09-19 DIAGNOSIS — Z12.31 SCREENING MAMMOGRAM FOR BREAST CANCER: ICD-10-CM

## 2023-09-19 PROCEDURE — 77063 BREAST TOMOSYNTHESIS BI: CPT | Mod: 26,,, | Performed by: RADIOLOGY

## 2023-09-19 PROCEDURE — 77067 SCR MAMMO BI INCL CAD: CPT | Mod: 26,,, | Performed by: RADIOLOGY

## 2023-09-19 PROCEDURE — 77067 SCR MAMMO BI INCL CAD: CPT | Mod: TC

## 2023-09-19 PROCEDURE — 77067 MAMMO DIGITAL SCREENING BILAT WITH TOMO: ICD-10-PCS | Mod: 26,,, | Performed by: RADIOLOGY

## 2023-09-19 PROCEDURE — 77063 MAMMO DIGITAL SCREENING BILAT WITH TOMO: ICD-10-PCS | Mod: 26,,, | Performed by: RADIOLOGY

## 2023-09-22 ENCOUNTER — OFFICE VISIT (OUTPATIENT)
Dept: OPHTHALMOLOGY | Facility: CLINIC | Age: 78
End: 2023-09-22
Payer: MEDICARE

## 2023-09-22 DIAGNOSIS — H40.1122 PRIMARY OPEN ANGLE GLAUCOMA OF LEFT EYE, MODERATE STAGE: ICD-10-CM

## 2023-09-22 DIAGNOSIS — H40.1113 PRIMARY OPEN ANGLE GLAUCOMA OF RIGHT EYE, SEVERE STAGE: Primary | ICD-10-CM

## 2023-09-22 DIAGNOSIS — E11.9 TYPE 2 DIABETES MELLITUS WITHOUT OPHTHALMIC MANIFESTATIONS: ICD-10-CM

## 2023-09-22 DIAGNOSIS — Z98.83 GLAUCOMA FILTERING BLEB OF BOTH EYES: ICD-10-CM

## 2023-09-22 DIAGNOSIS — Z96.1 PSEUDOPHAKIA OF BOTH EYES: ICD-10-CM

## 2023-09-22 DIAGNOSIS — H04.123 DRY EYES, BILATERAL: ICD-10-CM

## 2023-09-22 DIAGNOSIS — Z96.89 HISTORY OF GLAUCOMA TUBE SHUNT PROCEDURE: ICD-10-CM

## 2023-09-22 LAB
DEPRECATED S PNEUM12 IGG SER-MCNC: <0.3 UG/ML
DEPRECATED S PNEUM23 IGG SER-MCNC: 0.5 UG/ML
DEPRECATED S PNEUM4 IGG SER-MCNC: <0.3 UG/ML
DEPRECATED S PNEUM8 IGG SER-MCNC: 0.5 UG/ML
DEPRECATED S PNEUM9 IGG SER-MCNC: <0.3 UG/ML
S PN DA SERO 19F IGG SER-MCNC: 0.5 UG/ML
S PNEUM DA 1 IGG SER-MCNC: <0.3 UG/ML
S PNEUM DA 14 IGG SER-MCNC: 1.5
S PNEUM DA 18C IGG SER-MCNC: 1.2
S PNEUM DA 3 IGG SER-MCNC: <0.3 UG/ML
S PNEUM DA 5 IGG SER-MCNC: 1.6 UG/ML
S PNEUM DA 6B IGG SER-MCNC: <0.3 UG/ML
S PNEUM DA 7F IGG SER-MCNC: 3.7 UG/ML
S PNEUM DA 9V IGG SER-MCNC: 1 UG/ML

## 2023-09-22 PROCEDURE — 99999 PR PBB SHADOW E&M-EST. PATIENT-LVL III: CPT | Mod: PBBFAC,,, | Performed by: OPHTHALMOLOGY

## 2023-09-22 PROCEDURE — 92020 GONIOSCOPY: CPT | Mod: S$PBB,,, | Performed by: OPHTHALMOLOGY

## 2023-09-22 PROCEDURE — 99999 PR PBB SHADOW E&M-EST. PATIENT-LVL III: ICD-10-PCS | Mod: PBBFAC,,, | Performed by: OPHTHALMOLOGY

## 2023-09-22 PROCEDURE — 99213 OFFICE O/P EST LOW 20 MIN: CPT | Mod: PBBFAC | Performed by: OPHTHALMOLOGY

## 2023-09-22 PROCEDURE — 92020 GONIOSCOPY: CPT | Mod: PBBFAC | Performed by: OPHTHALMOLOGY

## 2023-09-22 PROCEDURE — 99214 PR OFFICE/OUTPT VISIT, EST, LEVL IV, 30-39 MIN: ICD-10-PCS | Mod: S$PBB,,, | Performed by: OPHTHALMOLOGY

## 2023-09-22 PROCEDURE — 99214 OFFICE O/P EST MOD 30 MIN: CPT | Mod: S$PBB,,, | Performed by: OPHTHALMOLOGY

## 2023-09-22 PROCEDURE — 92020 PR SPECIAL EYE EVAL,GONIOSCOPY: ICD-10-PCS | Mod: S$PBB,,, | Performed by: OPHTHALMOLOGY

## 2023-09-25 ENCOUNTER — TELEPHONE (OUTPATIENT)
Dept: ALLERGY | Facility: CLINIC | Age: 78
End: 2023-09-25
Payer: MEDICARE

## 2023-09-25 NOTE — TELEPHONE ENCOUNTER
----- Message from Rudolph Lipscomb MD sent at 9/25/2023  4:03 PM CDT -----  Please call to let know that evaluation of pt's immune system showed that pt may benefit from an extra vaccine, Pneumovax, to decrease frequency of infections. Please schedule follow up appointment to review labs and discuss Pneumovax vaccine.  Allergy tests show notable positive to a common mold allergen.

## 2023-09-25 NOTE — PROGRESS NOTES
Please call to let know that evaluation of pt's immune system showed that pt may benefit from an extra vaccine, Pneumovax, to decrease frequency of infections. Please schedule follow up appointment to review labs and discuss Pneumovax vaccine.  Allergy tests show notable positive to a common mold allergen.

## 2023-09-25 NOTE — TELEPHONE ENCOUNTER
Good afternoon Dr. Lipscomb,    Kindly note the message was relayed via the phone to the patient and an appointment scheduled for Wednesday 27th at 11:30am    Kind regards  Malena Platt MA    ----- Message from Rudolph Lipscomb MD sent at 9/25/2023  4:03 PM CDT -----  Please call to let know that evaluation of pt's immune system showed that pt may benefit from an extra vaccine, Pneumovax, to decrease frequency of infections. Please schedule follow up appointment to review labs and discuss Pneumovax vaccine.  Allergy tests show notable positive to a common mold allergen.

## 2023-09-27 ENCOUNTER — OFFICE VISIT (OUTPATIENT)
Dept: ALLERGY | Facility: CLINIC | Age: 78
End: 2023-09-27
Payer: MEDICARE

## 2023-09-27 VITALS — BODY MASS INDEX: 32.85 KG/M2 | HEIGHT: 60 IN | WEIGHT: 167.31 LBS

## 2023-09-27 DIAGNOSIS — J30.1 NON-SEASONAL ALLERGIC RHINITIS DUE TO POLLEN: ICD-10-CM

## 2023-09-27 DIAGNOSIS — J32.4 CHRONIC PANSINUSITIS: ICD-10-CM

## 2023-09-27 DIAGNOSIS — J45.40 MODERATE PERSISTENT ASTHMA, UNSPECIFIED WHETHER COMPLICATED: ICD-10-CM

## 2023-09-27 DIAGNOSIS — R76.0 ABNORMAL ANTIBODY TITER: Primary | ICD-10-CM

## 2023-09-27 PROCEDURE — 99215 PR OFFICE/OUTPT VISIT, EST, LEVL V, 40-54 MIN: ICD-10-PCS | Mod: S$PBB,,, | Performed by: ALLERGY & IMMUNOLOGY

## 2023-09-27 PROCEDURE — 99999 PR PBB SHADOW E&M-EST. PATIENT-LVL III: CPT | Mod: PBBFAC,,, | Performed by: ALLERGY & IMMUNOLOGY

## 2023-09-27 PROCEDURE — 99999PBSHW PNEUMOCOCCAL CONJUGATE VACCINE 20-VALENT: Mod: PBBFAC,,,

## 2023-09-27 PROCEDURE — 99999 PR PBB SHADOW E&M-EST. PATIENT-LVL III: ICD-10-PCS | Mod: PBBFAC,,, | Performed by: ALLERGY & IMMUNOLOGY

## 2023-09-27 PROCEDURE — 99999PBSHW PNEUMOCOCCAL CONJUGATE VACCINE 20-VALENT: ICD-10-PCS | Mod: PBBFAC,,,

## 2023-09-27 PROCEDURE — 99213 OFFICE O/P EST LOW 20 MIN: CPT | Mod: PBBFAC | Performed by: ALLERGY & IMMUNOLOGY

## 2023-09-27 PROCEDURE — 99215 OFFICE O/P EST HI 40 MIN: CPT | Mod: S$PBB,,, | Performed by: ALLERGY & IMMUNOLOGY

## 2023-09-27 PROCEDURE — 90677 PCV20 VACCINE IM: CPT | Mod: PBBFAC

## 2023-09-27 NOTE — PROGRESS NOTES
9/13/23      Pt presents for fu recurrent sinusitis, asthma, AR.  Pt reports some improvement in cough and rhinosinusitis sx's after course of cefdinir rx'd at .        Hx from 9/13/23:  Mrs. Jewell is a 77-year-old -American female with a long history of:   1. Allergic asthma.   2. Allergic rhinoconjunctivitis with prick skin test positivity on June 19, 2003 to multiple common inhalants including both dust mites 4+, cockroach 1+, several trees, several weeds, and several molds. The grasses were negative. The histamine was 3+ and her saline was negative.   3. GERD, status post Thai fundoplication in January 2005.   4. Hypertension.   5. Glaucoma.   6. Type 2 diabetes.   7. Obstructive sleep apnea.     Presents today w daughter for re-eval asthma and chronic, recurrent sinusitis.  Has been on abx (and typically steroids also) 4-5 times over the last year for sinusitis. Only temp improvement after 4 weeks biaxin. Subsequent sinus CT c/w chronic sinusitis.  She is bothered by frequent, recurrent cough and shortness of breath w exertion. Notes temp improvement w abx but sx's always recur.  No prev sins surgeries. Has been referred to rhinologist to consider.  Wheeze most commonly triggered by URI.  Takes symbicort 160 and singulair routinely. Albuterol prn. Also takes flonase and astelin routinely--both 2 sen twice daily.        Past Medical History:   Diagnosis Date    ALLERGIC RHINITIS 08/17/2012    Asthma, well controlled 08/17/2012    Chronic asthma     Chronic rhinitis     Diabetes 02/04/2014    Diabetes mellitus     Diabetes mellitus type II     Fever blister     GERD (gastroesophageal reflux disease) 08/17/2012    Glaucoma     Hyperlipemia     Hypertension     Obstructive sleep apnea     Osteoporosis, unspecified     Recurrent upper respiratory infection (URI)          Review of Systems   Constitutional:  Negative for chills and fever.   HENT:  Positive for congestion. Negative for ear discharge,  ear pain and nosebleeds.    Eyes:  Negative for discharge and redness.   Respiratory:  Positive for cough.    Cardiovascular:  Negative for chest pain.   Gastrointestinal:  Negative for diarrhea, nausea and vomiting.   Skin:  Negative for rash.   Neurological:  Negative for dizziness.   Psychiatric/Behavioral:  The patient is not nervous/anxious.                             Physical Exam  Vitals and nursing note reviewed.   Constitutional:       General: She is not in acute distress.     Appearance: She is well-developed.   HENT:      Head: Normocephalic.      Right Ear: Tympanic membrane and external ear normal.      Left Ear: Tympanic membrane and external ear normal.      Nose: No septal deviation, mucosal edema or rhinorrhea.      Right Sinus: No maxillary sinus tenderness or frontal sinus tenderness.      Left Sinus: No maxillary sinus tenderness or frontal sinus tenderness.      Mouth/Throat:      Pharynx: Uvula midline. No uvula swelling.   Eyes:      General:         Right eye: No discharge.         Left eye: No discharge.      Conjunctiva/sclera: Conjunctivae normal.   Cardiovascular:      Rate and Rhythm: Normal rate and regular rhythm.   Pulmonary:      Effort: Pulmonary effort is normal. No respiratory distress.      Breath sounds: Normal breath sounds. No wheezing.   Abdominal:      General: Bowel sounds are normal.      Palpations: Abdomen is soft.      Tenderness: There is no abdominal tenderness.   Musculoskeletal:         General: No tenderness. Normal range of motion.      Cervical back: Normal range of motion and neck supple.   Lymphadenopathy:      Cervical: No cervical adenopathy.   Skin:     General: Skin is warm.      Findings: No erythema or rash.   Neurological:      Mental Status: She is alert and oriented to person, place, and time.   Psychiatric:         Behavior: Behavior normal.         Thought Content: Thought content normal.         Judgment: Judgment normal.       Pneumovax 2010,  2014, 2015  PCV13 2015    1/24/23 nl spirometry  3/16/23 nl CXR       Latest Reference Range & Units 09/13/23 15:18   A. fumigatus Class  CLASS 0/1   Altern. alternata Class  CLASS 2   Alternaria alternata <0.10 kU/L 0.82 (H)   Aspergillus Fumigatus IgE <0.10 kU/L 0.26 (H)   [place] Class  CLASS 0   BAHIA GRASS <0.10 kU/L <0.10   Cat Dander <0.10 kU/L <0.10   Cat Epithelium Class  CLASS 0   [place] Class  CLASS 0   [place] IgE <0.10 kU/L <0.10   Cockroach, IgE <0.10 kU/L  -  <0.10  CLASS 0   D. farinae <0.10 kU/L 0.12 (H)   D. farinae Class  CLASS 0/1   D. pteronyssinus Class  CLASS 0/1   Dog Dander, IgE <0.10 kU/L <0.10   Dog Dander Class  CLASS 0   English Plantain Class  CLASS 0   Marshelder Class  CLASS 0   Marshelder IgE <0.10 kU/L <0.10   Mite Dust Pteronyssinus IgE <0.10 kU/L 0.13 (H)   [place], Class  CLASS 0   Pecan [place] Tree <0.10 kU/L <0.10   Pecan, Class  CLASS 0   Plantain <0.10 kU/L <0.10   Ragweed, Short, Class  CLASS 0   Ragweed, Short, IgE <0.10 kU/L <0.10   [place] Grass <0.10 kU/L <0.10   [place]in Grass Class  CLASS 0   [place](Quercus alba) IgE <0.10 kU/L <0.10   IgG 650 - 1600 mg/dL 1085   IgM 50 - 300 mg/dL 29 (L)   IgA 40 - 350 mg/dL 328   (H): Data is abnormally high  (L): Data is abnormally low     09/13/23 15:18   S.pneumoniae Type 1 <0.3   S.pneumoniae Type 12F <0.3   S.pneumoniae Type 18C 1.2   S.pneumoniae Type 19F 0.5   S.pneumoniae Type 23F 0.5   S.pneumoniae Type 3 <0.3   S.pneumoniae Type 5 1.6   S.pneumoniae Type 6B <0.3   S.pneumoniae Type 7F 3.7   S.pneumoniae Type 8 0.5   S.pneumoniae Type 9N <0.3   S.pneumoniae Type 9V Abs 1.0   Strep pneumo Type 14 1.5   Strep pneumo Type 4 <0.3       Imp:  1. Asthma  2. Allergic Rhinoconjunctivitis  3. Chronic sinusitis  4. Abnl antibody/weak response to pneumococcal vaccination    Plan:   1. Continue symbicort 160 , singulair routinely. Prn albuterol.   2. Astelin and flonase 2 sen twice daily each  3. Give PCV20  4. Repeat pneumo titers 4 weeks  later.   Fu pending response.        Total of 42 minutes on encounter the day of the visit. This includes face to face time and non-face to face time preparing to see the patient (eg, review of tests), obtaining and/or reviewing separately obtained history, documenting clinical information in the electronic or other health record, independently interpreting results and communicating results to the patient/family/caregiver, or care coordinator.

## 2023-09-30 ENCOUNTER — EXTERNAL CHRONIC CARE MANAGEMENT (OUTPATIENT)
Dept: PRIMARY CARE CLINIC | Facility: CLINIC | Age: 78
End: 2023-09-30
Payer: MEDICARE

## 2023-09-30 PROCEDURE — 99490 PR CHRONIC CARE MGMT, 1ST 20 MIN: ICD-10-PCS | Mod: S$PBB,,, | Performed by: INTERNAL MEDICINE

## 2023-09-30 PROCEDURE — 99490 CHRNC CARE MGMT STAFF 1ST 20: CPT | Mod: S$PBB,,, | Performed by: INTERNAL MEDICINE

## 2023-09-30 PROCEDURE — 99490 CHRNC CARE MGMT STAFF 1ST 20: CPT | Mod: PBBFAC | Performed by: INTERNAL MEDICINE

## 2023-10-03 RX ORDER — AZELASTINE 1 MG/ML
SPRAY, METERED NASAL
Qty: 30 ML | Refills: 2 | Status: SHIPPED | OUTPATIENT
Start: 2023-10-03 | End: 2024-01-20

## 2023-10-03 RX ORDER — FLUTICASONE PROPIONATE 50 MCG
SPRAY, SUSPENSION (ML) NASAL
Qty: 16 G | Refills: 2 | Status: SHIPPED | OUTPATIENT
Start: 2023-10-03 | End: 2024-01-20

## 2023-10-31 ENCOUNTER — EXTERNAL CHRONIC CARE MANAGEMENT (OUTPATIENT)
Dept: PRIMARY CARE CLINIC | Facility: CLINIC | Age: 78
End: 2023-10-31
Payer: MEDICARE

## 2023-10-31 PROCEDURE — 99490 PR CHRONIC CARE MGMT, 1ST 20 MIN: ICD-10-PCS | Mod: S$PBB,,, | Performed by: INTERNAL MEDICINE

## 2023-10-31 PROCEDURE — 99490 CHRNC CARE MGMT STAFF 1ST 20: CPT | Mod: S$PBB,,, | Performed by: INTERNAL MEDICINE

## 2023-10-31 PROCEDURE — 99490 CHRNC CARE MGMT STAFF 1ST 20: CPT | Mod: PBBFAC | Performed by: INTERNAL MEDICINE

## 2023-11-03 ENCOUNTER — LAB VISIT (OUTPATIENT)
Dept: LAB | Facility: OTHER | Age: 78
End: 2023-11-03
Attending: ALLERGY & IMMUNOLOGY
Payer: MEDICARE

## 2023-11-03 DIAGNOSIS — R76.0 ABNORMAL ANTIBODY TITER: ICD-10-CM

## 2023-11-03 PROCEDURE — 86317 IMMUNOASSAY INFECTIOUS AGENT: CPT | Performed by: ALLERGY & IMMUNOLOGY

## 2023-11-03 PROCEDURE — 36415 COLL VENOUS BLD VENIPUNCTURE: CPT | Performed by: ALLERGY & IMMUNOLOGY

## 2023-11-08 LAB
DEPRECATED S PNEUM12 IGG SER-MCNC: <0.3 UG/ML
DEPRECATED S PNEUM23 IGG SER-MCNC: 4.7 UG/ML
DEPRECATED S PNEUM4 IGG SER-MCNC: <0.3 UG/ML
DEPRECATED S PNEUM8 IGG SER-MCNC: 1.7 UG/ML
DEPRECATED S PNEUM9 IGG SER-MCNC: <0.3 UG/ML
S PN DA SERO 19F IGG SER-MCNC: 1 UG/ML
S PNEUM DA 1 IGG SER-MCNC: 1.2 UG/ML
S PNEUM DA 14 IGG SER-MCNC: 2.3
S PNEUM DA 18C IGG SER-MCNC: 4.8
S PNEUM DA 3 IGG SER-MCNC: 1.2 UG/ML
S PNEUM DA 5 IGG SER-MCNC: 2.5 UG/ML
S PNEUM DA 6B IGG SER-MCNC: 0.6 UG/ML
S PNEUM DA 7F IGG SER-MCNC: 4.3 UG/ML
S PNEUM DA 9V IGG SER-MCNC: 2.6 UG/ML

## 2023-11-10 ENCOUNTER — DOCUMENTATION ONLY (OUTPATIENT)
Dept: ALLERGY | Facility: CLINIC | Age: 78
End: 2023-11-10
Payer: MEDICARE

## 2023-11-10 ENCOUNTER — TELEPHONE (OUTPATIENT)
Dept: ALLERGY | Facility: CLINIC | Age: 78
End: 2023-11-10
Payer: MEDICARE

## 2023-11-10 NOTE — PROGRESS NOTES
Unable to make contact with the patient.  Third time trying.  I will try again at the end of the day.

## 2023-11-10 NOTE — TELEPHONE ENCOUNTER
Good afternoon ,    Kindly note I spoke with the patient and she will make a Follow Up appointment when she needs one.      She also states she is feeling better.    Kind regards  Malena Platt MA    ----- Message from Rudolph Lipscomb MD sent at 11/9/2023  4:51 PM CST -----  Please let Ms. Jewell know that she had a good response to the recent Prevnar vaccine. This should help minimize frequency and severity of sinus infections. FU in clinic if freq sinus infections continue or recur. Also fu prn for poorly controlled asthma or allergies.

## 2023-11-10 NOTE — TELEPHONE ENCOUNTER
----- Message from Rudolph Lipscomb MD sent at 11/9/2023  4:51 PM CST -----  Please let Ms. Jewell know that she had a good response to the recent Prevnar vaccine. This should help minimize frequency and severity of sinus infections. FU in clinic if freq sinus infections continue or recur. Also fu prn for poorly controlled asthma or allergies.

## 2023-11-10 NOTE — PROGRESS NOTES
Telephone call made to the patient to assist her with scheduling her appointment and also to relay 's message.    Voice message left with clinic's number for her to ring the clinic .

## 2023-11-27 ENCOUNTER — IMMUNIZATION (OUTPATIENT)
Dept: INTERNAL MEDICINE | Facility: CLINIC | Age: 78
End: 2023-11-27
Payer: MEDICARE

## 2023-11-27 ENCOUNTER — OFFICE VISIT (OUTPATIENT)
Dept: INTERNAL MEDICINE | Facility: CLINIC | Age: 78
End: 2023-11-27
Payer: MEDICARE

## 2023-11-27 ENCOUNTER — HOSPITAL ENCOUNTER (OUTPATIENT)
Dept: RADIOLOGY | Facility: HOSPITAL | Age: 78
Discharge: HOME | End: 2023-11-27
Attending: INTERNAL MEDICINE
Payer: MEDICARE

## 2023-11-27 DIAGNOSIS — Z23 NEED FOR VACCINATION: Primary | ICD-10-CM

## 2023-11-27 DIAGNOSIS — J45.901 EXACERBATION OF ASTHMA, UNSPECIFIED ASTHMA SEVERITY, UNSPECIFIED WHETHER PERSISTENT: ICD-10-CM

## 2023-11-27 DIAGNOSIS — E11.9 TYPE 2 DIABETES MELLITUS WITHOUT COMPLICATION, WITHOUT LONG-TERM CURRENT USE OF INSULIN: ICD-10-CM

## 2023-11-27 DIAGNOSIS — I10 HYPERTENSION, UNSPECIFIED TYPE: ICD-10-CM

## 2023-11-27 DIAGNOSIS — R05.9 COUGH, UNSPECIFIED TYPE: Primary | ICD-10-CM

## 2023-11-27 DIAGNOSIS — R05.9 COUGH, UNSPECIFIED TYPE: ICD-10-CM

## 2023-11-27 DIAGNOSIS — Z78.0 ASYMPTOMATIC MENOPAUSAL STATE: ICD-10-CM

## 2023-11-27 DIAGNOSIS — J45.41 MODERATE PERSISTENT ASTHMA WITH ACUTE EXACERBATION: ICD-10-CM

## 2023-11-27 PROCEDURE — 99999 PR PBB SHADOW E&M-EST. PATIENT-LVL V: CPT | Mod: PBBFAC,,, | Performed by: INTERNAL MEDICINE

## 2023-11-27 PROCEDURE — 99999PBSHW FLU VACCINE - QUADRIVALENT - ADJUVANTED: ICD-10-PCS | Mod: PBBFAC,,,

## 2023-11-27 PROCEDURE — 71250 CT THORAX DX C-: CPT | Mod: 26,,, | Performed by: STUDENT IN AN ORGANIZED HEALTH CARE EDUCATION/TRAINING PROGRAM

## 2023-11-27 PROCEDURE — 99214 PR OFFICE/OUTPT VISIT, EST, LEVL IV, 30-39 MIN: ICD-10-PCS | Mod: S$PBB,,, | Performed by: INTERNAL MEDICINE

## 2023-11-27 PROCEDURE — 99999PBSHW FLU VACCINE - QUADRIVALENT - ADJUVANTED: Mod: PBBFAC,,,

## 2023-11-27 PROCEDURE — 99215 OFFICE O/P EST HI 40 MIN: CPT | Mod: PBBFAC,25 | Performed by: INTERNAL MEDICINE

## 2023-11-27 PROCEDURE — 99999 PR PBB SHADOW E&M-EST. PATIENT-LVL V: ICD-10-PCS | Mod: PBBFAC,,, | Performed by: INTERNAL MEDICINE

## 2023-11-27 PROCEDURE — 71250 CT CHEST WITHOUT CONTRAST: ICD-10-PCS | Mod: 26,,, | Performed by: STUDENT IN AN ORGANIZED HEALTH CARE EDUCATION/TRAINING PROGRAM

## 2023-11-27 PROCEDURE — 99214 OFFICE O/P EST MOD 30 MIN: CPT | Mod: S$PBB,,, | Performed by: INTERNAL MEDICINE

## 2023-11-27 PROCEDURE — G0008 ADMIN INFLUENZA VIRUS VAC: HCPCS | Mod: PBBFAC

## 2023-11-27 PROCEDURE — 71250 CT THORAX DX C-: CPT | Mod: TC

## 2023-11-27 RX ORDER — ALBUTEROL SULFATE 90 UG/1
AEROSOL, METERED RESPIRATORY (INHALATION)
Qty: 8.5 G | Refills: 3 | Status: SHIPPED | OUTPATIENT
Start: 2023-11-27

## 2023-11-27 RX ORDER — MONTELUKAST SODIUM 10 MG/1
10 TABLET ORAL NIGHTLY
Qty: 90 TABLET | Refills: 1 | Status: SHIPPED | OUTPATIENT
Start: 2023-11-27

## 2023-11-27 RX ORDER — LOSARTAN POTASSIUM 50 MG/1
50 TABLET ORAL DAILY
Qty: 90 TABLET | Refills: 1 | Status: SHIPPED | OUTPATIENT
Start: 2023-11-27

## 2023-11-27 RX ORDER — ATORVASTATIN CALCIUM 10 MG/1
10 TABLET, FILM COATED ORAL DAILY
Qty: 90 TABLET | Refills: 1 | Status: SHIPPED | OUTPATIENT
Start: 2023-11-27

## 2023-11-27 RX ORDER — METFORMIN HYDROCHLORIDE 500 MG/1
500 TABLET ORAL 2 TIMES DAILY WITH MEALS
Qty: 180 TABLET | Refills: 1 | Status: SHIPPED | OUTPATIENT
Start: 2023-11-27

## 2023-11-29 VITALS
WEIGHT: 168 LBS | BODY MASS INDEX: 31.72 KG/M2 | DIASTOLIC BLOOD PRESSURE: 74 MMHG | OXYGEN SATURATION: 98 % | SYSTOLIC BLOOD PRESSURE: 116 MMHG | HEIGHT: 61 IN | HEART RATE: 96 BPM | TEMPERATURE: 99 F

## 2023-11-29 NOTE — PROGRESS NOTES
Subjective:       Patient ID: Lupe Jewell is a 78 y.o. female.    Chief Complaint: Hypertension    HPI  She returns for management of hypertension.  She has had hypertension for over a year.  Current treatment has included medications outlined in medication list.  She denies chest pain or shortness of breath.  No palpitations.  Denies left arm or neck pain.  She complains of a chronic cough.  She has diabetes.  Denies polyuria, polydipsia     Medications:  See medication list     Social history:  Does not smoke, does not drink alcohol       Review of Systems   Constitutional:  Negative for chills, fatigue, fever and unexpected weight change.   Respiratory:  Negative for chest tightness and shortness of breath.    Cardiovascular:  Negative for chest pain and palpitations.   Gastrointestinal:  Negative for abdominal pain and blood in stool.   Neurological:  Negative for dizziness, syncope, numbness and headaches.       Objective:      Physical Exam  HENT:      Right Ear: External ear normal.      Left Ear: External ear normal.      Nose: Nose normal.      Mouth/Throat:      Mouth: Mucous membranes are moist.      Pharynx: Oropharynx is clear.   Eyes:      Pupils: Pupils are equal, round, and reactive to light.   Cardiovascular:      Rate and Rhythm: Normal rate and regular rhythm.      Heart sounds: No murmur heard.  Pulmonary:      Breath sounds: Normal breath sounds.   Abdominal:      General: There is no distension.      Palpations: There is no hepatomegaly or splenomegaly.      Tenderness: There is no abdominal tenderness.   Musculoskeletal:      Cervical back: Normal range of motion.   Lymphadenopathy:      Cervical: No cervical adenopathy.      Upper Body:      Right upper body: No axillary adenopathy.      Left upper body: No axillary adenopathy.   Neurological:      Cranial Nerves: No cranial nerve deficit.      Sensory: No sensory deficit.      Motor: Motor function is intact.      Deep Tendon Reflexes:  Reflexes are normal and symmetric.         Assessment/Plan       Assessment and plan:  1.  Hypertension:  Controlled.  Continue Cozaar.  Check CMP   2. Chronic cough: Schedule CT scan chest  3.  Diabetes:  Check A1c  4.  Schedule bone density

## 2023-11-30 ENCOUNTER — EXTERNAL CHRONIC CARE MANAGEMENT (OUTPATIENT)
Dept: PRIMARY CARE CLINIC | Facility: CLINIC | Age: 78
End: 2023-11-30
Payer: MEDICARE

## 2023-11-30 PROCEDURE — 99490 CHRNC CARE MGMT STAFF 1ST 20: CPT | Mod: PBBFAC | Performed by: INTERNAL MEDICINE

## 2023-11-30 PROCEDURE — 99490 PR CHRONIC CARE MGMT, 1ST 20 MIN: ICD-10-PCS | Mod: S$PBB,,, | Performed by: INTERNAL MEDICINE

## 2023-11-30 PROCEDURE — 99490 CHRNC CARE MGMT STAFF 1ST 20: CPT | Mod: S$PBB,,, | Performed by: INTERNAL MEDICINE

## 2023-12-06 NOTE — PROGRESS NOTES
Subjective:       Patient ID: Lupe Jewell is a 78 y.o. female.    Chief Complaint: Follow-up (Poss scope)    Returns for follow-up, last seen 06/21/2023 and unable to make 07/05/2023 follow-up.  Took the 9 day prednisone taper and course of Levaquin after the last visit without difficulty.  Resumed Symbicort and continues on saline rinses 3 to 4 times a week and Astelin and fluticasone daily.  Overall improved.  Continues to require rescue inhaler about every 10-14 days.  A little stuffy today, fluctuates, better after meds.  No recent sick contacts.          Review of Systems     Constitutional: Negative for fever.      HENT: Negative for ear discharge and ear pain.      Respiratory:  Negative for shortness of breath.      Cardiovascular:  Negative for chest pain.     Gastrointestinal:  Negative for abdominal pain.     Neurological: Negative for dizziness.      Hematologic: Negative for swollen glands.                Objective:        Vitals:    07/26/23 1602   BP: 131/78   Pulse: 93   Temp: 97.9 °F (36.6 °C)     Body mass index is 31.99 kg/m².  Physical Exam  Constitutional:       General: She is not in acute distress.     Appearance: Normal appearance.   HENT:      Head: Normocephalic and atraumatic.      Right Ear: Tympanic membrane, ear canal and external ear normal.      Left Ear: Tympanic membrane, ear canal and external ear normal.      Nose: No nasal deformity, mucosal edema or rhinorrhea.      Mouth/Throat:      Mouth: Mucous membranes are moist.      Pharynx: No pharyngeal swelling, oropharyngeal exudate or posterior oropharyngeal erythema.   Neck:      Trachea: Phonation normal.   Pulmonary:      Effort: Pulmonary effort is normal. No respiratory distress.   Lymphadenopathy:      Cervical: No cervical adenopathy.   Skin:     General: Skin is warm and dry.   Neurological:      Mental Status: She is alert and oriented to person, place, and time.   Psychiatric:         Speech: Speech normal.          Behavior: Behavior normal.         Tests / Results:    Sinus CT done 06/14/2023 again discussed.        Assessment:       1. Chronic pansinusitis    2. Moderate persistent asthma, unspecified whether complicated    3. History of multiple allergies    4. History of recurrent respiratory infection    5. Gastroesophageal reflux disease, unspecified whether esophagitis present    6. History of Nissen fundoplication        Plan:       Reviewed above and office endoscopy and she would like to proceed.    See procedure note.     Reviewed all above and considerations and recommendations and answered questions.  Has been on multiple antibiotics, oral steroids, nasal sprays and rinses.  Needs to see allergy immunology again discussed and has upcoming appointment and will see rhinology as well discussed.      Continue saline rinses, azelastine and fluticasone sprays.      Continue Symbicort per PCP.      Continue montelukast daily.      Call/follow-up if assistance needed pending above.

## 2023-12-13 ENCOUNTER — TELEPHONE (OUTPATIENT)
Dept: OPHTHALMOLOGY | Facility: CLINIC | Age: 78
End: 2023-12-13
Payer: MEDICARE

## 2023-12-13 NOTE — TELEPHONE ENCOUNTER
will be out on 12/22/23 and so pt will need to be rescheduled. I left a message for pt to call back.

## 2023-12-21 ENCOUNTER — TELEPHONE (OUTPATIENT)
Dept: OTOLARYNGOLOGY | Facility: CLINIC | Age: 78
End: 2023-12-21
Payer: MEDICARE

## 2023-12-21 NOTE — TELEPHONE ENCOUNTER
----- Message from Madison Graham MA sent at 12/20/2023 10:13 AM CST -----  Contact: patient    ----- Message -----  From: Dina Flores  Sent: 12/19/2023   3:48 PM CST  To: Mikey Charlton Staff    Type:  Patient Call          Who Called: Patient         Does the patient know what this is regarding?: Requesting a call back pt said that she received a letter saying she was due for a follow up visit ;please advise           Would the patient rather a call back or a response via [facility]?call           Best Call Back Number:[phone] (home)              Additional Information:

## 2023-12-30 NOTE — PROCEDURES
Nasal/sinus endoscopy    Date/Time: 7/26/2023 3:30 PM    Performed by: Latesha Jensen MD  Authorized by: Latesha Jensen MD    Consent Done?:  Yes (Verbal)  Anesthesia:     Local anesthetic:  Topical anesthetic    Patient tolerance:  Patient tolerated the procedure well with no immediate complications  Nose:     Procedure Performed:  Nasal Endoscopy  External:      No external nasal deformity  Intranasal:      Mucosa no polyps     Mucosa ulcers not present     No mucosa lesions present     Turbinates not enlarged  Nasopharynx:      No mucosa lesions     Adenoids not present     Posterior choanae patent     Bilateral nasal endoscopy was carried out using monitor screen demonstrating findings throughout.  The interior of the nasal cavities, the turbinates, the middle and superior meatus, and the sphenoethmoidal recesses were inspected.  This revealed whitish to pale yellow secretions at right and left mm draining into nasopharynx.

## 2023-12-31 ENCOUNTER — EXTERNAL CHRONIC CARE MANAGEMENT (OUTPATIENT)
Dept: PRIMARY CARE CLINIC | Facility: CLINIC | Age: 78
End: 2023-12-31
Payer: MEDICARE

## 2023-12-31 PROCEDURE — 99490 CHRNC CARE MGMT STAFF 1ST 20: CPT | Mod: S$PBB,,, | Performed by: INTERNAL MEDICINE

## 2023-12-31 PROCEDURE — 99490 CHRNC CARE MGMT STAFF 1ST 20: CPT | Mod: PBBFAC | Performed by: INTERNAL MEDICINE

## 2024-01-12 ENCOUNTER — HOSPITAL ENCOUNTER (OUTPATIENT)
Dept: RADIOLOGY | Facility: CLINIC | Age: 79
Discharge: HOME | End: 2024-01-12
Attending: INTERNAL MEDICINE
Payer: MEDICARE

## 2024-01-12 DIAGNOSIS — Z78.0 ASYMPTOMATIC MENOPAUSAL STATE: ICD-10-CM

## 2024-01-12 PROCEDURE — 77080 DXA BONE DENSITY AXIAL: CPT | Mod: TC

## 2024-01-12 PROCEDURE — 77080 DXA BONE DENSITY AXIAL: CPT | Mod: 26,,, | Performed by: INTERNAL MEDICINE

## 2024-01-18 DIAGNOSIS — H40.1122 PRIMARY OPEN ANGLE GLAUCOMA OF LEFT EYE, MODERATE STAGE: ICD-10-CM

## 2024-01-18 DIAGNOSIS — H40.1113 PRIMARY OPEN ANGLE GLAUCOMA OF RIGHT EYE, SEVERE STAGE: ICD-10-CM

## 2024-01-18 RX ORDER — LATANOPROST 50 UG/ML
SOLUTION/ DROPS OPHTHALMIC
Qty: 2.5 ML | Refills: 12 | Status: SHIPPED | OUTPATIENT
Start: 2024-01-18

## 2024-01-19 ENCOUNTER — CLINICAL SUPPORT (OUTPATIENT)
Dept: DIABETES | Facility: CLINIC | Age: 79
End: 2024-01-19
Attending: INTERNAL MEDICINE
Payer: MEDICARE

## 2024-01-19 DIAGNOSIS — E11.9 TYPE 2 DIABETES MELLITUS WITHOUT COMPLICATION, WITHOUT LONG-TERM CURRENT USE OF INSULIN: ICD-10-CM

## 2024-01-19 PROCEDURE — 99999PBSHW PR PBB SHADOW TECHNICAL ONLY FILED TO HB: Mod: PBBFAC,,,

## 2024-01-19 PROCEDURE — G0108 DIAB MANAGE TRN  PER INDIV: HCPCS | Mod: PBBFAC

## 2024-01-19 NOTE — PROGRESS NOTES
"Diabetes Care Specialist Progress Note  Author: Rachael Griffin RD  Date: 1/19/2024    Program Intake  Reason for Diabetes Program Visit:: Initial Diabetes Assessment  Current diabetes risk level:: low  In the last 12 months, have you:: none  Permission to speak with others about care:: yes (Pt daughter in office during appt)    Lab Results   Component Value Date    HGBA1C 5.3 11/27/2023     Clinical      There is no height or weight on file to calculate BMI.    Clinical Assessment  Current Diabetes Treatment: Oral Medication  Have you ever experienced hyperglycemia (high blood sugar)?: yes  Are you able to tell when your blood sugar is high?: Yes  What are your symptoms?: frequent urination, thirst, other (see comments) ("Lightheaded")    Medication Information  Medication adherence impacting ability to self-manage diabetes?: No    Labs  Do you have regular lab work to monitor your medications?: Yes  Type of Regular Lab Work: A1c, Cholesterol, CBC  Lab Compliance Barriers: No    Nutritional Status  Diet: Regular  Meal Plan 24 Hour Recall: Breakfast, Lunch, Dinner, Snack  Meal Plan 24 Hour Recall - Breakfast: Skips or toast with orange marmalade  Meal Plan 24 Hour Recall - Lunch: Chicken noodle soup with ~8-10 crackers  Meal Plan 24 Hour Recall - Dinner: Hamburger yovani with green peas  Meal Plan 24 Hour Recall - Snack: Grilled cheese, fortune cookies, fruit, sugar-free cookies, pretzels, nuts. Drinks: lite 1/2 tea 1/2 lemonade, Diet Coke  Current nutritional status an area of need that is impacting patient's ability to self-manage diabetes?: No    Additional Social History    Support  Does anyone support you with your diabetes care?: yes  Who supports you?: self, son/daughter  Who takes you to your medical appointments?: self, son/daughter  Does the current support meet the patient's needs?: Yes  Is Support an area impacting ability to self-manage diabetes?: No    Cognitive/Behavioral Health  Alert and Oriented: " Yes  Difficulty Thinking: No  Requires Prompting: No  Requires assistance for routine expression?: No  Cognitive or behavioral barriers impacting ability to self-manage diabetes?: No    Communication  Language preference: English  Hearing Problems: No  Vision Problems: Yes  Vision problem type:: Decreased Vision  Vision Assistance: Glasses  Communication needs impacting ability to self-manage diabetes?: No    Health Literacy  Preferred Learning Method: Face to Face, Reading Materials  Health literacy needs impacting ability to self-manage diabetes?: No    Diabetes Self-Management Skills Assessment    Diabetes Disease Process/Treatment Options  Diabetes Disease Process/Treatment Options: Skills Assessment Completed: No  Deferred due to:: Time  Area of need?: Deferred    Nutrition/Healthy Eating  Challenges to healthy eating:: portion control, snacking between meals and at night  Method of carbohydrate measurement:: no method  Patient can identify foods that impact blood sugar.: yes  Patient-identified foods:: starches (bread, pasta, rice, cereal), sweets  Nutrition/Healthy Eating Skills Assessment Completed:: Yes  Assessment indicates:: Knowledge deficit, Instruction Needed  Area of need?: Yes    Physical Activity/Exercise  Physical Activity/Exercise Skills Assessment Completed: : No  Deffered due to:: Time  Area of need?: Deferred    Medications  Patient is able to describe current diabetes management routine.: yes  Diabetes management routine:: oral medications  Patient is able to identify current diabetes medications, dosages, and appropriate timing of medications.: yes (Metformin)  Patient reports problems or concerns with current medication regimen.: no  Medication Skills Assessment Completed:: Yes  Assessment indicates:: Adequate understanding  Area of need?: No    Home Blood Glucose Monitoring  Patient states that blood sugar is checked at home daily.: no (Pt BS:113 in clinic)  Reasons for not monitoring::  other (see comments) (Pt currently not told to monitor BS at this time)  Home Blood Glucose Monitoring Skills Assessment Completed: : Yes  Assessment indicates:: Adequate understanding  Area of need?: No    Acute Complications  Acute Complications Skills Assessment Completed: : No  Deffered due to:: Time  Area of need?: Deferred    Chronic Complications  Patient is taking statin?: Yes  Chronic Complications Skills Assessment Completed: : No  Deferred due to:: Time  Area of need?: Deferred    Psychosocial/Coping  Psychosocial/Coping Skills Assessment Completed: : No  Deffered due to:: Time  Area of need?: Deferred    Assessment Summary and Plan    Based on today's diabetes care assessment, the following areas of need were identified:          1/19/2024    12:01 AM   Social   Support No   Cognitive/Behavioral Health No   Communication No   Health Literacy No            1/19/2024    12:01 AM   Clinical   Medication Adherence No   Lab Compliance No   Nutritional Status No            1/19/2024    12:01 AM   Diabetes Self-Management Skills   Diabetes Disease Process/Treatment Options Deferred   Nutrition/Healthy Eating Yes, see care plan.   Physical Activity/Exercise Deferred   Medication No   Home Blood Glucose Monitoring No   Acute Complications Deferred   Chronic Complications Deferred   Psychosocial/Coping Deferred      Today's interventions were provided through individual discussion, instruction, and written materials were provided.      Patient verbalized understanding of instruction and written materials.  Pt was able to return back demonstration of instructions today. Patient understood key points, needs reinforcement and further instruction.     Diabetes Self-Management Care Plan:    Today's Diabetes Self-Management Care Plan was developed with Lupe's input. Lupe has agreed to work toward the following goal(s) to improve his/her overall diabetes control.      Care Plan: Diabetes Management   Updates made since  12/20/2023 12:00 AM        Problem: Healthy Eating         Goal: Eat 2-3 meals daily with 30-45g/2-3 servings of Carbohydrate per meal. Limit snacking in between meal to 1 serving (15 grams).    Start Date: 1/19/2024   Expected End Date: 4/19/2024   This Visit's Progress: Deferred   Priority: High   Barriers: Knowledge deficit   Note:    Reviewed meal planning and general nutritional counseling with regards to diabetes management. Typical food intake obtained from patient. Patient currently is not measuring foods or carb counting.        Task: Reviewed the sources and role of Carbohydrate, Protein, and Fat and how each nutrient impacts blood sugar. Completed 1/19/2024        Task: Explained how to count carbohydrates using the food label and the use of dry measuring cups for accurate carb counting. Completed 1/19/2024        Task: Recommended replacing beverages containing high sugar content with noncaloric/sugar free options and/or water. Completed 1/19/2024        Task: Review the importance of balancing carbohydrates with each meal using portion control techniques to count servings of carbohydrate and label reading to identify serving size and amount of total carbs per serving. Completed 1/19/2024        Follow Up Plan     Follow up if symptoms worsen or fail to improve.    Today's care plan and follow up schedule was discussed with patient.  Lupe verbalized understanding of the care plan, goals, and agrees to follow up plan.        The patient was encouraged to communicate with his/her health care provider/physician and care team regarding his/her condition(s) and treatment.  I provided the patient with my contact information today and encouraged to contact me via phone or [facility] as needed.     Length of Visit   Total Time: 60 Minutes

## 2024-01-20 ENCOUNTER — TELEPHONE (OUTPATIENT)
Dept: INTERNAL MEDICINE | Facility: CLINIC | Age: 79
End: 2024-01-20
Payer: MEDICARE

## 2024-01-20 RX ORDER — AZELASTINE 1 MG/ML
SPRAY, METERED NASAL
Qty: 30 ML | Refills: 2 | Status: SHIPPED | OUTPATIENT
Start: 2024-01-20 | End: 2024-06-03

## 2024-01-20 RX ORDER — FLUTICASONE PROPIONATE 50 MCG
SPRAY, SUSPENSION (ML) NASAL
Qty: 16 G | Refills: 2 | Status: SHIPPED | OUTPATIENT
Start: 2024-01-20 | End: 2024-06-03

## 2024-01-24 ENCOUNTER — TELEPHONE (OUTPATIENT)
Dept: INTERNAL MEDICINE | Facility: CLINIC | Age: 79
End: 2024-01-24
Payer: MEDICARE

## 2024-01-29 ENCOUNTER — TELEPHONE (OUTPATIENT)
Dept: INTERNAL MEDICINE | Facility: CLINIC | Age: 79
End: 2024-01-29
Payer: MEDICARE

## 2024-01-29 RX ORDER — ALENDRONATE SODIUM 70 MG/1
70 TABLET ORAL
Qty: 12 TABLET | Refills: 1 | Status: SHIPPED | OUTPATIENT
Start: 2024-01-29

## 2024-01-29 NOTE — TELEPHONE ENCOUNTER
Patient called back about bone density test results and stated she uses [facility] on [address] to get her prescriptions filled there.

## 2024-01-31 ENCOUNTER — EXTERNAL CHRONIC CARE MANAGEMENT (OUTPATIENT)
Dept: PRIMARY CARE CLINIC | Facility: CLINIC | Age: 79
End: 2024-01-31
Payer: MEDICARE

## 2024-01-31 PROCEDURE — 99490 CHRNC CARE MGMT STAFF 1ST 20: CPT | Mod: S$PBB,,, | Performed by: INTERNAL MEDICINE

## 2024-01-31 PROCEDURE — 99490 CHRNC CARE MGMT STAFF 1ST 20: CPT | Mod: PBBFAC | Performed by: INTERNAL MEDICINE

## 2024-02-06 DIAGNOSIS — J45.50 SEVERE PERSISTENT ASTHMA WITHOUT COMPLICATION: ICD-10-CM

## 2024-02-07 RX ORDER — IPRATROPIUM BROMIDE AND ALBUTEROL SULFATE 2.5; .5 MG/3ML; MG/3ML
SOLUTION RESPIRATORY (INHALATION)
Qty: 180 ML | Refills: 11 | Status: SHIPPED | OUTPATIENT
Start: 2024-02-07

## 2024-02-21 ENCOUNTER — TELEPHONE (OUTPATIENT)
Dept: OTOLARYNGOLOGY | Facility: CLINIC | Age: 79
End: 2024-02-21

## 2024-02-21 NOTE — TELEPHONE ENCOUNTER
LM on patients VM that we rescheduled her appointment to 4/24 at 2:00pm with Dr. Macias and 2:30pm with Dr. Jensen, also mailed a letter with date and times.

## 2024-02-21 NOTE — TELEPHONE ENCOUNTER
----- Message from Madison Graham MA sent at 2/21/2024 10:04 AM CST -----    ----- Message -----  From: Tania Macias AU.D  Sent: 2/21/2024   9:20 AM CST  To: Mikey Charlton Staff    Pt cx audio and OV, please call her to reschedule.

## 2024-02-29 ENCOUNTER — EXTERNAL CHRONIC CARE MANAGEMENT (OUTPATIENT)
Dept: PRIMARY CARE CLINIC | Facility: CLINIC | Age: 79
End: 2024-02-29
Payer: MEDICARE

## 2024-02-29 PROCEDURE — 99490 CHRNC CARE MGMT STAFF 1ST 20: CPT | Mod: S$PBB,,, | Performed by: INTERNAL MEDICINE

## 2024-02-29 PROCEDURE — 99490 CHRNC CARE MGMT STAFF 1ST 20: CPT | Mod: PBBFAC | Performed by: INTERNAL MEDICINE

## 2024-03-31 ENCOUNTER — EXTERNAL CHRONIC CARE MANAGEMENT (OUTPATIENT)
Dept: PRIMARY CARE CLINIC | Facility: CLINIC | Age: 79
End: 2024-03-31
Payer: MEDICARE

## 2024-03-31 PROCEDURE — 99490 CHRNC CARE MGMT STAFF 1ST 20: CPT | Mod: PBBFAC | Performed by: INTERNAL MEDICINE

## 2024-03-31 PROCEDURE — 99490 CHRNC CARE MGMT STAFF 1ST 20: CPT | Mod: S$PBB,,, | Performed by: INTERNAL MEDICINE

## 2024-04-22 DIAGNOSIS — H91.90 HEARING DISORDER, UNSPECIFIED LATERALITY: Primary | ICD-10-CM

## 2024-04-24 ENCOUNTER — OFFICE VISIT (OUTPATIENT)
Dept: OTOLARYNGOLOGY | Facility: CLINIC | Age: 79
End: 2024-04-24
Payer: MEDICARE

## 2024-04-24 ENCOUNTER — CLINICAL SUPPORT (OUTPATIENT)
Dept: OTOLARYNGOLOGY | Facility: CLINIC | Age: 79
End: 2024-04-24
Payer: MEDICARE

## 2024-04-24 VITALS
HEIGHT: 61 IN | HEART RATE: 111 BPM | SYSTOLIC BLOOD PRESSURE: 122 MMHG | WEIGHT: 168.69 LBS | TEMPERATURE: 98 F | BODY MASS INDEX: 31.85 KG/M2 | DIASTOLIC BLOOD PRESSURE: 70 MMHG

## 2024-04-24 DIAGNOSIS — J32.9 SINUSITIS, UNSPECIFIED CHRONICITY, UNSPECIFIED LOCATION: Primary | ICD-10-CM

## 2024-04-24 DIAGNOSIS — R29.2 ABNORMAL ACOUSTIC REFLEX: ICD-10-CM

## 2024-04-24 DIAGNOSIS — Z87.09 HISTORY OF RECURRENT RESPIRATORY INFECTION: ICD-10-CM

## 2024-04-24 DIAGNOSIS — J45.40 MODERATE PERSISTENT ASTHMA, UNSPECIFIED WHETHER COMPLICATED: ICD-10-CM

## 2024-04-24 DIAGNOSIS — H90.3 BILATERAL SENSORINEURAL HEARING LOSS: ICD-10-CM

## 2024-04-24 DIAGNOSIS — H61.23 EXCESSIVE CERUMEN IN EAR CANAL, BILATERAL: ICD-10-CM

## 2024-04-24 DIAGNOSIS — Z98.890 HISTORY OF NISSEN FUNDOPLICATION: ICD-10-CM

## 2024-04-24 DIAGNOSIS — K21.9 GASTROESOPHAGEAL REFLUX DISEASE, UNSPECIFIED WHETHER ESOPHAGITIS PRESENT: ICD-10-CM

## 2024-04-24 DIAGNOSIS — Z88.9 HISTORY OF MULTIPLE ALLERGIES: ICD-10-CM

## 2024-04-24 DIAGNOSIS — H90.3 ASYMMETRICAL SENSORINEURAL HEARING LOSS: Primary | ICD-10-CM

## 2024-04-24 PROCEDURE — 92557 COMPREHENSIVE HEARING TEST: CPT | Mod: S$GLB,,, | Performed by: AUDIOLOGIST-HEARING AID FITTER

## 2024-04-24 PROCEDURE — 87186 SC STD MICRODIL/AGAR DIL: CPT | Performed by: OTOLARYNGOLOGY

## 2024-04-24 PROCEDURE — 69210 REMOVE IMPACTED EAR WAX UNI: CPT | Mod: 51,S$GLB,, | Performed by: OTOLARYNGOLOGY

## 2024-04-24 PROCEDURE — 92550 TYMPANOMETRY & REFLEX THRESH: CPT | Mod: S$GLB,,, | Performed by: AUDIOLOGIST-HEARING AID FITTER

## 2024-04-24 PROCEDURE — 99215 OFFICE O/P EST HI 40 MIN: CPT | Mod: 25,S$GLB,, | Performed by: OTOLARYNGOLOGY

## 2024-04-24 PROCEDURE — 87077 CULTURE AEROBIC IDENTIFY: CPT | Performed by: OTOLARYNGOLOGY

## 2024-04-24 PROCEDURE — 87070 CULTURE OTHR SPECIMN AEROBIC: CPT | Performed by: OTOLARYNGOLOGY

## 2024-04-24 PROCEDURE — 31231 NASAL ENDOSCOPY DX: CPT | Mod: S$GLB,,, | Performed by: OTOLARYNGOLOGY

## 2024-04-24 RX ORDER — CLARITHROMYCIN 500 MG/1
500 TABLET, FILM COATED ORAL 2 TIMES DAILY
Qty: 28 TABLET | Refills: 1 | Status: SHIPPED | OUTPATIENT
Start: 2024-04-24

## 2024-04-24 NOTE — PATIENT INSTRUCTIONS
Start generic Biaxin as prescribed pending response and culture.    Call re culture and progress.    Follow up depending on above.    Keep follow up with allergy-immunology.    Cleared for hearing aid trial.

## 2024-04-24 NOTE — PROGRESS NOTES
Subjective:       Patient ID: Lupe Jewell is a 78 y.o. female.    Chief Complaint: Follow-up (Go over audio / sinuses doing well)    HPI      Review of Systems        Objective:        Vitals:    04/24/24 1503   BP: 122/70   Pulse: (!) 111   Temp: 98.1 °F (36.7 °C)     Body mass index is 32.3 kg/m².  Physical Exam    Tests / Results:        Assessment:       No diagnosis found.    Plan:       ***     atraumatic.      Right Ear: Tympanic membrane and external ear normal.      Left Ear: Tympanic membrane and external ear normal.      Ears:      Comments:   Excessive cerumen right and lesser left adherent and partially obstructing AU, cleared with curette and tolerated well and otherwise canals and TMs within normal limits AU.     Nose: No nasal deformity, mucosal edema or rhinorrhea.      Mouth/Throat:      Mouth: Mucous membranes are moist.      Pharynx: No pharyngeal swelling, oropharyngeal exudate or posterior oropharyngeal erythema.   Neck:      Trachea: Phonation normal.   Pulmonary:      Effort: Pulmonary effort is normal. No respiratory distress.   Lymphadenopathy:      Cervical: No cervical adenopathy.   Skin:     General: Skin is warm and dry.   Neurological:      Mental Status: She is alert and oriented to person, place, and time.   Psychiatric:         Speech: Speech normal.         Behavior: Behavior normal.         Tests / Results:                  Assessment:       1. Sinusitis, unspecified chronicity, unspecified location    2. Moderate persistent asthma, unspecified whether complicated    3. History of multiple allergies    4. History of recurrent respiratory infection    5. Gastroesophageal reflux disease, unspecified whether esophagitis present    6. History of Nissen fundoplication    7. Bilateral sensorineural hearing loss    8. Excessive cerumen in ear canal, bilateral        Plan:       Reviewed above and office endoscopy and she would like to proceed.    See procedure note.      Reviewed above and options and would like to proceed as follows:     Start generic Biaxin as prescribed pending response and culture.      Call regarding culture and progress next several days.      Determine follow-up depending on results/response.      Keep follow-up with allergy-immunology.      Cleared for hearing aid trial.

## 2024-04-27 LAB
BACTERIA NPH AEROBE CULT: ABNORMAL
BACTERIA NPH AEROBE CULT: ABNORMAL

## 2024-04-29 ENCOUNTER — TELEPHONE (OUTPATIENT)
Dept: OTOLARYNGOLOGY | Facility: CLINIC | Age: 79
End: 2024-04-29
Payer: MEDICARE

## 2024-04-29 RX ORDER — DOXYCYCLINE 100 MG/1
100 CAPSULE ORAL EVERY 12 HOURS
Qty: 20 CAPSULE | Refills: 1 | Status: SHIPPED | OUTPATIENT
Start: 2024-04-29

## 2024-04-29 NOTE — TELEPHONE ENCOUNTER
Called Ms. Jewell re the culture results.  Left VM that will change antibiotic to Doxycycline and she should follow up in next few weeks and call if any questions.

## 2024-04-30 ENCOUNTER — EXTERNAL CHRONIC CARE MANAGEMENT (OUTPATIENT)
Dept: PRIMARY CARE CLINIC | Facility: CLINIC | Age: 79
End: 2024-04-30
Payer: MEDICARE

## 2024-04-30 PROCEDURE — 99490 CHRNC CARE MGMT STAFF 1ST 20: CPT | Mod: PBBFAC | Performed by: INTERNAL MEDICINE

## 2024-04-30 PROCEDURE — 99490 CHRNC CARE MGMT STAFF 1ST 20: CPT | Mod: S$PBB,,, | Performed by: INTERNAL MEDICINE

## 2024-05-04 NOTE — PROGRESS NOTES
HPI    DLS: 9/22/2023    Pt here for HVF review/OCT;  Pt states no eye pain or discomfort. Pt states vision seems to be doing   okay. Pt states she does notice floaters in the OS about 20 of them. Pt   denies any flashes or diplopia.     Meds;  Dorzolamide TID OS  Latanoprost QHS OS  PF AT's PRN OD    1) Severe POAG OU   2) PCIOL OU   3) Type 2 DM NO DR   4) LOLA   5) PVD OU   6) Yag Cap OU   7) s/p trab os 12/28/2015  8) s/p ahmed OD 9/21/2022      Last edited by Sanna Wiley MD on 5/7/2024 11:23 AM.            Assessment /Plan     For exam results, see Encounter Report.    Primary open angle glaucoma of right eye, severe stage    Primary open angle glaucoma of left eye, moderate stage    Type 2 diabetes mellitus without ophthalmic manifestations    Dry eyes, bilateral    Pseudophakia of both eyes    Glaucoma filtering bleb of both eyes    History of glaucoma tube shunt procedure    Status post YAG capsulotomy of both eyes        1. POAG (primary open-angle glaucoma) - Severe stage - Both Eyes      Now with  od   Glaucoma (type and duration) POAG,   -  first HVF 1997  -  first photo: 1996     Family history None   Glaucoma meds - (off all gtts os post combined)  (( use to use - Alphagan od , Xalatan od , dorzolamide bid od ))  H/O adverse rxn to glaucoma drops into to BB 2/2 asthma // brimonidine - irritation - tearing   LASERS SLT 12/8/05 and repeat 7/16/09 OD, and SLT 1/6/06 OS;  SLT os 7/31/14, od 8/14/14 (no response ou) // yag cap od 1/3/2019 /// yag cap os  10/17/2019   GLAUCOMA SURGERIES - combined phaco/IOL trab -os 12/28/2015// elastic sclera - 9 sutures to close - all visible ones cut /// combined - phaco/trab w/ minishunt od - 3/30/2016 // ahmed OD 9/21/2022  OTHER EYE SURGERIES PC IOL OS (combined 1/28/2015) // PC IOL od (combined 3/30/2016)  CDR 0.95 OU   Tbase 22 OU   Tmax 37 (4/11/2016) /38 ( 4/16/2015)   Ttarget 14/14  HVF 25 HVF: 5455-3701- now gets 10-2 stim V   OD, // SAD / IAD   Os (+prog ou 10/2020)   Gonio ext PAS od - 90 degrees open // +1-3 os    /421   OCT 6 test 2017 - 2024: unable to interpret - grayed out   OD// Dec thru out   HRT 13 test 2005 to 2020 - MR- Dec. SN/N, bord IT od // dec. N/IN, bord SN/IT os /// CDR 0.78 od // 0.75 os  Disc photos 1996, 1997, 2003: slides // 2012 , 2015 - OIS    Ttoday  8  off gtts post ahmed /12   on gtts   Test done today:IOP / HVF 10-2 stim V od and 24-2 ss os // DFE // OCT      2. Diabetes mellitus type II   No BDR     3. Dry eyes - Both Eyes   AT's prn     4. Posterior vitreous detachment of both eyes - Both Eyes   With floaters - stable     5. A lot of family stressors   Mom passed since last visit  dad elderly and in poor health, have sitters  Daughter is unemployed and had to move back home  Daughter recently ill - in ICU and intubated 2/2 pancreatitis (8/2/2015 to 8/11/2015)      6. S/P yag cap ou   -od 10/3/3019 (also Rx ant capsule phimosis od)   -OS - 10/17/2019        Plan  Glaucoma   + blebs ou -  S/P ahmed od -  9/21/2022     Cont AT's ou prn     Sees Lisa for glasses     AT's prn     Photo file updated -5/18/2023     S/P ahmed OD  Date:9/21/2022   POY 1 . 5   anterior chamber depth  deep   Bleb appearance  elevated over ahmed plate   sidell neg   IOP  8 AC deep // no choroidals - NO drops     Cont glaucoma drops os   Dorzolamide tid os   Latanoprost 1 x day os     OFF brimonidine - ? Allergy - tearing / irritation     ? PVD os - warned of signs of RD    dr gordon- glasses    F/U 4 months - IOP    In future cont with 10-2 stim V od and consider switching to 10-2 ss os

## 2024-05-07 ENCOUNTER — OFFICE VISIT (OUTPATIENT)
Dept: OPHTHALMOLOGY | Facility: CLINIC | Age: 79
End: 2024-05-07
Payer: MEDICARE

## 2024-05-07 ENCOUNTER — CLINICAL SUPPORT (OUTPATIENT)
Dept: OPHTHALMOLOGY | Facility: CLINIC | Age: 79
End: 2024-05-07
Payer: MEDICARE

## 2024-05-07 DIAGNOSIS — H04.123 DRY EYES, BILATERAL: ICD-10-CM

## 2024-05-07 DIAGNOSIS — Z98.83 GLAUCOMA FILTERING BLEB OF BOTH EYES: ICD-10-CM

## 2024-05-07 DIAGNOSIS — Z96.1 PSEUDOPHAKIA OF BOTH EYES: ICD-10-CM

## 2024-05-07 DIAGNOSIS — Z96.89 HISTORY OF GLAUCOMA TUBE SHUNT PROCEDURE: ICD-10-CM

## 2024-05-07 DIAGNOSIS — H40.1113 PRIMARY OPEN ANGLE GLAUCOMA OF RIGHT EYE, SEVERE STAGE: Primary | ICD-10-CM

## 2024-05-07 DIAGNOSIS — E11.9 TYPE 2 DIABETES MELLITUS WITHOUT OPHTHALMIC MANIFESTATIONS: ICD-10-CM

## 2024-05-07 DIAGNOSIS — Z98.890 STATUS POST YAG CAPSULOTOMY OF BOTH EYES: ICD-10-CM

## 2024-05-07 DIAGNOSIS — H40.1122 PRIMARY OPEN ANGLE GLAUCOMA OF LEFT EYE, MODERATE STAGE: ICD-10-CM

## 2024-05-07 PROCEDURE — 99999 PR PBB SHADOW E&M-EST. PATIENT-LVL III: CPT | Mod: PBBFAC,,, | Performed by: OPHTHALMOLOGY

## 2024-05-07 PROCEDURE — 92133 CPTRZD OPH DX IMG PST SGM ON: CPT | Mod: PBBFAC | Performed by: OPHTHALMOLOGY

## 2024-05-07 PROCEDURE — 99213 OFFICE O/P EST LOW 20 MIN: CPT | Mod: PBBFAC,25 | Performed by: OPHTHALMOLOGY

## 2024-05-07 PROCEDURE — 99214 OFFICE O/P EST MOD 30 MIN: CPT | Mod: S$PBB,,, | Performed by: OPHTHALMOLOGY

## 2024-05-07 PROCEDURE — 92083 EXTENDED VISUAL FIELD XM: CPT | Mod: PBBFAC | Performed by: OPHTHALMOLOGY

## 2024-05-07 NOTE — PROGRESS NOTES
OCT/HVF done ou./ rel/fix/coop. Good ou./ chart checked for latex allergy./ -1.00 + 1.25 x 55/od -1.00 + 2.00 x 80/os-h

## 2024-05-31 ENCOUNTER — EXTERNAL CHRONIC CARE MANAGEMENT (OUTPATIENT)
Dept: PRIMARY CARE CLINIC | Facility: CLINIC | Age: 79
End: 2024-05-31
Payer: MEDICARE

## 2024-05-31 PROCEDURE — 99490 CHRNC CARE MGMT STAFF 1ST 20: CPT | Mod: PBBFAC | Performed by: INTERNAL MEDICINE

## 2024-05-31 PROCEDURE — 99490 CHRNC CARE MGMT STAFF 1ST 20: CPT | Mod: S$PBB,,, | Performed by: INTERNAL MEDICINE

## 2024-05-31 PROCEDURE — 99439 CHRNC CARE MGMT STAF EA ADDL: CPT | Mod: S$PBB,,, | Performed by: INTERNAL MEDICINE

## 2024-05-31 PROCEDURE — 99439 CHRNC CARE MGMT STAF EA ADDL: CPT | Mod: PBBFAC | Performed by: INTERNAL MEDICINE

## 2024-06-01 DIAGNOSIS — E11.9 TYPE 2 DIABETES MELLITUS WITHOUT COMPLICATION, WITHOUT LONG-TERM CURRENT USE OF INSULIN: ICD-10-CM

## 2024-06-01 DIAGNOSIS — G47.00 INSOMNIA, UNSPECIFIED TYPE: ICD-10-CM

## 2024-06-01 NOTE — TELEPHONE ENCOUNTER
Care Due:                  Date            Visit Type   Department     Provider  --------------------------------------------------------------------------------                                EP -                              PRIMARY      [facility] INTERNAL  Last Visit: 11-      CARE ([facility])   MEDICINE       Marisol Restrepo                              [facility]                              PRIMARY      [facility] INTERNAL  Next Visit: 08-      CARE ([facility])   MEDICINE       Marisol Restrepo                                                            Last  Test          Frequency    Reason                     Performed    Due Date  --------------------------------------------------------------------------------    HBA1C.......  6 months...  metFORMIN................  11- 05-    Lipid Panel.  12 months..  atorvastatin.............  05- 05-    Mg Level....  12 months..  alendronate..............  Not Found    Overdue    Phosphate...  12 months..  alendronate..............  Not Found    Overdue    Vitamin D...  12 months..  alendronate..............  Not Found    Overdue    [employer] Embedded Care Due Messages. Reference number: [id number].   6/01/2024 3:38:43 PM CDT

## 2024-06-02 RX ORDER — AMITRIPTYLINE HYDROCHLORIDE 25 MG/1
25 TABLET, FILM COATED ORAL NIGHTLY
Qty: 90 TABLET | Refills: 1 | Status: SHIPPED | OUTPATIENT
Start: 2024-06-02

## 2024-06-02 NOTE — TELEPHONE ENCOUNTER
Refill Routing Note   Medication(s) are not appropriate for processing by [facility] for the following reason(s):        Required labs outdated    [facility] action(s):  Defer  Approve     Requires labs : Yes             Appointments  past 12m or future 3m with PCP    Date Provider   Last Visit   11/27/2023 Marisol Restrepo MD   Next Visit   8/9/2024 Marisol Restrepo MD   ED visits in past 90 days: 0        Note composed:9:59 AM 06/02/2024

## 2024-06-03 RX ORDER — METFORMIN HYDROCHLORIDE 500 MG/1
500 TABLET ORAL 2 TIMES DAILY WITH MEALS
Qty: 180 TABLET | Refills: 0 | Status: SHIPPED | OUTPATIENT
Start: 2024-06-03

## 2024-06-03 RX ORDER — AZELASTINE 1 MG/ML
SPRAY, METERED NASAL
Qty: 30 ML | Refills: 2 | Status: SHIPPED | OUTPATIENT
Start: 2024-06-03

## 2024-06-03 RX ORDER — ATORVASTATIN CALCIUM 10 MG/1
10 TABLET, FILM COATED ORAL DAILY
Qty: 90 TABLET | Refills: 0 | Status: SHIPPED | OUTPATIENT
Start: 2024-06-03

## 2024-06-03 RX ORDER — FLUTICASONE PROPIONATE 50 MCG
SPRAY, SUSPENSION (ML) NASAL
Qty: 16 G | Refills: 2 | Status: SHIPPED | OUTPATIENT
Start: 2024-06-03

## 2024-06-30 ENCOUNTER — EXTERNAL CHRONIC CARE MANAGEMENT (OUTPATIENT)
Dept: PRIMARY CARE CLINIC | Facility: CLINIC | Age: 79
End: 2024-06-30
Payer: MEDICARE

## 2024-06-30 PROCEDURE — 99490 CHRNC CARE MGMT STAFF 1ST 20: CPT | Mod: PBBFAC | Performed by: INTERNAL MEDICINE

## 2024-06-30 PROCEDURE — 99490 CHRNC CARE MGMT STAFF 1ST 20: CPT | Mod: S$PBB,,, | Performed by: INTERNAL MEDICINE

## 2024-07-15 DIAGNOSIS — J45.41 MODERATE PERSISTENT ASTHMA WITH ACUTE EXACERBATION: ICD-10-CM

## 2024-07-15 RX ORDER — FLUTICASONE PROPIONATE 50 MCG
SPRAY, SUSPENSION (ML) NASAL
Qty: 16 G | Refills: 2 | Status: SHIPPED | OUTPATIENT
Start: 2024-07-15

## 2024-07-15 NOTE — TELEPHONE ENCOUNTER
No care due was identified.  [facility] Embedded Care Due Messages. Reference number: [id number].   7/15/2024 2:50:01 PM CDT

## 2024-07-16 RX ORDER — BUDESONIDE AND FORMOTEROL FUMARATE DIHYDRATE 160; 4.5 UG/1; UG/1
AEROSOL RESPIRATORY (INHALATION)
Qty: 30.6 G | Refills: 1 | Status: SHIPPED | OUTPATIENT
Start: 2024-07-16

## 2024-07-16 RX ORDER — ALENDRONATE SODIUM 70 MG/1
70 TABLET ORAL
Qty: 12 TABLET | Refills: 1 | Status: SHIPPED | OUTPATIENT
Start: 2024-07-16

## 2024-07-16 NOTE — TELEPHONE ENCOUNTER
Refill Routing Note   Medication(s) are not appropriate for processing by [facility] for the following reason(s):        Outside of protocol  Required vitals abnormal    [facility] action(s):  Defer  Route               Appointments  past 12m or future 3m with PCP    Date Provider   Last Visit   11/27/2023 Marisol Restrepo MD   Next Visit   8/9/2024 Marisol Restrepo MD   ED visits in past 90 days: 0        Note composed:9:32 PM 07/15/2024

## 2024-07-24 ENCOUNTER — TELEPHONE (OUTPATIENT)
Dept: INTERNAL MEDICINE | Facility: CLINIC | Age: 79
End: 2024-07-24
Payer: MEDICARE

## 2024-07-24 NOTE — TELEPHONE ENCOUNTER
----- Message from Heather Yin sent at 7/24/2024  3:04 PM CDT -----  Pt called in regards to she states her insurance will no longer cover medication budesonide-formoterol 160-4.5 mcg (SYMBICORT) 160-4.5 mcg/actuation HFAA pt states can something else be called in comparable please advise

## 2024-07-24 NOTE — TELEPHONE ENCOUNTER
Please ask her to ask her pharmacist what medications are covered by her insurance that are similar

## 2024-07-25 ENCOUNTER — TELEPHONE (OUTPATIENT)
Dept: INTERNAL MEDICINE | Facility: CLINIC | Age: 79
End: 2024-07-25
Payer: MEDICARE

## 2024-07-25 NOTE — TELEPHONE ENCOUNTER
Attempted to call pt back about medication change  but no response at this time. Left VM to call office when available.

## 2024-07-25 NOTE — TELEPHONE ENCOUNTER
Patient was told by pharmacy to check with her insurance and see what they will cover under her insurance. Patient states she will call the tomorrow and then call us back.

## 2024-07-26 ENCOUNTER — TELEPHONE (OUTPATIENT)
Dept: INTERNAL MEDICINE | Facility: CLINIC | Age: 79
End: 2024-07-26
Payer: MEDICARE

## 2024-07-26 NOTE — TELEPHONE ENCOUNTER
----- Message from Helen Rodriguez sent at 7/26/2024 12:22 PM CDT -----  Contact: [phone]  [email or web address]     Patient is calling for Medical Advice regarding:Jaylin please call her back.refills to an alternat emedications that the insurance will cover this medications is WIXELA 2ccc/50.  Please call pt back    How long has patient had these symptoms:    Pharmacy name and phone#:    Patient wants a call back or thru [facility]:callback    Comments:    Please advise patient replies from provider may take up to 48 hours.

## 2024-07-30 ENCOUNTER — TELEPHONE (OUTPATIENT)
Dept: INTERNAL MEDICINE | Facility: CLINIC | Age: 79
End: 2024-07-30
Payer: MEDICARE

## 2024-07-30 RX ORDER — FLUTICASONE PROPIONATE AND SALMETEROL 250; 50 UG/1; UG/1
1 POWDER RESPIRATORY (INHALATION) 2 TIMES DAILY
Qty: 60 EACH | Refills: 11 | Status: SHIPPED | OUTPATIENT
Start: 2024-07-30 | End: 2025-07-30

## 2024-07-30 NOTE — TELEPHONE ENCOUNTER
----- Message from Mario  sent at 7/29/2024  4:15 PM CDT -----  Regarding: Refill Follow Up  Contact: PT [phone]  [id number]     Patient is calling for Medical Advice regarding: Patient called to follow up on the alternative medication called WIXELA 2ccc/50. She needs it because her insurance does not cover budesonide-formoterol 160-4.5 mcg (SYMBICORT) 160-4.5 mcg/actuation HFAA. Please call back ASAP    How long has patient had these symptoms:    Pharmacy name and phone#:    Patient wants a call back or thru [facility]: call    Comments:    Please advise patient replies from provider may take up to 48 hours.

## 2024-07-31 ENCOUNTER — EXTERNAL CHRONIC CARE MANAGEMENT (OUTPATIENT)
Dept: PRIMARY CARE CLINIC | Facility: CLINIC | Age: 79
End: 2024-07-31
Payer: MEDICARE

## 2024-07-31 PROCEDURE — 99490 CHRNC CARE MGMT STAFF 1ST 20: CPT | Mod: PBBFAC | Performed by: INTERNAL MEDICINE

## 2024-07-31 PROCEDURE — 99490 CHRNC CARE MGMT STAFF 1ST 20: CPT | Mod: S$PBB,,, | Performed by: INTERNAL MEDICINE

## 2024-08-09 ENCOUNTER — LAB VISIT (OUTPATIENT)
Dept: LAB | Facility: HOSPITAL | Age: 79
End: 2024-08-09
Attending: INTERNAL MEDICINE
Payer: MEDICARE

## 2024-08-09 ENCOUNTER — OFFICE VISIT (OUTPATIENT)
Dept: INTERNAL MEDICINE | Facility: CLINIC | Age: 79
End: 2024-08-09
Payer: MEDICARE

## 2024-08-09 DIAGNOSIS — E11.9 DIABETES MELLITUS WITHOUT COMPLICATION: ICD-10-CM

## 2024-08-09 DIAGNOSIS — I70.0 AORTIC ATHEROSCLEROSIS: ICD-10-CM

## 2024-08-09 DIAGNOSIS — R00.2 PALPITATIONS: ICD-10-CM

## 2024-08-09 DIAGNOSIS — I10 HYPERTENSION, UNSPECIFIED TYPE: ICD-10-CM

## 2024-08-09 DIAGNOSIS — J44.9 CHRONIC OBSTRUCTIVE PULMONARY DISEASE, UNSPECIFIED COPD TYPE: ICD-10-CM

## 2024-08-09 DIAGNOSIS — E11.69 TYPE 2 DIABETES MELLITUS WITH OTHER SPECIFIED COMPLICATION, WITHOUT LONG-TERM CURRENT USE OF INSULIN: ICD-10-CM

## 2024-08-09 DIAGNOSIS — J45.50 SEVERE PERSISTENT ASTHMA WITHOUT COMPLICATION: ICD-10-CM

## 2024-08-09 DIAGNOSIS — E53.8 B12 DEFICIENCY: ICD-10-CM

## 2024-08-09 DIAGNOSIS — R19.5 OCCULT GI BLEEDING: Primary | ICD-10-CM

## 2024-08-09 LAB
ALBUMIN SERPL BCP-MCNC: 4.1 G/DL (ref 3.5–5.2)
ALP SERPL-CCNC: 71 U/L (ref 55–135)
ALT SERPL W/O P-5'-P-CCNC: 11 U/L (ref 10–44)
ANION GAP SERPL CALC-SCNC: 14 MMOL/L (ref 8–16)
AST SERPL-CCNC: 13 U/L (ref 10–40)
BASOPHILS # BLD AUTO: 0.08 K/UL (ref 0–0.2)
BASOPHILS NFR BLD: 0.5 % (ref 0–1.9)
BILIRUB SERPL-MCNC: 0.6 MG/DL (ref 0.1–1)
BUN SERPL-MCNC: 15 MG/DL (ref 8–23)
CALCIUM SERPL-MCNC: 9.8 MG/DL (ref 8.7–10.5)
CHLORIDE SERPL-SCNC: 103 MMOL/L (ref 95–110)
CHOLEST SERPL-MCNC: 165 MG/DL (ref 120–199)
CHOLEST/HDLC SERPL: 3.2 {RATIO} (ref 2–5)
CO2 SERPL-SCNC: 24 MMOL/L (ref 23–29)
CREAT SERPL-MCNC: 1.1 MG/DL (ref 0.5–1.4)
DIFFERENTIAL METHOD BLD: ABNORMAL
EOSINOPHIL # BLD AUTO: 0.2 K/UL (ref 0–0.5)
EOSINOPHIL NFR BLD: 1.3 % (ref 0–8)
ERYTHROCYTE [DISTWIDTH] IN BLOOD BY AUTOMATED COUNT: 13.3 % (ref 11.5–14.5)
EST. GFR  (NO RACE VARIABLE): 51.4 ML/MIN/1.73 M^2
ESTIMATED AVG GLUCOSE: 114 MG/DL (ref 68–131)
GLUCOSE SERPL-MCNC: 100 MG/DL (ref 70–110)
HBA1C MFR BLD: 5.6 % (ref 4–5.6)
HCT VFR BLD AUTO: 33.2 % (ref 37–48.5)
HDLC SERPL-MCNC: 51 MG/DL (ref 40–75)
HDLC SERPL: 30.9 % (ref 20–50)
HGB BLD-MCNC: 11.3 G/DL (ref 12–16)
IMM GRANULOCYTES # BLD AUTO: 0.13 K/UL (ref 0–0.04)
IMM GRANULOCYTES NFR BLD AUTO: 0.9 % (ref 0–0.5)
LDLC SERPL CALC-MCNC: 95.4 MG/DL (ref 63–159)
LYMPHOCYTES # BLD AUTO: 4.4 K/UL (ref 1–4.8)
LYMPHOCYTES NFR BLD: 30.1 % (ref 18–48)
MCH RBC QN AUTO: 28.3 PG (ref 27–31)
MCHC RBC AUTO-ENTMCNC: 34 G/DL (ref 32–36)
MCV RBC AUTO: 83 FL (ref 82–98)
MONOCYTES # BLD AUTO: 0.9 K/UL (ref 0.3–1)
MONOCYTES NFR BLD: 6 % (ref 4–15)
NEUTROPHILS # BLD AUTO: 9 K/UL (ref 1.8–7.7)
NEUTROPHILS NFR BLD: 61.2 % (ref 38–73)
NONHDLC SERPL-MCNC: 114 MG/DL
NRBC BLD-RTO: 0 /100 WBC
PLATELET # BLD AUTO: 409 K/UL (ref 150–450)
PMV BLD AUTO: 10.8 FL (ref 9.2–12.9)
POTASSIUM SERPL-SCNC: 3.4 MMOL/L (ref 3.5–5.1)
PROT SERPL-MCNC: 7.8 G/DL (ref 6–8.4)
RBC # BLD AUTO: 3.99 M/UL (ref 4–5.4)
SODIUM SERPL-SCNC: 141 MMOL/L (ref 136–145)
TRIGL SERPL-MCNC: 93 MG/DL (ref 30–150)
TSH SERPL DL<=0.005 MIU/L-ACNC: 1.03 UIU/ML (ref 0.4–4)
VIT B12 SERPL-MCNC: 256 PG/ML (ref 210–950)
WBC # BLD AUTO: 14.74 K/UL (ref 3.9–12.7)

## 2024-08-09 PROCEDURE — 99214 OFFICE O/P EST MOD 30 MIN: CPT | Mod: S$PBB,,, | Performed by: INTERNAL MEDICINE

## 2024-08-09 PROCEDURE — 85025 COMPLETE CBC W/AUTO DIFF WBC: CPT | Performed by: INTERNAL MEDICINE

## 2024-08-09 PROCEDURE — 82607 VITAMIN B-12: CPT | Performed by: INTERNAL MEDICINE

## 2024-08-09 PROCEDURE — 99999 PR PBB SHADOW E&M-EST. PATIENT-LVL V: CPT | Mod: PBBFAC,,, | Performed by: INTERNAL MEDICINE

## 2024-08-09 PROCEDURE — G2211 COMPLEX E/M VISIT ADD ON: HCPCS | Mod: S$PBB,,, | Performed by: INTERNAL MEDICINE

## 2024-08-09 PROCEDURE — 80053 COMPREHEN METABOLIC PANEL: CPT | Performed by: INTERNAL MEDICINE

## 2024-08-09 PROCEDURE — 83036 HEMOGLOBIN GLYCOSYLATED A1C: CPT | Performed by: INTERNAL MEDICINE

## 2024-08-09 PROCEDURE — 36415 COLL VENOUS BLD VENIPUNCTURE: CPT | Performed by: INTERNAL MEDICINE

## 2024-08-09 PROCEDURE — 84443 ASSAY THYROID STIM HORMONE: CPT | Performed by: INTERNAL MEDICINE

## 2024-08-09 PROCEDURE — 99215 OFFICE O/P EST HI 40 MIN: CPT | Mod: PBBFAC | Performed by: INTERNAL MEDICINE

## 2024-08-09 PROCEDURE — 80061 LIPID PANEL: CPT | Performed by: INTERNAL MEDICINE

## 2024-08-10 ENCOUNTER — TELEPHONE (OUTPATIENT)
Dept: INTERNAL MEDICINE | Facility: CLINIC | Age: 79
End: 2024-08-10
Payer: MEDICARE

## 2024-08-10 DIAGNOSIS — D72.829 LEUKOCYTOSIS, UNSPECIFIED TYPE: Primary | ICD-10-CM

## 2024-08-10 NOTE — TELEPHONE ENCOUNTER
Please contact patient and inform her that her white blood cell count is slightly elevated.  Please ask if she is having a fever, sore throat, burning when she urinates or any other indications of infection.      I would like to recheck her blood count in 1 week.  Lab order in.  Please schedule

## 2024-08-12 RX ORDER — DOXYCYCLINE 100 MG/1
100 CAPSULE ORAL 2 TIMES DAILY
Qty: 14 CAPSULE | Refills: 0 | Status: SHIPPED | OUTPATIENT
Start: 2024-08-12 | End: 2024-08-19

## 2024-08-12 NOTE — TELEPHONE ENCOUNTER
Please let her know I sent a prescription for an antibiotic to her pharmacy     I would like to recheck her blood count in 2 weeks.  Lab order is in.  Please schedule

## 2024-08-14 VITALS
HEART RATE: 96 BPM | TEMPERATURE: 99 F | SYSTOLIC BLOOD PRESSURE: 136 MMHG | HEIGHT: 60 IN | WEIGHT: 168.19 LBS | BODY MASS INDEX: 33.02 KG/M2 | OXYGEN SATURATION: 96 % | DIASTOLIC BLOOD PRESSURE: 84 MMHG

## 2024-08-14 PROBLEM — I70.0 AORTIC ATHEROSCLEROSIS: Status: ACTIVE | Noted: 2024-08-14

## 2024-08-14 PROBLEM — J44.9 CHRONIC OBSTRUCTIVE PULMONARY DISEASE, UNSPECIFIED COPD TYPE: Status: ACTIVE | Noted: 2024-08-14

## 2024-08-14 NOTE — PROGRESS NOTES
Subjective:       Patient ID: Lupe Jewell is a 78 y.o. female.    Chief Complaint: Hypertension    HPI  She returns for management of hypertension.  She has had hypertension for over a year.  Current treatment has included medications outlined in medication list.  She denies chest pain or shortness of breath.  Denies left arm or neck pain.  She complains of occasional episodes of palpitations.  Currently asymptomatic     Past medical history:  Hypertension, diabetes, asthma, glaucoma,  osteoporosis, status post hysterectomy, status post Carmelita n Y gastrectomy, bladder repair, colon adenoma.  She had a colonoscopy February 2022     Medications:   albuterol, Wixela, Dyazide, Lipitor 10 mg daily, Xalatan, metformin 500 mg b.i.d., Cozaar 50 mg daily, fosamax     Allergies:  Penicillin       Review of Systems   Constitutional:  Negative for chills, fatigue, fever and unexpected weight change.   Respiratory:  Negative for chest tightness and shortness of breath.    Cardiovascular:  Negative for chest pain and palpitations.   Gastrointestinal:  Negative for abdominal pain and blood in stool.   Neurological:  Negative for dizziness, syncope, numbness and headaches.       Objective:      Physical Exam  HENT:      Right Ear: External ear normal.      Left Ear: External ear normal.      Nose: Nose normal.      Mouth/Throat:      Mouth: Mucous membranes are moist.      Pharynx: Oropharynx is clear.   Eyes:      Pupils: Pupils are equal, round, and reactive to light.   Cardiovascular:      Rate and Rhythm: Normal rate and regular rhythm.      Heart sounds: No murmur heard.  Pulmonary:      Breath sounds: Normal breath sounds.   Abdominal:      General: There is no distension.      Palpations: There is no hepatomegaly or splenomegaly.      Tenderness: There is no abdominal tenderness.   Musculoskeletal:      Cervical back: Normal range of motion.   Lymphadenopathy:      Cervical: No cervical adenopathy.      Upper Body:       Right upper body: No axillary adenopathy.      Left upper body: No axillary adenopathy.   Neurological:      Cranial Nerves: No cranial nerve deficit.      Sensory: No sensory deficit.      Motor: Motor function is intact.      Deep Tendon Reflexes: Reflexes are normal and symmetric.         Assessment/Plan       Assessment and plan: 1.  Hypertension: Controlled.  Continue Cozaar.  Check CMP, lipid panel, A1c   2. Palpitations: Check CBC, TSH.  Schedule Holter monitor and cardiology appointment.  Advised her to call immediately if symptom recurs  3.  She needs a CT scan chest in December    Visit today included increased complexity associated with the care of the episodic problem hypertension addressed and managing the longitudinal care of the patient due to the serious and/or complex managed problem(s) hypertension, palpitations.

## 2024-08-18 DIAGNOSIS — I10 HYPERTENSION, UNSPECIFIED TYPE: ICD-10-CM

## 2024-08-18 RX ORDER — MONTELUKAST SODIUM 10 MG/1
10 TABLET ORAL NIGHTLY
Qty: 90 TABLET | Refills: 3 | Status: SHIPPED | OUTPATIENT
Start: 2024-08-18

## 2024-08-18 RX ORDER — LOSARTAN POTASSIUM 50 MG/1
50 TABLET ORAL
Qty: 90 TABLET | Refills: 3 | Status: SHIPPED | OUTPATIENT
Start: 2024-08-18

## 2024-08-18 NOTE — TELEPHONE ENCOUNTER
Care Due:                  Date            Visit Type   Department     Provider  --------------------------------------------------------------------------------                                EP -                              PRIMARY      [facility] INTERNAL  Last Visit: 08-      CARE ([facility])   MEDICINE       Marisol Restrepo                              [facility]                              PRIMARY      [facility] INTERNAL  Next Visit: 12-      CARE ([facility])   MEDICINE       Marisol Restrepo                                                            Last  Test          Frequency    Reason                     Performed    Due Date  --------------------------------------------------------------------------------    Mg Level....  12 months..  alendronate..............  Not Found    Overdue    Phosphate...  12 months..  alendronate..............  Not Found    Overdue    [employer] Embedded Care Due Messages. Reference number: [id number].   8/18/2024 3:20:45 PM CDT

## 2024-08-19 RX ORDER — TRIAMTERENE/HYDROCHLOROTHIAZID 37.5-25 MG
TABLET ORAL
Qty: 90 TABLET | Refills: 1 | Status: SHIPPED | OUTPATIENT
Start: 2024-08-19

## 2024-08-19 NOTE — TELEPHONE ENCOUNTER
Refill Routing Note   Medication(s) are not appropriate for processing by [facility] for the following reason(s):        Required labs abnormal    [facility] action(s):  Defer  Approve   Requires labs : Yes             Appointments  past 12m or future 3m with PCP    Date Provider   Last Visit   8/9/2024 Marisol Restrepo MD   Next Visit   12/17/2024 Marisol Restrepo MD   ED visits in past 90 days: 0        Note composed:9:56 PM 08/18/2024

## 2024-08-22 RX ORDER — ATORVASTATIN CALCIUM 10 MG/1
10 TABLET, FILM COATED ORAL
Qty: 90 TABLET | Refills: 3 | Status: SHIPPED | OUTPATIENT
Start: 2024-08-22

## 2024-08-22 NOTE — TELEPHONE ENCOUNTER
Refill Decision Note   Lupe Jewell  is requesting a refill authorization.  Brief Assessment and Rationale for Refill:  Approve     Medication Therapy Plan:         Comments:     Note composed:2:36 PM 08/22/2024

## 2024-08-22 NOTE — TELEPHONE ENCOUNTER
No care due was identified.  Health [facility] Embedded Care Due Messages. Reference number: [id number].   8/21/2024 11:09:49 PM CDT

## 2024-08-26 ENCOUNTER — LAB VISIT (OUTPATIENT)
Dept: LAB | Facility: HOSPITAL | Age: 79
End: 2024-08-26
Attending: INTERNAL MEDICINE
Payer: MEDICARE

## 2024-08-26 DIAGNOSIS — D72.829 LEUKOCYTOSIS, UNSPECIFIED TYPE: ICD-10-CM

## 2024-08-26 LAB
BASOPHILS # BLD AUTO: 0.09 K/UL (ref 0–0.2)
BASOPHILS NFR BLD: 0.7 % (ref 0–1.9)
DIFFERENTIAL METHOD BLD: ABNORMAL
EOSINOPHIL # BLD AUTO: 0.4 K/UL (ref 0–0.5)
EOSINOPHIL NFR BLD: 3 % (ref 0–8)
ERYTHROCYTE [DISTWIDTH] IN BLOOD BY AUTOMATED COUNT: 12.9 % (ref 11.5–14.5)
HCT VFR BLD AUTO: 32.3 % (ref 37–48.5)
HGB BLD-MCNC: 10.6 G/DL (ref 12–16)
IMM GRANULOCYTES # BLD AUTO: 0.1 K/UL (ref 0–0.04)
IMM GRANULOCYTES NFR BLD AUTO: 0.8 % (ref 0–0.5)
LYMPHOCYTES # BLD AUTO: 4.4 K/UL (ref 1–4.8)
LYMPHOCYTES NFR BLD: 34.8 % (ref 18–48)
MCH RBC QN AUTO: 27.4 PG (ref 27–31)
MCHC RBC AUTO-ENTMCNC: 32.8 G/DL (ref 32–36)
MCV RBC AUTO: 84 FL (ref 82–98)
MONOCYTES # BLD AUTO: 1 K/UL (ref 0.3–1)
MONOCYTES NFR BLD: 7.6 % (ref 4–15)
NEUTROPHILS # BLD AUTO: 6.7 K/UL (ref 1.8–7.7)
NEUTROPHILS NFR BLD: 53.1 % (ref 38–73)
NRBC BLD-RTO: 0 /100 WBC
PLATELET # BLD AUTO: 355 K/UL (ref 150–450)
PMV BLD AUTO: 10.9 FL (ref 9.2–12.9)
RBC # BLD AUTO: 3.87 M/UL (ref 4–5.4)
WBC # BLD AUTO: 12.53 K/UL (ref 3.9–12.7)

## 2024-08-26 PROCEDURE — 85025 COMPLETE CBC W/AUTO DIFF WBC: CPT | Performed by: INTERNAL MEDICINE

## 2024-08-26 PROCEDURE — 36415 COLL VENOUS BLD VENIPUNCTURE: CPT | Performed by: INTERNAL MEDICINE

## 2024-08-31 ENCOUNTER — EXTERNAL CHRONIC CARE MANAGEMENT (OUTPATIENT)
Dept: PRIMARY CARE CLINIC | Facility: CLINIC | Age: 79
End: 2024-08-31
Payer: MEDICARE

## 2024-08-31 PROCEDURE — 99490 CHRNC CARE MGMT STAFF 1ST 20: CPT | Mod: S$PBB,,, | Performed by: INTERNAL MEDICINE

## 2024-08-31 PROCEDURE — 99490 CHRNC CARE MGMT STAFF 1ST 20: CPT | Mod: PBBFAC | Performed by: INTERNAL MEDICINE

## 2024-09-07 ENCOUNTER — TELEPHONE (OUTPATIENT)
Dept: INTERNAL MEDICINE | Facility: CLINIC | Age: 79
End: 2024-09-07
Payer: MEDICARE

## 2024-09-07 DIAGNOSIS — D64.9 ANEMIA, UNSPECIFIED TYPE: Primary | ICD-10-CM

## 2024-09-07 DIAGNOSIS — E53.8 B12 DEFICIENCY: ICD-10-CM

## 2024-09-07 DIAGNOSIS — E11.9 TYPE 2 DIABETES MELLITUS WITHOUT COMPLICATION, WITHOUT LONG-TERM CURRENT USE OF INSULIN: ICD-10-CM

## 2024-09-07 NOTE — TELEPHONE ENCOUNTER
No care due was identified.  Health [facility] Embedded Care Due Messages. Reference number: [id number].   9/07/2024 1:57:09 PM CDT

## 2024-09-07 NOTE — TELEPHONE ENCOUNTER
Please contact patient and inform her that she is mildly anemic.  I would like additional blood work to further evaluate.  Lab orders are in.  Please schedule

## 2024-09-08 RX ORDER — METFORMIN HYDROCHLORIDE 500 MG/1
500 TABLET ORAL 2 TIMES DAILY WITH MEALS
Qty: 180 TABLET | Refills: 1 | Status: SHIPPED | OUTPATIENT
Start: 2024-09-08 | End: 2024-09-10 | Stop reason: SDUPTHER

## 2024-09-08 NOTE — TELEPHONE ENCOUNTER
Refill Decision Note   Lupe Jewell  is requesting a refill authorization.  Brief Assessment and Rationale for Refill:  Approve     Medication Therapy Plan:         Comments:     Note composed:3:16 PM 09/08/2024             Appointments     Last Visit   8/9/2024 Marisol Restrepo MD   Next Visit   12/17/2024 Marisol Restrepo MD

## 2024-09-10 ENCOUNTER — TELEPHONE (OUTPATIENT)
Dept: INTERNAL MEDICINE | Facility: CLINIC | Age: 79
End: 2024-09-10
Payer: MEDICARE

## 2024-09-10 DIAGNOSIS — E11.9 TYPE 2 DIABETES MELLITUS WITHOUT COMPLICATION, WITHOUT LONG-TERM CURRENT USE OF INSULIN: ICD-10-CM

## 2024-09-10 RX ORDER — METFORMIN HYDROCHLORIDE 500 MG/1
500 TABLET ORAL 2 TIMES DAILY WITH MEALS
Qty: 180 TABLET | Refills: 1 | Status: SHIPPED | OUTPATIENT
Start: 2024-09-10

## 2024-09-10 NOTE — TELEPHONE ENCOUNTER
No care due was identified.  Health [facility] Embedded Care Due Messages. Reference number: [id number].   9/10/2024 9:15:53 AM CDT

## 2024-09-10 NOTE — TELEPHONE ENCOUNTER
----- Message from Connie Schwab sent at 9/10/2024  9:01 AM CDT -----  Contact: Pt [phone]  Requesting an RX refill or new RX.    Is this a refill or new RX: Refill    RX name and strength (copy/paste from chart):  metFORMIN (GLUCOPHAGE) 500 MG tablet    Is this a 30 day or 90 day RX: 90    Pharmacy name and phone # (copy/paste from chart):    [id number] - [place] [address] [place] [address] [address] [address]  Phone: [phone] Fax: [phone]

## 2024-09-10 NOTE — PROCEDURES
Nasal/sinus endoscopy    Date/Time: 4/24/2024 2:30 PM    Performed by: Latesha Jensen MD  Authorized by: Latesha Jensen MD    Consent Done?:  Yes (Verbal)  Anesthesia:     Local anesthetic:  Topical anesthetic    Patient tolerance:  Patient tolerated the procedure well with no immediate complications  Nose:     Procedure Performed:  Nasal Endoscopy  External:      No external nasal deformity  Intranasal:      Mucosa no polyps     Mucosa ulcers not present     No mucosa lesions present  Nasopharynx:      No mucosa lesions     Posterior choanae patent     Bilateral nasal endoscopy was carried out using monitor screen demonstrating findings throughout.  The interior of the nasal cavities was inspected including the turbinates, middle and superior meatus, sphenoethmoidal recesses.  This demonstrates pale yellow secretions meatus draining into nasopharynx.  Endoscopic culture obtained and suctioned.  Tolerated well.

## 2024-09-10 NOTE — TELEPHONE ENCOUNTER
Called and spoke to patient about recent labs showing anemia. Scheduled patient to have additional labs to rule out anemia.

## 2024-09-16 DIAGNOSIS — H40.1113 PRIMARY OPEN ANGLE GLAUCOMA OF RIGHT EYE, SEVERE STAGE: ICD-10-CM

## 2024-09-16 RX ORDER — DORZOLAMIDE HCL 20 MG/ML
1 SOLUTION/ DROPS OPHTHALMIC 3 TIMES DAILY
Qty: 10 ML | Refills: 12 | Status: SHIPPED | OUTPATIENT
Start: 2024-09-16

## 2024-09-23 ENCOUNTER — HOSPITAL ENCOUNTER (OUTPATIENT)
Dept: CARDIOLOGY | Facility: OTHER | Age: 79
Discharge: HOME | End: 2024-09-23
Attending: INTERNAL MEDICINE
Payer: MEDICARE

## 2024-09-23 DIAGNOSIS — R00.2 PALPITATIONS: ICD-10-CM

## 2024-09-23 PROCEDURE — 93227 XTRNL ECG REC<48 HR R&I: CPT | Mod: ,,, | Performed by: INTERNAL MEDICINE

## 2024-09-23 PROCEDURE — 93225 XTRNL ECG REC<48 HRS REC: CPT

## 2024-09-24 ENCOUNTER — TELEPHONE (OUTPATIENT)
Dept: INTERNAL MEDICINE | Facility: CLINIC | Age: 79
End: 2024-09-24
Payer: MEDICARE

## 2024-09-24 DIAGNOSIS — Z12.31 SCREENING MAMMOGRAM, ENCOUNTER FOR: Primary | ICD-10-CM

## 2024-09-24 DIAGNOSIS — Z12.39 ENCOUNTER FOR SCREENING FOR MALIGNANT NEOPLASM OF BREAST, UNSPECIFIED SCREENING MODALITY: ICD-10-CM

## 2024-09-24 DIAGNOSIS — Z00.00 ANNUAL PHYSICAL EXAM: ICD-10-CM

## 2024-09-24 NOTE — PROGRESS NOTES
Gastroenterology Clinic Consultation Note    Reason for Visit:  The primary encounter diagnosis was Occult GI bleeding. Diagnoses of Severe persistent asthma without complication and NSAID induced gastritis were also pertinent to this visit.    PCP:   Marisol Restrepo   [address] / [place] [address]      Initial HPI   This is a 78 y.o. female presenting for concerns of GI Bleeding and fecal incontinence. Patient with history of Nissen Fundoplication in 2005. Denies reflux symptoms.   Primary care referred her for her anemia. Patient reports that she had black stools around April/May. Still feels like her stools are dark brown in color, but no longer seeing black stools. Denies fatigue, abdominal pain. Last iron studies completed 3 days ago noted to be normal with improvement in her Hgb noted. Did have lower levels in August. Reports that at the time of her black stools, she was taking excessive amounts of NSAIDS and Tylenol.     She does report intermittent diarrhea with some fecal incontinence. Has tried fiber supplementation that did not feel that this helped her. She took FiberCon 2 tablet a day. Has not tried powder supplementation as this is not tolerable to her. Has noticed intermittent red blood when wiping and reports that she was told that she had hemorrhoids. She denies rectal pain.    Of note, patient reports ongoing issues with her asthma/coughing. She has not seen pulmonology in over a year. Taking Nyquil nightly that she thought was causing her stools to be darker in color.       ROS:  Review of Systems   Constitutional:  Negative for chills, fever, malaise/fatigue and weight loss.   Eyes:  Negative for redness.   Respiratory:  Negative for cough, sputum production and wheezing.    Cardiovascular:  Negative for chest pain.   Gastrointestinal:  Positive for heartburn. Negative for abdominal pain, blood in stool,  constipation, diarrhea, melena, nausea and vomiting.   Genitourinary:  Negative for flank pain.   Skin:  Negative for rash.   Neurological:  Negative for seizures, loss of consciousness and weakness.        Medical History:  has a past medical history of ALLERGIC RHINITIS (08/17/2012), Asthma, well controlled (08/17/2012), Chronic asthma, Chronic rhinitis, Diabetes (02/04/2014), Diabetes mellitus, Diabetes mellitus type II, Fever blister, GERD (gastroesophageal reflux disease) (08/17/2012), Glaucoma, Hyperlipemia, Hypertension, Obstructive sleep apnea, Osteoporosis, unspecified, and Recurrent upper respiratory infection (URI).    Surgical History:  has a past surgical history that includes Hysterectomy; stomach procedure; Bladder surgery; Colonoscopy (N/A, 07/01/2016); Cataract extraction w/  intraocular lens implant (Left, 01/28/2015); Cataract extraction w/  intraocular lens implant (Right, 03/30/2016); Trabeculectomy (Left, 12/28/2015); Trabeculectomy (Right, 03/30/2016); YAG CAPSULOTOMY (Bilateral, 10/3/2019 and 10/17/2019); Breast surgery; Eye surgery (Bilateral); Tubal ligation; Breast biopsy (Left); Colonoscopy (N/A, 02/04/2022); and Implantation of device for glaucoma (Right, 09/21/2022).    Family History: family history includes Asthma in her grandchild, mother, sister, son, and another family member; Depression in her daughter; Diabetes in her daughter; Glaucoma in her mother, sister, and sister; Hyperlipidemia in her daughter and sister; Hypertension in her daughter, mother, sister, and son; No Known Problems in her brother, father, maternal aunt, maternal grandfather, maternal grandmother, maternal uncle, paternal aunt, paternal grandfather, paternal grandmother, paternal uncle, and sister; Obesity in her daughter..       Review of patient's allergies indicates:   Allergen Reactions    Penicillins Rash       Current Outpatient Medications on File Prior to Visit   Medication Sig Dispense Refill     albuterol (PROVENTIL/VENTOLIN HFA) 90 mcg/actuation inhaler INHALE 2 PUFFS INTO THE LUNGS EVERY 6 HOURS AS NEEDED FOR WHEEZING OR RESCUE 8.5 g 3    albuterol-ipratropium (DUO-NEB) 2.5 mg-0.5 mg/3 mL nebulizer solution USE 3 ML VIA NEBULIZER EVERY 6 HOURS AS NEEDED FOR WHEEZING OR SHORTNESS OF BREATH 180 mL 11    alendronate (FOSAMAX) 70 MG tablet TAKE 1 TABLET(70 MG) BY MOUTH EVERY 7 DAYS 12 tablet 1    amitriptyline (ELAVIL) 25 MG tablet Take 1 tablet (25 mg total) by mouth every evening. 90 tablet 1    atorvastatin (LIPITOR) 10 MG tablet TAKE 1 TABLET(10 MG) BY MOUTH DAILY 90 tablet 3    azelastine (ASTELIN) 137 mcg (0.1 %) nasal spray USE 2 SPRAYS IN EACH NOSTRIL TWICE DAILY 30 mL 2    ciclopirox (LOPROX) 0.77 % Crea Apply topically 2 (two) times daily. 90 g 2    dorzolamide (TRUSOPT) 2 % ophthalmic solution Place 1 drop into the left eye 3 (three) times daily. 10 mL 12    fluticasone propionate (FLONASE) 50 mcg/actuation nasal spray SHAKE LIQUID AND USE 2 SPRAYS IN EACH NOSTRIL DAILY 16 g 2    fluticasone-salmeterol diskus inhaler 250-50 mcg Inhale 1 puff into the lungs 2 (two) times daily. Controller 60 each 11    latanoprost 0.005 % ophthalmic solution INSTILL 1 DROP IN BOTH EYES EVERY DAY 2.5 mL 12    losartan (COZAAR) 50 MG tablet TAKE 1 TABLET(50 MG) BY MOUTH EVERY DAY 90 tablet 3    metFORMIN (GLUCOPHAGE) 500 MG tablet Take 1 tablet (500 mg total) by mouth 2 (two) times daily with meals. 180 tablet 1    montelukast (SINGULAIR) 10 mg tablet TAKE 1 TABLET(10 MG) BY MOUTH EVERY EVENING 90 tablet 3    triamterene-hydrochlorothiazide 37.5-25 mg (MAXZIDE-25) 37.5-25 mg per tablet TAKE 1 TABLET BY MOUTH EVERY DAY 90 tablet 1     No current facility-administered medications on file prior to visit.         Objective Findings:    Vital Signs:  BP (!) 153/81   Pulse 103   Ht 5' (1.524 m)   Wt 76.7 kg (169 lb 1.5 oz)   BMI 33.02 kg/m²   Body mass index is 33.02 kg/m².    Physical Exam:  Physical Exam  Vitals and  nursing note reviewed.   Constitutional:       Appearance: She is normal weight. She is not ill-appearing.   HENT:      Mouth/Throat:      Mouth: Mucous membranes are moist.      Pharynx: Oropharynx is clear.   Eyes:      General: No scleral icterus.  Abdominal:      General: Abdomen is flat. Bowel sounds are normal. There is no distension.      Palpations: Abdomen is soft. There is no mass.      Tenderness: There is no abdominal tenderness.      Hernia: No hernia is present.   Skin:     General: Skin is warm and dry.      Capillary Refill: Capillary refill takes less than 2 seconds.      Coloration: Skin is not jaundiced or pale.   Neurological:      Mental Status: She is alert and oriented to person, place, and time. Mental status is at baseline.             Labs:  Lab Results   Component Value Date    WBC 12.45 09/23/2024    HGB 12.0 09/23/2024    HCT 35.7 (L) 09/23/2024     09/23/2024    CRP 6.33 08/23/2011    CHOL 165 08/09/2024    TRIG 93 08/09/2024    HDL 51 08/09/2024    ALKPHOS 71 08/09/2024    ALT 11 08/09/2024    AST 13 08/09/2024     08/09/2024    K 3.4 (L) 08/09/2024     08/09/2024    CREATININE 1.1 08/09/2024    BUN 15 08/09/2024    CO2 24 08/09/2024    TSH 1.028 08/09/2024    INR 0.9 01/14/2005    HGBA1C 5.6 08/09/2024       Imaging reviewed: No pertinent imaging reviewed       Endoscopy reviewed: Colonoscopy 2/4/2022  Impression:            - Diverticulosis in the sigmoid colon.                          - Stricture in the sigmoid colon.                          - One 8 mm polyp at the hepatic flexure, removed                          with a hot snare. Resected and retrieved.                          - One 6 mm polyp in the descending colon, removed                          with a cold snare. Resected and retrieved.                          - The examined portion of the ileum was normal.                          - The examination was otherwise normal on direct                           and retroflexion views.   Recommendation:        - Discharge patient to home (ambulatory).                          - Resume previous diet.                          - Continue present medications.                          - Await pathology results.                          - Repeat colonoscopy in 3 - 5 years for                          surveillance.   Attending Participation:        I personally performed the entire procedure.   Chemo Benitez MD   2/4/2022 11:00:56 AM       Assessment:  1. Occult GI bleeding    2. Severe persistent asthma without complication    3. NSAID induced gastritis      Orders Placed This Encounter    Ambulatory referral/consult to Pulmonology         Plan:  Concern for possible GI bleed with previous black stools, however, counts much improved with normal iron levels. Discussed EGD/Colonoscopy with patient and daughter and they will think about it. Would recommended proceeding with double procedures given concern for recent GI bleeding.   Referral to pulmonology for further evaluation. Discouraged Nyquil for management of her asthma symptoms.   EGD/Colonoscopy discussed and they will think about it and decide later today.   RTC pending above.       Thank you for allowing me to participate in this patient's care.    Sincerely,     Cynthia Jenkins NP  Gastroenterology Department  [facility]

## 2024-09-24 NOTE — TELEPHONE ENCOUNTER
----- Message from Rosalind Angelo Andrew sent at 9/24/2024  9:21 AM CDT -----  Contact: [phone]  Caller is requesting to schedule their annual screening mammogram appointment. Order is not listed in Epic.  Please enter order and contact patient to schedule.    Would the patient like a call back, or a response through their [facility] portal?:   call back     Where would they like the mammogram performed?: [facility].     Comments:

## 2024-09-25 ENCOUNTER — OFFICE VISIT (OUTPATIENT)
Dept: GASTROENTEROLOGY | Facility: CLINIC | Age: 79
End: 2024-09-25
Payer: MEDICARE

## 2024-09-25 VITALS
BODY MASS INDEX: 33.19 KG/M2 | HEART RATE: 103 BPM | HEIGHT: 60 IN | DIASTOLIC BLOOD PRESSURE: 81 MMHG | SYSTOLIC BLOOD PRESSURE: 153 MMHG | WEIGHT: 169.06 LBS

## 2024-09-25 DIAGNOSIS — K29.60 NSAID INDUCED GASTRITIS: ICD-10-CM

## 2024-09-25 DIAGNOSIS — R19.5 OCCULT GI BLEEDING: Primary | ICD-10-CM

## 2024-09-25 DIAGNOSIS — J45.50 SEVERE PERSISTENT ASTHMA WITHOUT COMPLICATION: ICD-10-CM

## 2024-09-25 DIAGNOSIS — T39.395A NSAID INDUCED GASTRITIS: ICD-10-CM

## 2024-09-25 PROCEDURE — 99214 OFFICE O/P EST MOD 30 MIN: CPT | Mod: PBBFAC

## 2024-09-25 PROCEDURE — 99999 PR PBB SHADOW E&M-EST. PATIENT-LVL IV: CPT | Mod: PBBFAC,,,

## 2024-09-26 LAB
OHS CV EVENT MONITOR DAY: 0
OHS CV HOLTER LENGTH DECIMAL HOURS: 47.98
OHS CV HOLTER LENGTH HOURS: 47
OHS CV HOLTER LENGTH MINUTES: 59
OHS CV HOLTER SINUS AVERAGE HR: 97
OHS CV HOLTER SINUS MAX HR: 122
OHS CV HOLTER SINUS MIN HR: 82

## 2024-09-27 ENCOUNTER — TELEPHONE (OUTPATIENT)
Dept: PULMONOLOGY | Facility: CLINIC | Age: 79
End: 2024-09-27
Payer: MEDICARE

## 2024-09-27 NOTE — TELEPHONE ENCOUNTER
I received a Epic message that Mrs Jewell was requesting a visit with Dr Franco. I left a voicemail that she can see Dr Franco next week on 10-3-24 at 1030am. Appointment slip mailed. Denise Issa LPN

## 2024-09-30 ENCOUNTER — EXTERNAL CHRONIC CARE MANAGEMENT (OUTPATIENT)
Dept: PRIMARY CARE CLINIC | Facility: CLINIC | Age: 79
End: 2024-09-30
Payer: MEDICARE

## 2024-09-30 ENCOUNTER — TELEPHONE (OUTPATIENT)
Dept: ENDOSCOPY | Facility: HOSPITAL | Age: 79
End: 2024-09-30
Payer: MEDICARE

## 2024-09-30 VITALS — BODY MASS INDEX: 33.18 KG/M2 | WEIGHT: 169 LBS | HEIGHT: 60 IN

## 2024-09-30 DIAGNOSIS — K92.1 MELENA: Primary | ICD-10-CM

## 2024-09-30 DIAGNOSIS — Z12.11 SCREEN FOR COLON CANCER: Primary | ICD-10-CM

## 2024-09-30 PROCEDURE — 99490 CHRNC CARE MGMT STAFF 1ST 20: CPT | Mod: S$PBB,,, | Performed by: INTERNAL MEDICINE

## 2024-09-30 PROCEDURE — 99439 CHRNC CARE MGMT STAF EA ADDL: CPT | Mod: PBBFAC | Performed by: INTERNAL MEDICINE

## 2024-09-30 PROCEDURE — 99439 CHRNC CARE MGMT STAF EA ADDL: CPT | Mod: S$PBB,,, | Performed by: INTERNAL MEDICINE

## 2024-09-30 PROCEDURE — 99490 CHRNC CARE MGMT STAFF 1ST 20: CPT | Mod: PBBFAC | Performed by: INTERNAL MEDICINE

## 2024-09-30 RX ORDER — SODIUM, POTASSIUM,MAG SULFATES 17.5-3.13G
1 SOLUTION, RECONSTITUTED, ORAL ORAL DAILY
Qty: 1 KIT | Refills: 0 | Status: SHIPPED | OUTPATIENT
Start: 2024-09-30 | End: 2024-10-02

## 2024-09-30 NOTE — TELEPHONE ENCOUNTER
Are you ready for your Colonoscopy?      __ If you take blood thinners,weight loss or diabetic injectable medications, have you stopped taking them according to your doctor's instructions before your procedures?  __ Have you stopped eating solid foods and followed a clear liquid diet a full day before your procedure or followed the diet       guidelines indicated in your instructions?       REMINDER: NO BROTH AFTER MIDNIGHT THE DAY BEFORE PROCEDURE.   __ Have you completed all your prep solution? PLEASE DO NOT FOLLOW the insert/or box instructions from pharmacy)  __ Have you taken your blood pressure, heart, seizure, or other essential medications the morning of your procedure?  __ Have you planned for a ride to and from procedure?  (Medical Transportation, Uber, Lyft, Taxi, etc. may ONLY be used if a responsible adult is present to accompany you home). The responsible adult CANNOT be the  of the service.  person must be available to return and pick you up within 15 minutes of being notified of discharge.           Questions or Concerns?  Please call us!  [phone] (M-F) [phone] (Nights and weekends)    Listed below are some helpful tips:    Please bring protective cases for eyewear and hearing aids-wear comfortable clothing/shoes.  Follow prep instructions closely so you don't have to do it twice.  Bowel prep is prescription used to clean out the colon before a colonoscopy. The prep increases movement of your colon by causing you to have diarrhea (loose stools). Cleaning out stool from the colon helps your doctor to see in your colon clearly during this procedure.   It is important to stay hydrated before, during and after bowel prep to prevent loss of fluid (dehydration). You can have water and your choice of clear liquids. Reminder: these liquids you will drink in addition to the bowel prep.     Preparing the mixture:    First, mix the prep with water.  Make the taste better by adding a sugar  free drink mix (Crystal Light) can improve the taste of your prep.   Use a large bore (opening) straw. Place towards the back of mouth (throat) as tolerated.  Prepare a prep mixture that is lightly chilled, but not ice-cold. Drinking a large amount of ice-cold liquid can make you feel very ill.  Avoid drinking any RED beverages or eating popsicles with this color for the 24 hours leading up to your procedure. This color can look like blood in the colon.    Consuming the prep:    Take your time. If you feel ill, take a 15-minute break from drinking the prep mixture  Combat hunger and dehydration with clear liquids. Options like JELL-O, or popsicles will help.  Settle in with good reading material. The goal is to clean out 6 feet of colon, so you can plan on spending a good deal of time in the bathroom. Have some good reading material on-hand or an iPad ready to keep yourself entertained!  Keep yourself comfortable. We recommend applying personal hygiene wipes, Tucks pads and a soothing ointment, like A&D ointment, Desitin, or Vaseline to your bottom before starting and as needed to protect your skin.     If you are unsure about any of these instructions, call [facility] at [phone].                          Oral Medicine  Day of Prep  Day of Procedure           Glyburide    Do NOT take morning of procedure. If you         Glucotrol (Glipizide)  Do NOT take. take twice daily, take with dinner.         Amaryl (Glimepiride)                                Glucophage    Do NOT take morning of procedure. Take         Glumetza  Take as  when you start eating again.          Fortamet (Metformin)  prescribed.                              Januvia (Sitaglipitin)    Do NOT take morning of procedure. Take         Nesina (Aloglipitin)    when you start eating again.          Onglyza (Saxaglipitin)  Take as              Tradjenta (Linaglipitin)  prescribed.                              Invokana (Canagliflozin)                Farxiga (Dapagliflozin)  Stop 2 days  Do NOT take morning of procedure. Take         Jardiance(Empagliflozin) before prep.  when you start eating again.                          Actos (Pioglitazone)  Take as  Do NOT take morning of procedure. Take           prescribed.  when you start eating again.                          Injectable & Combination Daily Dose  Weekly Dose           Medicine                Adlyxin (Lixisenatide)  If you take these For weekly dose, hold dose at least 8 days prior        Byetta (Exenatide)  medications daily to appointment. You may take the dose after your        Bydureon (Exenatide XL) on the day of your procedure.            Ozempic (Semaglutide)  appointment hold              Trulicity (Dulaglutide)  that dose until              Victoza (Liraglutide)  after your              Mounjaro (Tirzepatide)  procedure.              Rupert Durant                It is important to monitor your blood sugar while doing the bowel preparation. On the day of your bowel   prep, when you are on a clear liquid diet, you may drink beverages with sugar as your source of glucose.   Be sure to mix the prep with water or sugar free liquid only. Below are instructions on how to adjust your   diabetic medications prior to your scheduled procedure. Call the healthcare provider who manages your   diabetes if you have questions.   Insulin for Type 1   Diabetes Mellitus Day of Prep                                         Day of procedure          Basaglar If you use in the morning, take as prescribed.  If you take in the morning,         Lantus If you use in the evening, inject 70% of dose. inject 80% of dose. If you take        Levemir      in the evenings, inject usual         Toujeo      dose.           Trevor Montalvo Count carbs and adjust dose   Do NOT take morning of procedure.         Apidra accordingly. If not carb counting,  Take  when you start eating again.         Humalog 100 take 25% of usual meal dose.             Humalog 200 May use correction dose every              Novolog 4 hours. Do NOT use once sugar-free             liquid prep is started.                             Insulin Pump Count carbs and adjust your   May use temp basal rate at 70 % starting          dose as needed. May use temp basal at midnight until after procedure.          rate at 70 % after sugar-free clear liquid             prep is started until midnight.                              Insulin for Type 2   Diabetes Mellitus Day of Prep                                         Day of procedure          Basaglar If you use in the morning, take as prescribed.  If you take in the morning,         Lantus If you use in the evening, inject 70% of dose. inject 80% of dose. If you take        Levemir      in the evenings, inject usual         Toujeo      dose.           Tresiba                                                Afrezza Inject 50 % of dose with clear liquid diet.   Do NOT take morning of         Apidra Do NOT use once sugar-free clear liquid prep procedure. Take when you         Fiasp is started. If you are using a correction scale,  start eating meals again.         Humalog 100 take dose every 4 hours as needed.             Humalog 200                Novolog                                NPH  Inject 50% of breakfast and dinner   Do NOT take morning of         doses with clear liquid diet.    procedure. Take usual dose              when you eat dinner.                         Regular  Inject 50% of breakfast dose and do NOT take Do NOT take morning of          dinner dose once sugar-free liquid prep has   procedure. Take usual dose          started. If using correction scale, make take dose when you eat dinner.          every 6 hours as needed.                              Novolog Mix 70/30 Inject 50% of breakfast dose. Inject 25%  Do NOT take breakfast dose.          Humolog Mix 75/25 of dinner dose.     Take usual dose when you eat         Humolog Mix 50/50      dinner.                           U500 Take 50% of AM or breakfast unit colby dose.  Do NOT take mornining of           Take 25% of lunch or dinner or PM unit colby dose.  procedure. Take when you               start eating again.                          V-Go  Continue to wear your V-Go device. Do NOT click  Coninue to wear your V-Go         once sugar-free clear liquid prep is started.   device. Resume clicks with               meals.                      Colonoscopy Procedure Prep Instructions      Date of procedure: 11/04/24 Arrive at: 2:30PM    Location of Department:   [facility] [address] [place] [address]  Take the [facility] Elevators to 2nd Floor Outpatient Surgery    As soon as possible:   your prep from pharmacy and over the counter DULCOLAX LAXATIVE TABLETS     On the day before your procedure   What You CAN do:   You may have clear liquids ONLY -see below for list.     Liquids That Are OK to Drink:   Water  Sports drinks (Gatorade, Power-Aid)  Coffee or tea (no cream or nondairy creamer)  Clear juices without pulp (apple, white grape)  Gelatin desserts (no fruit or toppings)  Clear soda (sprite, coke, ginger ale)  Chicken broth (until 12 midnight the night before procedure)      What You CANNOT do:   Do not EAT solid food, drink milk or anything   colored red.  Do not drink alcohol.  Do not take oral medications within 1 hour of starting   each dose of SUPREP.  No gum chewing or candy morning of procedure.      Note:   (Please disregard the insert instructions from pharmacy).  SUPREP Bowel Prep Kit is indicated for cleansing of the colon as a preparation for colonoscopy in adults.   Be sure to tell your doctor about all the medicines you take, including prescription and non-prescription medicines, vitamins, and herbal supplements. SUPREP Bowel Prep Kit may affect how other  medicines work.  Medication taken by mouth may not be absorbed properly when taken within 1 hour before the start of each dose of SUPREP Bowel Prep Kit.    It is not uncommon to experience some abdominal cramping, nausea and/or vomiting when taking the prep. If you have nausea and/or vomiting while taking the prep, stop drinking for 20 to 30 minutes then continue.    How to take prep:    SUPREP Bowel Prep Kit is a (2-day) prep.   Both 6-ounce bottles are required for a complete preparation for colonoscopy. Dilute the solution concentrate as directed prior to use. You must drink water with each dose of SUPREP, and additional water after each dose.    DOSE 1--Day Before Colonoscopy 11/3/24    Drink at least 6 to 8 glasses of clear liquids from time you wake up until you begin your prep and then continue until bedtime to avoid dehydration.     12:00 pm (NOON) Take four (4) Dulcolax (Bisacodyl) tablets with at least 8 ounces or more of clear liquids.      6:00 pm:    You must complete Steps 1 through 4 using one (1) 6-ounce bottle before going to bed as shown below:    Step 1-Pour ONE (1) 6-ounce bottle of SUPREP liquid into the mixing container.  Step 2-Add cool drinking water to the 16-ounce line on the container and mix.  Step 3-Drink ALL the liquid in the container.  Step 4-You must drink two (2) more 16-ounce containers of water over the next 1 hour.  IMPORTANT: If you experience preparation-related symptoms (for example, nausea, bloating, or cramping), stop, or slow the rate of drinking the additional water until your symptoms decrease.    DOSE 2--Day of the Colonoscopy 11/4/24 at 2-3 AM.    For this dose, repeat Steps 1 through 4 shown above using the other 6-ounce bottle.   You may continue drinking water/clear liquids until   4 hours before your colonoscopy or as directed by the scheduling nurse  11:30AM.    For information about your procedure, two (2) things to view prior to colonoscopy:  Please watch this  informational video. It is important to watch this animated consent video prior to your arrival. If you haven't watched the video prior to arriving, you are required to watch it during admission which can causes delays.    Options for viewing:   Using a keyboard:  press and hold the control tab (Ctrl) and left mouse click to follow links.           Colonoscopy Instructional Video                                                                                   OR    Type link address into your web browser's address bar:  [email or web address]      Educational Booklet with pictures:      Colonoscopy Prep - Liquid      Comments:             IMPORTANT INFORMATION TO KNOW BEFORE YOUR PROCEDURE    [facility] [place] 2nd Floor         If your procedure requires the administration of anesthesia, it is necessary for a responsible adult to drive you home. (Medical Transportation, Uber, Lyft, Taxi, etc. may ONLY be used if a responsible adult is present to accompany you home.  The responsible adult CAN'T be the  of the service).      person must be available to return to pick you up within 15 minutes of being notified of discharge.       Please bring a picture ID, insurance card, & copayment      Take Medications as directed below:        If you begin taking any blood thinning medications, injectable weight loss/diabetes medications (other than insulin) , or Adipex (Phentermine) please contact the endoscopy scheduling department listed below as soon as possible.    If you are diabetic see the attached instruction sheet regarding your medication.     If you take HEART, BLOOD PRESSURE, SEIZURE, PAIN, LUNG (including inhalers/nebulizers), ANTI-REJECTION (transplant patients), or PSYCHIATRIC medications, please take at your regular times with a sip of water or as directed by the scheduling nurse.     Important contact information:    Endoscopy [phone] Hours of  operation Monday-Friday 8:00-4:30pm.    Questions about insurance or financial obligations call [phone] or [phone].    If you have questions regarding the prep or need to reschedule, please call [phone]. After hours questions requiring immediate assistance, contact [facility] nurse line at [phone] or [phone].   NOTE:     On occasion, unforeseen circumstances may cause a delay in your procedure start time. We respect your time and appreciate your patience during these circumstances.      Comments:

## 2024-09-30 NOTE — TELEPHONE ENCOUNTER
"----- Message from Agatha sent at 2024 10:19 AM CDT -----  Regarding: FW: EGD/Colonoscopy    ----- Message -----  From: Cynthia Jenkins NP  Sent: 2024   9:32 AM CDT  To: [facility]  Subject: EGD/Colonoscopy                                  Procedure: EGD/Colonoscopy    Diagnosis: Melena    Procedure Timin-12 weeks    #If within 4 weeks selected, please colby as high priority#    #If greater than 12 weeks, please select "5-12 weeks" and delay sending until 3 months prior to requested date#     Location: Any Site    Additional Scheduling Information: Diabetes    Prep Specifications:Standard prep    Is the patient taking a GLP-1 Agonist:no    Have you attached a patient to this message: yes  "

## 2024-10-01 ENCOUNTER — TELEPHONE (OUTPATIENT)
Dept: ENDOSCOPY | Facility: HOSPITAL | Age: 79
End: 2024-10-01
Payer: MEDICARE

## 2024-10-01 NOTE — TELEPHONE ENCOUNTER
----- Message from Med Assistant Servin sent at 2024 12:48 PM CDT -----  Regardin/4 CL  The patient is currently under an internal cardiologist Erna silva and requires a clearance Cardiology for their upcoming scheduled Colonoscopy/EGD on 24.         Notes: pt states she's having chest pains and her heart racing

## 2024-10-02 ENCOUNTER — OFFICE VISIT (OUTPATIENT)
Dept: ALLERGY | Facility: CLINIC | Age: 79
End: 2024-10-02
Payer: MEDICARE

## 2024-10-02 VITALS — WEIGHT: 171.06 LBS | HEIGHT: 60 IN | BODY MASS INDEX: 33.58 KG/M2

## 2024-10-02 DIAGNOSIS — J45.901 EXACERBATION OF ASTHMA, UNSPECIFIED ASTHMA SEVERITY, UNSPECIFIED WHETHER PERSISTENT: Primary | ICD-10-CM

## 2024-10-02 DIAGNOSIS — J30.1 NON-SEASONAL ALLERGIC RHINITIS DUE TO POLLEN: ICD-10-CM

## 2024-10-02 DIAGNOSIS — J32.4 CHRONIC PANSINUSITIS: ICD-10-CM

## 2024-10-02 PROCEDURE — 99213 OFFICE O/P EST LOW 20 MIN: CPT | Mod: PBBFAC | Performed by: ALLERGY & IMMUNOLOGY

## 2024-10-02 PROCEDURE — 99214 OFFICE O/P EST MOD 30 MIN: CPT | Mod: S$PBB,,, | Performed by: ALLERGY & IMMUNOLOGY

## 2024-10-02 PROCEDURE — 99999 PR PBB SHADOW E&M-EST. PATIENT-LVL III: CPT | Mod: PBBFAC,,, | Performed by: ALLERGY & IMMUNOLOGY

## 2024-10-02 RX ORDER — ALBUTEROL SULFATE 90 UG/1
INHALANT RESPIRATORY (INHALATION)
Qty: 8.5 G | Refills: 3 | Status: SHIPPED | OUTPATIENT
Start: 2024-10-02

## 2024-10-02 RX ORDER — PREDNISONE 20 MG/1
TABLET ORAL
Qty: 7 TABLET | Refills: 0 | Status: SHIPPED | OUTPATIENT
Start: 2024-10-02 | End: 2024-10-07
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
## 2024-10-07 ENCOUNTER — OFFICE VISIT (OUTPATIENT)
Dept: CARDIOLOGY | Facility: CLINIC | Age: 79
End: 2024-10-07
Payer: MEDICARE

## 2024-10-07 ENCOUNTER — HOSPITAL ENCOUNTER (OUTPATIENT)
Dept: CARDIOLOGY | Facility: CLINIC | Age: 79
Discharge: HOME | End: 2024-10-07
Payer: MEDICARE

## 2024-10-07 VITALS
BODY MASS INDEX: 33.38 KG/M2 | DIASTOLIC BLOOD PRESSURE: 77 MMHG | HEART RATE: 92 BPM | SYSTOLIC BLOOD PRESSURE: 167 MMHG | HEIGHT: 60 IN | OXYGEN SATURATION: 99 % | WEIGHT: 170 LBS

## 2024-10-07 DIAGNOSIS — R00.2 PALPITATIONS: Primary | ICD-10-CM

## 2024-10-07 DIAGNOSIS — E78.5 HYPERLIPIDEMIA, UNSPECIFIED HYPERLIPIDEMIA TYPE: ICD-10-CM

## 2024-10-07 DIAGNOSIS — I10 HYPERTENSION, UNSPECIFIED TYPE: ICD-10-CM

## 2024-10-07 DIAGNOSIS — R00.2 PALPITATIONS: ICD-10-CM

## 2024-10-07 DIAGNOSIS — J45.909 ASTHMA, UNSPECIFIED ASTHMA SEVERITY, UNSPECIFIED WHETHER COMPLICATED, UNSPECIFIED WHETHER PERSISTENT: ICD-10-CM

## 2024-10-07 LAB
OHS QRS DURATION: 80 MS
OHS QTC CALCULATION: 467 MS

## 2024-10-07 PROCEDURE — 93010 ELECTROCARDIOGRAM REPORT: CPT | Mod: S$PBB,,, | Performed by: INTERNAL MEDICINE

## 2024-10-07 PROCEDURE — 99214 OFFICE O/P EST MOD 30 MIN: CPT | Mod: PBBFAC,25 | Performed by: STUDENT IN AN ORGANIZED HEALTH CARE EDUCATION/TRAINING PROGRAM

## 2024-10-07 PROCEDURE — 99999 PR PBB SHADOW E&M-EST. PATIENT-LVL IV: CPT | Mod: PBBFAC,GC,, | Performed by: STUDENT IN AN ORGANIZED HEALTH CARE EDUCATION/TRAINING PROGRAM

## 2024-10-07 PROCEDURE — 93005 ELECTROCARDIOGRAM TRACING: CPT | Mod: PBBFAC | Performed by: INTERNAL MEDICINE

## 2024-10-07 NOTE — PROGRESS NOTES
"Clinic Note  10/7/2024      Subjective:       Patient ID:  Lupe is a 78 y.o. female being seen for an urgent care visit.    Chief Complaint: No chief complaint on file.    78 years F with asthma, allergic rhino conjunctivitis, GERD, s/p Thai fundoplication in January 2005, HTN, T2DM, ANN-MARIE presenting to our clinic with palpitations.   She visited her PCP last month and was told that her heart rate was elevated, since then she has noted recurrent plapitations.   She states daily episodes of "racing heart beats" sensation over the past 2 months, with some improvement over the past 2 weeks. Episodes are related to her frequent Albuterol inhaler use, lasting for few minutes and gradually improving. She was experiencing recurrent asthma attacks over the past 3 months, and was placed on short course of prednisone after recent follow up with her allergist. Currently she is using her inhaler 1-2 times a day PRN only.  She had a 24 hour event monitor last month without abnormal findings, she denies having symptoms during this time period.     She denies angina, DESHPANDE, shortness of breath, presyncope, syncope, leg swelling, PND, or orthopnea.           Diagnosis Date    ALLERGIC RHINITIS 08/17/2012    Asthma, well controlled 08/17/2012    Chronic asthma     Chronic rhinitis     Diabetes 02/04/2014    Diabetes mellitus     Diabetes mellitus type II     Fever blister     GERD (gastroesophageal reflux disease) 08/17/2012    Glaucoma     Hyperlipemia     Hypertension     Obstructive sleep apnea     Osteoporosis, unspecified     Recurrent upper respiratory infection (URI)      Past Surgical History:   Procedure Laterality Date    BLADDER SURGERY      BREAST BIOPSY Left     BREAST SURGERY      left breast biopsy    CATARACT EXTRACTION W/  INTRAOCULAR LENS IMPLANT Left 01/28/2015    WITH TRAB ()    CATARACT EXTRACTION W/  INTRAOCULAR LENS IMPLANT Right 03/30/2016    WITH TRAB ()    COLONOSCOPY N/A 07/01/2016 "    Procedure: COLONOSCOPY;  Surgeon: Rony Lima MD;  [facility]  Service: Endoscopy;  Laterality: N/A;    COLONOSCOPY N/A 02/04/2022    Procedure: COLONOSCOPY;  Surgeon: FARA Benitez MD;  [facility]  Service: Endoscopy;  Laterality: N/A;  fully vacc-inst mail-tb.Covid test at [facility] on 2/1.EC    EYE SURGERY Bilateral     cataract removal and Laser surgery    HYSTERECTOMY      IMPLANTATION OF DEVICE FOR GLAUCOMA Right 09/21/2022    AHMED ()    stomach procedure      TRABECULECTOMY Left 12/28/2015    COMBINED WITH CE ()    TRABECULECTOMY Right 03/30/2016    COMBINED WITH CE ()    TUBAL LIGATION      YAG CAPSULOTOMY Bilateral 10/3/2019 and 10/17/2019         Current Outpatient Medications on File Prior to Visit   Medication Sig Dispense Refill    albuterol (PROVENTIL/VENTOLIN HFA) 90 mcg/actuation inhaler INHALE 2 PUFFS INTO THE LUNGS EVERY 6 HOURS AS NEEDED FOR WHEEZING OR RESCUE 8.5 g 3    albuterol-ipratropium (DUO-NEB) 2.5 mg-0.5 mg/3 mL nebulizer solution USE 3 ML VIA NEBULIZER EVERY 6 HOURS AS NEEDED FOR WHEEZING OR SHORTNESS OF BREATH 180 mL 11    alendronate (FOSAMAX) 70 MG tablet TAKE 1 TABLET(70 MG) BY MOUTH EVERY 7 DAYS 12 tablet 1    amitriptyline (ELAVIL) 25 MG tablet Take 1 tablet (25 mg total) by mouth every evening. 90 tablet 1    atorvastatin (LIPITOR) 10 MG tablet TAKE 1 TABLET(10 MG) BY MOUTH DAILY 90 tablet 3    azelastine (ASTELIN) 137 mcg (0.1 %) nasal spray USE 2 SPRAYS IN EACH NOSTRIL TWICE DAILY 30 mL 2    dorzolamide (TRUSOPT) 2 % ophthalmic solution Place 1 drop into the left eye 3 (three) times daily. 10 mL 12    fluticasone propionate (FLONASE) 50 mcg/actuation nasal spray SHAKE LIQUID AND USE 2 SPRAYS IN EACH NOSTRIL DAILY 16 g 2    fluticasone-salmeterol diskus inhaler 250-50 mcg Inhale 1 puff into the lungs 2 (two) times daily. Controller 60 each 11    latanoprost 0.005 % ophthalmic  solution INSTILL 1 DROP IN BOTH EYES EVERY DAY 2.5 mL 12    losartan (COZAAR) 50 MG tablet TAKE 1 TABLET(50 MG) BY MOUTH EVERY DAY 90 tablet 3    metFORMIN (GLUCOPHAGE) 500 MG tablet Take 1 tablet (500 mg total) by mouth 2 (two) times daily with meals. 180 tablet 1    mometasone-formoterol (DULERA) 200-5 mcg/actuation inhaler Inhale 2 puffs into the lungs 2 (two) times daily. Controller 13 g 11    montelukast (SINGULAIR) 10 mg tablet TAKE 1 TABLET(10 MG) BY MOUTH EVERY EVENING 90 tablet 3    triamterene-hydrochlorothiazide 37.5-25 mg (MAXZIDE-25) 37.5-25 mg per tablet TAKE 1 TABLET BY MOUTH EVERY DAY 90 tablet 1    [DISCONTINUED] predniSONE (DELTASONE) 20 MG tablet Take 2 tablets (40 mg total) by mouth once daily for 2 days, THEN 1 tablet (20 mg total) once daily for 3 days. 7 tablet 0     No current facility-administered medications on file prior to visit.     Review of patient's allergies indicates:   Allergen Reactions    Penicillins Rash     Family History   Problem Relation Name Age of Onset    Asthma Mother      Glaucoma Mother      Hypertension Mother      No Known Problems Father      Glaucoma Sister Rosa     Asthma Sister Rsoa     Hypertension Sister Rosa     Hyperlipidemia Sister      Glaucoma Sister      No Known Problems Sister Elois     No Known Problems Brother none     No Known Problems Maternal Aunt      No Known Problems Maternal Uncle      No Known Problems Paternal Aunt      No Known Problems Paternal Uncle      No Known Problems Maternal Grandmother      No Known Problems Maternal Grandfather      No Known Problems Paternal Grandmother      No Known Problems Paternal Grandfather      Hyperlipidemia Daughter Leanna     Hypertension Daughter Leanna     Depression Daughter Leanna     Diabetes Daughter Leanna     Obesity Daughter Leanna     Hypertension Son Don     Asthma Son Don     Asthma Other nephew     Asthma Grandchild 4     Melanoma Neg Hx      Psoriasis Neg Hx      Lupus Neg Hx       Eczema Neg Hx      Acne Neg Hx      Blindness Neg Hx      Macular degeneration Neg Hx      Retinal detachment Neg Hx      Amblyopia Neg Hx      Cancer Neg Hx      Cataracts Neg Hx      Strabismus Neg Hx      Stroke Neg Hx      Thyroid disease Neg Hx         Review of Systems   Constitutional:  Negative for chills and fever.   HENT:  Positive for congestion. Negative for ear discharge, ear pain and nosebleeds.    Eyes:  Negative for discharge and redness.   Respiratory:  Positive for cough.    Cardiovascular:  Negative for chest pain.   Gastrointestinal:  Negative for diarrhea, nausea and vomiting.   Skin:  Negative for rash.   Neurological:  Negative for dizziness.   Psychiatric/Behavioral:  The patient is not nervous/anxious.        Medication List with Changes/Refills   Current Medications    ALBUTEROL (PROVENTIL/VENTOLIN HFA) 90 MCG/ACTUATION INHALER    INHALE 2 PUFFS INTO THE LUNGS EVERY 6 HOURS AS NEEDED FOR WHEEZING OR RESCUE    ALBUTEROL-IPRATROPIUM (DUO-NEB) 2.5 MG-0.5 MG/3 ML NEBULIZER SOLUTION    USE 3 ML VIA NEBULIZER EVERY 6 HOURS AS NEEDED FOR WHEEZING OR SHORTNESS OF BREATH    ALENDRONATE (FOSAMAX) 70 MG TABLET    TAKE 1 TABLET(70 MG) BY MOUTH EVERY 7 DAYS    AMITRIPTYLINE (ELAVIL) 25 MG TABLET    Take 1 tablet (25 mg total) by mouth every evening.    ATORVASTATIN (LIPITOR) 10 MG TABLET    TAKE 1 TABLET(10 MG) BY MOUTH DAILY    AZELASTINE (ASTELIN) 137 MCG (0.1 %) NASAL SPRAY    USE 2 SPRAYS IN EACH NOSTRIL TWICE DAILY    DORZOLAMIDE (TRUSOPT) 2 % OPHTHALMIC SOLUTION    Place 1 drop into the left eye 3 (three) times daily.    FLUTICASONE PROPIONATE (FLONASE) 50 MCG/ACTUATION NASAL SPRAY    SHAKE LIQUID AND USE 2 SPRAYS IN EACH NOSTRIL DAILY    FLUTICASONE-SALMETEROL DISKUS INHALER 250-50 MCG    Inhale 1 puff into the lungs 2 (two) times daily. Controller    LATANOPROST 0.005 % OPHTHALMIC SOLUTION    INSTILL 1 DROP IN BOTH EYES EVERY DAY    LOSARTAN (COZAAR) 50 MG TABLET    TAKE 1 TABLET(50 MG) BY MOUTH  EVERY DAY    METFORMIN (GLUCOPHAGE) 500 MG TABLET    Take 1 tablet (500 mg total) by mouth 2 (two) times daily with meals.    MOMETASONE-FORMOTEROL (DULERA) 200-5 MCG/ACTUATION INHALER    Inhale 2 puffs into the lungs 2 (two) times daily. Controller    MONTELUKAST (SINGULAIR) 10 MG TABLET    TAKE 1 TABLET(10 MG) BY MOUTH EVERY EVENING    TRIAMTERENE-HYDROCHLOROTHIAZIDE 37.5-25 MG (MAXZIDE-25) 37.5-25 MG PER TABLET    TAKE 1 TABLET BY MOUTH EVERY DAY   Discontinued Medications    PREDNISONE (DELTASONE) 20 MG TABLET    Take 2 tablets (40 mg total) by mouth once daily for 2 days, THEN 1 tablet (20 mg total) once daily for 3 days.       Patient Active Problem List   Diagnosis    Nuclear sclerosis - Both Eyes    Type 2 diabetes mellitus with other specified complication, without long-term current use of insulin    Dry eyes - Both Eyes    Posterior vitreous detachment of both eyes - Both Eyes    Asthma, well controlled    AR (allergic rhinitis)    GERD (gastroesophageal reflux disease)    Presbyopia    Diabetes    Primary open angle glaucoma of right eye, severe stage    Primary open angle glaucoma of left eye    Senile nuclear sclerosis    Post-operative state    Bronchitis    Fecal incontinence    Dermatitis    Nuclear senile cataract of right eye    Special screening for malignant neoplasms, colon    Severe persistent asthma without complication    History of multiple allergies    Chronic obstructive pulmonary disease, unspecified COPD type    Aortic atherosclerosis           Objective:      BP (!) 167/77   Pulse 92   Ht 5' (1.524 m)   Wt 77.1 kg (169 lb 15.6 oz)   SpO2 99%   BMI 33.20 kg/m²   Estimated body mass index is 33.2 kg/m² as calculated from the following:    Height as of this encounter: 5' (1.524 m).    Weight as of this encounter: 77.1 kg (169 lb 15.6 oz).  Physical Exam  Vitals and nursing note reviewed.   Constitutional:       Appearance: She is normal weight. She is not ill-appearing.   HENT:       Mouth/Throat:      Mouth: Mucous membranes are moist.      Pharynx: Oropharynx is clear.   Eyes:      General: No scleral icterus.  Cardiovascular:      Rate and Rhythm: Normal rate and regular rhythm.   Pulmonary:      Breath sounds: No wheezing or rales.   Abdominal:      General: Abdomen is flat. Bowel sounds are normal. There is no distension.      Palpations: Abdomen is soft. There is no mass.      Tenderness: There is no abdominal tenderness.      Hernia: No hernia is present.   Musculoskeletal:      Cervical back: Normal range of motion.   Skin:     General: Skin is warm and dry.      Capillary Refill: Capillary refill takes less than 2 seconds.      Coloration: Skin is not jaundiced or pale.   Neurological:      Mental Status: She is alert and oriented to person, place, and time. Mental status is at baseline.           Assessment and Plan:     78 years old with asthma presents with recurrent palpitations.     Palpitations:  Described as in HPI, gradual onset and termination.   24 event monitor last month without abnormal findings.   Likely related to her frequent albuterol use, however can't rule out arrhythmia.   -- 30 day Event monitor ordered   -- Discussed follow up with allergist to limit frequent dependence on albuterol inhaler     HTN:   Continue Losartan, and Maxzide   DASH diet discussed   Exercise discussed   Patient agrees to keep a journal of bp readings over the next 7 days    HLD  Lipid profile on   Lab Results   Component Value Date    LDLCALC 95.4 08/09/2024    HDL 51 08/09/2024    TRIG 93 08/09/2024    CHOL 165 08/09/2024      - Cont statin   - Denies adverse effects of medications        Problem List Items Addressed This Visit    None  Visit Diagnoses       Palpitations                        Plan discussed with attending Dr. Turpin, further recommendations as per attending addendum. Please feel free to call with any questions or concerns.        Daniel Umanzor MD  Cardiology -  PGY5  [facility]

## 2024-10-08 ENCOUNTER — HOSPITAL ENCOUNTER (OUTPATIENT)
Dept: RADIOLOGY | Facility: OTHER | Age: 79
Discharge: HOME | End: 2024-10-08
Attending: INTERNAL MEDICINE
Payer: MEDICARE

## 2024-10-08 DIAGNOSIS — Z12.31 SCREENING MAMMOGRAM, ENCOUNTER FOR: ICD-10-CM

## 2024-10-08 PROCEDURE — 77067 SCR MAMMO BI INCL CAD: CPT | Mod: TC

## 2024-10-08 PROCEDURE — 77063 BREAST TOMOSYNTHESIS BI: CPT | Mod: TC

## 2024-10-25 ENCOUNTER — TELEPHONE (OUTPATIENT)
Dept: PULMONOLOGY | Facility: CLINIC | Age: 79
End: 2024-10-25
Payer: MEDICARE

## 2024-10-25 NOTE — TELEPHONE ENCOUNTER
----- Message from Basiliojulita sent at 10/25/2024 11:46 AM CDT -----  Regarding: appt; follow up; established patient  Contact: Patient @ [phone]  Pt called in to schedule an appt; no available appts in Epic. Pt is asking for a call back soon to schedule. Thanks.       Patient's DX: Severe persistent asthma without complication [J45.50]    Patient requesting call back or [facility] msg: call  back. I do apologize to send another message; patient stated that she had to get a new phone and is afraid that she may have missed a call from the office. Patient declined to schedule for [place]; it is too far for her to travel; would like to be seen at main campus. Thanks.

## 2024-10-25 NOTE — TELEPHONE ENCOUNTER
I called Mrs Jewell and told her Dr Franco's first available [facility] appointment is 12-13-24 at 1030am and I will put her on the waiting list.She accepted this visit and asked that I mail a appointment slip to her home. Denise Bullock

## 2024-10-28 ENCOUNTER — TELEPHONE (OUTPATIENT)
Dept: ENDOSCOPY | Facility: HOSPITAL | Age: 79
End: 2024-10-28
Payer: MEDICARE

## 2024-10-29 ENCOUNTER — TELEPHONE (OUTPATIENT)
Dept: ENDOSCOPY | Facility: HOSPITAL | Age: 79
End: 2024-10-29
Payer: MEDICARE

## 2024-10-31 ENCOUNTER — EXTERNAL CHRONIC CARE MANAGEMENT (OUTPATIENT)
Dept: PRIMARY CARE CLINIC | Facility: CLINIC | Age: 79
End: 2024-10-31
Payer: MEDICARE

## 2024-10-31 ENCOUNTER — OFFICE VISIT (OUTPATIENT)
Dept: OPHTHALMOLOGY | Facility: CLINIC | Age: 79
End: 2024-10-31
Payer: MEDICARE

## 2024-10-31 DIAGNOSIS — H04.123 DRY EYES, BILATERAL: ICD-10-CM

## 2024-10-31 DIAGNOSIS — Z98.83 GLAUCOMA FILTERING BLEB OF BOTH EYES: ICD-10-CM

## 2024-10-31 DIAGNOSIS — E11.9 TYPE 2 DIABETES MELLITUS WITHOUT OPHTHALMIC MANIFESTATIONS: ICD-10-CM

## 2024-10-31 DIAGNOSIS — Z96.1 PSEUDOPHAKIA OF BOTH EYES: ICD-10-CM

## 2024-10-31 DIAGNOSIS — H40.1113 PRIMARY OPEN ANGLE GLAUCOMA OF RIGHT EYE, SEVERE STAGE: Primary | ICD-10-CM

## 2024-10-31 DIAGNOSIS — Z98.890 STATUS POST YAG CAPSULOTOMY OF BOTH EYES: ICD-10-CM

## 2024-10-31 DIAGNOSIS — Z96.89 HISTORY OF GLAUCOMA TUBE SHUNT PROCEDURE: ICD-10-CM

## 2024-10-31 DIAGNOSIS — H40.1122 PRIMARY OPEN ANGLE GLAUCOMA OF LEFT EYE, MODERATE STAGE: ICD-10-CM

## 2024-10-31 PROCEDURE — 99214 OFFICE O/P EST MOD 30 MIN: CPT | Mod: S$PBB,,, | Performed by: OPHTHALMOLOGY

## 2024-10-31 PROCEDURE — 99213 OFFICE O/P EST LOW 20 MIN: CPT | Mod: PBBFAC | Performed by: OPHTHALMOLOGY

## 2024-10-31 PROCEDURE — 99999 PR PBB SHADOW E&M-EST. PATIENT-LVL III: CPT | Mod: PBBFAC,,, | Performed by: OPHTHALMOLOGY

## 2024-10-31 PROCEDURE — 99490 CHRNC CARE MGMT STAFF 1ST 20: CPT | Mod: S$PBB,,, | Performed by: INTERNAL MEDICINE

## 2024-10-31 PROCEDURE — 99490 CHRNC CARE MGMT STAFF 1ST 20: CPT | Mod: PBBFAC | Performed by: INTERNAL MEDICINE

## 2024-11-04 ENCOUNTER — HOSPITAL ENCOUNTER (OUTPATIENT)
Facility: HOSPITAL | Age: 79
Discharge: HOME | End: 2024-11-04
Attending: INTERNAL MEDICINE | Admitting: INTERNAL MEDICINE
Payer: MEDICARE

## 2024-11-04 ENCOUNTER — ANESTHESIA (OUTPATIENT)
Dept: ENDOSCOPY | Facility: HOSPITAL | Age: 79
End: 2024-11-04
Payer: MEDICARE

## 2024-11-04 ENCOUNTER — ANESTHESIA EVENT (OUTPATIENT)
Dept: ENDOSCOPY | Facility: HOSPITAL | Age: 79
End: 2024-11-04
Payer: MEDICARE

## 2024-11-04 VITALS
BODY MASS INDEX: 33.18 KG/M2 | SYSTOLIC BLOOD PRESSURE: 157 MMHG | DIASTOLIC BLOOD PRESSURE: 72 MMHG | RESPIRATION RATE: 18 BRPM | TEMPERATURE: 98 F | HEART RATE: 102 BPM | OXYGEN SATURATION: 98 % | WEIGHT: 169 LBS | HEIGHT: 60 IN

## 2024-11-04 DIAGNOSIS — K92.1 MELENA: ICD-10-CM

## 2024-11-04 LAB
POCT GLUCOSE: 121 MG/DL (ref 70–110)
POCT GLUCOSE: 128 MG/DL (ref 70–110)

## 2024-11-04 PROCEDURE — 45378 DIAGNOSTIC COLONOSCOPY: CPT | Mod: 74 | Performed by: INTERNAL MEDICINE

## 2024-11-04 PROCEDURE — 88305 TISSUE EXAM BY PATHOLOGIST: CPT | Performed by: PATHOLOGY

## 2024-11-04 PROCEDURE — 37000008 HC ANESTHESIA 1ST 15 MINUTES: Performed by: INTERNAL MEDICINE

## 2024-11-04 PROCEDURE — 37000009 HC ANESTHESIA EA ADD 15 MINS: Performed by: INTERNAL MEDICINE

## 2024-11-04 PROCEDURE — 45378 DIAGNOSTIC COLONOSCOPY: CPT | Mod: 53,,, | Performed by: INTERNAL MEDICINE

## 2024-11-04 PROCEDURE — 82962 GLUCOSE BLOOD TEST: CPT | Performed by: INTERNAL MEDICINE

## 2024-11-04 PROCEDURE — 27201012 HC FORCEPS, HOT/COLD, DISP: Performed by: INTERNAL MEDICINE

## 2024-11-04 PROCEDURE — 25000242 PHARM REV CODE 250 ALT 637 W/ HCPCS: Performed by: NURSE ANESTHETIST, CERTIFIED REGISTERED

## 2024-11-04 PROCEDURE — 63600175 PHARM REV CODE 636 W HCPCS: Performed by: NURSE ANESTHETIST, CERTIFIED REGISTERED

## 2024-11-04 PROCEDURE — 43239 EGD BIOPSY SINGLE/MULTIPLE: CPT | Performed by: INTERNAL MEDICINE

## 2024-11-04 PROCEDURE — 43239 EGD BIOPSY SINGLE/MULTIPLE: CPT | Mod: 51,,, | Performed by: INTERNAL MEDICINE

## 2024-11-04 PROCEDURE — D9220A PRA ANESTHESIA: Mod: ANES,,, | Performed by: ANESTHESIOLOGY

## 2024-11-04 PROCEDURE — D9220A PRA ANESTHESIA: Mod: CRNA,,, | Performed by: NURSE ANESTHETIST, CERTIFIED REGISTERED

## 2024-11-04 RX ORDER — PROCHLORPERAZINE EDISYLATE 5 MG/ML
5 INJECTION INTRAMUSCULAR; INTRAVENOUS EVERY 30 MIN PRN
Status: DISCONTINUED | OUTPATIENT
Start: 2024-11-04 | End: 2024-11-04 | Stop reason: HOSPADM

## 2024-11-04 RX ORDER — SODIUM CHLORIDE 0.9 % (FLUSH) 0.9 %
3 SYRINGE (ML) INJECTION EVERY 6 HOURS
Status: DISCONTINUED | OUTPATIENT
Start: 2024-11-04 | End: 2024-11-04 | Stop reason: HOSPADM

## 2024-11-04 RX ORDER — GLUCAGON 1 MG
1 KIT INJECTION
Status: DISCONTINUED | OUTPATIENT
Start: 2024-11-04 | End: 2024-11-04 | Stop reason: HOSPADM

## 2024-11-04 RX ORDER — LIDOCAINE HYDROCHLORIDE 20 MG/ML
INJECTION, SOLUTION EPIDURAL; INFILTRATION; INTRACAUDAL; PERINEURAL
Status: DISCONTINUED | OUTPATIENT
Start: 2024-11-04 | End: 2024-11-04

## 2024-11-04 RX ORDER — PROPOFOL 10 MG/ML
VIAL (ML) INTRAVENOUS
Status: DISCONTINUED | OUTPATIENT
Start: 2024-11-04 | End: 2024-11-04

## 2024-11-04 RX ORDER — ONDANSETRON HYDROCHLORIDE 2 MG/ML
4 INJECTION, SOLUTION INTRAVENOUS DAILY PRN
Status: DISCONTINUED | OUTPATIENT
Start: 2024-11-04 | End: 2024-11-04 | Stop reason: HOSPADM

## 2024-11-04 RX ORDER — ALBUTEROL SULFATE 90 UG/1
INHALANT RESPIRATORY (INHALATION)
Status: DISCONTINUED | OUTPATIENT
Start: 2024-11-04 | End: 2024-11-04

## 2024-11-04 RX ORDER — SODIUM CHLORIDE 9 MG/ML
INJECTION, SOLUTION INTRAVENOUS CONTINUOUS
Status: DISCONTINUED | OUTPATIENT
Start: 2024-11-04 | End: 2024-11-04 | Stop reason: HOSPADM

## 2024-11-04 RX ADMIN — LIDOCAINE HYDROCHLORIDE 40 MG: 20 INJECTION, SOLUTION EPIDURAL; INFILTRATION; INTRACAUDAL at 03:11

## 2024-11-04 RX ADMIN — PROPOFOL 150 MCG/KG/MIN: 10 INJECTION, EMULSION INTRAVENOUS at 03:11

## 2024-11-04 RX ADMIN — ALBUTEROL SULFATE 2 PUFF: 108 INHALANT RESPIRATORY (INHALATION) at 03:11

## 2024-11-04 RX ADMIN — PROPOFOL 20 MG: 10 INJECTION, EMULSION INTRAVENOUS at 03:11

## 2024-11-04 RX ADMIN — PROPOFOL 60 MG: 10 INJECTION, EMULSION INTRAVENOUS at 03:11

## 2024-11-04 NOTE — ANESTHESIA PREPROCEDURE EVALUATION
11/04/2024  Lupe Jewell is a 79 y.o., female.    Procedures:      EGD (ESOPHAGOGASTRODUODENOSCOPY) (Abdomen) - referral a labat/ prep inst given in clinic / diabetic / asthma/ suprep  cleared by cardiology Dr Umanzor-GT  10/28-lvm for pre call-tb  10/29 Pre-call complete- ASul      COLONOSCOPY (Abdomen)   Anesthesia type: Choice   Diagnosis: Melena [K92.1]       Pre-operative evaluation for Procedure(s) (LRB):  EGD (ESOPHAGOGASTRODUODENOSCOPY) (N/A)  COLONOSCOPY (N/A)      Encounter Diagnosis   Name Primary?    Melena        Review of patient's allergies indicates:   Allergen Reactions    Penicillins Rash       Medications Prior to Admission   Medication Sig Dispense Refill Last Dose/Taking    albuterol (PROVENTIL/VENTOLIN HFA) 90 mcg/actuation inhaler INHALE 2 PUFFS INTO THE LUNGS EVERY 6 HOURS AS NEEDED FOR WHEEZING OR RESCUE 8.5 g 3     albuterol-ipratropium (DUO-NEB) 2.5 mg-0.5 mg/3 mL nebulizer solution USE 3 ML VIA NEBULIZER EVERY 6 HOURS AS NEEDED FOR WHEEZING OR SHORTNESS OF BREATH 180 mL 11     alendronate (FOSAMAX) 70 MG tablet TAKE 1 TABLET(70 MG) BY MOUTH EVERY 7 DAYS 12 tablet 1     amitriptyline (ELAVIL) 25 MG tablet Take 1 tablet (25 mg total) by mouth every evening. 90 tablet 1     atorvastatin (LIPITOR) 10 MG tablet TAKE 1 TABLET(10 MG) BY MOUTH DAILY 90 tablet 3     azelastine (ASTELIN) 137 mcg (0.1 %) nasal spray USE 2 SPRAYS IN EACH NOSTRIL TWICE DAILY 30 mL 2     dorzolamide (TRUSOPT) 2 % ophthalmic solution Place 1 drop into the left eye 3 (three) times daily. 10 mL 12     fluticasone propionate (FLONASE) 50 mcg/actuation nasal spray SHAKE LIQUID AND USE 2 SPRAYS IN EACH NOSTRIL DAILY 16 g 2     fluticasone-salmeterol diskus inhaler 250-50 mcg Inhale 1 puff into the lungs 2 (two) times daily. Controller 60 each 11     latanoprost 0.005 % ophthalmic solution INSTILL 1 DROP IN BOTH EYES  EVERY DAY 2.5 mL 12     losartan (COZAAR) 50 MG tablet TAKE 1 TABLET(50 MG) BY MOUTH EVERY DAY 90 tablet 3     metFORMIN (GLUCOPHAGE) 500 MG tablet Take 1 tablet (500 mg total) by mouth 2 (two) times daily with meals. 180 tablet 1     mometasone-formoterol (DULERA) 200-5 mcg/actuation inhaler Inhale 2 puffs into the lungs 2 (two) times daily. Controller 13 g 11     montelukast (SINGULAIR) 10 mg tablet TAKE 1 TABLET(10 MG) BY MOUTH EVERY EVENING 90 tablet 3     triamterene-hydrochlorothiazide 37.5-25 mg (MAXZIDE-25) 37.5-25 mg per tablet TAKE 1 TABLET BY MOUTH EVERY DAY 90 tablet 1             No current facility-administered medications on file prior to encounter.     Current Outpatient Medications on File Prior to Encounter   Medication Sig Dispense Refill    albuterol-ipratropium (DUO-NEB) 2.5 mg-0.5 mg/3 mL nebulizer solution USE 3 ML VIA NEBULIZER EVERY 6 HOURS AS NEEDED FOR WHEEZING OR SHORTNESS OF BREATH 180 mL 11    alendronate (FOSAMAX) 70 MG tablet TAKE 1 TABLET(70 MG) BY MOUTH EVERY 7 DAYS 12 tablet 1    amitriptyline (ELAVIL) 25 MG tablet Take 1 tablet (25 mg total) by mouth every evening. 90 tablet 1    atorvastatin (LIPITOR) 10 MG tablet TAKE 1 TABLET(10 MG) BY MOUTH DAILY 90 tablet 3    azelastine (ASTELIN) 137 mcg (0.1 %) nasal spray USE 2 SPRAYS IN EACH NOSTRIL TWICE DAILY 30 mL 2    dorzolamide (TRUSOPT) 2 % ophthalmic solution Place 1 drop into the left eye 3 (three) times daily. 10 mL 12    fluticasone propionate (FLONASE) 50 mcg/actuation nasal spray SHAKE LIQUID AND USE 2 SPRAYS IN EACH NOSTRIL DAILY 16 g 2    fluticasone-salmeterol diskus inhaler 250-50 mcg Inhale 1 puff into the lungs 2 (two) times daily. Controller 60 each 11    latanoprost 0.005 % ophthalmic solution INSTILL 1 DROP IN BOTH EYES EVERY DAY 2.5 mL 12    losartan (COZAAR) 50 MG tablet TAKE 1 TABLET(50 MG) BY MOUTH EVERY DAY 90 tablet 3    metFORMIN (GLUCOPHAGE) 500 MG tablet Take 1 tablet (500 mg total) by mouth 2 (two) times  daily with meals. 180 tablet 1    montelukast (SINGULAIR) 10 mg tablet TAKE 1 TABLET(10 MG) BY MOUTH EVERY EVENING 90 tablet 3    triamterene-hydrochlorothiazide 37.5-25 mg (MAXZIDE-25) 37.5-25 mg per tablet TAKE 1 TABLET BY MOUTH EVERY DAY 90 tablet 1       Past Medical History:  08/17/2012: ALLERGIC RHINITIS  08/17/2012: Asthma, well controlled  No date: Chronic asthma  No date: Chronic rhinitis  02/04/2014: Diabetes  No date: Diabetes mellitus  No date: Diabetes mellitus type II  No date: Fever blister  08/17/2012: GERD (gastroesophageal reflux disease)  No date: Glaucoma  No date: Hyperlipemia  No date: Hypertension  No date: Obstructive sleep apnea  No date: Osteoporosis, unspecified  No date: Recurrent upper respiratory infection (URI)    Past Surgical History:   Procedure Laterality Date    BLADDER SURGERY      BREAST BIOPSY Left     BREAST SURGERY      left breast biopsy    CATARACT EXTRACTION W/  INTRAOCULAR LENS IMPLANT Left 01/28/2015    WITH TRAB ()    CATARACT EXTRACTION W/  INTRAOCULAR LENS IMPLANT Right 03/30/2016    WITH TRAB ()    COLONOSCOPY N/A 07/01/2016    Procedure: COLONOSCOPY;  Surgeon: Rony Lima MD;  Location: [facility] ([address] [place] [address]);  Service: Endoscopy;  Laterality: N/A;    COLONOSCOPY N/A 02/04/2022    Procedure: COLONOSCOPY;  Surgeon: FARA Benitez MD;  Location: [facility] ([address] [place] [address]);  Service: Endoscopy;  Laterality: N/A;  fully vacc-inst mail-tb.Covid test at [facility] on 2/1.EC    EYE SURGERY Bilateral     cataract removal and Laser surgery    HYSTERECTOMY      IMPLANTATION OF DEVICE FOR GLAUCOMA Right 09/21/2022    AHMED ()    stomach procedure      TRABECULECTOMY Left 12/28/2015    COMBINED WITH CE ()    TRABECULECTOMY Right 03/30/2016    COMBINED WITH CE ()    TUBAL LIGATION      YAG CAPSULOTOMY Bilateral 10/3/2019 and 10/17/2019           Social History     Tobacco Use   Smoking Status Former     "Current packs/day: 0.00    Average packs/day: 0.3 packs/day for 2.0 years (0.5 ttl pk-yrs)    Types: Cigarettes    Start date:     Quit date:     Years since quittin.8    Passive exposure: Past   Smokeless Tobacco Never       Social History     Substance and Sexual Activity   Alcohol Use Yes    Alcohol/week: 12.0 standard drinks of alcohol    Types: 6 Glasses of wine, 6 Cans of beer per week    Comment: socially       Physical Activity: Not on file       No birth history on file.  No results for input(s): "HCT" in the last 72 hours.  No results for input(s): "PLT" in the last 72 hours.  No results for input(s): "K" in the last 72 hours.  No results for input(s): "CREATININE" in the last 72 hours.  No results for input(s): "GLU" in the last 72 hours.  No results for input(s): "PT" in the last 72 hours.  There were no vitals filed for this visit.                    Pre-op Assessment          Review of Systems  Anesthesia Hx:  No problems with previous Anesthesia                Hematology/Oncology:  Hematology Normal   Oncology Normal                                   Cardiovascular:     Denies Pacemaker. Hypertension, well controlled   Denies MI.     Denies CABG/stent.    Denies Angina.                                    Pulmonary:   COPD, mild Asthma mild Shortness of breath (usually relieved by albuterol)  Sleep Apnea Routine singulair and steroid inhaler, albuterol once or twice a week, one week steroids one month ago          Education provided regarding risk of obstructive sleep apnea            Renal/:   Denies Chronic Renal Disease.                Hepatic/GI:      Denies Liver Disease.               Neurological:  Denies TIA.  Denies CVA.    Denies Seizures.                                Endocrine:  Diabetes, type 2         Obesity / BMI > 30      Physical Exam  General: Well nourished, Cooperative, Alert and Oriented    Airway:  Mallampati: II   Mouth Opening: Normal  TM Distance: " Normal  Tongue: Normal  Neck ROM: Normal ROM        Anesthesia Plan  Type of Anesthesia, risks & benefits discussed:    Anesthesia Type: Gen Natural Airway  Intra-op Monitoring Plan: Standard ASA Monitors  Post Op Pain Control Plan: IV/PO Opioids PRN  Induction:  IV  Informed Consent: Informed consent signed with the Patient and all parties understand the risks and agree with anesthesia plan.  All questions answered.   ASA Score: 2  Day of Surgery Review of History & Physical: H&P Update referred to the surgeon/provider.    Ready For Surgery From Anesthesia Perspective.     .  Vent. Rate : 089 BPM     Atrial Rate : 089 BPM      P-R Int : 148 ms          QRS Dur : 080 ms       QT Int : 384 ms       P-R-T Axes : 055 023 053 degrees      QTc Int : 467 ms     Normal sinus rhythm   Nonspecific T wave abnormality   Abnormal ECG   When compared with ECG of 30-JUN-2017 12:22,   Premature atrial complexes are no longer Present   Confirmed by Wale Turpin MD ([id number]) on 10/7/2024 10:27:39 PM

## 2024-11-04 NOTE — PROVATION PATIENT INSTRUCTIONS
Discharge Summary/Instructions after an Endoscopic Procedure  Patient Name: Lupe Thompson  Patient MRN: [id number]  Patient YOB: 1945 Monday, November 4, 2024  Domi Carbajal MD  Dear patient,  As a result of recent federal legislation (The Federal Cures Act), you may   receive lab or pathology results from your procedure in your [facility]   account before your physician is able to contact you. Your physician or   their representative will relay the results to you with their   recommendations at their soonest availability.  Thank you,  RESTRICTIONS:  During your procedure today, you received medications for sedation.  These   medications may affect your judgment, balance and coordination.  Therefore,   for 24 hours, you have the following restrictions:   - DO NOT drive a car, operate machinery, make legal/financial decisions,   sign important papers or drink alcohol.    ACTIVITY:  Today: no heavy lifting, straining or running due to procedural   sedation/anesthesia.  The following day: return to full activity including work.  DIET:  Eat and drink normally unless instructed otherwise.     TREATMENT FOR COMMON SIDE EFFECTS:  - Mild abdominal pain, nausea, belching, bloating or excessive gas:  rest,   eat lightly and use a heating pad.  - Sore Throat: treat with throat lozenges and/or gargle with warm salt   water.  - Because air was used during the procedure, expelling large amounts of air   from your rectum or belching is normal.  - If a bowel prep was taken, you may not have a bowel movement for 1-3 days.    This is normal.  SYMPTOMS TO WATCH FOR AND REPORT TO YOUR PHYSICIAN:  1. Abdominal pain or bloating, other than gas cramps.  2. Chest pain.  3. Back pain.  4. Signs of infection such as: chills or fever occurring within 24 hours   after the procedure.  5. Rectal bleeding, which would show as bright red, maroon, or black stools.   (A tablespoon of blood from the rectum is not serious, especially if    hemorrhoids are present.)  6. Vomiting.  7. Weakness or dizziness.  GO DIRECTLY TO THE NEAREST EMERGENCY ROOM IF YOU HAVE ANY OF THE FOLLOWING:      Difficulty breathing              Chills and/or fever over 101 F   Persistent vomiting and/or vomiting blood   Severe abdominal pain   Severe chest pain   Black, tarry stools   Bleeding- more than one tablespoon   Any other symptom or condition that you feel may need urgent attention  Your doctor recommends these additional instructions:  If any biopsies were taken, your doctors clinic will contact you in 1 to 2   weeks with any results.  - Discharge patient to home.   - Resume previous diet.   - Continue present medications.   - Repeat colonoscopy with device assisted colonoscopy with Dr. Garcia at   appointment to be scheduled because the examination today was incomplete.  For questions, problems or results please call your physician - Domi Carbajal MD at Work:  [phone].  [facility], EMERGENCY ROOM PHONE NUMBER: [phone]  IF A COMPLICATION OR EMERGENCY SITUATION ARISES AND YOU ARE UNABLE TO REACH   YOUR PHYSICIAN - GO DIRECTLY TO THE EMERGENCY ROOM.  Domi Carbajal MD  11/4/2024 4:02:20 PM  This report has been verified and signed electronically.  Dear patient,  As a result of recent federal legislation (The Federal Cures Act), you may   receive lab or pathology results from your procedure in your [facility]   account before your physician is able to contact you. Your physician or   their representative will relay the results to you with their   recommendations at their soonest availability.  Thank you,  PROVATION

## 2024-11-04 NOTE — TRANSFER OF CARE
Anesthesia Transfer of Care Note    Patient: Lupe Jewell    Procedure(s) Performed: Procedure(s) (LRB):  EGD (ESOPHAGOGASTRODUODENOSCOPY) (N/A)  COLONOSCOPY (N/A)    Patient location: [facility]    Anesthesia Type: general    Transport from OR: Transported from OR on room air with adequate spontaneous ventilation    Post pain: adequate analgesia    Post assessment: no apparent anesthetic complications and tolerated procedure well    Post vital signs: stable    Level of consciousness: awake, alert and oriented    Nausea/Vomiting: no nausea/vomiting    Complications: none    Transfer of care protocol was followed      Last vitals: Visit Vitals  BP (!) 157/72 (Patient Position: Lying)   Pulse 102   Temp 36.9 °C (98.4 °F) (Temporal)   Resp 18   Ht 5' (1.524 m)   Wt 76.7 kg (169 lb)   SpO2 98%   Breastfeeding No   BMI 33.01 kg/m²

## 2024-11-04 NOTE — PROVATION PATIENT INSTRUCTIONS
Discharge Summary/Instructions after an Endoscopic Procedure  Patient Name: Lupe Thompson  Patient MRN: [id number]  Patient YOB: 1945 Monday, November 4, 2024  Domi Carbajal MD  Dear patient,  As a result of recent federal legislation (The Federal Cures Act), you may   receive lab or pathology results from your procedure in your [facility]   account before your physician is able to contact you. Your physician or   their representative will relay the results to you with their   recommendations at their soonest availability.  Thank you,  RESTRICTIONS:  During your procedure today, you received medications for sedation.  These   medications may affect your judgment, balance and coordination.  Therefore,   for 24 hours, you have the following restrictions:   - DO NOT drive a car, operate machinery, make legal/financial decisions,   sign important papers or drink alcohol.    ACTIVITY:  Today: no heavy lifting, straining or running due to procedural   sedation/anesthesia.  The following day: return to full activity including work.  DIET:  Eat and drink normally unless instructed otherwise.     TREATMENT FOR COMMON SIDE EFFECTS:  - Mild abdominal pain, nausea, belching, bloating or excessive gas:  rest,   eat lightly and use a heating pad.  - Sore Throat: treat with throat lozenges and/or gargle with warm salt   water.  - Because air was used during the procedure, expelling large amounts of air   from your rectum or belching is normal.  - If a bowel prep was taken, you may not have a bowel movement for 1-3 days.    This is normal.  SYMPTOMS TO WATCH FOR AND REPORT TO YOUR PHYSICIAN:  1. Abdominal pain or bloating, other than gas cramps.  2. Chest pain.  3. Back pain.  4. Signs of infection such as: chills or fever occurring within 24 hours   after the procedure.  5. Rectal bleeding, which would show as bright red, maroon, or black stools.   (A tablespoon of blood from the rectum is not serious, especially if    hemorrhoids are present.)  6. Vomiting.  7. Weakness or dizziness.  GO DIRECTLY TO THE NEAREST EMERGENCY ROOM IF YOU HAVE ANY OF THE FOLLOWING:      Difficulty breathing              Chills and/or fever over 101 F   Persistent vomiting and/or vomiting blood   Severe abdominal pain   Severe chest pain   Black, tarry stools   Bleeding- more than one tablespoon   Any other symptom or condition that you feel may need urgent attention  Your doctor recommends these additional instructions:  If any biopsies were taken, your doctors clinic will contact you in 1 to 2   weeks with any results.  - Proceed to colonoscopy.   - Resume previous diet.   - Continue present medications.  For questions, problems or results please call your physician - Domi Carbajal MD at Work:  [phone].  [facility], EMERGENCY ROOM PHONE NUMBER: [phone]  IF A COMPLICATION OR EMERGENCY SITUATION ARISES AND YOU ARE UNABLE TO REACH   YOUR PHYSICIAN - GO DIRECTLY TO THE EMERGENCY ROOM.  Domi Carbajal MD  11/4/2024 4:04:05 PM  This report has been verified and signed electronically.  Dear patient,  As a result of recent federal legislation (The Federal Cures Act), you may   receive lab or pathology results from your procedure in your [facility]   account before your physician is able to contact you. Your physician or   their representative will relay the results to you with their   recommendations at their soonest availability.  Thank you,  PROVATION

## 2024-11-04 NOTE — ANESTHESIA POSTPROCEDURE EVALUATION
Anesthesia Post Evaluation    Patient: Lupe Jewell    Procedure(s) Performed: Procedure(s) (LRB):  EGD (ESOPHAGOGASTRODUODENOSCOPY) (N/A)  COLONOSCOPY (N/A)    Final Anesthesia Type: general      Patient location during evaluation: PACU  Patient participation: Yes- Able to Participate  Level of consciousness: awake and alert and oriented  Post-procedure vital signs: reviewed and stable  Pain management: adequate  Airway patency: patent    PONV status at discharge: No PONV  Anesthetic complications: no      Cardiovascular status: stable  Respiratory status: unassisted, spontaneous ventilation and room air  Hydration status: euvolemic  Follow-up not needed.              Vitals Value Taken Time   /62 11/04/24 1617   Temp 36.9 °C (98.4 °F) 11/04/24 1614   Pulse 91 11/04/24 1624   Resp 18 11/04/24 1615   SpO2 100 % 11/04/24 1624   Vitals shown include unfiled device data.      No case tracking events are documented in the log.      Pain/Analia Score: Analia Score: 10 (11/4/2024  4:15 PM)

## 2024-11-04 NOTE — H&P
Short Stay Endoscopy History and Physical    PCP - Marisol Restrepo MD    Procedure - EGD/Colonoscopy  ASA - per anesthesia  Mallampati - per anesthesia  History of Anesthesia problems - no  Family history Anesthesia problems -  no   Plan of anesthesia - MAC    HPI:  This is a 79 y.o. female here for evaluation of :     EGD - melena  Colon - melena and rectal bleeding    ROS:  Constitutional: No fevers, chills, No weight loss  CV: No chest pain  Pulm: No cough, No shortness of breath  Ophtho: No vision changes  GI: see HPI  Derm: No rash    Medical History:  has a past medical history of ALLERGIC RHINITIS (08/17/2012), Asthma, well controlled (08/17/2012), Chronic asthma, Chronic rhinitis, Diabetes (02/04/2014), Diabetes mellitus, Diabetes mellitus type II, Fever blister, GERD (gastroesophageal reflux disease) (08/17/2012), Glaucoma, Hyperlipemia, Hypertension, Obstructive sleep apnea, Osteoporosis, unspecified, and Recurrent upper respiratory infection (URI).    Surgical History:  has a past surgical history that includes Hysterectomy; stomach procedure; Bladder surgery; Colonoscopy (N/A, 07/01/2016); Cataract extraction w/  intraocular lens implant (Left, 01/28/2015); Cataract extraction w/  intraocular lens implant (Right, 03/30/2016); Trabeculectomy (Left, 12/28/2015); Trabeculectomy (Right, 03/30/2016); YAG CAPSULOTOMY (Bilateral, 10/3/2019 and 10/17/2019); Breast surgery; Eye surgery (Bilateral); Tubal ligation; Breast biopsy (Left); Colonoscopy (N/A, 02/04/2022); and Implantation of device for glaucoma (Right, 09/21/2022).    Family History: family history includes Asthma in her grandchild, mother, sister, son, and another family member; Depression in her daughter; Diabetes in her daughter; Glaucoma in her mother, sister, and sister; Hyperlipidemia in her daughter and sister; Hypertension in her daughter, mother, sister, and son; No Known Problems in her brother, father, maternal aunt, maternal grandfather,  maternal grandmother, maternal uncle, paternal aunt, paternal grandfather, paternal grandmother, paternal uncle, and sister; Obesity in her daughter.. Otherwise no colon cancer, inflammatory bowel disease, or GI malignancies.    Social History:  reports that she quit smoking about 49 years ago. Her smoking use included cigarettes. She started smoking about 51 years ago. She has a 0.5 pack-year smoking history. She has been exposed to tobacco smoke. She has never used smokeless tobacco. She reports current alcohol use of about 12.0 standard drinks of alcohol per week. She reports that she does not use drugs.    Review of patient's allergies indicates:   Allergen Reactions    Penicillins Rash       Medications:   Medications Prior to Admission   Medication Sig Dispense Refill Last Dose/Taking    albuterol (PROVENTIL/VENTOLIN HFA) 90 mcg/actuation inhaler INHALE 2 PUFFS INTO THE LUNGS EVERY 6 HOURS AS NEEDED FOR WHEEZING OR RESCUE 8.5 g 3     albuterol-ipratropium (DUO-NEB) 2.5 mg-0.5 mg/3 mL nebulizer solution USE 3 ML VIA NEBULIZER EVERY 6 HOURS AS NEEDED FOR WHEEZING OR SHORTNESS OF BREATH 180 mL 11     alendronate (FOSAMAX) 70 MG tablet TAKE 1 TABLET(70 MG) BY MOUTH EVERY 7 DAYS 12 tablet 1     amitriptyline (ELAVIL) 25 MG tablet Take 1 tablet (25 mg total) by mouth every evening. 90 tablet 1     atorvastatin (LIPITOR) 10 MG tablet TAKE 1 TABLET(10 MG) BY MOUTH DAILY 90 tablet 3     azelastine (ASTELIN) 137 mcg (0.1 %) nasal spray USE 2 SPRAYS IN EACH NOSTRIL TWICE DAILY 30 mL 2     dorzolamide (TRUSOPT) 2 % ophthalmic solution Place 1 drop into the left eye 3 (three) times daily. 10 mL 12     fluticasone propionate (FLONASE) 50 mcg/actuation nasal spray SHAKE LIQUID AND USE 2 SPRAYS IN EACH NOSTRIL DAILY 16 g 2     fluticasone-salmeterol diskus inhaler 250-50 mcg Inhale 1 puff into the lungs 2 (two) times daily. Controller 60 each 11     latanoprost 0.005 % ophthalmic solution INSTILL 1 DROP IN BOTH EYES EVERY DAY  2.5 mL 12     losartan (COZAAR) 50 MG tablet TAKE 1 TABLET(50 MG) BY MOUTH EVERY DAY 90 tablet 3     metFORMIN (GLUCOPHAGE) 500 MG tablet Take 1 tablet (500 mg total) by mouth 2 (two) times daily with meals. 180 tablet 1     mometasone-formoterol (DULERA) 200-5 mcg/actuation inhaler Inhale 2 puffs into the lungs 2 (two) times daily. Controller 13 g 11     montelukast (SINGULAIR) 10 mg tablet TAKE 1 TABLET(10 MG) BY MOUTH EVERY EVENING 90 tablet 3     triamterene-hydrochlorothiazide 37.5-25 mg (MAXZIDE-25) 37.5-25 mg per tablet TAKE 1 TABLET BY MOUTH EVERY DAY 90 tablet 1        Physical Exam:    Vital Signs: There were no vitals filed for this visit.    Gen: NAD, lying comfortably  HENT: NCAT, oropharynx clear  Eyes: anicteric sclerae, EOMI grossly  Neck: supple, no visible masses/goiter  Cardiac: RRR  Lungs: non-labored breathing  Abd: soft, NT/ND, normoactive BS  Ext: no LE edema, warm, well perfused  Skin: skin intact on exposed body parts, no visible rashes, lesions  Neuro: A&Ox4, neuro exam grossly intact, moves all extremities  Psych: appropriate mood, affect      Labs:  Lab Results   Component Value Date    WBC 12.45 09/23/2024    HGB 12.0 09/23/2024    HCT 35.7 (L) 09/23/2024     09/23/2024    CHOL 165 08/09/2024    TRIG 93 08/09/2024    HDL 51 08/09/2024    ALT 11 08/09/2024    AST 13 08/09/2024     08/09/2024    K 3.4 (L) 08/09/2024     08/09/2024    CREATININE 1.1 08/09/2024    BUN 15 08/09/2024    CO2 24 08/09/2024    TSH 1.028 08/09/2024    INR 0.9 01/14/2005    HGBA1C 5.6 08/09/2024       Plan:  EGD for melena   Colonoscopy for melena and rectal bleeding.    I have explained the risks and benefits of endoscopy procedures to the patient including but not limited to bleeding, perforation, infection, and death.  The patient was asked if they understand and allowed to ask any further questions to their satisfaction.    Domi Carbajal MD

## 2024-11-06 ENCOUNTER — TELEPHONE (OUTPATIENT)
Dept: GASTROENTEROLOGY | Facility: CLINIC | Age: 79
End: 2024-11-06
Payer: MEDICARE

## 2024-11-06 LAB
FINAL PATHOLOGIC DIAGNOSIS: NORMAL
GROSS: NORMAL
Lab: NORMAL

## 2024-11-06 NOTE — TELEPHONE ENCOUNTER
----- Message from Domi Carbajal MD sent at 11/6/2024 10:45 AM CST -----  Can you let Ms. Jewell know that there were no concerning findings on the biopsies of her stomach from her recent EGD procedure.

## 2024-11-06 NOTE — TELEPHONE ENCOUNTER
MA called/spoke with patient. Reviewed biopsy results per provider. Patient verbalized understanding and had no questions/concerns at this time.

## 2024-11-15 ENCOUNTER — CLINICAL SUPPORT (OUTPATIENT)
Dept: CARDIOLOGY | Facility: HOSPITAL | Age: 79
End: 2024-11-15
Attending: STUDENT IN AN ORGANIZED HEALTH CARE EDUCATION/TRAINING PROGRAM
Payer: MEDICARE

## 2024-11-15 ENCOUNTER — TELEPHONE (OUTPATIENT)
Dept: ENDOSCOPY | Facility: HOSPITAL | Age: 79
End: 2024-11-15
Payer: MEDICARE

## 2024-11-15 VITALS — BODY MASS INDEX: 33.18 KG/M2 | WEIGHT: 169 LBS | HEIGHT: 60 IN

## 2024-11-15 DIAGNOSIS — D64.9 ANEMIA, UNSPECIFIED TYPE: Primary | ICD-10-CM

## 2024-11-15 DIAGNOSIS — Z12.11 SPECIAL SCREENING FOR MALIGNANT NEOPLASMS, COLON: Primary | ICD-10-CM

## 2024-11-15 DIAGNOSIS — R00.2 PALPITATIONS: ICD-10-CM

## 2024-11-15 DIAGNOSIS — R19.5 OCCULT BLOOD IN STOOLS: ICD-10-CM

## 2024-11-15 PROCEDURE — 93270 REMOTE 30 DAY ECG REV/REPORT: CPT

## 2024-11-15 RX ORDER — SODIUM, POTASSIUM,MAG SULFATES 17.5-3.13G
1 SOLUTION, RECONSTITUTED, ORAL ORAL DAILY
Qty: 1 KIT | Refills: 0 | Status: SHIPPED | OUTPATIENT
Start: 2024-11-15 | End: 2024-11-17

## 2024-11-15 NOTE — TELEPHONE ENCOUNTER
"----- Message from Agatha sent at 2024 10:45 AM CST -----  Regarding: FW: Colonoscopy, possible device-assisted    ----- Message -----  From: Yanick Garcia MD  Sent: 2024  11:43 AM CST  To: [facility]  Subject: Colonoscopy, possible device-assisted            Procedure: Colonoscopy, possible device-assisted    Diagnosis: Anemia, occult blood in stool    Procedure Timin-12 weeks    #If within 4 weeks selected, please colby as high priority#    #If greater than 12 weeks, please select "5-12 weeks" and delay sending until 3 months prior to requested date#     Location: Any Site    Additional Scheduling Information: Difficult procedure, recent incomplete colonoscopy; use the slim pediatric colonoscope and have the short double-balloon enteroscope available.     Prep Specifications:Standard prep    Is the patient taking a GLP-1 Agonist:no    Have you attached a patient to this message: yes  "

## 2024-11-15 NOTE — TELEPHONE ENCOUNTER
Referral for procedure from [facility]      Spoke to Lupe to schedule procedure(s) Colonoscopy       Physician to perform procedure(s) Dr. MARINA Garcia  Date of Procedure (s) 01/23/25  Arrival Time 10:00 AM  Time of Procedure(s) 11:00 AM   Location of Procedure(s) [facility] [address] Floor  Type of Rx Prep sent to patient: Suprep  Instructions provided to patient via Postal Mail    Patient was informed on the following information and verbalized understanding. Screening questionnaire reviewed with patient and complete. If procedure requires anesthesia, a responsible adult needs to be present to accompany the patient home, patient cannot drive after receiving anesthesia. Appointment details are tentative, especially check-in time. Patient will receive a prep-op call 7 days prior to confirm check-in time for procedure. If applicable the patient should contact their pharmacy to verify Rx for procedure prep is ready for pick-up. Patient was advised to call the scheduling department at [phone] if pharmacy states no Rx is available. Patient was advised to call the endoscopy scheduling department if any questions or concerns arise.       Endoscopy Scheduling Department

## 2024-11-15 NOTE — TELEPHONE ENCOUNTER
Are you ready for your Colonoscopy?      __ If you take blood thinners,weight loss or diabetic injectable medications, have you stopped taking them according to your doctor's instructions before your procedures?  __ Have you stopped eating solid foods and followed a clear liquid diet a full day before your procedure or followed the diet       guidelines indicated in your instructions?       REMINDER: NO BROTH AFTER MIDNIGHT THE DAY BEFORE PROCEDURE.   __ Have you completed all your prep solution? PLEASE DO NOT FOLLOW the insert/or box instructions from pharmacy)  __ Have you taken your blood pressure, heart, seizure, or other essential medications the morning of your procedure?  __ Have you planned for a ride to and from procedure?  (Medical Transportation, Uber, Lyft, Taxi, etc. may ONLY be used if a responsible adult is present to accompany you home). The responsible adult CANNOT be the  of the service.  person must be available to return and pick you up within 15 minutes of being notified of discharge.           Questions or Concerns?  Please call us!  [phone] (M-F) [phone] (Nights and weekends)    Listed below are some helpful tips:    Please bring protective cases for eyewear and hearing aids-wear comfortable clothing/shoes.  Follow prep instructions closely so you don't have to do it twice.  Bowel prep is prescription used to clean out the colon before a colonoscopy. The prep increases movement of your colon by causing you to have diarrhea (loose stools). Cleaning out stool from the colon helps your doctor to see in your colon clearly during this procedure.   It is important to stay hydrated before, during and after bowel prep to prevent loss of fluid (dehydration). You can have water and your choice of clear liquids. Reminder: these liquids you will drink in addition to the bowel prep.     Preparing the mixture:    First, mix the prep with water.  Make the taste better by adding a sugar  free drink mix (Crystal Light) can improve the taste of your prep.   Use a large bore (opening) straw. Place towards the back of mouth (throat) as tolerated.  Prepare a prep mixture that is lightly chilled, but not ice-cold. Drinking a large amount of ice-cold liquid can make you feel very ill.  Avoid drinking any RED beverages or eating popsicles with this color for the 24 hours leading up to your procedure. This color can look like blood in the colon.    Consuming the prep:    Take your time. If you feel ill, take a 15-minute break from drinking the prep mixture  Combat hunger and dehydration with clear liquids. Options like JELL-O, or popsicles will help.  Settle in with good reading material. The goal is to clean out 6 feet of colon, so you can plan on spending a good deal of time in the bathroom. Have some good reading material on-hand or an iPad ready to keep yourself entertained!  Keep yourself comfortable. We recommend applying personal hygiene wipes, Tucks pads and a soothing ointment, like A&D ointment, Desitin, or Vaseline to your bottom before starting and as needed to protect your skin.   If you are unsure about any of these instructions, call [facility] at [phone].                          Oral Medicine  Day of Prep  Day of Procedure           Glyburide    Do NOT take morning of procedure. If you         Glucotrol (Glipizide)  Do NOT take. take twice daily, take with dinner.         Amaryl (Glimepiride)                                Glucophage    Do NOT take morning of procedure. Take         Glumetza  Take as  when you start eating again.          Fortamet (Metformin)  prescribed.                              Januvia (Sitaglipitin)    Do NOT take morning of procedure. Take         Nesina (Aloglipitin)    when you start eating again.          Onglyza (Saxaglipitin)  Take as              Tradjenta (Linaglipitin)  prescribed.                              Invokana (Canagliflozin)                Farxiga (Dapagliflozin)  Stop 2 days  Do NOT take morning of procedure. Take         Jardiance(Empagliflozin) before prep.  when you start eating again.                          Actos (Pioglitazone)  Take as  Do NOT take morning of procedure. Take           prescribed.  when you start eating again.                          Injectable & Combination Daily Dose  Weekly Dose           Medicine                Adlyxin (Lixisenatide)  If you take these For weekly dose, hold dose at least 8 days prior        Byetta (Exenatide)  medications daily to appointment. You may take the dose after your        Bydureon (Exenatide XL) on the day of your procedure.            Ozempic (Semaglutide)  appointment hold              Trulicity (Dulaglutide)  that dose until              Victoza (Liraglutide)  after your              Mounjaro (Tirzepatide)  procedure.              Rupert Durant                It is important to monitor your blood sugar while doing the bowel preparation. On the day of your bowel   prep, when you are on a clear liquid diet, you may drink beverages with sugar as your source of glucose.   Be sure to mix the prep with water or sugar free liquid only. Below are instructions on how to adjust your   diabetic medications prior to your scheduled procedure. Call the healthcare provider who manages your   diabetes if you have questions.   Insulin for Type 1   Diabetes Mellitus Day of Prep                                         Day of procedure          Basaglar If you use in the morning, take as prescribed.  If you take in the morning,         Lantus If you use in the evening, inject 70% of dose. inject 80% of dose. If you take        Levemir      in the evenings, inject usual         Toujeo      dose.           Trevor Montalvo Count carbs and adjust dose   Do NOT take morning of procedure.         Apidra accordingly. If not carb counting,  Take  when you start eating again.         Humalog 100 take 25% of usual meal dose.             Humalog 200 May use correction dose every              Novolog 4 hours. Do NOT use once sugar-free             liquid prep is started.                             Insulin Pump Count carbs and adjust your   May use temp basal rate at 70 % starting          dose as needed. May use temp basal at midnight until after procedure.          rate at 70 % after sugar-free clear liquid             prep is started until midnight.                              Insulin for Type 2   Diabetes Mellitus Day of Prep                                         Day of procedure          Basaglar If you use in the morning, take as prescribed.  If you take in the morning,         Lantus If you use in the evening, inject 70% of dose. inject 80% of dose. If you take        Levemir      in the evenings, inject usual         Toujeo      dose.           Tresiba                                                Afrezza Inject 50 % of dose with clear liquid diet.   Do NOT take morning of         Apidra Do NOT use once sugar-free clear liquid prep procedure. Take when you         Fiasp is started. If you are using a correction scale,  start eating meals again.         Humalog 100 take dose every 4 hours as needed.             Humalog 200                Novolog                                NPH  Inject 50% of breakfast and dinner   Do NOT take morning of         doses with clear liquid diet.    procedure. Take usual dose              when you eat dinner.                         Regular  Inject 50% of breakfast dose and do NOT take Do NOT take morning of          dinner dose once sugar-free liquid prep has   procedure. Take usual dose          started. If using correction scale, make take dose when you eat dinner.          every 6 hours as needed.                              Novolog Mix 70/30 Inject 50% of breakfast dose. Inject 25%  Do NOT take breakfast dose.          Humolog Mix 75/25 of dinner dose.     Take usual dose when you eat         Humolog Mix 50/50      dinner.                           U500 Take 50% of AM or breakfast unit colby dose.  Do NOT take mornining of           Take 25% of lunch or dinner or PM unit colby dose.  procedure. Take when you               start eating again.                          V-Go  Continue to wear your V-Go device. Do NOT click  Coninue to wear your V-Go         once sugar-free clear liquid prep is started.   device. Resume clicks with               meals.                     IMPORTANT INFORMATION TO KNOW BEFORE YOUR PROCEDURE    [facility] [place] 2nd Floor         If your procedure requires the administration of anesthesia, it is necessary for a responsible adult to drive you home. (Medical Transportation, Uber, Lyft, Taxi, etc. may ONLY be used if a responsible adult is present to accompany you home.  The responsible adult CAN'T be the  of the service).      person must be available to return to pick you up within 15 minutes of being notified of discharge.       Please bring a picture ID, insurance card, & copayment      Take Medications as directed below:    .    If you are taking the oral medication Adipex (Phentermine), hold 4 days prior to your procedure, please stop taking on .       If you are taking the oral medication(s) Invokana (Canagliflozin), Farxiga (Dapagliflozin), Jardiance (Empagliflozin), or Xigduo, hold 3 days prior to your procedure, please stop taking on .         If you begin taking any blood thinning medications, injectable weight loss/diabetes medications (other than insulin) , or Adipex (Phentermine) please contact the endoscopy scheduling department listed below as soon as possible.    If you are diabetic see the attached instruction sheet regarding your medication.     If you take HEART, BLOOD PRESSURE, SEIZURE, PAIN, LUNG (including inhalers/nebulizers), ANTI-REJECTION (transplant  patients), or PSYCHIATRIC medications, please take at your regular times with a sip of water or as directed by the scheduling nurse.     Important contact information:    Endoscopy [phone] Hours of operation Monday-Friday 8:00-4:30pm.    Questions about insurance or financial obligations call [phone] or [phone].    If you have questions regarding the prep or need to reschedule, please call [phone]. After hours questions requiring immediate assistance, contact [facility] nurse line at [phone] or [phone].   NOTE:     On occasion, unforeseen circumstances may cause a delay in your procedure start time. We respect your time and appreciate your patience during these circumstances.      Comments:              Colonoscopy Procedure Prep Instructions      Date of procedure: 01/23/25 Arrive at: 10:00AM    Location of Department:   [facility] [address] [place] [address]  Take the [facility] Elevators to 2nd Floor Outpatient Surgery    As soon as possible:   your prep from pharmacy and over the counter DULCOLAX LAXATIVE TABLETS     On the day before your procedure   What You CAN do:   You may have clear liquids ONLY -see below for list.     Liquids That Are OK to Drink:   Water  Sports drinks (Gatorade, Power-Aid)  Coffee or tea (no cream or nondairy creamer)  Clear juices without pulp (apple, white grape)  Gelatin desserts (no fruit or toppings)  Clear soda (sprite, coke, ginger ale)  Chicken broth (until 12 midnight the night before procedure)      What You CANNOT do:   Do not EAT solid food, drink milk or anything   colored red.  Do not drink alcohol.  Do not take oral medications within 1 hour of starting   each dose of SUPREP.  No gum chewing or candy morning of procedure.      Note:   (Please disregard the insert instructions from pharmacy).  SUPREP Bowel Prep Kit is indicated for cleansing of the colon as a preparation for  colonoscopy in adults.   Be sure to tell your doctor about all the medicines you take, including prescription and non-prescription medicines, vitamins, and herbal supplements. SUPREP Bowel Prep Kit may affect how other medicines work.  Medication taken by mouth may not be absorbed properly when taken within 1 hour before the start of each dose of SUPREP Bowel Prep Kit.    It is not uncommon to experience some abdominal cramping, nausea and/or vomiting when taking the prep. If you have nausea and/or vomiting while taking the prep, stop drinking for 20 to 30 minutes then continue.    How to take prep:    SUPREP Bowel Prep Kit is a (2-day) prep.   Both 6-ounce bottles are required for a complete preparation for colonoscopy. Dilute the solution concentrate as directed prior to use. You must drink water with each dose of SUPREP, and additional water after each dose.    DOSE 1--Day Before Colonoscopy 01/22/25    Drink at least 6 to 8 glasses of clear liquids from time you wake up until you begin your prep and then continue until bedtime to avoid dehydration.     12:00 pm (NOON) Take four (4) Dulcolax (Bisacodyl) tablets with at least 8 ounces or more of clear liquids.      6:00 pm:    You must complete Steps 1 through 4 using one (1) 6-ounce bottle before going to bed as shown below:    Step 1-Pour ONE (1) 6-ounce bottle of SUPREP liquid into the mixing container.  Step 2-Add cool drinking water to the 16-ounce line on the container and mix.  Step 3-Drink ALL the liquid in the container.  Step 4-You must drink two (2) more 16-ounce containers of water over the next 1 hour.  IMPORTANT: If you experience preparation-related symptoms (for example, nausea, bloating, or cramping), stop, or slow the rate of drinking the additional water until your symptoms decrease.    DOSE 2--Day of the Colonoscopy 01/23/25 at 2-3 AM.    For this dose, repeat Steps 1 through 4 shown above using the other 6-ounce bottle.   You may continue  drinking water/clear liquids until   2 hours before your colonoscopy or as directed by the scheduling nurse  9:00AM.    For information about your procedure, two (2) things to view prior to colonoscopy:  Please watch this informational video. It is important to watch this animated consent video prior to your arrival. If you haven't watched the video prior to arriving, you are required to watch it during admission which can causes delays.    Options for viewing:   Using a keyboard:  press and hold the control tab (Ctrl) and left mouse click to follow links.           Colonoscopy Instructional Video                                                                                   OR    Type link address into your web browser's address bar:  [email or web address]      Educational Booklet with pictures:      Colonoscopy Prep - Liquid      Comments:

## 2024-11-22 ENCOUNTER — TELEPHONE (OUTPATIENT)
Dept: CARDIOLOGY | Facility: HOSPITAL | Age: 79
End: 2024-11-22

## 2024-11-25 ENCOUNTER — TELEPHONE (OUTPATIENT)
Dept: CARDIOLOGY | Facility: HOSPITAL | Age: 79
End: 2024-11-25
Payer: MEDICARE

## 2024-11-25 NOTE — TELEPHONE ENCOUNTER
Patient wearing 30 day event monitor for diagnosis palpitations.     Received auto-triggered alert notification for Ventricular Tachycardia 12 bts. @ 206 bpm on November 22, 2024 at 7: 09 PM     On call cardiology MD was notified.    Strips placed under this encounter for review. Message sent to ordering provider. Will continue to monitor until  12/15/24.

## 2024-11-28 RX ORDER — AZELASTINE 1 MG/ML
SPRAY, METERED NASAL
Qty: 30 ML | Refills: 2 | Status: SHIPPED | OUTPATIENT
Start: 2024-11-28

## 2024-11-30 ENCOUNTER — EXTERNAL CHRONIC CARE MANAGEMENT (OUTPATIENT)
Dept: PRIMARY CARE CLINIC | Facility: CLINIC | Age: 79
End: 2024-11-30
Payer: MEDICARE

## 2024-11-30 PROCEDURE — 99490 CHRNC CARE MGMT STAFF 1ST 20: CPT | Mod: S$PBB,,, | Performed by: INTERNAL MEDICINE

## 2024-11-30 PROCEDURE — 99490 CHRNC CARE MGMT STAFF 1ST 20: CPT | Mod: PBBFAC | Performed by: INTERNAL MEDICINE

## 2024-12-02 DIAGNOSIS — G47.00 INSOMNIA, UNSPECIFIED TYPE: ICD-10-CM

## 2024-12-02 NOTE — TELEPHONE ENCOUNTER
Care Due:                  Date            Visit Type   Department     Provider  --------------------------------------------------------------------------------                                EP -                              PRIMARY      [facility] INTERNAL  Last Visit: 08-      CARE ([facility])   MEDICINE       Marisol Restrepo                              [facility]                              PRIMARY      [facility] INTERNAL  Next Visit: 12-      CARE ([facility])   MEDICINE       Marisol Restrepo                                                            Last  Test          Frequency    Reason                     Performed    Due Date  --------------------------------------------------------------------------------    HBA1C.......  6 months...  metFORMIN................  08-   02-    Mg Level....  12 months..  alendronate..............  Not Found    Overdue    Phosphate...  12 months..  alendronate..............  Not Found    Overdue    [employer] Embedded Care Due Messages. Reference number: [id number].   12/02/2024 10:24:36 AM CST

## 2024-12-03 RX ORDER — AMITRIPTYLINE HYDROCHLORIDE 25 MG/1
25 TABLET, FILM COATED ORAL NIGHTLY
Qty: 90 TABLET | Refills: 2 | Status: SHIPPED | OUTPATIENT
Start: 2024-12-03

## 2024-12-03 NOTE — TELEPHONE ENCOUNTER
Provider Staff:  Action required for this patient    Requires labs      Please see care gap opportunities below in Care Due Message.    Thanks!  [facility]     Appointments      Date Provider   Last Visit   8/9/2024 Marisol Restrepo MD   Next Visit   12/17/2024 Marisol Restrepo MD     Refill Decision Note   Lupe Jewell  is requesting a refill authorization.  Brief Assessment and Rationale for Refill:  Approve     Medication Therapy Plan:         Comments:     Note composed:5:41 AM 12/03/2024

## 2024-12-08 NOTE — PROGRESS NOTES
"HPI     Glaucoma            Comments: 6 week IOP ck and pt states she has a "grayish film like   curtain" over vision OS          Comments    DLS: 10/31/24    1) Severe POAG OU  2) PCIOL OU   3) Type 2 DM no DR   4) LOLA   5) PVD OU   6) Yag Cap OU     MEDS:   Dorzolamide TID OS   Rocklatan QHS OS   Pres Free AT's  PRN OU             Last edited by Tia Garcia MA on 12/12/2024 11:27 AM.            Assessment /Plan     For exam results, see Encounter Report.    Primary open angle glaucoma of right eye, severe stage    Primary open angle glaucoma of left eye, moderate stage    Type 2 diabetes mellitus without ophthalmic manifestations    Dry eyes, bilateral    Pseudophakia of both eyes    Glaucoma filtering bleb of both eyes    History of glaucoma tube shunt procedure    Status post YAG capsulotomy of both eyes      1. POAG (primary open-angle glaucoma) - Severe stage - Both Eyes      Now with  od   Glaucoma (type and duration) POAG,   -  first HVF 1997  -  first photo: 1996     Family history None   Glaucoma meds - (off all gtts os post combined)  (( use to use - Alphagan od , Xalatan od , dorzolamide bid od ))  H/O adverse rxn to glaucoma drops into to BB 2/2 asthma // brimonidine - irritation - tearing   LASERS SLT 12/8/05 and repeat 7/16/09 OD, and SLT 1/6/06 OS;  SLT os 7/31/14, od 8/14/14 (no response ou) // yag cap od 1/3/2019 /// yag cap os  10/17/2019   GLAUCOMA SURGERIES - combined phaco/IOL trab -os 12/28/2015// elastic sclera - 9 sutures to close - all visible ones cut /// combined - phaco/trab w/ minishunt od - 3/30/2016 // ahmed OD 9/21/2022  OTHER EYE SURGERIES PC IOL OS (combined 1/28/2015) // PC IOL od (combined 3/30/2016)  CDR 0.95 OU   Tbase 22 OU   Tmax 37 (4/11/2016) /38 ( 4/16/2015)   Ttarget 14/14  HVF 25 HVF: 4938-9302- now gets 10-2 stim V   OD, // SAD / IAD  Os (+prog ou 10/2020)   Gonio ext PAS od - 90 degrees open // +1-3 os    /421   OCT 6 test 2017 - 2024: unable to " interpret - grayed out   OD// Dec thru out   HRT 13 test 2005 to 2020 - MR- Dec. SN/N, bord IT od // dec. N/IN, bord SN/IT os /// CDR 0.78 od // 0.75 os  Disc photos 1996, 1997, 2003: slides // 2012 , 2015 - OIS    Ttoday  10 off gtts post ahmed / 8    on gtts ( eye VERY red w/ rocklatan)   Test done today:IOP  // DFE      2. Diabetes mellitus type II   No BDR     3. Dry eyes - Both Eyes   AT's prn     4. Posterior vitreous detachment of both eyes - Both Eyes   With floaters - stable     5. A lot of family stressors   Mom passed since last visit  dad elderly and in poor health, have sitters  Daughter is unemployed and had to move back home  Daughter recently ill - in ICU and intubated 2/2 pancreatitis (8/2/2015 to 8/11/2015)      6. S/P yag cap ou   -od 10/3/3019 (also Rx ant capsule phimosis od)   -OS - 10/17/2019        Plan  Glaucoma   + blebs ou -  S/P ahmed od -  9/21/2022     Cont AT's ou prn     Sees Lisa for glasses     AT's prn     Photo file updated -5/18/2023     S/P ahmed OD  Date:9/21/2022   POY 2  anterior chamber depth  deep   Bleb appearance  elevated over ahmed plate   sidell neg   IOP  10  AC deep // no choroidals - NO drops       10/31/2024   Cont glaucoma drops os   Dorzolamide tid os   Latanoprost 1 x day os -- try swithcing to rocklatan - sample given    IOP seems ok at 12 / but pt is aware of declining vision   ?? If could try alphagan P name brand   ?? If could phong diamox   Consider additional filtration surgery os - ? Ahmed or [facility] - already has a trab with some function - + elevated bleb     OFF brimonidine - ? Allergy - tearing / irritation     ? PVD os - warned of signs of RD    dr gordon- glasses    IOP better with rocklatan os - But VERY red and irritated   Change back from rocklatan to latanoprost  OS  Cont dorz os   C/O blurr / ++ deposits on ant surface of IOl and some PCO as well    Rec enlarge yag cap and try and yag the ant surface IOL deposits to clear visual axis (also check to  see if eye less red off the rockaltan os     Add AT's prn      In future cont with 10-2 stim V od and consider switching to 10-2 ss os

## 2024-12-12 ENCOUNTER — OFFICE VISIT (OUTPATIENT)
Dept: OPHTHALMOLOGY | Facility: CLINIC | Age: 79
End: 2024-12-12
Payer: MEDICARE

## 2024-12-12 DIAGNOSIS — Z98.890 STATUS POST YAG CAPSULOTOMY OF BOTH EYES: ICD-10-CM

## 2024-12-12 DIAGNOSIS — H40.1113 PRIMARY OPEN ANGLE GLAUCOMA OF RIGHT EYE, SEVERE STAGE: Primary | ICD-10-CM

## 2024-12-12 DIAGNOSIS — Z98.83 GLAUCOMA FILTERING BLEB OF BOTH EYES: ICD-10-CM

## 2024-12-12 DIAGNOSIS — H40.1122 PRIMARY OPEN ANGLE GLAUCOMA OF LEFT EYE, MODERATE STAGE: ICD-10-CM

## 2024-12-12 DIAGNOSIS — Z96.89 HISTORY OF GLAUCOMA TUBE SHUNT PROCEDURE: ICD-10-CM

## 2024-12-12 DIAGNOSIS — Z96.1 PSEUDOPHAKIA OF BOTH EYES: ICD-10-CM

## 2024-12-12 DIAGNOSIS — E11.9 TYPE 2 DIABETES MELLITUS WITHOUT OPHTHALMIC MANIFESTATIONS: ICD-10-CM

## 2024-12-12 DIAGNOSIS — H04.123 DRY EYES, BILATERAL: ICD-10-CM

## 2024-12-12 PROCEDURE — 99214 OFFICE O/P EST MOD 30 MIN: CPT | Mod: S$PBB,,, | Performed by: OPHTHALMOLOGY

## 2024-12-12 PROCEDURE — 99213 OFFICE O/P EST LOW 20 MIN: CPT | Mod: PBBFAC | Performed by: OPHTHALMOLOGY

## 2024-12-12 PROCEDURE — 99999 PR PBB SHADOW E&M-EST. PATIENT-LVL III: CPT | Mod: PBBFAC,,, | Performed by: OPHTHALMOLOGY

## 2024-12-12 RX ORDER — NETARSUDIL AND LATANOPROST OPHTHALMIC SOLUTION, 0.02%/0.005% .2; .05 MG/ML; MG/ML
1 SOLUTION/ DROPS OPHTHALMIC; TOPICAL DAILY
COMMUNITY
Start: 2024-10-31

## 2024-12-13 ENCOUNTER — HOSPITAL ENCOUNTER (OUTPATIENT)
Dept: PULMONOLOGY | Facility: CLINIC | Age: 79
Discharge: HOME | End: 2024-12-13
Payer: MEDICARE

## 2024-12-13 ENCOUNTER — OFFICE VISIT (OUTPATIENT)
Dept: PULMONOLOGY | Facility: CLINIC | Age: 79
End: 2024-12-13
Payer: MEDICARE

## 2024-12-13 VITALS
OXYGEN SATURATION: 93 % | BODY MASS INDEX: 32.79 KG/M2 | WEIGHT: 167 LBS | HEIGHT: 60 IN | SYSTOLIC BLOOD PRESSURE: 130 MMHG | DIASTOLIC BLOOD PRESSURE: 74 MMHG | HEART RATE: 71 BPM

## 2024-12-13 DIAGNOSIS — R00.2 PALPITATIONS: ICD-10-CM

## 2024-12-13 DIAGNOSIS — Z29.11 NEED FOR RSV VACCINATION: Primary | ICD-10-CM

## 2024-12-13 DIAGNOSIS — J45.909 ASTHMA, UNSPECIFIED ASTHMA SEVERITY, UNSPECIFIED WHETHER COMPLICATED, UNSPECIFIED WHETHER PERSISTENT: ICD-10-CM

## 2024-12-13 DIAGNOSIS — J45.50 SEVERE PERSISTENT ASTHMA WITHOUT COMPLICATION: ICD-10-CM

## 2024-12-13 DIAGNOSIS — J32.9 CHRONIC RHINOSINUSITIS: ICD-10-CM

## 2024-12-13 LAB
FEF 25 75 LLN: 0.74
FEF 25 75 PRE REF: 29.1 %
FEF 25 75 REF: 2.14
FET100 CHG: -9.3 %
FEV05 LLN: 0.53
FEV05 REF: 1.38
FEV1 CHG: 9.1 %
FEV1 FVC LLN: 64
FEV1 FVC PRE REF: 94.1 %
FEV1 FVC REF: 78
FEV1 LLN: 0.98
FEV1 PRE REF: 59 %
FEV1 REF: 1.47
FEV1 VOL CHG: 0.08
FEV1FVCZSCORE: -0.56
FEV1ZSCORE: -2.03
FVC CHG: 8.2 %
FVC LLN: 1.29
FVC PRE REF: 62.1 %
FVC REF: 1.9
FVC VOL CHG: 0.1
FVCZSCORE: -1.96
PEF LLN: 1.63
PEF PRE REF: 105.4 %
PEF REF: 3.39
PHYSICIAN COMMENT: ABNORMAL
POST FEF 25 75: 0.67 L/S (ref 0.74–3.54)
POST FET 100: 5.56 SEC
POST FEV1 FVC: 74.06 % (ref 63.84–90.45)
POST FEV1: 0.95 L (ref 0.98–1.93)
POST FEV5: 0.73 L (ref 0.53–2.24)
POST FVC: 1.28 L (ref 1.29–2.55)
POST PEF: 3.86 L/S (ref 1.63–5.15)
PRE FEF 25 75: 0.62 L/S (ref 0.74–3.54)
PRE FET 100: 6.13 SEC
PRE FEV05 REF: 47.9 %
PRE FEV1 FVC: 73.47 % (ref 63.84–90.45)
PRE FEV1: 0.87 L (ref 0.98–1.93)
PRE FEV5: 0.66 L (ref 0.53–2.24)
PRE FVC: 1.18 L (ref 1.29–2.55)
PRE PEF: 3.57 L/S (ref 1.63–5.15)

## 2024-12-13 PROCEDURE — 99999 PR PBB SHADOW E&M-EST. PATIENT-LVL IV: CPT | Mod: PBBFAC,,, | Performed by: INTERNAL MEDICINE

## 2024-12-13 PROCEDURE — 94060 EVALUATION OF WHEEZING: CPT | Mod: PBBFAC | Performed by: INTERNAL MEDICINE

## 2024-12-13 PROCEDURE — 94060 EVALUATION OF WHEEZING: CPT | Mod: 26,S$PBB,, | Performed by: INTERNAL MEDICINE

## 2024-12-13 PROCEDURE — 99214 OFFICE O/P EST MOD 30 MIN: CPT | Mod: PBBFAC | Performed by: INTERNAL MEDICINE

## 2024-12-13 RX ORDER — RESPIRATORY SYNCYTIAL VISUS VACCINE RECOMBINANT, ADJUVANTED 120MCG/0.5
0.5 KIT INTRAMUSCULAR ONCE
Qty: 0.5 ML | Refills: 0 | Status: SHIPPED | OUTPATIENT
Start: 2024-12-13 | End: 2024-12-13

## 2024-12-13 NOTE — PROGRESS NOTES
Follow Up  [facility]    SUBJECTIVE:     Chief Complaint:   asthma    History of Present Illness:  Lupe Jewell is a 79 y.o. female who presents for follow up of asthma. She had asthma really bad as a child, but her asthma symptoms improved around 10 years-old. Her asthma symptoms returned in her 30s. Her symptoms include: wheezing, coughing, & shortness of breath. Cough symptoms are particularly prominent. Her cough is usually productive of mucus, typically clear mucus unless she has a cold.    She takes albuterol as needed, dulera 200 x2 puffs BID, singulair, nebulizer prn.    She also notes chronic rhinitis. She uses flonase nasal spray 1 spray each nostril BID.    In addition to the inhalers, she has found abx & steroids to be helpful.    She notes lots of allergies including: mold, dust, cats, dogs, some trees...    She has seen allergist & ENT in the past.    She has a history of GERD s/p nissen fundoplication in 2005.    She is a lifetime non-smoker (other than brief smoking in college). She was hospitalized one time for pneumonia. No other hospitalizations.    No history of heart disease or cancer.    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 She is a native of [place].    Review of patient's allergies indicates:   Allergen Reactions    Penicillins Rash     Past Medical History:   Diagnosis Date    ALLERGIC RHINITIS 08/17/2012    Amblyopia     Asthma, well controlled 08/17/2012    Chronic asthma     Chronic rhinitis     Diabetes 02/04/2014    Diabetes mellitus     Diabetes mellitus type II     Fever blister     GERD (gastroesophageal reflux disease) 08/17/2012    Glaucoma     Hyperlipemia     Hypertension     Macular degeneration     Obstructive sleep apnea     Osteoporosis, unspecified     Recurrent upper respiratory infection (URI)     Retinal detachment     Strabismus     Uveitis      Past Surgical History:   Procedure Laterality Date    BLADDER SURGERY      BREAST BIOPSY Left      BREAST SURGERY      left breast biopsy    CATARACT EXTRACTION W/  INTRAOCULAR LENS IMPLANT Left 01/28/2015    WITH TRAB ()    CATARACT EXTRACTION W/  INTRAOCULAR LENS IMPLANT Right 03/30/2016    WITH TRAB ()    COLONOSCOPY N/A 07/01/2016    Procedure: COLONOSCOPY;  Surgeon: Rony Lima MD;  Location: [facility] ENDO (4TH FLR);  Service: Endoscopy;  Laterality: N/A;    COLONOSCOPY N/A 02/04/2022    Procedure: COLONOSCOPY;  Surgeon: FARA Benitez MD;  Location: [facility] (4TH FLR);  Service: Endoscopy;  Laterality: N/A;  fully vacc-inst mail-tb.Covid test at [facility], [place], operated by [facility] on 2/1.EC    COLONOSCOPY N/A 11/04/2024    Procedure: COLONOSCOPY;  Surgeon: Domi Carbajal MD;  Location: [facility] (2ND FLR);  Service: Endoscopy;  Laterality: N/A;    ESOPHAGOGASTRODUODENOSCOPY N/A 11/04/2024    Procedure: EGD (ESOPHAGOGASTRODUODENOSCOPY);  Surgeon: Domi Carbajal MD;  Location: [facility] (2ND FLR);  Service: Endoscopy;  Laterality: N/A;  referral a labat/ prep inst given in clinic / diabetic / asthma/ suprep  cleared by cardiology Dr Umanzor-GT  10/28-lv for pre call-tb  10/29 Pre-call complete- ASul    EYE SURGERY Bilateral     cataract removal and Laser surgery    HYSTERECTOMY      IMPLANTATION OF DEVICE FOR GLAUCOMA Right 09/21/2022    AHMED ()    stomach procedure      TRABECULECTOMY Left 12/28/2015    COMBINED WITH CE ()    TRABECULECTOMY Right 03/30/2016    COMBINED WITH CE ()    TUBAL LIGATION      YAG CAPSULOTOMY Bilateral 10/3/2019 and 10/17/2019         Family History   Problem Relation Name Age of Onset    Asthma Mother      Glaucoma Mother      Hypertension Mother      No Known Problems Father      Glaucoma Sister Rosa     Asthma Sister Rosa     Hypertension Sister Rosa     Hyperlipidemia Sister      Glaucoma Sister      No Known Problems Sister Elois     No Known Problems Brother none     No Known Problems Maternal Aunt      No Known  Problems Maternal Uncle      No Known Problems Paternal Aunt      No Known Problems Paternal Uncle      No Known Problems Maternal Grandmother      No Known Problems Maternal Grandfather      No Known Problems Paternal Grandmother      No Known Problems Paternal Grandfather      Hyperlipidemia Daughter Leanna     Hypertension Daughter Leanna     Depression Daughter Leanna     Diabetes Daughter Leanna     Obesity Daughter Leanna     Hypertension Son Don     Asthma Son Don     Asthma Other nephew     Asthma Grandchild 4     Melanoma Neg Hx      Psoriasis Neg Hx      Lupus Neg Hx      Eczema Neg Hx      Acne Neg Hx      Blindness Neg Hx      Macular degeneration Neg Hx      Retinal detachment Neg Hx      Amblyopia Neg Hx      Cancer Neg Hx      Cataracts Neg Hx      Strabismus Neg Hx      Stroke Neg Hx      Thyroid disease Neg Hx       Social History     Socioeconomic History    Marital status:    Occupational History    Occupation: retired    Tobacco Use    Smoking status: Former     Current packs/day: 0.00     Average packs/day: 0.3 packs/day for 2.0 years (0.5 ttl pk-yrs)     Types: Cigarettes     Start date:      Quit date:      Years since quittin.9     Passive exposure: Past    Smokeless tobacco: Never   Substance and Sexual Activity    Alcohol use: Yes     Alcohol/week: 12.0 standard drinks of alcohol     Types: 6 Glasses of wine, 6 Cans of beer per week     Comment: socially    Drug use: No    Sexual activity: Not Currently     Partners: Male   Other Topics Concern    Are you pregnant or think you may be? No    Breast-feeding No     Review of Systems:  negative    OBJECTIVE:     Vital Signs  Vitals:    24 1015   BP: 130/74   Pulse: 71   SpO2: (!) 93%   Weight: 75.8 kg (167 lb)   Height: 5' (1.524 m)     Body mass index is 32.61 kg/m².    Physical Exam:  General: no distress  Eyes:  conjunctivae/corneas clear  Nose: no discharge  Neck: trachea midline with no  masses appreciated  Lungs:  normal respiratory effort, no wheezes, no rales  Heart: regular rate and rhythm and no murmur  Abdomen: non-distended  Extremities: no cyanosis, no edema, no clubbing  Skin: No rashes or lesions. good skin turgor  Neurologic: alert, oriented, thought content appropriate    Laboratory:  Lab Results   Component Value Date    WBC 12.45 09/23/2024    HGB 12.0 09/23/2024    HCT 35.7 (L) 09/23/2024    MCV 83 09/23/2024     09/23/2024     IgE: 400s, 200s, 83  Eos 100 - 400    Chest Imaging, My Impression:   CXR 6/2022: normal  Chest CT 11/2023: lung parenchyma looks normal, several small lung nodules    Diagnostic Results:  ECG: Reviewed    PFT Today: no obstruction, FVC is low suggesting restriction, no bronchodilator responsiveness  PFT 2023: no obstruction, low-normal TLC, DLCO is 82% of predicted    ASSESSMENT/PLAN:     1) Severe persistent asthma  - Continue dulera 2 puffs BID & singulair. Continue albuterol MDI & nebulizer prn.    2) Chronic rhinosinusitis  She uses flonase nasal spray 1 spray each nostril. Continue this.    Recommend RSV vaccine now.    Total professional time spent for the encounter: 30 minutes  Time was spent preparing to see the patient, reviewing results of prior testing, obtaining and/or reviewing separately obtained history, performing a medically appropriate examination and interview, counseling and educating the patient/family, ordering medications/tests/procedures, referring and communicating with other health care professionals, documenting clinical information in the electronic health record, and independently interpreting results.      [place], MD  [facility]

## 2024-12-17 ENCOUNTER — OFFICE VISIT (OUTPATIENT)
Dept: INTERNAL MEDICINE | Facility: CLINIC | Age: 79
End: 2024-12-17
Payer: MEDICARE

## 2024-12-17 ENCOUNTER — LAB VISIT (OUTPATIENT)
Dept: LAB | Facility: HOSPITAL | Age: 79
End: 2024-12-17
Attending: INTERNAL MEDICINE
Payer: MEDICARE

## 2024-12-17 DIAGNOSIS — E11.9 DIABETES MELLITUS WITHOUT COMPLICATION: ICD-10-CM

## 2024-12-17 DIAGNOSIS — I10 HYPERTENSION, UNSPECIFIED TYPE: Primary | ICD-10-CM

## 2024-12-17 DIAGNOSIS — I10 HYPERTENSION, UNSPECIFIED TYPE: ICD-10-CM

## 2024-12-17 DIAGNOSIS — J45.50 SEVERE PERSISTENT ASTHMA WITHOUT COMPLICATION: ICD-10-CM

## 2024-12-17 LAB
ALBUMIN SERPL BCP-MCNC: 3.9 G/DL (ref 3.5–5.2)
ALP SERPL-CCNC: 65 U/L (ref 40–150)
ALT SERPL W/O P-5'-P-CCNC: 10 U/L (ref 10–44)
ANION GAP SERPL CALC-SCNC: 10 MMOL/L (ref 8–16)
AST SERPL-CCNC: 11 U/L (ref 10–40)
BILIRUB SERPL-MCNC: 0.4 MG/DL (ref 0.1–1)
BUN SERPL-MCNC: 16 MG/DL (ref 8–23)
CALCIUM SERPL-MCNC: 9.8 MG/DL (ref 8.7–10.5)
CHLORIDE SERPL-SCNC: 103 MMOL/L (ref 95–110)
CO2 SERPL-SCNC: 26 MMOL/L (ref 23–29)
CREAT SERPL-MCNC: 0.9 MG/DL (ref 0.5–1.4)
EST. GFR  (NO RACE VARIABLE): >60 ML/MIN/1.73 M^2
ESTIMATED AVG GLUCOSE: 108 MG/DL (ref 68–131)
GLUCOSE SERPL-MCNC: 98 MG/DL (ref 70–110)
HBA1C MFR BLD: 5.4 % (ref 4–5.6)
POTASSIUM SERPL-SCNC: 3.9 MMOL/L (ref 3.5–5.1)
PROT SERPL-MCNC: 7.6 G/DL (ref 6–8.4)
SODIUM SERPL-SCNC: 139 MMOL/L (ref 136–145)

## 2024-12-17 PROCEDURE — 99214 OFFICE O/P EST MOD 30 MIN: CPT | Mod: PBBFAC | Performed by: INTERNAL MEDICINE

## 2024-12-17 PROCEDURE — 36415 COLL VENOUS BLD VENIPUNCTURE: CPT | Performed by: INTERNAL MEDICINE

## 2024-12-17 PROCEDURE — 99999 PR PBB SHADOW E&M-EST. PATIENT-LVL IV: CPT | Mod: PBBFAC,,, | Performed by: INTERNAL MEDICINE

## 2024-12-17 PROCEDURE — G2211 COMPLEX E/M VISIT ADD ON: HCPCS | Mod: S$PBB,,, | Performed by: INTERNAL MEDICINE

## 2024-12-17 PROCEDURE — 80053 COMPREHEN METABOLIC PANEL: CPT | Performed by: INTERNAL MEDICINE

## 2024-12-17 PROCEDURE — 99214 OFFICE O/P EST MOD 30 MIN: CPT | Mod: S$PBB,,, | Performed by: INTERNAL MEDICINE

## 2024-12-17 PROCEDURE — 83036 HEMOGLOBIN GLYCOSYLATED A1C: CPT | Performed by: INTERNAL MEDICINE

## 2024-12-17 RX ORDER — FLUTICASONE PROPIONATE 50 MCG
SPRAY, SUSPENSION (ML) NASAL
Qty: 16 G | Refills: 2 | Status: SHIPPED | OUTPATIENT
Start: 2024-12-17

## 2024-12-17 RX ORDER — IPRATROPIUM BROMIDE AND ALBUTEROL SULFATE 2.5; .5 MG/3ML; MG/3ML
SOLUTION RESPIRATORY (INHALATION)
Qty: 180 ML | Refills: 11 | Status: SHIPPED | OUTPATIENT
Start: 2024-12-17

## 2024-12-22 VITALS
WEIGHT: 165.81 LBS | OXYGEN SATURATION: 98 % | SYSTOLIC BLOOD PRESSURE: 138 MMHG | DIASTOLIC BLOOD PRESSURE: 84 MMHG | HEART RATE: 95 BPM | HEIGHT: 60 IN | BODY MASS INDEX: 32.55 KG/M2 | TEMPERATURE: 99 F

## 2024-12-22 NOTE — PROGRESS NOTES
Subjective:       Patient ID: Lupe Jewell is a 79 y.o. female.    Chief Complaint: Hypertension    HPI  She returns for management of hypertension.  She has had hypertension for over a year.  Current treatment has included medications outlined in medication list.  She denies chest pain or shortness of breath.  No palpitations.  Denies left arm or neck pain.  She has diabetes.  Denies polyuria, polydipsia     Past medical history:  Hypertension, diabetes, asthma, glaucoma,  osteoporosis, status post hysterectomy, status post Carmelita n Y gastrectomy, bladder repair, colon adenoma.  She had a colonoscopy November 2024     Medications:   albuterol, Wixela, Dyazide, Lipitor 10 mg daily, Xalatan, metformin 500 mg b.i.d., Cozaar 50 mg daily, fosamax     Allergies:  Penicillin        Review of Systems   Constitutional:  Negative for chills, fatigue, fever and unexpected weight change.   Respiratory:  Negative for chest tightness and shortness of breath.    Cardiovascular:  Negative for chest pain and palpitations.   Gastrointestinal:  Negative for abdominal pain and blood in stool.   Neurological:  Negative for dizziness, syncope, numbness and headaches.       Objective:      Physical Exam  HENT:      Right Ear: External ear normal.      Left Ear: External ear normal.      Nose: Nose normal.      Mouth/Throat:      Mouth: Mucous membranes are moist.      Pharynx: Oropharynx is clear.   Eyes:      Pupils: Pupils are equal, round, and reactive to light.   Cardiovascular:      Rate and Rhythm: Normal rate and regular rhythm.      Heart sounds: No murmur heard.  Pulmonary:      Breath sounds: Normal breath sounds.   Abdominal:      General: There is no distension.      Palpations: There is no hepatomegaly or splenomegaly.      Tenderness: There is no abdominal tenderness.   Musculoskeletal:      Cervical back: Normal range of motion.   Lymphadenopathy:      Cervical: No cervical adenopathy.      Upper Body:      Right upper  body: No axillary adenopathy.      Left upper body: No axillary adenopathy.   Neurological:      Cranial Nerves: No cranial nerve deficit.      Sensory: No sensory deficit.      Motor: Motor function is intact.      Deep Tendon Reflexes: Reflexes are normal and symmetric.         Assessment/Plan       Assessment and plan: 1.  Hypertension: Controlled.  Continue Cozaar.  Check CMP   2.  Diabetes: Check A1c    Visit today included increased complexity associated with the care of the episodic problem hypertension addressed and managing the longitudinal care of the patient due to the serious and/or complex managed problem(s) hypertension, diabetes.

## 2024-12-31 ENCOUNTER — EXTERNAL CHRONIC CARE MANAGEMENT (OUTPATIENT)
Dept: PRIMARY CARE CLINIC | Facility: CLINIC | Age: 79
End: 2024-12-31
Payer: MEDICARE

## 2024-12-31 PROCEDURE — 99490 CHRNC CARE MGMT STAFF 1ST 20: CPT | Mod: PBBFAC | Performed by: INTERNAL MEDICINE

## 2024-12-31 PROCEDURE — 99439 CHRNC CARE MGMT STAF EA ADDL: CPT | Mod: PBBFAC | Performed by: INTERNAL MEDICINE

## 2025-01-04 NOTE — TELEPHONE ENCOUNTER
No care due was identified.  [facility] Embedded Care Due Messages. Reference number: [id number].   1/04/2025 3:15:54 PM CST

## 2025-01-06 RX ORDER — ALENDRONATE SODIUM 70 MG/1
70 TABLET ORAL
Qty: 12 TABLET | Refills: 1 | Status: SHIPPED | OUTPATIENT
Start: 2025-01-06

## 2025-01-13 DIAGNOSIS — Z00.00 ENCOUNTER FOR MEDICARE ANNUAL WELLNESS EXAM: ICD-10-CM

## 2025-01-22 ENCOUNTER — TELEPHONE (OUTPATIENT)
Dept: ENDOSCOPY | Facility: HOSPITAL | Age: 80
End: 2025-01-22
Payer: MEDICARE

## 2025-01-22 NOTE — TELEPHONE ENCOUNTER
Endoscopy unit  will be closed on 1/23/25 due to weather. Patient notified and verbalized understanding that procedure will be cancelled and that the Endoscopy Scheduling team will contact patient to reschedule when normal hours resume.

## 2025-01-24 ENCOUNTER — TELEPHONE (OUTPATIENT)
Dept: ENDOSCOPY | Facility: HOSPITAL | Age: 80
End: 2025-01-24
Payer: MEDICARE

## 2025-01-24 DIAGNOSIS — D50.9 IRON DEFICIENCY ANEMIA, UNSPECIFIED IRON DEFICIENCY ANEMIA TYPE: Primary | ICD-10-CM

## 2025-01-24 NOTE — TELEPHONE ENCOUNTER
Referral for procedure from [facility]      Spoke to patient to schedule procedure(s) Retrograde Double Balloon       Physician to perform procedure(s) Dr. MARINA Garcia  Date of Procedure (s) 02/06/25  Arrival Time 08:00 AM  Time of Procedure(s) 09:00 AM   Location of Procedure(s) [place] 2nd Floor  Type of Rx Prep sent to patient: Suprep  Instructions provided to patient via Postal Mail    Patient was informed on the following information and verbalized understanding. Screening questionnaire reviewed with patient and complete. If procedure requires anesthesia, a responsible adult needs to be present to accompany the patient home, patient cannot drive after receiving anesthesia. Appointment details are tentative, especially check-in time. Patient will receive a prep-op call 7 days prior to confirm check-in time for procedure. If applicable the patient should contact their pharmacy to verify Rx for procedure prep is ready for pick-up. Patient was advised to call the scheduling department at [phone] if pharmacy states no Rx is available. Patient was advised to call the endoscopy scheduling department if any questions or concerns arise.       Endoscopy Scheduling Department

## 2025-01-24 NOTE — TELEPHONE ENCOUNTER
Colonoscopy Procedure Prep Instructions      Date of procedure: 02/06/25 Arrive at: 08:00AM    Location of Department:   [facility] [address] [place] [address]  Take the [facility] Elevators to 2nd Floor Outpatient Surgery    As soon as possible:   your prep from pharmacy and over the counter DULCOLAX LAXATIVE TABLETS     On the day before your procedure   What You CAN do:   You may have clear liquids ONLY -see below for list.     Liquids That Are OK to Drink:   Water  Sports drinks (Gatorade, Power-Aid)  Coffee or tea (no cream or nondairy creamer)  Clear juices without pulp (apple, white grape)  Gelatin desserts (no fruit or toppings)  Clear soda (sprite, coke, ginger ale)  Chicken broth (until 12 midnight the night before procedure)      What You CANNOT do:   Do not EAT solid food, drink milk or anything   colored red.  Do not drink alcohol.  Do not take oral medications within 1 hour of starting   each dose of SUPREP.  No gum chewing or candy morning of procedure.      Note:   (Please disregard the insert instructions from pharmacy).  SUPREP Bowel Prep Kit is indicated for cleansing of the colon as a preparation for colonoscopy in adults.   Be sure to tell your doctor about all the medicines you take, including prescription and non-prescription medicines, vitamins, and herbal supplements. SUPREP Bowel Prep Kit may affect how other medicines work.  Medication taken by mouth may not be absorbed properly when taken within 1 hour before the start of each dose of SUPREP Bowel Prep Kit.    It is not uncommon to experience some abdominal cramping, nausea and/or vomiting when taking the prep. If you have nausea and/or vomiting while taking the prep, stop drinking for 20 to 30 minutes then continue.    How to take prep:    SUPREP Bowel Prep Kit is a (2-day) prep.   Both 6-ounce bottles are required for a complete preparation for colonoscopy. Dilute the solution concentrate as directed  prior to use. You must drink water with each dose of SUPREP, and additional water after each dose.    DOSE 1--Day Before Colonoscopy 02/05/25    Drink at least 6 to 8 glasses of clear liquids from time you wake up until you begin your prep and then continue until bedtime to avoid dehydration.     12:00 pm (NOON) Take four (4) Dulcolax (Bisacodyl) tablets with at least 8 ounces or more of clear liquids.      6:00 pm:    You must complete Steps 1 through 4 using one (1) 6-ounce bottle before going to bed as shown below:    Step 1-Pour ONE (1) 6-ounce bottle of SUPREP liquid into the mixing container.  Step 2-Add cool drinking water to the 16-ounce line on the container and mix.  Step 3-Drink ALL the liquid in the container.  Step 4-You must drink two (2) more 16-ounce containers of water over the next 1 hour.  IMPORTANT: If you experience preparation-related symptoms (for example, nausea, bloating, or cramping), stop, or slow the rate of drinking the additional water until your symptoms decrease.    DOSE 2--Day of the Colonoscopy 02/06/25 at 2-3 AM.    For this dose, repeat Steps 1 through 4 shown above using the other 6-ounce bottle.   You may continue drinking water/clear liquids until   2 hours before your colonoscopy or as directed by the scheduling nurse  07:00AM.    For information about your procedure, two (2) things to view prior to colonoscopy:  Please watch this informational video. It is important to watch this animated consent video prior to your arrival. If you haven't watched the video prior to arriving, you are required to watch it during admission which can causes delays.    Options for viewing:   Using a keyboard:  press and hold the control tab (Ctrl) and left mouse click to follow links.           Colonoscopy Instructional Video                                                                                   OR    Type link address into your web browser's address  bar:  [email or web address]      Educational Booklet with pictures:      Colonoscopy Prep - Liquid      Comments:          IMPORTANT INFORMATION TO KNOW BEFORE YOUR PROCEDURE    [facility] 2nd Floor         If your procedure requires the administration of anesthesia, it is necessary for a responsible adult to drive you home. (Medical Transportation, Uber, Lyft, Taxi, etc. may ONLY be used if a responsible adult is present to accompany you home.  The responsible adult CAN'T be the  of the service).      person must be available to return to pick you up within 15 minutes of being notified of discharge.       Please bring a picture ID, insurance card, & copayment      Take Medications as directed below:      If you begin taking any blood thinning medications, injectable weight loss/diabetes medications (other than insulin) , or Adipex (Phentermine) please contact the endoscopy scheduling department listed below as soon as possible.    If you are diabetic see the attached instruction sheet regarding your medication.     If you take HEART, BLOOD PRESSURE, SEIZURE, PAIN, LUNG (including inhalers/nebulizers), ANTI-REJECTION (transplant patients), or PSYCHIATRIC medications, please take at your regular times with a sip of water or as directed by the scheduling nurse.     Important contact information:    Endoscopy [phone] Hours of operation Monday-Friday 8:00-4:30pm.    Questions about insurance or financial obligations call [phone] or [phone].    If you have questions regarding the prep or need to reschedule, please call [phone]. After hours questions requiring immediate assistance, contact [facility] nurse line at [phone] or [phone].   NOTE:     On occasion, unforeseen circumstances may cause a delay in your procedure start time. We respect your time and appreciate your patience during these  circumstances.      Comments:       Are you ready for your Colonoscopy?      __ If you take blood thinners,weight loss or diabetic injectable medications, have you stopped taking them according to your doctor's instructions before your procedures?  __ Have you stopped eating solid foods and followed a clear liquid diet a full day before your procedure or followed the diet       guidelines indicated in your instructions?       REMINDER: NO BROTH AFTER MIDNIGHT THE DAY BEFORE PROCEDURE.   __ Have you completed all your prep solution? PLEASE DO NOT FOLLOW the insert/or box instructions from pharmacy)  __ Have you taken your blood pressure, heart, seizure, or other essential medications the morning of your procedure?  __ Have you planned for a ride to and from procedure?  (Medical Transportation, Uber, Lyft, Taxi, etc. may ONLY be used if a responsible adult is present to accompany you home). The responsible adult CANNOT be the  of the service.  person must be available to return and pick you up within 15 minutes of being notified of discharge.           Questions or Concerns?  Please call us!  [phone] (M-F) [phone] (Nights and weekends)    Listed below are some helpful tips:    Please bring protective cases for eyewear and hearing aids-wear comfortable clothing/shoes.  Follow prep instructions closely so you don't have to do it twice.  Bowel prep is prescription used to clean out the colon before a colonoscopy. The prep increases movement of your colon by causing you to have diarrhea (loose stools). Cleaning out stool from the colon helps your doctor to see in your colon clearly during this procedure.   It is important to stay hydrated before, during and after bowel prep to prevent loss of fluid (dehydration). You can have water and your choice of clear liquids. Reminder: these liquids you will drink in addition to the bowel prep.     Preparing the mixture:    First, mix the prep with  water.  Make the taste better by adding a sugar free drink mix (Crystal Light) can improve the taste of your prep.   Use a large bore (opening) straw. Place towards the back of mouth (throat) as tolerated.  Prepare a prep mixture that is lightly chilled, but not ice-cold. Drinking a large amount of ice-cold liquid can make you feel very ill.  Avoid drinking any RED beverages or eating popsicles with this color for the 24 hours leading up to your procedure. This color can look like blood in the colon.    Consuming the prep:    Take your time. If you feel ill, take a 15-minute break from drinking the prep mixture  Combat hunger and dehydration with clear liquids. Options like JELL-O, or popsicles will help.  Settle in with good reading material. The goal is to clean out 6 feet of colon, so you can plan on spending a good deal of time in the bathroom. Have some good reading material on-hand or an iPad ready to keep yourself entertained!  Keep yourself comfortable. We recommend applying personal hygiene wipes, Tucks pads and a soothing ointment, like A&D ointment, Desitin, or Vaseline to your bottom before starting and as needed to protect your skin.

## 2025-01-24 NOTE — TELEPHONE ENCOUNTER
Colonoscopy Procedure Prep Instructions      Date of procedure: 02/06/25 Arrive at: ***:***{Time; am/pm:38182}    Location of Department:   [facility] { Endoscopy Locations:60697}    As soon as possible:   your prep from pharmacy and over the counter DULCOLAX LAXATIVE TABLETS     On the day before your procedure   What You CAN do:   You may have clear liquids ONLY -see below for list.     Liquids That Are OK to Drink:   Water  Sports drinks (Gatorade, Power-Aid)  Coffee or tea (no cream or nondairy creamer)  Clear juices without pulp (apple, white grape)  Gelatin desserts (no fruit or toppings)  Clear soda (sprite, coke, ginger ale)  Chicken broth (until 12 midnight the night before procedure)      What You CANNOT do:   Do not EAT solid food, drink milk or anything   colored red.  Do not drink alcohol.  Do not take oral medications within 1 hour of starting   each dose of SUPREP.  No gum chewing or candy morning of procedure.      Note:   (Please disregard the insert instructions from pharmacy).  SUPREP Bowel Prep Kit is indicated for cleansing of the colon as a preparation for colonoscopy in adults.   Be sure to tell your doctor about all the medicines you take, including prescription and non-prescription medicines, vitamins, and herbal supplements. SUPREP Bowel Prep Kit may affect how other medicines work.  Medication taken by mouth may not be absorbed properly when taken within 1 hour before the start of each dose of SUPREP Bowel Prep Kit.    It is not uncommon to experience some abdominal cramping, nausea and/or vomiting when taking the prep. If you have nausea and/or vomiting while taking the prep, stop drinking for 20 to 30 minutes then continue.    How to take prep:    SUPREP Bowel Prep Kit is a (2-day) prep.   Both 6-ounce bottles are required for a complete preparation for colonoscopy. Dilute the solution concentrate as directed prior to use. You must drink water with each dose of  SUPREP, and additional water after each dose.    DOSE 1--Day Before Colonoscopy ***/***/***    Drink at least 6 to 8 glasses of clear liquids from time you wake up until you begin your prep and then continue until bedtime to avoid dehydration.     12:00 pm (NOON) Take four (4) Dulcolax (Bisacodyl) tablets with at least 8 ounces or more of clear liquids.      6:00 pm:    You must complete Steps 1 through 4 using one (1) 6-ounce bottle before going to bed as shown below:    Step 1-Pour ONE (1) 6-ounce bottle of SUPREP liquid into the mixing container.  Step 2-Add cool drinking water to the 16-ounce line on the container and mix.  Step 3-Drink ALL the liquid in the container.  Step 4-You must drink two (2) more 16-ounce containers of water over the next 1 hour.  IMPORTANT: If you experience preparation-related symptoms (for example, nausea, bloating, or cramping), stop, or slow the rate of drinking the additional water until your symptoms decrease.    DOSE 2--Day of the Colonoscopy ***/***/*** at 2-3 AM.    For this dose, repeat Steps 1 through 4 shown above using the other 6-ounce bottle.   You may continue drinking water/clear liquids until   2 hours before your colonoscopy or as directed by the scheduling nurse  ***:***{Time; am/pm:07234}.    For information about your procedure, two (2) things to view prior to colonoscopy:  Please watch this informational video. It is important to watch this animated consent video prior to your arrival. If you haven't watched the video prior to arriving, you are required to watch it during admission which can causes delays.    Options for viewing:   Using a keyboard:  press and hold the control tab (Ctrl) and left mouse click to follow links.           Colonoscopy Instructional Video                                                                                   OR    Type link address into your web browser's address  bar:  [email or web address]      Educational Booklet with pictures:      Colonoscopy Prep - Liquid      Comments: ***         IMPORTANT INFORMATION TO KNOW BEFORE YOUR PROCEDURE    [facility] {SS Endo Facilities:18618}         If your procedure requires the administration of anesthesia, it is necessary for a responsible adult to drive you home. (Medical Transportation, Uber, Lyft, Taxi, etc. may ONLY be used if a responsible adult is present to accompany you home.  The responsible adult CAN'T be the  of the service).      person must be available to return to pick you up within 15 minutes of being notified of discharge.       Please bring a picture ID, insurance card, & copayment      Take Medications as directed below:    If you are taking any injectable medication (s) for weight loss and/or diabetes  {[facility] Endo hold times for WL/DB medications-DAILY/WEEKLY:87941}, hold {[facility] Endo WL/DM hold times:52959}, please stop taking {ssendoinjectableweightlossmeds:11105}}on ***. After the procedure, your provider will inform you  of when to resume injection.    If you are taking the oral medication Adipex (Phentermine), hold 4 days prior to your procedure, please stop taking on ***.       If you are taking the oral medication(s):   Invokana (Canagliflozin), Farxiga (Dapagliflozin), Jardiance (Empagliflozin), Invokamet/Invokamet XR (invokana +metformin), Xigduo (farxiga + metformin), Synjardy XR (jardiance + metformin), hold 3 days prior to your procedure, please stop taking on ***.     1) Stop taking {[facility] Endoscopy Blood Thinner Medication List:64789} {[facility] ENDO number of days to hold BT:62003} (prior to the procedure) on:  ***/***/***     2) Stop taking {[facility] Endoscopy Blood Thinner Medication List:11063} {[facility] ENDO number of days to hold BT:74928} (prior to the procedure) on:  ***/***/***    If you begin taking any blood thinning medications, injectable weight loss/diabetes medications  (other than insulin) , or Adipex (Phentermine) please contact the endoscopy scheduling department listed below as soon as possible.    If you are diabetic see the attached instruction sheet regarding your medication.     If you take HEART, BLOOD PRESSURE, SEIZURE, PAIN, LUNG (including inhalers/nebulizers), ANTI-REJECTION (transplant patients), or PSYCHIATRIC medications, please take at your regular times with a sip of water or as directed by the scheduling nurse.     Important contact information:    Endoscopy [phone] Hours of operation Monday-Friday 8:00-4:30pm.    Questions about insurance or financial obligations call [phone] or [phone].    If you have questions regarding the prep or need to reschedule, please call [phone]. After hours questions requiring immediate assistance, contact [facility] nurse line at [phone] or [phone].   NOTE:     On occasion, unforeseen circumstances may cause a delay in your procedure start time. We respect your time and appreciate your patience during these circumstances.      Comments: ***      Are you ready for your Colonoscopy?      __ If you take blood thinners,weight loss or diabetic injectable medications, have you stopped taking them according to your doctor's instructions before your procedures?  __ Have you stopped eating solid foods and followed a clear liquid diet a full day before your procedure or followed the diet       guidelines indicated in your instructions?       REMINDER: NO BROTH AFTER MIDNIGHT THE DAY BEFORE PROCEDURE.   __ Have you completed all your prep solution? PLEASE DO NOT FOLLOW the insert/or box instructions from pharmacy)  __ Have you taken your blood pressure, heart, seizure, or other essential medications the morning of your procedure?  __ Have you planned for a ride to and from procedure?  (Medical Transportation, Uber, Lyft, Taxi, etc. may ONLY be used if a responsible adult is present to  accompany you home). The responsible adult CANNOT be the  of the service.  person must be available to return and pick you up within 15 minutes of being notified of discharge.           Questions or Concerns?  Please call us!  [phone] (M-F) [phone] (Nights and weekends)    Listed below are some helpful tips:    Please bring protective cases for eyewear and hearing aids-wear comfortable clothing/shoes.  Follow prep instructions closely so you don't have to do it twice.  Bowel prep is prescription used to clean out the colon before a colonoscopy. The prep increases movement of your colon by causing you to have diarrhea (loose stools). Cleaning out stool from the colon helps your doctor to see in your colon clearly during this procedure.   It is important to stay hydrated before, during and after bowel prep to prevent loss of fluid (dehydration). You can have water and your choice of clear liquids. Reminder: these liquids you will drink in addition to the bowel prep.     Preparing the mixture:    First, mix the prep with water.  Make the taste better by adding a sugar free drink mix (Crystal Light) can improve the taste of your prep.   Use a large bore (opening) straw. Place towards the back of mouth (throat) as tolerated.  Prepare a prep mixture that is lightly chilled, but not ice-cold. Drinking a large amount of ice-cold liquid can make you feel very ill.  Avoid drinking any RED beverages or eating popsicles with this color for the 24 hours leading up to your procedure. This color can look like blood in the colon.    Consuming the prep:    Take your time. If you feel ill, take a 15-minute break from drinking the prep mixture  Combat hunger and dehydration with clear liquids. Options like JELL-O, or popsicles will help.  Settle in with good reading material. The goal is to clean out 6 feet of colon, so you can plan on spending a good deal of time in the bathroom. Have some good reading material  on-hand or an iPad ready to keep yourself entertained!  Keep yourself comfortable. We recommend applying personal hygiene wipes, Tucks pads and a soothing ointment, like A&D ointment, Desitin, or Vaseline to your bottom before starting and as needed to protect your skin.

## 2025-01-26 ENCOUNTER — TELEPHONE (OUTPATIENT)
Dept: CARDIOLOGY | Facility: HOSPITAL | Age: 80
End: 2025-01-26
Payer: MEDICARE

## 2025-01-26 DIAGNOSIS — I47.29 NSVT (NONSUSTAINED VENTRICULAR TACHYCARDIA): Primary | ICD-10-CM

## 2025-01-26 NOTE — TELEPHONE ENCOUNTER
Attempting to call patient, went to voice mail.   Will order TTE and discuss starting selective BB such as Toprol 25mg.

## 2025-01-28 ENCOUNTER — TELEPHONE (OUTPATIENT)
Dept: ENDOSCOPY | Facility: HOSPITAL | Age: 80
End: 2025-01-28
Payer: MEDICARE

## 2025-01-28 NOTE — TELEPHONE ENCOUNTER
Yanick Garcia MD Benvenutti, Jennifer B, RN; [facility] Staff  This is not a definite balloon procedure.  I sent the original request on 11/15/24.  This is a colonoscopy for recent incomplete colonoscopy.  This is appropriate for a 60-min general GI slot and does not require a dedicated balloon enteroscopy slot (those are reserved for true enteroscopy procedures).  I will use a pediatric colonoscope, and will need the slim pediatric colonoscope and the balloon enteroscope available in case needed.    Dr. Garcia          Previous Messages       ----- Message -----  From: Nathalie Thomas, RN  Sent: 1/24/2025   2:17 PM CST  To: Yanick Garcia MD; [facility] Staff  Subject: Colonoscopy                                      Good Afternoon,  I have been looking for the original order for Ms. Andrade.  I have not been able to find it.  I wasn't sure if she needed a double balloon procedure but I remember she needed something special and had to be on 2nd floor.  I have added her to the schedule but need a code to enter for the balloon endoscopy.  Not sure if it is Antegrade or Retrograde.

## 2025-01-28 NOTE — TELEPHONE ENCOUNTER
Referral for procedure from PAT appointment      Spoke to patient to schedule procedure(s) Colonoscopy       Physician to perform procedure(s) Dr. MARINA Garcia  Date of Procedure (s) 02/19/25  Arrival Time 1:15 PM  Time of Procedure(s) 0:15 PM   Location of Procedure(s) [address]  Type of Rx Prep sent to patient: Suprep  Instructions provided to patient via [facility]    Patient was informed on the following information and verbalized understanding. Screening questionnaire reviewed with patient and complete. If procedure requires anesthesia, a responsible adult needs to be present to accompany the patient home, patient cannot drive after receiving anesthesia. Appointment details are tentative, especially check-in time. Patient will receive a prep-op call 7 days prior to confirm check-in time for procedure. If applicable the patient should contact their pharmacy to verify Rx for procedure prep is ready for pick-up. Patient was advised to call the scheduling department at [phone] if pharmacy states no Rx is available. Patient was advised to call the endoscopy scheduling department if any questions or concerns arise.      [facility] Endoscopy Scheduling Department

## 2025-01-28 NOTE — TELEPHONE ENCOUNTER
Colonoscopy Procedure Prep Instructions      Date of procedure: 02/19/25 Arrive at: 01:15PM    Location of Department:   [facility] [address] [place] [address]  Take the [facility] Elevators to 4th Floor Endoscopy Lab    As soon as possible:   your prep from pharmacy and over the counter DULCOLAX LAXATIVE TABLETS     On the day before your procedure   What You CAN do:   You may have clear liquids ONLY -see below for list.     Liquids That Are OK to Drink:   Water  Sports drinks (Gatorade, Power-Aid)  Coffee or tea (no cream or nondairy creamer)  Clear juices without pulp (apple, white grape)  Gelatin desserts (no fruit or toppings)  Clear soda (sprite, coke, ginger ale)  Chicken broth (until 12 midnight the night before procedure)      What You CANNOT do:   Do not EAT solid food, drink milk or anything   colored red.  Do not drink alcohol.  Do not take oral medications within 1 hour of starting   each dose of SUPREP.  No gum chewing or candy morning of procedure.      Note:   (Please disregard the insert instructions from pharmacy).  SUPREP Bowel Prep Kit is indicated for cleansing of the colon as a preparation for colonoscopy in adults.   Be sure to tell your doctor about all the medicines you take, including prescription and non-prescription medicines, vitamins, and herbal supplements. SUPREP Bowel Prep Kit may affect how other medicines work.  Medication taken by mouth may not be absorbed properly when taken within 1 hour before the start of each dose of SUPREP Bowel Prep Kit.    It is not uncommon to experience some abdominal cramping, nausea and/or vomiting when taking the prep. If you have nausea and/or vomiting while taking the prep, stop drinking for 20 to 30 minutes then continue.    How to take prep:    SUPREP Bowel Prep Kit is a (2-day) prep.   Both 6-ounce bottles are required for a complete preparation for colonoscopy. Dilute the solution concentrate as directed prior  to use. You must drink water with each dose of SUPREP, and additional water after each dose.    DOSE 1--Day Before Colonoscopy 02/18/25    Drink at least 6 to 8 glasses of clear liquids from time you wake up until you begin your prep and then continue until bedtime to avoid dehydration.     12:00 pm (NOON) Take four (4) Dulcolax (Bisacodyl) tablets with at least 8 ounces or more of clear liquids.      6:00 pm:    You must complete Steps 1 through 4 using one (1) 6-ounce bottle before going to bed as shown below:    Step 1-Pour ONE (1) 6-ounce bottle of SUPREP liquid into the mixing container.  Step 2-Add cool drinking water to the 16-ounce line on the container and mix.  Step 3-Drink ALL the liquid in the container.  Step 4-You must drink two (2) more 16-ounce containers of water over the next 1 hour.  IMPORTANT: If you experience preparation-related symptoms (for example, nausea, bloating, or cramping), stop, or slow the rate of drinking the additional water until your symptoms decrease.    DOSE 2--Day of the Colonoscopy 02/19/25 at 2-3 AM.    For this dose, repeat Steps 1 through 4 shown above using the other 6-ounce bottle.   You may continue drinking water/clear liquids until   2 hours before your colonoscopy or as directed by the scheduling nurse  12:15PM.    For information about your procedure, two (2) things to view prior to colonoscopy:  Please watch this informational video. It is important to watch this animated consent video prior to your arrival. If you haven't watched the video prior to arriving, you are required to watch it during admission which can causes delays.    Options for viewing:   Using a keyboard:  press and hold the control tab (Ctrl) and left mouse click to follow links.           Colonoscopy Instructional Video                                                                                   OR    Type link address into your web browser's address  bar:  [email or web address]      Educational Booklet with pictures:      Colonoscopy Prep - Liquid      Comments:          IMPORTANT INFORMATION TO KNOW BEFORE YOUR PROCEDURE    [facility] 4th Floor         If your procedure requires the administration of anesthesia, it is necessary for a responsible adult to drive you home. (Medical Transportation, Uber, Lyft, Taxi, etc. may ONLY be used if a responsible adult is present to accompany you home.  The responsible adult CAN'T be the  of the service).      person must be available to return to pick you up within 15 minutes of being notified of discharge.       Please bring a picture ID, insurance card, & copayment      Take Medications as directed below:      If you begin taking any blood thinning medications, injectable weight loss/diabetes medications (other than insulin) , or Adipex (Phentermine) please contact the endoscopy scheduling department listed below as soon as possible.    If you are diabetic see the attached instruction sheet regarding your medication.     If you take HEART, BLOOD PRESSURE, SEIZURE, PAIN, LUNG (including inhalers/nebulizers), ANTI-REJECTION (transplant patients), or PSYCHIATRIC medications, please take at your regular times with a sip of water or as directed by the scheduling nurse.     Important contact information:    Endoscopy [phone] Hours of operation Monday-Friday 8:00-4:30pm.    Questions about insurance or financial obligations call [phone] or [phone].    If you have questions regarding the prep or need to reschedule, please call [phone]. After hours questions requiring immediate assistance, contact [facility] nurse line at [phone] or [phone].   NOTE:     On occasion, unforeseen circumstances may cause a delay in your procedure start time. We respect your time and appreciate your patience during these  circumstances.      Comments:       Are you ready for your Colonoscopy?      __ If you take blood thinners,weight loss or diabetic injectable medications, have you stopped taking them according to your doctor's instructions before your procedures?  __ Have you stopped eating solid foods and followed a clear liquid diet a full day before your procedure or followed the diet       guidelines indicated in your instructions?       REMINDER: NO BROTH AFTER MIDNIGHT THE DAY BEFORE PROCEDURE.   __ Have you completed all your prep solution? PLEASE DO NOT FOLLOW the insert/or box instructions from pharmacy)  __ Have you taken your blood pressure, heart, seizure, or other essential medications the morning of your procedure?  __ Have you planned for a ride to and from procedure?  (Medical Transportation, Uber, Lyft, Taxi, etc. may ONLY be used if a responsible adult is present to accompany you home). The responsible adult CANNOT be the  of the service.  person must be available to return and pick you up within 15 minutes of being notified of discharge.           Questions or Concerns?  Please call us!  [phone] (M-F) [phone] (Nights and weekends)    Listed below are some helpful tips:    Please bring protective cases for eyewear and hearing aids-wear comfortable clothing/shoes.  Follow prep instructions closely so you don't have to do it twice.  Bowel prep is prescription used to clean out the colon before a colonoscopy. The prep increases movement of your colon by causing you to have diarrhea (loose stools). Cleaning out stool from the colon helps your doctor to see in your colon clearly during this procedure.   It is important to stay hydrated before, during and after bowel prep to prevent loss of fluid (dehydration). You can have water and your choice of clear liquids. Reminder: these liquids you will drink in addition to the bowel prep.     Preparing the mixture:    First, mix the prep with  water.  Make the taste better by adding a sugar free drink mix (Crystal Light) can improve the taste of your prep.   Use a large bore (opening) straw. Place towards the back of mouth (throat) as tolerated.  Prepare a prep mixture that is lightly chilled, but not ice-cold. Drinking a large amount of ice-cold liquid can make you feel very ill.  Avoid drinking any RED beverages or eating popsicles with this color for the 24 hours leading up to your procedure. This color can look like blood in the colon.    Consuming the prep:    Take your time. If you feel ill, take a 15-minute break from drinking the prep mixture  Combat hunger and dehydration with clear liquids. Options like JELL-O, or popsicles will help.  Settle in with good reading material. The goal is to clean out 6 feet of colon, so you can plan on spending a good deal of time in the bathroom. Have some good reading material on-hand or an iPad ready to keep yourself entertained!  Keep yourself comfortable. We recommend applying personal hygiene wipes, Tucks pads and a soothing ointment, like A&D ointment, Desitin, or Vaseline to your bottom before starting and as needed to protect your skin.

## 2025-01-30 ENCOUNTER — OFFICE VISIT (OUTPATIENT)
Dept: OPHTHALMOLOGY | Facility: CLINIC | Age: 80
End: 2025-01-30
Payer: MEDICARE

## 2025-01-30 DIAGNOSIS — Z96.89 HISTORY OF GLAUCOMA TUBE SHUNT PROCEDURE: ICD-10-CM

## 2025-01-30 DIAGNOSIS — H40.1122 PRIMARY OPEN ANGLE GLAUCOMA OF LEFT EYE, MODERATE STAGE: ICD-10-CM

## 2025-01-30 DIAGNOSIS — Z96.1 PSEUDOPHAKIA OF BOTH EYES: ICD-10-CM

## 2025-01-30 DIAGNOSIS — H26.492 PCO (POSTERIOR CAPSULAR OPACIFICATION), LEFT: Primary | ICD-10-CM

## 2025-01-30 DIAGNOSIS — H40.1113 PRIMARY OPEN ANGLE GLAUCOMA OF RIGHT EYE, SEVERE STAGE: ICD-10-CM

## 2025-01-30 DIAGNOSIS — Z98.83 GLAUCOMA FILTERING BLEB OF BOTH EYES: ICD-10-CM

## 2025-01-30 DIAGNOSIS — E11.9 TYPE 2 DIABETES MELLITUS WITHOUT OPHTHALMIC MANIFESTATIONS: ICD-10-CM

## 2025-01-30 DIAGNOSIS — H04.123 DRY EYES, BILATERAL: ICD-10-CM

## 2025-01-30 PROCEDURE — 99999 PR PBB SHADOW E&M-EST. PATIENT-LVL III: CPT | Mod: PBBFAC,,, | Performed by: OPHTHALMOLOGY

## 2025-01-30 PROCEDURE — 99213 OFFICE O/P EST LOW 20 MIN: CPT | Mod: PBBFAC | Performed by: OPHTHALMOLOGY

## 2025-01-30 PROCEDURE — 99499 UNLISTED E&M SERVICE: CPT | Mod: S$PBB,,, | Performed by: OPHTHALMOLOGY

## 2025-01-30 RX ORDER — LATANOPROST 50 UG/ML
1 SOLUTION/ DROPS OPHTHALMIC NIGHTLY
COMMUNITY

## 2025-01-31 ENCOUNTER — EXTERNAL CHRONIC CARE MANAGEMENT (OUTPATIENT)
Dept: PRIMARY CARE CLINIC | Facility: CLINIC | Age: 80
End: 2025-01-31
Payer: MEDICARE

## 2025-01-31 PROCEDURE — 99490 CHRNC CARE MGMT STAFF 1ST 20: CPT | Mod: PBBFAC | Performed by: INTERNAL MEDICINE

## 2025-01-31 PROCEDURE — 99490 CHRNC CARE MGMT STAFF 1ST 20: CPT | Mod: S$PBB,,, | Performed by: INTERNAL MEDICINE

## 2025-01-31 NOTE — PROGRESS NOTES
HPI    DLS: 12/12/24    Patient here for yag OS. Patient states redness is a little better since   switching from Rocklatan to Latanoprost.     1) Severe POAG OU  2) PCIOL OU   3) Type 2 DM no DR   4) LOLA   5) PVD OU   6) Yag Cap OU     MEDS:   Dorzolamide TID OS   Latanoprost QHS OS   Pres Free AT's  PRN OU     Last edited by Rachael Matute MA on 1/30/2025  9:56 AM.            Assessment /Plan     For exam results, see Encounter Report.    PCO (posterior capsular opacification), left    Primary open angle glaucoma of right eye, severe stage    Primary open angle glaucoma of left eye, moderate stage    Type 2 diabetes mellitus without ophthalmic manifestations    Dry eyes, bilateral    Pseudophakia of both eyes    Glaucoma filtering bleb of both eyes    History of glaucoma tube shunt procedure        1. POAG (primary open-angle glaucoma) - Severe stage - Both Eyes      Now with  od   Glaucoma (type and duration) POAG,   -  first HVF 1997  -  first photo: 1996     Family history None   Glaucoma meds - (off all gtts os post combined)  (( use to use - Alphagan od , Xalatan od , dorzolamide bid od ))  H/O adverse rxn to glaucoma drops into to BB 2/2 asthma // brimonidine - irritation - tearing   LASERS SLT 12/8/05 and repeat 7/16/09 OD, and SLT 1/6/06 OS;  SLT os 7/31/14, od 8/14/14 (no response ou) // yag cap od 1/3/2019 /// yag cap os  10/17/2019   GLAUCOMA SURGERIES - combined phaco/IOL trab -os 12/28/2015// elastic sclera - 9 sutures to close - all visible ones cut /// combined - phaco/trab w/ minishunt od - 3/30/2016 // ahmed OD 9/21/2022  OTHER EYE SURGERIES PC IOL OS (combined 1/28/2015) // PC IOL od (combined 3/30/2016)  CDR 0.95 OU   Tbase 22 OU   Tmax 37 (4/11/2016) /38 ( 4/16/2015)   Ttarget 14/14  HVF 25 HVF: 3829-8371- now gets 10-2 stim V   OD, // SAD / IAD  Os (+prog ou 10/2020)   Gonio ext PAS od - 90 degrees open // +1-3 os    /421   OCT 6 test 2017 - 2024: unable to interpret - grayed  out   OD// Dec thru out   HRT 13 test 2005 to 2020 - MR- Dec. SN/N, bord IT od // dec. N/IN, bord SN/IT os /// CDR 0.78 od // 0.75 os  Disc photos 1996, 1997, 2003: slides // 2012 , 2015 - OIS    Ttoday  10 off gtts post ahmed / 8    on gtts ( eye VERY red w/ rocklatan)   Test done today:IOP  // DFE      2. Diabetes mellitus type II   No BDR     3. Dry eyes - Both Eyes   AT's prn     4. Posterior vitreous detachment of both eyes - Both Eyes   With floaters - stable     5. A lot of family stressors   Mom passed since last visit  dad elderly and in poor health, have sitters  Daughter is unemployed and had to move back home  Daughter recently ill - in ICU and intubated 2/2 pancreatitis (8/2/2015 to 8/11/2015)      6. S/P yag cap ou   -od 10/3/3019 (also Rx ant capsule phimosis od)   -OS - 10/17/2019        Plan  Glaucoma   + blebs ou -  S/P ahmed od -  9/21/2022     Cont AT's ou prn     Sees Lisa for glasses     AT's prn     Photo file updated -5/18/2023     S/P ahmed OD  Date:9/21/2022   POY 2  anterior chamber depth  deep   Bleb appearance  elevated over ahmed plate   sidell neg   IOP  10  AC deep // no choroidals - NO drops       10/31/2024   Cont glaucoma drops os   Dorzolamide tid os   Latanoprost 1 x day os -- try swithcing to rocklatan - sample given    IOP seems ok at 12 / but pt is aware of declining vision   ?? If could try alphagan P name brand   ?? If could phong diamox   Consider additional filtration surgery os - ? Ahmed or [facility] - already has a trab with some function - + elevated bleb     OFF brimonidine - ? Allergy - tearing / irritation     ? PVD os - warned of signs of RD    dr gordon- glasses    IOP better with rocklatan os - But VERY red and irritated   Change back from rocklatan to latanoprost  OS  Cont dorz os   C/O blurr / ++ deposits on ant surface of IOl and some PCO as well    Rec enlarge yag cap and try and yag the ant surface IOL deposits to clear visual axis (also check to see if eye less red  "off the rockaltan os     Add AT's prn      In future cont with 10-2 stim V od and consider switching to 10-2 ss os     1/30/2025    Yag cap  os and "polish of IOL" - maxitrol eye drops - 12 day taper - sample     F/U 4 months with HVF 10-2 stim V od and 24-2 ss os .. DFE .. OCT     "

## 2025-02-07 DIAGNOSIS — J45.50 SEVERE PERSISTENT ASTHMA WITHOUT COMPLICATION: ICD-10-CM

## 2025-02-10 RX ORDER — IPRATROPIUM BROMIDE AND ALBUTEROL SULFATE 2.5; .5 MG/3ML; MG/3ML
SOLUTION RESPIRATORY (INHALATION)
Qty: 180 ML | Refills: 11 | Status: SHIPPED | OUTPATIENT
Start: 2025-02-10

## 2025-02-18 ENCOUNTER — TELEPHONE (OUTPATIENT)
Dept: ENDOSCOPY | Facility: HOSPITAL | Age: 80
End: 2025-02-18
Payer: MEDICARE

## 2025-02-18 NOTE — TELEPHONE ENCOUNTER
----- Message from Yanick Garcia MD sent at 2/18/2025  2:49 PM CST -----  The case is booked in a 60-min general GI slot, appropriately, and will use a slim pediatric colonoscope with the balloon scope available in case needed.  However, I should not be doing these tougher cases with a swing room.  I can still do this case tomorrow.  ----- Message -----  From: Gareth Niño MA  Sent: 2/18/2025   2:15 PM CST  To: Yanick Garcia MD; [facility] Staff    Dr. Garcia,This patient is scheduled with you on 2/19 . Edda sent a message stating the patient is scheduled incorrectly for a single balloon. Agatha asked that a message be sent to you to see if you can still scope the patient on tomorrow. Patient states she has started her prep and that this is the 3rd time that she had to be rescheduled .Please advise

## 2025-02-19 ENCOUNTER — ANESTHESIA EVENT (OUTPATIENT)
Dept: ENDOSCOPY | Facility: HOSPITAL | Age: 80
End: 2025-02-19
Payer: MEDICARE

## 2025-02-19 ENCOUNTER — ANESTHESIA (OUTPATIENT)
Dept: ENDOSCOPY | Facility: HOSPITAL | Age: 80
End: 2025-02-19
Payer: MEDICARE

## 2025-02-19 ENCOUNTER — HOSPITAL ENCOUNTER (OUTPATIENT)
Facility: HOSPITAL | Age: 80
Discharge: HOME | End: 2025-02-19
Attending: INTERNAL MEDICINE | Admitting: INTERNAL MEDICINE
Payer: MEDICARE

## 2025-02-19 VITALS
WEIGHT: 167 LBS | OXYGEN SATURATION: 96 % | RESPIRATION RATE: 16 BRPM | TEMPERATURE: 98 F | HEIGHT: 60 IN | DIASTOLIC BLOOD PRESSURE: 68 MMHG | SYSTOLIC BLOOD PRESSURE: 145 MMHG | BODY MASS INDEX: 32.79 KG/M2 | HEART RATE: 93 BPM

## 2025-02-19 DIAGNOSIS — D50.9 IDA (IRON DEFICIENCY ANEMIA): ICD-10-CM

## 2025-02-19 DIAGNOSIS — Z86.0100 HISTORY OF COLON POLYPS: Primary | ICD-10-CM

## 2025-02-19 LAB — POCT GLUCOSE: 157 MG/DL (ref 70–110)

## 2025-02-19 PROCEDURE — 27202087 HC PROBE, APC: Performed by: INTERNAL MEDICINE

## 2025-02-19 PROCEDURE — 88305 TISSUE EXAM BY PATHOLOGIST: CPT | Performed by: STUDENT IN AN ORGANIZED HEALTH CARE EDUCATION/TRAINING PROGRAM

## 2025-02-19 PROCEDURE — 27201089 HC SNARE, DISP (ANY): Performed by: INTERNAL MEDICINE

## 2025-02-19 PROCEDURE — 45385 COLONOSCOPY W/LESION REMOVAL: CPT | Mod: PT,59,, | Performed by: INTERNAL MEDICINE

## 2025-02-19 PROCEDURE — 45388 COLONOSCOPY W/ABLATION: CPT | Mod: PT,,, | Performed by: INTERNAL MEDICINE

## 2025-02-19 PROCEDURE — 88305 TISSUE EXAM BY PATHOLOGIST: CPT | Mod: 26,,, | Performed by: STUDENT IN AN ORGANIZED HEALTH CARE EDUCATION/TRAINING PROGRAM

## 2025-02-19 PROCEDURE — 25000242 PHARM REV CODE 250 ALT 637 W/ HCPCS: Performed by: ANESTHESIOLOGY

## 2025-02-19 PROCEDURE — 25000003 PHARM REV CODE 250: Performed by: INTERNAL MEDICINE

## 2025-02-19 PROCEDURE — 37000008 HC ANESTHESIA 1ST 15 MINUTES: Performed by: INTERNAL MEDICINE

## 2025-02-19 PROCEDURE — 45388 COLONOSCOPY W/ABLATION: CPT | Mod: PT | Performed by: INTERNAL MEDICINE

## 2025-02-19 PROCEDURE — 45385 COLONOSCOPY W/LESION REMOVAL: CPT | Mod: PT,59 | Performed by: INTERNAL MEDICINE

## 2025-02-19 PROCEDURE — 37000009 HC ANESTHESIA EA ADD 15 MINS: Performed by: INTERNAL MEDICINE

## 2025-02-19 PROCEDURE — 63600175 PHARM REV CODE 636 W HCPCS: Performed by: STUDENT IN AN ORGANIZED HEALTH CARE EDUCATION/TRAINING PROGRAM

## 2025-02-19 RX ORDER — IPRATROPIUM BROMIDE 0.5 MG/2.5ML
0.5 SOLUTION RESPIRATORY (INHALATION) ONCE
Status: DISCONTINUED | OUTPATIENT
Start: 2025-02-19 | End: 2025-02-19

## 2025-02-19 RX ORDER — SODIUM CHLORIDE 9 MG/ML
INJECTION, SOLUTION INTRAVENOUS CONTINUOUS
Status: DISCONTINUED | OUTPATIENT
Start: 2025-02-19 | End: 2025-02-19 | Stop reason: HOSPADM

## 2025-02-19 RX ORDER — PROPOFOL 10 MG/ML
VIAL (ML) INTRAVENOUS
Status: DISCONTINUED | OUTPATIENT
Start: 2025-02-19 | End: 2025-02-19

## 2025-02-19 RX ORDER — LIDOCAINE HYDROCHLORIDE 20 MG/ML
INJECTION INTRAVENOUS
Status: DISCONTINUED | OUTPATIENT
Start: 2025-02-19 | End: 2025-02-19

## 2025-02-19 RX ORDER — ALBUTEROL SULFATE 2.5 MG/.5ML
2.5 SOLUTION RESPIRATORY (INHALATION) EVERY 4 HOURS PRN
Status: DISCONTINUED | OUTPATIENT
Start: 2025-02-19 | End: 2025-02-19 | Stop reason: HOSPADM

## 2025-02-19 RX ORDER — IPRATROPIUM BROMIDE AND ALBUTEROL SULFATE 2.5; .5 MG/3ML; MG/3ML
3 SOLUTION RESPIRATORY (INHALATION) ONCE
Status: COMPLETED | OUTPATIENT
Start: 2025-02-19 | End: 2025-02-19

## 2025-02-19 RX ORDER — PHENYLEPHRINE HYDROCHLORIDE 10 MG/ML
INJECTION INTRAVENOUS
Status: DISCONTINUED | OUTPATIENT
Start: 2025-02-19 | End: 2025-02-19

## 2025-02-19 RX ADMIN — IPRATROPIUM BROMIDE AND ALBUTEROL SULFATE 3 ML: 2.5; .5 SOLUTION RESPIRATORY (INHALATION) at 02:02

## 2025-02-19 RX ADMIN — PHENYLEPHRINE HYDROCHLORIDE 200 MCG: 10 INJECTION INTRAVENOUS at 04:02

## 2025-02-19 RX ADMIN — SODIUM CHLORIDE: 0.9 INJECTION, SOLUTION INTRAVENOUS at 04:02

## 2025-02-19 RX ADMIN — PROPOFOL 30 MG: 10 INJECTION, EMULSION INTRAVENOUS at 04:02

## 2025-02-19 RX ADMIN — PROPOFOL 175 MCG/KG/MIN: 10 INJECTION, EMULSION INTRAVENOUS at 04:02

## 2025-02-19 RX ADMIN — PHENYLEPHRINE HYDROCHLORIDE 100 MCG: 10 INJECTION INTRAVENOUS at 04:02

## 2025-02-19 RX ADMIN — PROPOFOL 50 MG: 10 INJECTION, EMULSION INTRAVENOUS at 04:02

## 2025-02-19 RX ADMIN — LIDOCAINE HYDROCHLORIDE 100 MG: 20 INJECTION INTRAVENOUS at 04:02

## 2025-02-19 RX ADMIN — PROPOFOL 20 MG: 10 INJECTION, EMULSION INTRAVENOUS at 04:02

## 2025-02-19 NOTE — ANESTHESIA PREPROCEDURE EVALUATION
02/19/2025  Lupe Jewell is a 79 y.o., female.      Review of patient's allergies indicates:   Allergen Reactions    Penicillins Rash       Medications Prior to Admission   Medication Sig Dispense Refill Last Dose/Taking    albuterol (PROVENTIL/VENTOLIN HFA) 90 mcg/actuation inhaler INHALE 2 PUFFS INTO THE LUNGS EVERY 6 HOURS AS NEEDED FOR WHEEZING OR RESCUE 8.5 g 3     albuterol-ipratropium (DUO-NEB) 2.5 mg-0.5 mg/3 mL nebulizer solution USE 3 ML VIA NEBULIZER EVERY 6 HOURS AS NEEDED FOR WHEEZING OR SHORTNESS OF BREATH 180 mL 11     alendronate (FOSAMAX) 70 MG tablet TAKE 1 TABLET(70 MG) BY MOUTH EVERY 7 DAYS 12 tablet 1     amitriptyline (ELAVIL) 25 MG tablet TAKE 1 TABLET(25 MG) BY MOUTH EVERY EVENING 90 tablet 2     atorvastatin (LIPITOR) 10 MG tablet TAKE 1 TABLET(10 MG) BY MOUTH DAILY 90 tablet 3     azelastine (ASTELIN) 137 mcg (0.1 %) nasal spray USE 2 SPRAYS IN EACH NOSTRIL TWICE DAILY 30 mL 2     dorzolamide (TRUSOPT) 2 % ophthalmic solution Place 1 drop into the left eye 3 (three) times daily. 10 mL 12     fluticasone propionate (FLONASE) 50 mcg/actuation nasal spray SHAKE LIQUID AND USE 2 SPRAYS IN EACH NOSTRIL DAILY 16 g 2     fluticasone-salmeterol diskus inhaler 250-50 mcg Inhale 1 puff into the lungs 2 (two) times daily. Controller 60 each 11     latanoprost 0.005 % ophthalmic solution Place 1 drop into the left eye every evening.       losartan (COZAAR) 50 MG tablet TAKE 1 TABLET(50 MG) BY MOUTH EVERY DAY 90 tablet 3     metFORMIN (GLUCOPHAGE) 500 MG tablet Take 1 tablet (500 mg total) by mouth 2 (two) times daily with meals. 180 tablet 1     mometasone-formoterol (DULERA) 200-5 mcg/actuation inhaler Inhale 2 puffs into the lungs 2 (two) times daily. Controller 13 g 11     montelukast (SINGULAIR) 10 mg tablet TAKE 1 TABLET(10 MG) BY MOUTH EVERY EVENING 90 tablet 3     netarsudiL-latanoprost  (ROCKLATAN) 0.02-0.005 % Drop Place 1 drop into the left eye once daily. (Patient not taking: Reported on 1/30/2025)       triamterene-hydrochlorothiazide 37.5-25 mg (MAXZIDE-25) 37.5-25 mg per tablet TAKE 1 TABLET BY MOUTH EVERY DAY 90 tablet 1          0.9% NaCl   Intravenous Continuous           No current facility-administered medications on file prior to encounter.     Current Outpatient Medications on File Prior to Encounter   Medication Sig Dispense Refill    albuterol (PROVENTIL/VENTOLIN HFA) 90 mcg/actuation inhaler INHALE 2 PUFFS INTO THE LUNGS EVERY 6 HOURS AS NEEDED FOR WHEEZING OR RESCUE 8.5 g 3    alendronate (FOSAMAX) 70 MG tablet TAKE 1 TABLET(70 MG) BY MOUTH EVERY 7 DAYS 12 tablet 1    amitriptyline (ELAVIL) 25 MG tablet TAKE 1 TABLET(25 MG) BY MOUTH EVERY EVENING 90 tablet 2    atorvastatin (LIPITOR) 10 MG tablet TAKE 1 TABLET(10 MG) BY MOUTH DAILY 90 tablet 3    azelastine (ASTELIN) 137 mcg (0.1 %) nasal spray USE 2 SPRAYS IN EACH NOSTRIL TWICE DAILY 30 mL 2    dorzolamide (TRUSOPT) 2 % ophthalmic solution Place 1 drop into the left eye 3 (three) times daily. 10 mL 12    fluticasone propionate (FLONASE) 50 mcg/actuation nasal spray SHAKE LIQUID AND USE 2 SPRAYS IN EACH NOSTRIL DAILY 16 g 2    fluticasone-salmeterol diskus inhaler 250-50 mcg Inhale 1 puff into the lungs 2 (two) times daily. Controller 60 each 11    losartan (COZAAR) 50 MG tablet TAKE 1 TABLET(50 MG) BY MOUTH EVERY DAY 90 tablet 3    metFORMIN (GLUCOPHAGE) 500 MG tablet Take 1 tablet (500 mg total) by mouth 2 (two) times daily with meals. 180 tablet 1    mometasone-formoterol (DULERA) 200-5 mcg/actuation inhaler Inhale 2 puffs into the lungs 2 (two) times daily. Controller 13 g 11    montelukast (SINGULAIR) 10 mg tablet TAKE 1 TABLET(10 MG) BY MOUTH EVERY EVENING 90 tablet 3    netarsudiL-latanoprost (ROCKLATAN) 0.02-0.005 % Drop Place 1 drop into the left eye once daily. (Patient not taking: Reported on 1/30/2025)       triamterene-hydrochlorothiazide 37.5-25 mg (MAXZIDE-25) 37.5-25 mg per tablet TAKE 1 TABLET BY MOUTH EVERY DAY 90 tablet 1       Past Medical History:  08/17/2012: ALLERGIC RHINITIS  No date: Amblyopia  08/17/2012: Asthma, well controlled  No date: Chronic asthma  No date: Chronic rhinitis  02/04/2014: Diabetes  No date: Diabetes mellitus  No date: Diabetes mellitus type II  No date: Fever blister  08/17/2012: GERD (gastroesophageal reflux disease)  No date: Glaucoma  No date: Hyperlipemia  No date: Hypertension  No date: Macular degeneration  No date: Obstructive sleep apnea  No date: Osteoporosis, unspecified  No date: Recurrent upper respiratory infection (URI)  No date: Retinal detachment  No date: Strabismus  No date: Uveitis    Past Surgical History:   Procedure Laterality Date    BLADDER SURGERY      BREAST BIOPSY Left     BREAST SURGERY      left breast biopsy    CATARACT EXTRACTION W/  INTRAOCULAR LENS IMPLANT Left 01/28/2015    WITH TRAB ()    CATARACT EXTRACTION W/  INTRAOCULAR LENS IMPLANT Right 03/30/2016    WITH TRAB ()    COLONOSCOPY N/A 07/01/2016    Procedure: COLONOSCOPY;  Surgeon: Rony Lima MD;  Location: [facility] (4TH FLR);  Service: Endoscopy;  Laterality: N/A;    COLONOSCOPY N/A 02/04/2022    Procedure: COLONOSCOPY;  Surgeon: FARA Benitez MD;  Location: [facility] (4TH FLR);  Service: Endoscopy;  Laterality: N/A;  fully vacc-inst mail-tb.Covid test at [facility] on 2/1.EC    COLONOSCOPY N/A 11/04/2024    Procedure: COLONOSCOPY;  Surgeon: Domi Carbajal MD;  Location: [facility] (2ND FLR);  Service: Endoscopy;  Laterality: N/A;    ESOPHAGOGASTRODUODENOSCOPY N/A 11/04/2024    Procedure: EGD (ESOPHAGOGASTRODUODENOSCOPY);  Surgeon: Domi Carbajal MD;  [facility]  Service: Endoscopy;  Laterality: N/A;  referral a labat/ prep inst given in clinic / diabetic / asthma/ suprep  cleared by cardiology Dr Umanzor-GT  10/28-[facility] for pre  "call-tb  10/29 Pre-call complete- ASul    EYE SURGERY Bilateral     cataract removal and Laser surgery    HYSTERECTOMY      IMPLANTATION OF DEVICE FOR GLAUCOMA Right 2022    AHMED ()    stomach procedure      TRABECULECTOMY Left 2015    COMBINED WITH CE ()    TRABECULECTOMY Right 2016    COMBINED WITH CE ()    TUBAL LIGATION      YAG CAPSULOTOMY Bilateral 10/3/2019 and 10/17/2019           Social History     Tobacco Use   Smoking Status Former    Current packs/day: 0.00    Average packs/day: 0.3 packs/day for 2.0 years (0.5 ttl pk-yrs)    Types: Cigarettes    Start date:     Quit date:     Years since quittin.1    Passive exposure: Past   Smokeless Tobacco Never       Social History     Substance and Sexual Activity   Alcohol Use Yes    Alcohol/week: 12.0 standard drinks of alcohol    Types: 6 Glasses of wine, 6 Cans of beer per week    Comment: socially       Physical Activity: Not on file       No birth history on file.  No results for input(s): "HCT" in the last 72 hours.  No results for input(s): "PLT" in the last 72 hours.  No results for input(s): "K" in the last 72 hours.  No results for input(s): "CREATININE" in the last 72 hours.  No results for input(s): "GLU" in the last 72 hours.  No results for input(s): "PT" in the last 72 hours.  There were no vitals filed for this visit.        PFT 2024: no obstruction, FVC is low suggesting restriction, no bronchodilator responsiveness             Pre-op Assessment    I have reviewed the Patient Summary Reports.     I have reviewed the Nursing Notes. I have reviewed the NPO Status.      Review of Systems  Anesthesia Hx:  No problems with previous Anesthesia                Hematology/Oncology:    Oncology Normal                                   Cardiovascular:     Denies Pacemaker. Hypertension, well controlled   Denies MI.     Denies CABG/stent.    Denies Angina.         30 day event " monitor OCT/NOV 2024 showed  12 beat run of nonsustained VT (rate of 206 bpm).                           Pulmonary:    Asthma severe Shortness of breath (usually relieved by albuterol)  Sleep Apnea She takes albuterol as needed, dulera 200 x2 puffs BID, singulair, nebulizer prn.               Renal/:   Denies Chronic Renal Disease.                Hepatic/GI:      Denies Liver Disease.               Neurological:  Denies TIA.  Denies CVA.    Denies Seizures.                                Endocrine:  Diabetes, type 2         Obesity / BMI > 30      Physical Exam  General: Well nourished, Cooperative, Alert and Oriented    Airway:  Mallampati: II   Mouth Opening: Normal  TM Distance: Normal  Tongue: Normal  Neck ROM: Normal ROM    Chest/Lungs:  Clear to auscultation, Normal Respiratory Rate    Heart:  Rate: Normal  Rhythm: Regular Rhythm  Sounds: Normal        Anesthesia Plan  Type of Anesthesia, risks & benefits discussed:    Anesthesia Type: Gen Natural Airway  Intra-op Monitoring Plan: Standard ASA Monitors  Post Op Pain Control Plan: IV/PO Opioids PRN  Induction:  IV  Informed Consent: Informed consent signed with the Patient and all parties understand the risks and agree with anesthesia plan.  All questions answered.   ASA Score: 3  Day of Surgery Review of History & Physical: H&P Update referred to the surgeon/provider.  Anesthesia Plan Notes: Duoneb ordered to be given Preop    Ready For Surgery From Anesthesia Perspective.     .  Vent. Rate : 089 BPM     Atrial Rate : 089 BPM      P-R Int : 148 ms          QRS Dur : 080 ms       QT Int : 384 ms       P-R-T Axes : 055 023 053 degrees      QTc Int : 467 ms     Normal sinus rhythm   Nonspecific T wave abnormality   Abnormal ECG   When compared with ECG of 30-JUN-2017 12:22,   Premature atrial complexes are no longer Present   Confirmed by Wale Turpin MD ([id number]) on 10/7/2024 10:27:39 PM

## 2025-02-19 NOTE — TRANSFER OF CARE
Anesthesia Transfer of Care Note    Patient: Lupe Jewell    Procedure(s) Performed: Procedure(s) (LRB):  COLONOSCOPY, SCREENING, HIGH RISK PATIENT (N/A)    Patient location: PACU    Anesthesia Type: general    Transport from OR: Transported from OR on 6-10 L/min O2 by face mask with adequate spontaneous ventilation    Post pain: adequate analgesia    Post assessment: no apparent anesthetic complications    Post vital signs: stable    Level of consciousness: awake    Nausea/Vomiting: no nausea/vomiting    Complications: none    Transfer of care protocol was followed      Last vitals: Visit Vitals  BP (!) 178/78 (BP Location: Left arm, Patient Position: Lying)   Pulse 99   Temp 36.5 °C (97.7 °F) (Temporal)   Resp 16   Ht 5' (1.524 m)   Wt 75.8 kg (167 lb)   SpO2 99%   Breastfeeding No   BMI 32.61 kg/m²

## 2025-02-19 NOTE — H&P
Short Stay Endoscopy History and Physical      Procedure - Colonoscopy  ASA - per anesthesia  Mallampati - per anesthesia  History of Anesthesia problems - no  Family history Anesthesia problems - no   Plan of anesthesia - MAC    HPI:  This is a 79 y.o. female here for colonoscopy to assess for history of colon polyps and had recent GI bleeding.  Also noted to have recent incomplete colonoscopy with stricture in the sigmoid.       ROS:  Constitutional: No fevers, chills  CV: No chest pain  Pulm: No cough, No shortness of breath  GI: see HPI    Medical History:  has a past medical history of ALLERGIC RHINITIS (08/17/2012), Amblyopia, Asthma, well controlled (08/17/2012), Chronic asthma, Chronic rhinitis, Diabetes (02/04/2014), Diabetes mellitus, Diabetes mellitus type II, Fever blister, GERD (gastroesophageal reflux disease) (08/17/2012), Glaucoma, Hyperlipemia, Hypertension, Macular degeneration, Obstructive sleep apnea, Osteoporosis, unspecified, Recurrent upper respiratory infection (URI), Retinal detachment, Strabismus, and Uveitis.    Surgical History:  has a past surgical history that includes Hysterectomy; stomach procedure; Bladder surgery; Colonoscopy (N/A, 07/01/2016); Cataract extraction w/  intraocular lens implant (Left, 01/28/2015); Cataract extraction w/  intraocular lens implant (Right, 03/30/2016); Trabeculectomy (Left, 12/28/2015); Trabeculectomy (Right, 03/30/2016); YAG CAPSULOTOMY (Bilateral, 10/3/2019 and 10/17/2019); Breast surgery; Eye surgery (Bilateral); Tubal ligation; Breast biopsy (Left); Colonoscopy (N/A, 02/04/2022); Implantation of device for glaucoma (Right, 09/21/2022); Esophagogastroduodenoscopy (N/A, 11/04/2024); and Colonoscopy (N/A, 11/04/2024).    Family History: family history includes Asthma in her grandchild, mother, sister, son, and another family member; Depression in her daughter; Diabetes in her daughter; Glaucoma in her mother, sister, and sister; Hyperlipidemia in  her daughter and sister; Hypertension in her daughter, mother, sister, and son; No Known Problems in her brother, father, maternal aunt, maternal grandfather, maternal grandmother, maternal uncle, paternal aunt, paternal grandfather, paternal grandmother, paternal uncle, and sister; Obesity in her daughter.    Social History:  reports that she quit smoking about 50 years ago. Her smoking use included cigarettes. She started smoking about 52 years ago. She has a 0.5 pack-year smoking history. She has been exposed to tobacco smoke. She has never used smokeless tobacco. She reports current alcohol use of about 12.0 standard drinks of alcohol per week. She reports that she does not use drugs.    Review of patient's allergies indicates:   Allergen Reactions    Penicillins Rash       Medications:   Prescriptions Prior to Admission[1]      Physical Exam:    Vital Signs:   Vitals:    02/19/25 1404   BP:    Pulse: 99   Resp:    Temp:        General Appearance: Well appearing in no acute distress  Eyes:    No scleral icterus  Lungs: CTA bilaterally  Heart:  reg rate and rhythm   Abdomen: Soft, non tender, non distended with positive bowel sounds      Labs:  Lab Results   Component Value Date    WBC 12.45 09/23/2024    HGB 12.0 09/23/2024    HCT 35.7 (L) 09/23/2024    MCV 83 09/23/2024     09/23/2024        BMP  Lab Results   Component Value Date     12/17/2024    K 3.9 12/17/2024     12/17/2024    CO2 26 12/17/2024    BUN 16 12/17/2024    CREATININE 0.9 12/17/2024    CALCIUM 9.8 12/17/2024    ANIONGAP 10 12/17/2024    ESTGFRAFRICA >60.0 06/08/2022    EGFRNONAA >60.0 06/08/2022     Lab Results   Component Value Date    INR 0.9 01/14/2005          Assessment:  79 y.o. female with history of colon polyps and incomplete colonoscopy with sigmoid stricture.     Plan:  Proceed with colonoscopy today, device-assisted as needed.  I have explained the risks and benefits of endoscopy procedures to the patient  including but not limited to bleeding, perforation, infection, and death.  All questions and answered.        Yanick Garcia MD         [1]   Medications Prior to Admission   Medication Sig Dispense Refill Last Dose/Taking    losartan (COZAAR) 50 MG tablet TAKE 1 TABLET(50 MG) BY MOUTH EVERY DAY 90 tablet 3 2/18/2025    metFORMIN (GLUCOPHAGE) 500 MG tablet Take 1 tablet (500 mg total) by mouth 2 (two) times daily with meals. 180 tablet 1 2/18/2025 at  8:00 AM    triamterene-hydrochlorothiazide 37.5-25 mg (MAXZIDE-25) 37.5-25 mg per tablet TAKE 1 TABLET BY MOUTH EVERY DAY 90 tablet 1 2/18/2025    albuterol (PROVENTIL/VENTOLIN HFA) 90 mcg/actuation inhaler INHALE 2 PUFFS INTO THE LUNGS EVERY 6 HOURS AS NEEDED FOR WHEEZING OR RESCUE 8.5 g 3     albuterol-ipratropium (DUO-NEB) 2.5 mg-0.5 mg/3 mL nebulizer solution USE 3 ML VIA NEBULIZER EVERY 6 HOURS AS NEEDED FOR WHEEZING OR SHORTNESS OF BREATH 180 mL 11     alendronate (FOSAMAX) 70 MG tablet TAKE 1 TABLET(70 MG) BY MOUTH EVERY 7 DAYS 12 tablet 1     amitriptyline (ELAVIL) 25 MG tablet TAKE 1 TABLET(25 MG) BY MOUTH EVERY EVENING 90 tablet 2     atorvastatin (LIPITOR) 10 MG tablet TAKE 1 TABLET(10 MG) BY MOUTH DAILY 90 tablet 3 2/17/2025    azelastine (ASTELIN) 137 mcg (0.1 %) nasal spray USE 2 SPRAYS IN EACH NOSTRIL TWICE DAILY 30 mL 2     dorzolamide (TRUSOPT) 2 % ophthalmic solution Place 1 drop into the left eye 3 (three) times daily. 10 mL 12     fluticasone propionate (FLONASE) 50 mcg/actuation nasal spray SHAKE LIQUID AND USE 2 SPRAYS IN EACH NOSTRIL DAILY 16 g 2     fluticasone-salmeterol diskus inhaler 250-50 mcg Inhale 1 puff into the lungs 2 (two) times daily. Controller 60 each 11     latanoprost 0.005 % ophthalmic solution Place 1 drop into the left eye every evening.       mometasone-formoterol (DULERA) 200-5 mcg/actuation inhaler Inhale 2 puffs into the lungs 2 (two) times daily. Controller 13 g 11     montelukast (SINGULAIR) 10 mg tablet TAKE 1  TABLET(10 MG) BY MOUTH EVERY EVENING 90 tablet 3     netarsudiL-latanoprost (ROCKLATAN) 0.02-0.005 % Drop Place 1 drop into the left eye once daily. (Patient not taking: Reported on 1/30/2025)

## 2025-02-19 NOTE — PROVATION PATIENT INSTRUCTIONS
Discharge Summary/Instructions after an Endoscopic Procedure  Patient Name: Lupe Thompson  Patient MRN: [id number]  Patient YOB: 1945 Wednesday, February 19, 2025  Yanick Garcia MD  Dear patient,  As a result of recent federal legislation (The Federal Cures Act), you may   receive lab or pathology results from your procedure in your [facility]   account before your physician is able to contact you. Your physician or   their representative will relay the results to you with their   recommendations at their soonest availability.  Thank you,  RESTRICTIONS:  During your procedure today, you received medications for sedation.  These   medications may affect your judgment, balance and coordination.  Therefore,   for 24 hours, you have the following restrictions:   - DO NOT drive a car, operate machinery, make legal/financial decisions,   sign important papers or drink alcohol.    ACTIVITY:  Today: no heavy lifting, straining or running due to procedural   sedation/anesthesia.  The following day: return to full activity including work.  DIET:  Eat and drink normally unless instructed otherwise.     TREATMENT FOR COMMON SIDE EFFECTS:  - Mild abdominal pain, nausea, belching, bloating or excessive gas:  rest,   eat lightly and use a heating pad.  - Sore Throat: treat with throat lozenges and/or gargle with warm salt   water.  - Because air was used during the procedure, expelling large amounts of air   from your rectum or belching is normal.  - If a bowel prep was taken, you may not have a bowel movement for 1-3 days.    This is normal.  SYMPTOMS TO WATCH FOR AND REPORT TO YOUR PHYSICIAN:  1. Abdominal pain or bloating, other than gas cramps.  2. Chest pain.  3. Back pain.  4. Signs of infection such as: chills or fever occurring within 24 hours   after the procedure.  5. Rectal bleeding, which would show as bright red, maroon, or black stools.   (A tablespoon of blood from the rectum is not serious, especially  if   hemorrhoids are present.)  6. Vomiting.  7. Weakness or dizziness.  GO DIRECTLY TO THE NEAREST EMERGENCY ROOM IF YOU HAVE ANY OF THE FOLLOWING:      Difficulty breathing              Chills and/or fever over 101 F   Persistent vomiting and/or vomiting blood   Severe abdominal pain   Severe chest pain   Black, tarry stools   Bleeding- more than one tablespoon   Any other symptom or condition that you feel may need urgent attention  Your doctor recommends these additional instructions:  If any biopsies were taken, your doctors clinic will contact you in 1 to 2   weeks with any results.  - Discharge patient to home.   - Patient has a contact number available for emergencies.  The signs and   symptoms of potential delayed complications were discussed with the   patient.  Return to normal activities tomorrow.  Written discharge   instructions were provided to the patient.   - Resume previous diet.   - Continue present medications.   - Await pathology results.   - No repeat colonoscopy due to age.  For questions, problems or results please call your physician - Yanick Garcia MD at Work:  [phone].  [facility], EMERGENCY ROOM PHONE NUMBER: [phone]  IF A COMPLICATION OR EMERGENCY SITUATION ARISES AND YOU ARE UNABLE TO REACH   YOUR PHYSICIAN - GO DIRECTLY TO THE EMERGENCY ROOM.  Yanick Garcia MD  2/19/2025 4:51:05 PM  This report has been verified and signed electronically.  Dear patient,  As a result of recent federal legislation (The Federal Cures Act), you may   receive lab or pathology results from your procedure in your [facility]   account before your physician is able to contact you. Your physician or   their representative will relay the results to you with their   recommendations at their soonest availability.  Thank you,  PROVATION

## 2025-02-20 RX ORDER — LATANOPROST 50 UG/ML
1 SOLUTION/ DROPS OPHTHALMIC NIGHTLY
Qty: 2.5 ML | Refills: 12 | Status: SHIPPED | OUTPATIENT
Start: 2025-02-20

## 2025-02-20 NOTE — ANESTHESIA POSTPROCEDURE EVALUATION
Anesthesia Post Evaluation    Patient: Lupe Jewell    Procedure(s) Performed: Procedure(s) (LRB):  COLONOSCOPY, SCREENING, HIGH RISK PATIENT (N/A)    Final Anesthesia Type: general      Patient location during evaluation: PACU  Patient participation: Yes- Able to Participate  Level of consciousness: awake and alert  Post-procedure vital signs: reviewed and stable  Pain management: adequate  Airway patency: patent    PONV status at discharge: No PONV  Anesthetic complications: no      Cardiovascular status: hemodynamically stable  Respiratory status: spontaneous ventilation  Follow-up not needed.              Vitals Value Taken Time   /68 02/19/25 17:19   Temp 98.0 02/20/25 07:22   Pulse 93 02/19/25 17:19   Resp 16 02/19/25 17:19   SpO2 96 % 02/19/25 17:19         Event Time   Out of Recovery 17:37:54         Pain/Analia Score: No data recorded

## 2025-02-21 ENCOUNTER — RESULTS FOLLOW-UP (OUTPATIENT)
Dept: GASTROENTEROLOGY | Facility: CLINIC | Age: 80
End: 2025-02-21

## 2025-02-21 LAB
FINAL PATHOLOGIC DIAGNOSIS: NORMAL
GROSS: NORMAL
Lab: NORMAL
MICROSCOPIC EXAM: NORMAL

## 2025-02-22 DIAGNOSIS — I10 HYPERTENSION, UNSPECIFIED TYPE: ICD-10-CM

## 2025-02-22 NOTE — TELEPHONE ENCOUNTER
Care Due:                  Date            Visit Type   Department     Provider  --------------------------------------------------------------------------------                                EP -                              PRIMARY      [facility] INTERNAL  Last Visit: 12-      CARE ([facility])   MEDICINE       Marisol Restrepo                              [facility]                              PRIMARY      [facility] INTERNAL  Next Visit: 06-      CARE ([facility])   MEDICINE       Marisol Restrepo                                                            Last  Test          Frequency    Reason                     Performed    Due Date  --------------------------------------------------------------------------------    Mg Level....  12 months..  alendronate..............  Not Found    Overdue    Phosphate...  12 months..  alendronate..............  Not Found    Overdue    [employer] Embedded Care Due Messages. Reference number: [id number].   2/22/2025 2:57:27 PM CST

## 2025-02-23 NOTE — TELEPHONE ENCOUNTER
Refill Routing Note   Medication(s) are not appropriate for processing by [facility] for the following reason(s):      Required vitals abnormal    [facility] action(s):  Defer Care Due:  Labs due            Appointments  past 12m or future 3m with PCP    Date Provider   Last Visit   12/17/2024 Marisol Restrepo MD   Next Visit   6/17/2025 Marisol Restrepo MD   ED visits in past 90 days: 0        Note composed:11:36 AM 02/23/2025

## 2025-02-24 RX ORDER — TRIAMTERENE/HYDROCHLOROTHIAZID 37.5-25 MG
1 TABLET ORAL
Qty: 90 TABLET | Refills: 1 | Status: SHIPPED | OUTPATIENT
Start: 2025-02-24

## 2025-02-25 ENCOUNTER — TELEPHONE (OUTPATIENT)
Dept: SURGERY | Facility: CLINIC | Age: 80
End: 2025-02-25
Payer: MEDICARE

## 2025-02-25 NOTE — TELEPHONE ENCOUNTER
"Placed call for [facility] appt scheduling.  Referring provider: Dr. Garcia   Dx: FI    Pt denies experiencing UI and presence of POP.  Ms. Jewell states she's had FI "for a long time."  Takes Imodium occasionally; Wears pads daily d/t accidents & seepage.     A request for a Friday, mid-morning appt was made. 1st availability meeting pt's criteria was offered. Pt provided verbal approval. Appt successfully scheduled.   Location details reviewed. Appt reminder slip to be placed in mail.     No additional needs identified at this time.      "

## 2025-02-28 ENCOUNTER — EXTERNAL CHRONIC CARE MANAGEMENT (OUTPATIENT)
Dept: PRIMARY CARE CLINIC | Facility: CLINIC | Age: 80
End: 2025-02-28
Payer: MEDICARE

## 2025-02-28 PROCEDURE — 99490 CHRNC CARE MGMT STAFF 1ST 20: CPT | Mod: PBBFAC | Performed by: INTERNAL MEDICINE

## 2025-03-10 ENCOUNTER — HOSPITAL ENCOUNTER (OUTPATIENT)
Dept: CARDIOLOGY | Facility: HOSPITAL | Age: 80
Discharge: HOME | End: 2025-03-10
Attending: STUDENT IN AN ORGANIZED HEALTH CARE EDUCATION/TRAINING PROGRAM
Payer: MEDICARE

## 2025-03-10 DIAGNOSIS — I47.29 NSVT (NONSUSTAINED VENTRICULAR TACHYCARDIA): ICD-10-CM

## 2025-03-10 PROCEDURE — 93306 TTE W/DOPPLER COMPLETE: CPT

## 2025-03-10 PROCEDURE — 93306 TTE W/DOPPLER COMPLETE: CPT | Mod: 26,,, | Performed by: INTERNAL MEDICINE

## 2025-03-11 LAB
ASCENDING AORTA: 2.74 CM
AV AREA BY CONTINUOUS VTI: 2.3 CM2
AV INDEX (PROSTH): 0.68
AV LVOT MEAN GRADIENT: 2 MMHG
AV LVOT PEAK GRADIENT: 4 MMHG
AV MEAN GRADIENT: 5 MMHG
AV PEAK GRADIENT: 8 MMHG
AV VALVE AREA BY VELOCITY RATIO: 2.5 CM²
AV VALVE AREA: 2.3 CM2
AV VELOCITY RATIO: 0.71
CV ECHO LV RWT: 0.5 CM
DOP CALC AO PEAK VEL: 1.4 M/S
DOP CALC AO VTI: 29.1 CM
DOP CALC LVOT AREA: 3.5 CM2
DOP CALC LVOT DIAMETER: 2.1 CM
DOP CALC LVOT PEAK VEL: 1 M/S
DOP CALC LVOT STROKE VOLUME: 68.2 CM3
DOP CALCLVOT PEAK VEL VTI: 19.7 CM
E WAVE DECELERATION TIME: 220 MS
E/A RATIO: 0.63
E/E' RATIO: 13 M/S
ECHO EF ESTIMATED: 74 %
ECHO LV POSTERIOR WALL: 0.9 CM (ref 0.6–1.1)
FRACTIONAL SHORTENING: 41.7 % (ref 28–44)
INTERVENTRICULAR SEPTUM: 0.9 CM (ref 0.6–1.1)
IVC DIAMETER: 0.74 CM
LA MAJOR: 4.3 CM
LA MINOR: 4.6 CM
LA WIDTH: 3.7 CM
LEFT ATRIUM SIZE: 4.7 CM
LEFT ATRIUM VOLUME MOD: 45 ML
LEFT ATRIUM VOLUME: 66 CM3
LEFT INTERNAL DIMENSION IN SYSTOLE: 2.1 CM (ref 2.1–4)
LEFT VENTRICLE DIASTOLIC VOLUME: 54 ML
LEFT VENTRICLE SYSTOLIC VOLUME: 14 ML
LEFT VENTRICULAR INTERNAL DIMENSION IN DIASTOLE: 3.6 CM (ref 3.5–6)
LEFT VENTRICULAR MASS: 92.8 G
LV LATERAL E/E' RATIO: 16.8 M/S
LV SEPTAL E/E' RATIO: 11.2 M/S
MV PEAK A VEL: 1.07 M/S
MV PEAK E VEL: 0.67 M/S
OHS CV RV/LV RATIO: 0.86 CM
PISA TR MAX VEL: 2.2 M/S
RA MAJOR: 4.02 CM
RA PRESSURE ESTIMATED: 3 MMHG
RA WIDTH: 3.5 CM
RIGHT ATRIAL AREA: 12.6 CM2
RIGHT VENTRICLE DIASTOLIC BASEL DIMENSION: 3.1 CM
RV TB RVSP: 5 MMHG
RV TISSUE DOPPLER FREE WALL SYSTOLIC VELOCITY 1 (APICAL 4 CHAMBER VIEW): 20.67 CM/S
SINUS: 2.75 CM
STJ: 2.29 CM
TDI LATERAL: 0.04 M/S
TDI SEPTAL: 0.06 M/S
TDI: 0.05 M/S
TRICUSPID ANNULAR PLANE SYSTOLIC EXCURSION: 2.05 CM
TV PEAK GRADIENT: 19 MMHG
TV REST PULMONARY ARTERY PRESSURE: 22 MMHG

## 2025-03-16 DIAGNOSIS — E11.9 TYPE 2 DIABETES MELLITUS WITHOUT COMPLICATION, WITHOUT LONG-TERM CURRENT USE OF INSULIN: ICD-10-CM

## 2025-03-16 NOTE — TELEPHONE ENCOUNTER
No care due was identified.  [facility] Embedded Care Due Messages. Reference number: [id number].   3/16/2025 3:38:17 PM CDT

## 2025-03-17 RX ORDER — METFORMIN HYDROCHLORIDE 500 MG/1
500 TABLET ORAL 2 TIMES DAILY WITH MEALS
Qty: 180 TABLET | Refills: 0 | Status: SHIPPED | OUTPATIENT
Start: 2025-03-17

## 2025-03-17 NOTE — TELEPHONE ENCOUNTER
Refill Decision Note   Lupe Jewell  is requesting a refill authorization.  Brief Assessment and Rationale for Refill:  Approve     Medication Therapy Plan:         Comments:     Note composed:1:38 PM 03/17/2025

## 2025-03-31 ENCOUNTER — EXTERNAL CHRONIC CARE MANAGEMENT (OUTPATIENT)
Dept: PRIMARY CARE CLINIC | Facility: CLINIC | Age: 80
End: 2025-03-31
Payer: MEDICARE

## 2025-03-31 PROCEDURE — 99490 CHRNC CARE MGMT STAFF 1ST 20: CPT | Mod: PBBFAC | Performed by: INTERNAL MEDICINE

## 2025-04-02 ENCOUNTER — TELEPHONE (OUTPATIENT)
Dept: SURGERY | Facility: CLINIC | Age: 80
End: 2025-04-02
Payer: MEDICARE

## 2025-04-02 NOTE — TELEPHONE ENCOUNTER
Called pt to confirm appt with Dr. Francisco on 4/4/25 at 9:40 AM in [facility] (2nd floor). Pt expressed understanding of appt's date, time and location. Pt stated she would be here.

## 2025-04-03 NOTE — PROGRESS NOTES
[facility] Office Visit History and Physical    Referring Md:   Yanick Garcia Md  [address] [place] [address]    SUBJECTIVE:     Chief Complaint: Fecal incontinence    History of Present Illness:  The patient is new patient to this practice.   Course is as follows:  Patient is a 79 y.o. female presents with fecal incontinence.  Present for 15 years.  Last Colonoscopy: Feb 2025  Prior abdominal surgery: Yes - 'bladder repair' and hysterectomy, tubal ligation, Nissen fundoplication  Prior pelvic or anorectal surgery: No  Family history of colon/rectal cancer: No  Family history of IBD: No  Steroid or other immunosuppression: No  Blood thinners: No  Current stool softeners or fiber supplement: No  Active smoking: No  Prior deliveries: Yes - 2  complicated by tear: Yes -  Tried fiber-con, no relief.  Tried Kegels  No rectal prolapse  Takes Imodium to avoid accidents, 2 at a time.    [place] (FI):  Leakage of solid stool:   Daily (4)  Leakage of liquid stool:    Daily (4)  Leakage of flatus:     Daily (4)  Wear a pad:        Daily (4)  Alter life:    Daily (4)  Total: 20    Altomare (ODS):  How long on toilet:  6-10min (1)   How many times/day:  1 (0)   Digitation:   Never (0)   Laxatives:   Never (0)   Enemas:    Never (0)   Incomplete stool:  2-3x/wk (3)   Strain:    Never (0)   Total: 4    Stool consistency/Cabarrus: 5/6  Bowel movements per month:  Daily   Leakage of urine: No  Trouble emptying your bladder: Yes  Feel something bulging through vagina? No      Review of patient's allergies indicates:   Allergen Reactions    Penicillins Rash       Past Medical History:   Diagnosis Date    ALLERGIC RHINITIS 08/17/2012    Amblyopia     Asthma, well controlled 08/17/2012    Chronic asthma     Chronic rhinitis     Diabetes 02/04/2014    Diabetes mellitus     Diabetes mellitus type II     Fever blister     GERD (gastroesophageal reflux disease) 08/17/2012    Glaucoma     Hyperlipemia     Hypertension      Macular degeneration     Obstructive sleep apnea     Osteoporosis, unspecified     Recurrent upper respiratory infection (URI)     Retinal detachment     Strabismus     Uveitis      Past Surgical History:   Procedure Laterality Date    BLADDER SURGERY      BREAST BIOPSY Left     BREAST SURGERY      left breast biopsy    CATARACT EXTRACTION W/  INTRAOCULAR LENS IMPLANT Left 01/28/2015    WITH TRAB ()    CATARACT EXTRACTION W/  INTRAOCULAR LENS IMPLANT Right 03/30/2016    WITH TRAB ()    COLONOSCOPY N/A 07/01/2016    Procedure: COLONOSCOPY;  Surgeon: Rony Lima MD;  Location: [facility] ENDO (4TH FLR);  Service: Endoscopy;  Laterality: N/A;    COLONOSCOPY N/A 02/04/2022    Procedure: COLONOSCOPY;  Surgeon: FARA Benitez MD;  Location: [facility] END[facility] (4TH FLR);  Service: Endoscopy;  Laterality: N/A;  fully vacc-inst mail-tb.Covid test at [facility] on 2/1.[facility]    COLONOSCOPY N/A 11/04/2024    Procedure: COLONOSCOPY;  Surgeon: Domi Carbajal MD;  Location: [facility] (2ND FLR);  Service: Endoscopy;  Laterality: N/A;    COLONOSCOPY, SCREENING, HIGH RISK PATIENT N/A 2/19/2025    Procedure: COLONOSCOPY, SCREENING, HIGH RISK PATIENT;  Surgeon: Yanick Garcia MD;  Location: [facility] (4TH FLR);  Service: Endoscopy;  Laterality: N/A;  1/24 ref by Melissa Mendes, recent incomplete colonoscopy; use the slim pediatric colonoscope and have the short double-balloon enteroscope available, mail instructions. myles  2/11 pre call complete/mleone    ESOPHAGOGASTRODUODENOSCOPY N/A 11/04/2024    Procedure: EGD (ESOPHAGOGASTRODUODENOSCOPY);  Surgeon: Domi Carbajal MD;  Location: [facility] (2ND FLR);  Service: Endoscopy;  Laterality: N/A;  referral a labat/ prep inst given in clinic / diabetic / asthma/ suprep  cleared by cardiology Dr Umanzor-GT  10/28-lvm for pre call-tb  10/29 Pre-call complete- ASul    EYE SURGERY Bilateral     cataract removal and Laser surgery    HYSTERECTOMY      IMPLANTATION OF  DEVICE FOR GLAUCOMA Right 09/21/2022    AHMED ()    stomach procedure      TRABECULECTOMY Left 12/28/2015    COMBINED WITH CE ()    TRABECULECTOMY Right 03/30/2016    COMBINED WITH CE ()    TUBAL LIGATION      YAG CAPSULOTOMY Bilateral 10/3/2019 and 10/17/2019         Family History   Problem Relation Name Age of Onset    Asthma Mother      Glaucoma Mother      Hypertension Mother      No Known Problems Father      Glaucoma Sister Rosa     Asthma Sister Rosa     Hypertension Sister Rosa     Hyperlipidemia Sister      Glaucoma Sister      No Known Problems Sister Elois     No Known Problems Brother none     No Known Problems Maternal Aunt      No Known Problems Maternal Uncle      No Known Problems Paternal Aunt      No Known Problems Paternal Uncle      No Known Problems Maternal Grandmother      No Known Problems Maternal Grandfather      No Known Problems Paternal Grandmother      No Known Problems Paternal Grandfather      Hyperlipidemia Daughter Leanna     Hypertension Daughter Leanna     Depression Daughter Leanna     Diabetes Daughter Leanna     Obesity Daughter Leanna     Hypertension Son Don     Asthma Son Don     Asthma Other nephew     Asthma Grandchild 4     Melanoma Neg Hx      Psoriasis Neg Hx      Lupus Neg Hx      Eczema Neg Hx      Acne Neg Hx      Blindness Neg Hx      Macular degeneration Neg Hx      Retinal detachment Neg Hx      Amblyopia Neg Hx      Cancer Neg Hx      Cataracts Neg Hx      Strabismus Neg Hx      Stroke Neg Hx      Thyroid disease Neg Hx       Social History[1]     Review of Systems:  ROS    OBJECTIVE:     Vital Signs (Most Recent)  BP (!) 169/76   Pulse 93   Ht 5' (1.524 m)   Wt 75.5 kg (166 lb 7.2 oz)   BMI 32.51 kg/m²     Physical Exam:  General: Black or  female in no distress   Neuro: Alert and oriented x 4.  Moves all extremities.     HEENT: No icterus.  Trachea midline  Respiratory: Respirations  are even and unlabored  Cardiac: Regular rate  Abdomen: Well-healed lower midline incision  Extremities: Warm dry and intact  Skin: No rashes     Patient was examined w/ a chaperone present.    Anorectal:   External exam: Perianal skin healthy, soft skin tags, no vaginal prolapse with valsalva, no rectal prolapse with valsalva. Normal anal wink reflex.  Sensation in tact.  Digital exam revealed decreased resting tone. No masses. Weak squeeze with early fatigue.  No large rectocele pocket.      ASSESSMENT/PLAN:     80yo F w/ fecal incontinence    Discussed Interstim vs DigniFI study, she is more interested in Interstim  Bowel diaries x 2 weeks  RTC 4 weeks to schedule procedure    Nathalie Francisco MD  Staff Surgeon, Colon and Rectal Surgery  [facility]         [1]   Social History  Tobacco Use    Smoking status: Former     Current packs/day: 0.00     Average packs/day: 0.3 packs/day for 2.0 years (0.5 ttl pk-yrs)     Types: Cigarettes     Start date:      Quit date:      Years since quittin.2     Passive exposure: Past    Smokeless tobacco: Never   Substance Use Topics    Alcohol use: Yes     Alcohol/week: 12.0 standard drinks of alcohol     Types: 6 Glasses of wine, 6 Cans of beer per week     Comment: socially    Drug use: No

## 2025-04-04 ENCOUNTER — TELEPHONE (OUTPATIENT)
Dept: SURGERY | Facility: CLINIC | Age: 80
End: 2025-04-04
Payer: MEDICARE

## 2025-04-04 ENCOUNTER — OFFICE VISIT (OUTPATIENT)
Dept: SURGERY | Facility: CLINIC | Age: 80
End: 2025-04-04
Attending: COLON & RECTAL SURGERY
Payer: MEDICARE

## 2025-04-04 VITALS
WEIGHT: 166.44 LBS | BODY MASS INDEX: 32.68 KG/M2 | SYSTOLIC BLOOD PRESSURE: 169 MMHG | HEART RATE: 93 BPM | DIASTOLIC BLOOD PRESSURE: 76 MMHG | HEIGHT: 60 IN

## 2025-04-04 DIAGNOSIS — R15.9 INCONTINENCE OF FECES, UNSPECIFIED FECAL INCONTINENCE TYPE: Primary | ICD-10-CM

## 2025-04-04 PROCEDURE — 99999 PR PBB SHADOW E&M-EST. PATIENT-LVL III: CPT | Mod: PBBFAC,,, | Performed by: COLON & RECTAL SURGERY

## 2025-04-04 PROCEDURE — 99213 OFFICE O/P EST LOW 20 MIN: CPT | Mod: PBBFAC | Performed by: COLON & RECTAL SURGERY

## 2025-04-04 NOTE — TELEPHONE ENCOUNTER
Called pt because she or her family member left their book during the OV. LVM stating reason for call and CRS' number. Informed pt that I would hold book until we can figure out how she can get it back during another appt or her next appt.

## 2025-04-07 ENCOUNTER — TELEPHONE (OUTPATIENT)
Dept: SURGERY | Facility: CLINIC | Age: 80
End: 2025-04-07
Payer: MEDICARE

## 2025-04-07 NOTE — TELEPHONE ENCOUNTER
Returned call regarding msg below left w/ phone staff.    Reviewed instructions on bowel diary documentation including to collect at least 14-days worth of BMs (including leakage, accidents, etc.).  Confirmed knowledge of 1 month f/u appt date & time with request to bring bowel diaries to appt. Pt verbalized understanding to information & instructions provided.     Pt's book was left in clinic room after appointment on 4/4. Ms. Jewell stated she planned to come get book from clinic office today but isn't feeling well & asked CRS RN to hold on to it for her. Pt was asked to call the clinic when she's ready to  book & someone from CRS can bring it out to her upon arrival.    All questions & concerns addressed to the patient's satisfaction.     No additional needs identified prior to completing call.       ----- Message from Angelo sent at 4/7/2025 10:33 AM CDT -----  Regarding: Call needed  Contact: [phone]  Hi,Who called:The pt Reason:Pt would like to spk with the nurse to discuss your Diary. Pls call the pt at  [phone] to discuss.Provider's name:Dr. Francisco Additional Information:Thank you.

## 2025-04-23 ENCOUNTER — TELEPHONE (OUTPATIENT)
Dept: SURGERY | Facility: CLINIC | Age: 80
End: 2025-04-23
Payer: MEDICARE

## 2025-04-23 NOTE — TELEPHONE ENCOUNTER
Returned call regarding msg below left w/ phone staff.    Pt was asked if approximately 14 days worth of data has been collected to which the pt stated she started documentation on 4/7.   Ms. Jewell was asked to bring bowel diaries on the day of her appointment for MD to review.   Pt verbalized understanding.     No additional questions or concerns identified at this time.    ----- Message from [employer] sent at 4/23/2025 10:45 AM CDT -----  Name of Who is Calling: Pt  What is the request in detail:Pt would like a call back in regards to a diary that pt has. Pt would like to know if dairy is needed before appt or the day of. Please advise thank you   Can the clinic reply by [facility]:no  What Number to Call Back if not in [facility]:Telephone Information:Mobile           [phone]

## 2025-04-28 ENCOUNTER — OFFICE VISIT (OUTPATIENT)
Dept: INTERNAL MEDICINE | Facility: CLINIC | Age: 80
End: 2025-04-28
Payer: MEDICARE

## 2025-04-28 VITALS
OXYGEN SATURATION: 96 % | DIASTOLIC BLOOD PRESSURE: 70 MMHG | WEIGHT: 171.06 LBS | BODY MASS INDEX: 33.58 KG/M2 | SYSTOLIC BLOOD PRESSURE: 160 MMHG | HEIGHT: 60 IN | HEART RATE: 83 BPM

## 2025-04-28 DIAGNOSIS — H04.123 DRY EYES: ICD-10-CM

## 2025-04-28 DIAGNOSIS — Z12.83 SKIN CANCER SCREENING: ICD-10-CM

## 2025-04-28 DIAGNOSIS — R15.9 INCONTINENCE OF FECES WITH FECAL URGENCY: ICD-10-CM

## 2025-04-28 DIAGNOSIS — H52.4 PRESBYOPIA: ICD-10-CM

## 2025-04-28 DIAGNOSIS — I70.0 AORTIC ATHEROSCLEROSIS: ICD-10-CM

## 2025-04-28 DIAGNOSIS — K21.9 GASTROESOPHAGEAL REFLUX DISEASE, UNSPECIFIED WHETHER ESOPHAGITIS PRESENT: ICD-10-CM

## 2025-04-28 DIAGNOSIS — J45.50 SEVERE PERSISTENT ASTHMA WITHOUT COMPLICATION: ICD-10-CM

## 2025-04-28 DIAGNOSIS — Z00.00 ENCOUNTER FOR MEDICARE ANNUAL WELLNESS EXAM: ICD-10-CM

## 2025-04-28 DIAGNOSIS — R15.2 INCONTINENCE OF FECES WITH FECAL URGENCY: ICD-10-CM

## 2025-04-28 DIAGNOSIS — E11.9 TYPE 2 DIABETES MELLITUS WITHOUT COMPLICATION, WITHOUT LONG-TERM CURRENT USE OF INSULIN: Primary | ICD-10-CM

## 2025-04-28 DIAGNOSIS — H40.1113 PRIMARY OPEN ANGLE GLAUCOMA OF RIGHT EYE, SEVERE STAGE: ICD-10-CM

## 2025-04-28 DIAGNOSIS — H43.813 POSTERIOR VITREOUS DETACHMENT OF BOTH EYES: ICD-10-CM

## 2025-04-28 DIAGNOSIS — H25.13 NUCLEAR SCLEROSIS OF BOTH EYES: ICD-10-CM

## 2025-04-28 DIAGNOSIS — Z88.9 HISTORY OF MULTIPLE ALLERGIES: ICD-10-CM

## 2025-04-28 DIAGNOSIS — H40.1122 PRIMARY OPEN ANGLE GLAUCOMA (POAG) OF LEFT EYE, MODERATE STAGE: ICD-10-CM

## 2025-04-28 DIAGNOSIS — E11.69 TYPE 2 DIABETES MELLITUS WITH OTHER SPECIFIED COMPLICATION, WITHOUT LONG-TERM CURRENT USE OF INSULIN: ICD-10-CM

## 2025-04-28 DIAGNOSIS — R00.2 PALPITATIONS: ICD-10-CM

## 2025-04-28 LAB
ALBUMIN/CREAT UR: NORMAL
CREAT UR-MCNC: 37 MG/DL (ref 15–325)
MICROALBUMIN UR-MCNC: <5 UG/ML (ref ?–5000)

## 2025-04-28 PROCEDURE — G0439 PPPS, SUBSEQ VISIT: HCPCS | Mod: ,,,

## 2025-04-28 PROCEDURE — 82570 ASSAY OF URINE CREATININE: CPT

## 2025-04-28 PROCEDURE — 99215 OFFICE O/P EST HI 40 MIN: CPT | Mod: PBBFAC

## 2025-04-28 PROCEDURE — 99999 PR PBB SHADOW E&M-EST. PATIENT-LVL V: CPT | Mod: PBBFAC,,,

## 2025-04-28 NOTE — PROGRESS NOTES
CRS Office Visit History and Physical      SUBJECTIVE:     Chief Complaint: Fecal incontinence    History of Present Illness:  Patient is a 79 y.o. female presents with fecal incontinence.  Here today with bowel diaries.  Interested in scheduling Interstim procedure.    (4/4/2025)  Present for 15 years.  Last Colonoscopy: Feb 2025  Prior abdominal surgery: Yes - 'bladder repair' and hysterectomy, tubal ligation, Nissen fundoplication  Prior pelvic or anorectal surgery: No  Family history of colon/rectal cancer: No  Family history of IBD: No  Steroid or other immunosuppression: No  Blood thinners: No  Current stool softeners or fiber supplement: No  Active smoking: No  Prior deliveries: Yes - 2  complicated by tear: Yes -  Tried fiber-con, no relief.  Tried Kegels  No rectal prolapse  Takes Imodium to avoid accidents, 2 at a time.    [place] (FI):  Leakage of solid stool:   Daily (4)  Leakage of liquid stool:    Daily (4)  Leakage of flatus:     Daily (4)  Wear a pad:        Daily (4)  Alter life:    Daily (4)  Total: 20    Altomare (ODS):  How long on toilet:  6-10min (1)   How many times/day:  1 (0)   Digitation:   Never (0)   Laxatives:   Never (0)   Enemas:    Never (0)   Incomplete stool:  2-3x/wk (3)   Strain:    Never (0)   Total: 4    Stool consistency/Glenn: 5/6  Bowel movements per month:  Daily   Leakage of urine: No  Trouble emptying your bladder: Yes  Feel something bulging through vagina? No      Review of patient's allergies indicates:   Allergen Reactions    Penicillins Rash       Past Medical History:   Diagnosis Date    ALLERGIC RHINITIS 08/17/2012    Amblyopia     Asthma, well controlled 08/17/2012    Chronic asthma     Chronic rhinitis     Diabetes 02/04/2014    Diabetes mellitus     Diabetes mellitus type II     Fever blister     GERD (gastroesophageal reflux disease) 08/17/2012    Glaucoma     Hyperlipemia     Hypertension     Macular degeneration     Obstructive sleep apnea     Osteoporosis,  unspecified     Recurrent upper respiratory infection (URI)     Retinal detachment     Strabismus     Uveitis      Past Surgical History:   Procedure Laterality Date    BLADDER SURGERY      BREAST BIOPSY Left     BREAST SURGERY      left breast biopsy    CATARACT EXTRACTION W/  INTRAOCULAR LENS IMPLANT Left 01/28/2015    WITH TRAB ()    CATARACT EXTRACTION W/  INTRAOCULAR LENS IMPLANT Right 03/30/2016    WITH TRAB ()    COLONOSCOPY N/A 07/01/2016    Procedure: COLONOSCOPY;  Surgeon: Rony Lima MD;  Location: [facility] ENDO (4TH FLR);  Service: Endoscopy;  Laterality: N/A;    COLONOSCOPY N/A 02/04/2022    Procedure: COLONOSCOPY;  Surgeon: FARA Benitez MD;  Location: [facility] ENDO (4TH FLR);  Service: Endoscopy;  Laterality: N/A;  fully vacc-inst mail-tb.Covid test at [facility] on 2/1.EC    COLONOSCOPY N/A 11/04/2024    Procedure: COLONOSCOPY;  Surgeon: Domi Carbajal MD;  Location: [facility] (2ND FLR);  Service: Endoscopy;  Laterality: N/A;    COLONOSCOPY, SCREENING, HIGH RISK PATIENT N/A 2/19/2025    Procedure: COLONOSCOPY, SCREENING, HIGH RISK PATIENT;  Surgeon: Yanick Garcia MD;  Location: [facility] (4TH FLR);  Service: Endoscopy;  Laterality: N/A;  1/24 ref by Melissa Mendes, recent incomplete colonoscopy; use the slim pediatric colonoscope and have the short double-balloon enteroscope available, mail instructions. myles  2/11 pre call complete/mleone    ESOPHAGOGASTRODUODENOSCOPY N/A 11/04/2024    Procedure: EGD (ESOPHAGOGASTRODUODENOSCOPY);  Surgeon: Domi Carbajal MD;  Location: [facility] (2ND FLR);  Service: Endoscopy;  Laterality: N/A;  referral a labat/ prep inst given in clinic / diabetic / asthma/ suprep  cleared by cardiology Dr Umanzor-GT  10/28-lvm for pre call-tb  10/29 Pre-call complete- ASul    EYE SURGERY Bilateral     cataract removal and Laser surgery    HYSTERECTOMY      IMPLANTATION OF DEVICE FOR GLAUCOMA Right 09/21/2022    YEIMI ()     stomach procedure      TRABECULECTOMY Left 12/28/2015    COMBINED WITH CE ()    TRABECULECTOMY Right 03/30/2016    COMBINED WITH CE ()    TUBAL LIGATION      YAG CAPSULOTOMY Bilateral 10/3/2019 and 10/17/2019         Family History   Problem Relation Name Age of Onset    Asthma Mother      Glaucoma Mother      Hypertension Mother      No Known Problems Father      Glaucoma Sister Rosa     Asthma Sister Rosa     Hypertension Sister Rosa     Hyperlipidemia Sister      Glaucoma Sister      No Known Problems Sister Elois     No Known Problems Brother none     No Known Problems Maternal Aunt      No Known Problems Maternal Uncle      No Known Problems Paternal Aunt      No Known Problems Paternal Uncle      No Known Problems Maternal Grandmother      No Known Problems Maternal Grandfather      No Known Problems Paternal Grandmother      No Known Problems Paternal Grandfather      Hyperlipidemia Daughter Leanna     Hypertension Daughter Leanna     Depression Daughter Leanna     Diabetes Daughter Leanna     Obesity Daughter Leanna     Hypertension Son Don     Asthma Son Don     Asthma Other nephew     Asthma Grandchild 4     Melanoma Neg Hx      Psoriasis Neg Hx      Lupus Neg Hx      Eczema Neg Hx      Acne Neg Hx      Blindness Neg Hx      Macular degeneration Neg Hx      Retinal detachment Neg Hx      Amblyopia Neg Hx      Cancer Neg Hx      Cataracts Neg Hx      Strabismus Neg Hx      Stroke Neg Hx      Thyroid disease Neg Hx       Social History[1]     Review of Systems:  ROS    OBJECTIVE:     Vital Signs (Most Recent)  BP (!) 160/72 (BP Location: Right arm, Patient Position: Sitting)   Pulse 91   Resp 18   Ht 5' (1.524 m)   Wt 77.8 kg (171 lb 8.3 oz)   BMI 33.50 kg/m²     Physical Exam:  General: Black or  female in no distress   Neuro: Alert and oriented x 4.  Moves all extremities.     HEENT: No icterus.  Trachea midline  Respiratory: Respirations  are even and unlabored  Cardiac: Regular rate  Abdomen: Well-healed lower midline incision  Extremities: Warm dry and intact  Skin: No rashes      ASSESSMENT/PLAN:     80yo F w/ fecal incontinence    We discussed sacral nerve stimulation.  We reviewed the relevant anatomy, and principle of the procedure.  We discussed that the 1st surgery would involve placement of the electrode under sedation, followed by a 2 week test period, with a 2nd surgery for implantation of the battery if the patient is satisfied with the device.  We reviewed anticipated efficacy, and I told them that approximately 70% of patients will have the number of episodes of incontinence cut in half.  We reviewed that 20-30% of patients are able to achieve complete continence.      We discussed that the current device is completely MRI compatible.  We discussed risks of surgery including bleeding, infection, need for revision in the future, need for ongoing medications to thickened stool or other therapies, need for explant of the device, and death.  Surgical consent was signed today in clinic for both procedures.  The patient was asked if they had any additional questions, and they have none at this time.    Nathalie Francisco MD  Staff Surgeon, Colon and Rectal Surgery  [facility]           [1]   Social History  Tobacco Use    Smoking status: Former     Current packs/day: 0.00     Average packs/day: 0.3 packs/day for 2.0 years (0.5 ttl pk-yrs)     Types: Cigarettes     Start date:      Quit date:      Years since quittin.3     Passive exposure: Past    Smokeless tobacco: Never   Substance Use Topics    Alcohol use: Yes     Alcohol/week: 12.0 standard drinks of alcohol     Types: 6 Glasses of wine, 6 Cans of beer per week     Comment: socially    Drug use: No

## 2025-04-29 ENCOUNTER — TELEPHONE (OUTPATIENT)
Dept: CARDIOLOGY | Facility: HOSPITAL | Age: 80
End: 2025-04-29
Payer: MEDICARE

## 2025-04-29 NOTE — TELEPHONE ENCOUNTER
Attempted to reach patient at [phone] to discuss unremarkable results of TTE and to see how she is doing on the new medication.

## 2025-04-30 ENCOUNTER — EXTERNAL CHRONIC CARE MANAGEMENT (OUTPATIENT)
Dept: PRIMARY CARE CLINIC | Facility: CLINIC | Age: 80
End: 2025-04-30
Payer: MEDICARE

## 2025-04-30 ENCOUNTER — TELEPHONE (OUTPATIENT)
Dept: SURGERY | Facility: CLINIC | Age: 80
End: 2025-04-30
Payer: MEDICARE

## 2025-04-30 PROCEDURE — 99490 CHRNC CARE MGMT STAFF 1ST 20: CPT | Mod: PBBFAC | Performed by: INTERNAL MEDICINE

## 2025-05-02 ENCOUNTER — OFFICE VISIT (OUTPATIENT)
Dept: SURGERY | Facility: CLINIC | Age: 80
End: 2025-05-02
Attending: COLON & RECTAL SURGERY
Payer: MEDICARE

## 2025-05-02 VITALS
HEIGHT: 60 IN | SYSTOLIC BLOOD PRESSURE: 160 MMHG | RESPIRATION RATE: 18 BRPM | HEART RATE: 91 BPM | BODY MASS INDEX: 33.67 KG/M2 | WEIGHT: 171.5 LBS | DIASTOLIC BLOOD PRESSURE: 72 MMHG

## 2025-05-02 DIAGNOSIS — Z01.818 PREOPERATIVE TESTING: ICD-10-CM

## 2025-05-02 DIAGNOSIS — R15.9 INCONTINENCE OF FECES, UNSPECIFIED FECAL INCONTINENCE TYPE: Primary | ICD-10-CM

## 2025-05-02 DIAGNOSIS — R15.9 INCONTINENCE OF FECES, UNSPECIFIED FECAL INCONTINENCE TYPE: ICD-10-CM

## 2025-05-02 DIAGNOSIS — Z01.818 PREOPERATIVE TESTING: Primary | ICD-10-CM

## 2025-05-02 PROCEDURE — 99215 OFFICE O/P EST HI 40 MIN: CPT | Mod: PBBFAC | Performed by: COLON & RECTAL SURGERY

## 2025-05-02 PROCEDURE — 99999 PR PBB SHADOW E&M-EST. PATIENT-LVL V: CPT | Mod: PBBFAC,,, | Performed by: COLON & RECTAL SURGERY

## 2025-05-02 PROCEDURE — 87081 CULTURE SCREEN ONLY: CPT

## 2025-05-05 ENCOUNTER — RESULTS FOLLOW-UP (OUTPATIENT)
Dept: INTERNAL MEDICINE | Facility: CLINIC | Age: 80
End: 2025-05-05

## 2025-05-05 ENCOUNTER — TELEPHONE (OUTPATIENT)
Dept: INTERNAL MEDICINE | Facility: CLINIC | Age: 80
End: 2025-05-05
Payer: MEDICARE

## 2025-05-05 NOTE — PATIENT INSTRUCTIONS
Counseling and Referral of Other Preventative  (Italic type indicates deductible and co-insurance are waived)    Patient Name: Lupe Jewell  Today's Date: 5/5/2025    Health Maintenance       Date Due Completion Date    Hemoglobin A1c 06/17/2025 12/17/2024    Lipid Panel 08/09/2025 8/9/2024    TETANUS VACCINE 10/24/2025 10/24/2015    Diabetic Eye Exam 12/12/2025 12/12/2024    DEXA Scan 01/12/2026 1/12/2024    Diabetes Urine Screening 04/28/2026 4/28/2025    Colonoscopy 02/19/2030 2/19/2025        Orders Placed This Encounter   Procedures    Microalbumin/Creatinine Ratio, Urine    Ambulatory referral/consult to Podiatry    Ambulatory referral/consult to Dermatology     The following information is provided to all patients.  This information is to help you find resources for any of the problems found today that may be affecting your health:                  Living healthy guide: www.[facility].[place].[facility]      Understanding Diabetes: www.diabetes.org      Eating healthy: www.cdc.gov/healthyweight      CDC home safety checklist: www.cdc.gov/steadi/patient.html      Agency on Aging: www.goea.[place].[facility]      Alcoholics anonymous (AA): www.aa.org      Physical Activity: www.lanre.nih.gov/xk2iwvw      Tobacco use: www.quitwithusla.org

## 2025-05-05 NOTE — TELEPHONE ENCOUNTER
----- Message from Amada Damon NP sent at 5/5/2025 12:58 PM CDT -----  Alden Reyes,  Can you please call patient and let her know that her urine protein screening was normal.  ET  ----- Message -----  From: Lab, Background User  Sent: 4/28/2025   3:46 PM CDT  To: Amada Damon NP

## 2025-05-05 NOTE — PROGRESS NOTES
Lupe Jewell presented for a follow-up Medicare AWV today. The following components were reviewed and updated:    Medical history  Family History  Social history  Allergies and Current Medications  Health Risk Assessment  Health Maintenance  Care Team    **See Completed Assessments for Annual Wellness visit with in the encounter summary    The following assessments were completed:  Depression Screening  Cognitive function Screening  Timed Get Up Test  Whisper Test      Opioid documentation:      Patient does not have a current opioid prescription.          Vitals:    04/28/25 1014 04/28/25 1113   BP: (!) 152/84 (!) 160/70   BP Location: Left arm    Patient Position: Sitting    Pulse: 83    SpO2: 96%    Weight: 77.6 kg (171 lb 1.2 oz)    Height: 5' (1.524 m)      Body mass index is 33.41 kg/m².       Physical Exam  Constitutional:       General: She is not in acute distress.     Appearance: She is not ill-appearing.   Cardiovascular:      Rate and Rhythm: Normal rate.      Pulses: Normal pulses.      Heart sounds: No murmur heard.  Pulmonary:      Effort: Pulmonary effort is normal. No respiratory distress.      Breath sounds: Normal breath sounds.   Abdominal:      General: Bowel sounds are normal.      Palpations: Abdomen is soft.      Tenderness: There is no abdominal tenderness.   Musculoskeletal:         General: Normal range of motion.   Skin:     General: Skin is warm.   Neurological:      General: No focal deficit present.      Mental Status: She is alert and oriented to person, place, and time.   Psychiatric:         Mood and Affect: Mood normal.           Diagnoses and health risks identified today and associated recommendations/orders:  1. Encounter for Medicare annual wellness exam  All age-related screenings and hm measures reviewed with patient.   - Ambulatory Referral/Consult to Enhanced Annual Wellness Visit (eAWV)    2. Type 2 diabetes mellitus without complication, without long-term current use of  insulin  Stable. UaCr ordered, f/u with podiatry.  - Microalbumin/Creatinine Ratio, Urine; Future  - Ambulatory referral/consult to Podiatry; Future  - Microalbumin/Creatinine Ratio, Urine    3. Skin cancer screening  Stable. Derm referral  - Ambulatory referral/consult to Dermatology; Future    4. Nuclear sclerosis of both eyes  Stable. F/w optometry    5. Dry eyes - Both Eyes  Stable. Using lubricating drops    6. Posterior vitreous detachment of both eyes - Both Eyes  Stable. Close f/u with opt    7. Presbyopia  Stable. No complications.     8. Primary open angle glaucoma of right eye, severe stage  Stable. Using latanoprost drops    9. Primary open angle glaucoma (POAG) of left eye, moderate stage  Stable. Using latanoprost drops.    10. Severe persistent asthma without complication  Stable. Albuterol prn, advair    11. Aortic atherosclerosis  Stable. On lipitor    12. Palpitations  Stable. F/w cardiology    13. Type 2 diabetes mellitus with other specified complication, without long-term current use of insulin  Stable. Good control. On metformin    14. Gastroesophageal reflux disease, unspecified whether esophagitis present  Stable. No meds.    15. Incontinence of feces with fecal urgency  Stable. F/w gi    16. History of multiple allergies  Stable. On singulair      Provided Lupe with a 5-10 year written screening schedule and personal prevention plan. Recommendations were developed using the USPSTF age appropriate recommendations. Education, counseling, and referrals were provided as needed.  After Visit Summary printed and given to patient which includes a list of additional screenings\tests needed.    Follow up in about 1 year (around 4/28/2026) for Annual Medicare Wellness Visit.      Amada Damon NP  I offered to discuss advanced care planning, including how to pick a person who would make decisions for you if you were unable to make them for yourself, called a health care power of , and what  kind of decisions you might make such as use of life sustaining treatments such as ventilators and tube feeding when faced with a life limiting illness recorded on a living will that they will need to know. (How you want to be cared for as you near the end of your natural life)     X Patient is interested in learning more about how to make advanced directives.  I provided them paperwork and offered to discuss this with them.

## 2025-05-14 ENCOUNTER — OFFICE VISIT (OUTPATIENT)
Dept: PODIATRY | Facility: CLINIC | Age: 80
End: 2025-05-14
Payer: MEDICARE

## 2025-05-14 VITALS
WEIGHT: 171.5 LBS | HEART RATE: 103 BPM | DIASTOLIC BLOOD PRESSURE: 80 MMHG | SYSTOLIC BLOOD PRESSURE: 131 MMHG | BODY MASS INDEX: 33.67 KG/M2 | HEIGHT: 60 IN

## 2025-05-14 DIAGNOSIS — B35.3 TINEA PEDIS OF BOTH FEET: ICD-10-CM

## 2025-05-14 DIAGNOSIS — L57.0 KERATOSIS: Primary | ICD-10-CM

## 2025-05-14 DIAGNOSIS — E11.9 TYPE 2 DIABETES MELLITUS WITHOUT COMPLICATION, WITHOUT LONG-TERM CURRENT USE OF INSULIN: ICD-10-CM

## 2025-05-14 PROCEDURE — 99999 PR PBB SHADOW E&M-EST. PATIENT-LVL IV: CPT | Mod: PBBFAC,,, | Performed by: PODIATRIST

## 2025-05-14 PROCEDURE — 99204 OFFICE O/P NEW MOD 45 MIN: CPT | Mod: S$PBB,,, | Performed by: PODIATRIST

## 2025-05-14 PROCEDURE — 99214 OFFICE O/P EST MOD 30 MIN: CPT | Mod: PBBFAC,PN | Performed by: PODIATRIST

## 2025-05-14 RX ORDER — CICLOPIROX OLAMINE 7.7 MG/G
CREAM TOPICAL 2 TIMES DAILY
Qty: 90 G | Refills: 2 | Status: SHIPPED | OUTPATIENT
Start: 2025-05-14

## 2025-05-14 RX ORDER — UREA 40 %
CREAM (GRAM) TOPICAL DAILY
Qty: 85 G | Refills: 11 | Status: SHIPPED | OUTPATIENT
Start: 2025-05-14

## 2025-05-14 NOTE — PROGRESS NOTES
Subjective:      Patient ID: Lupe Jewell is a 79 y.o. female.    Chief Complaint: Diabetic Foot Exam, Foot Problem (Bottom of feet is itching), and Fungus (toenails)    Thick hard digit skin in the bottoms of the feet with flakes.  Longstanding condition present for many years.  This exacerbations been gradual onset, worsening over the past couple weeks.  No aggravating factors that she can identify.  Denies trauma and surgery both feet.  No prior medical treatment.  No self-treatment.    Review of Systems   Constitutional: Negative for chills, diaphoresis, fever, malaise/fatigue and night sweats.   Cardiovascular:  Negative for claudication, cyanosis, leg swelling and syncope.   Skin:  Positive for itching and suspicious lesions. Negative for color change, dry skin, nail changes, rash and unusual hair distribution.   Musculoskeletal:  Negative for falls, joint pain, joint swelling, muscle cramps, muscle weakness and stiffness.   Gastrointestinal:  Negative for constipation, diarrhea, nausea and vomiting.   Neurological:  Negative for brief paralysis, disturbances in coordination, focal weakness, numbness, paresthesias, sensory change and tremors.           Objective:      Physical Exam  Constitutional:       General: She is not in acute distress.     Appearance: She is well-developed. She is not diaphoretic.   Cardiovascular:      Pulses:           Popliteal pulses are 2+ on the right side and 2+ on the left side.        Dorsalis pedis pulses are 2+ on the right side and 2+ on the left side.        Posterior tibial pulses are 2+ on the right side and 2+ on the left side.      Comments: Capillary refill 3 seconds all toes/distal feet, all toes/both feet warm to touch.      Negative lymphadenopathy bilateral popliteal fossa and tarsal tunnel.      Negavie lower extremity edema bilateral.    Musculoskeletal:      Right ankle: No swelling, deformity, ecchymosis or lacerations. Normal range of motion. Normal pulse.       Right Achilles Tendon: Normal. No defects. Ortega's test negative.   Lymphadenopathy:      Lower Body: No right inguinal adenopathy. No left inguinal adenopathy.      Comments: Negative lymphadenopathy bilateral popliteal fossa and tarsal tunnel.    Negative lymphangitic streaking bilateral feet/ankles/legs.   Skin:     General: Skin is warm and dry.      Capillary Refill: Capillary refill takes 2 to 3 seconds.      Coloration: Skin is not pale.      Findings: No abrasion, bruising, burn, ecchymosis, erythema, laceration, lesion or rash.      Nails: There is no clubbing.      Comments:   Thick generalized keratosis with superficial flakes over an erythematous base with peripheral vesicles that itch right and left feet without open skin drainage pus tracking fluctuance malodor signs of infection.   Neurological:      Mental Status: She is alert and oriented to person, place, and time.      Sensory: No sensory deficit.      Motor: No tremor, atrophy or abnormal muscle tone.      Gait: Gait normal.      Deep Tendon Reflexes:      Reflex Scores:       Patellar reflexes are 2+ on the right side and 2+ on the left side.       Achilles reflexes are 2+ on the right side and 2+ on the left side.  Psychiatric:         Behavior: Behavior is cooperative.           Assessment:       Encounter Diagnoses   Name Primary?    Type 2 diabetes mellitus without complication, without long-term current use of insulin     Keratosis Yes    Tinea pedis of both feet          Plan:       Lupe was seen today for diabetic foot exam, foot problem and fungus.    Diagnoses and all orders for this visit:    Keratosis    Type 2 diabetes mellitus without complication, without long-term current use of insulin  -     Ambulatory referral/consult to Podiatry    Tinea pedis of both feet      I counseled the patient on her conditions, their implications and medical management.        Topical urea cream twice daily.      Pedicures periodic care with  a pumice stone or similar.      Topical ciclopirox cream twice daily about 6 weeks.      Follow here 6 weeks if not resolved, sooner p.r.n..          No follow-ups on file.

## 2025-05-26 ENCOUNTER — TELEPHONE (OUTPATIENT)
Dept: OPHTHALMOLOGY | Facility: CLINIC | Age: 80
End: 2025-05-26
Payer: MEDICARE

## 2025-05-26 NOTE — TELEPHONE ENCOUNTER
----- Message from [email or web address] sent at 5/26/2025  8:53 AM CDT -----  Regarding: Reschedule Existing Appointment  Contact: Lupe Jewell  Reschedule Existing Appointment Current appt date:05/26 Type of appt :[facility] and JOANA Physician:Saurabh Reason for rescheduling Patient is calling to reschedule due to not feeling well. Requesting a call backCaller:Lupe Jewell  Contact Preference:[phone]

## 2025-05-31 ENCOUNTER — EXTERNAL CHRONIC CARE MANAGEMENT (OUTPATIENT)
Dept: PRIMARY CARE CLINIC | Facility: CLINIC | Age: 80
End: 2025-05-31
Payer: MEDICARE

## 2025-05-31 PROCEDURE — 99490 CHRNC CARE MGMT STAFF 1ST 20: CPT | Mod: PBBFAC | Performed by: INTERNAL MEDICINE

## 2025-05-31 PROCEDURE — 99490 CHRNC CARE MGMT STAFF 1ST 20: CPT | Mod: S$PBB,,, | Performed by: INTERNAL MEDICINE

## 2025-06-07 ENCOUNTER — TELEPHONE (OUTPATIENT)
Dept: CARDIAC CATH/INVASIVE PROCEDURES | Facility: HOSPITAL | Age: 80
End: 2025-06-07
Payer: MEDICARE

## 2025-06-26 ENCOUNTER — TELEPHONE (OUTPATIENT)
Dept: SURGERY | Facility: CLINIC | Age: 80
End: 2025-06-26
Payer: MEDICARE

## 2025-06-27 NOTE — PRE-PROCEDURE INSTRUCTIONS
PreOp Instructions given:   - Verbal medication information (what to hold and what to take)   - NPO guidelines given/CRS Dept 2300  - Arrival place directions given; time to be given the day before procedure by the   Surgeon's Office 0500 dosc  - Bathing with antibacterial soap   - Don't wear any jewelry or bring any valuables AM of surgery   - No makeup or moisturizer to face   - No perfume/cologne, powder, lotions or aftershave   Pt. verbalized understanding.   Pt denies any h/o Anesthesia/Sedation complications or side effects.

## 2025-06-30 ENCOUNTER — TELEPHONE (OUTPATIENT)
Dept: SURGERY | Facility: CLINIC | Age: 80
End: 2025-06-30
Payer: MEDICARE

## 2025-06-30 ENCOUNTER — EXTERNAL CHRONIC CARE MANAGEMENT (OUTPATIENT)
Dept: PRIMARY CARE CLINIC | Facility: CLINIC | Age: 80
End: 2025-06-30
Payer: MEDICARE

## 2025-06-30 PROCEDURE — 99490 CHRNC CARE MGMT STAFF 1ST 20: CPT | Mod: S$PBB,,, | Performed by: INTERNAL MEDICINE

## 2025-06-30 PROCEDURE — 99490 CHRNC CARE MGMT STAFF 1ST 20: CPT | Mod: PBBFAC | Performed by: INTERNAL MEDICINE

## 2025-06-30 NOTE — TELEPHONE ENCOUNTER
Copied from CRM #[id number]. Topic: Appointments - Same Day Access  >> Jun 30, 2025  9:06 AM Mery wrote:  Consult/Advisory     Name Of Caller:pt     Contact Preference:[phone]      Nature of call:  pt called to confirm preop instructions in regards to medication. Please call to advise thank you    Spoke w/ pt who states a PreOp call was received Friday, June 27th, on her behalf by her daughter.  states her daughter was told pt needed to hold blood pressure medication on day of surgery; however, pt wanted to clarify w/ CRS as instructions per our dept documentation instruct pt to take blood pressure medication on DOS.     Due to the lack of communication documentation, any information and/or advise provided from a clinic outside of [facility]S was unable to be verified.   Ms. Jewell was instructed to hold BP med out of precaution & can resume following procedure.     Pt denies any additional questions or concerns at this time.

## 2025-07-01 ENCOUNTER — ANESTHESIA EVENT (OUTPATIENT)
Dept: SURGERY | Facility: HOSPITAL | Age: 80
End: 2025-07-01
Payer: MEDICARE

## 2025-07-01 ENCOUNTER — HOSPITAL ENCOUNTER (OUTPATIENT)
Facility: HOSPITAL | Age: 80
Discharge: HOME | End: 2025-07-01
Attending: COLON & RECTAL SURGERY | Admitting: COLON & RECTAL SURGERY
Payer: MEDICARE

## 2025-07-01 ENCOUNTER — ANESTHESIA (OUTPATIENT)
Dept: SURGERY | Facility: HOSPITAL | Age: 80
End: 2025-07-01
Payer: MEDICARE

## 2025-07-01 VITALS
SYSTOLIC BLOOD PRESSURE: 183 MMHG | DIASTOLIC BLOOD PRESSURE: 84 MMHG | TEMPERATURE: 98 F | RESPIRATION RATE: 35 BRPM | OXYGEN SATURATION: 98 % | BODY MASS INDEX: 33.54 KG/M2 | HEART RATE: 92 BPM | WEIGHT: 171.75 LBS

## 2025-07-01 DIAGNOSIS — R15.9 FECAL INCONTINENCE: ICD-10-CM

## 2025-07-01 LAB — POCT GLUCOSE: 145 MG/DL (ref 70–110)

## 2025-07-01 PROCEDURE — 27201423 OPTIME MED/SURG SUP & DEVICES STERILE SUPPLY: Performed by: COLON & RECTAL SURGERY

## 2025-07-01 PROCEDURE — 25000003 PHARM REV CODE 250: Performed by: NURSE ANESTHETIST, CERTIFIED REGISTERED

## 2025-07-01 PROCEDURE — 63600175 PHARM REV CODE 636 W HCPCS: Performed by: NURSE ANESTHETIST, CERTIFIED REGISTERED

## 2025-07-01 PROCEDURE — C1897 LEAD, NEUROSTIM TEST KIT: HCPCS | Performed by: COLON & RECTAL SURGERY

## 2025-07-01 PROCEDURE — 36000706: Performed by: COLON & RECTAL SURGERY

## 2025-07-01 PROCEDURE — 63600175 PHARM REV CODE 636 W HCPCS: Performed by: COLON & RECTAL SURGERY

## 2025-07-01 PROCEDURE — 25000003 PHARM REV CODE 250: Performed by: NURSE PRACTITIONER

## 2025-07-01 PROCEDURE — 25000003 PHARM REV CODE 250: Performed by: STUDENT IN AN ORGANIZED HEALTH CARE EDUCATION/TRAINING PROGRAM

## 2025-07-01 PROCEDURE — 37000009 HC ANESTHESIA EA ADD 15 MINS: Performed by: COLON & RECTAL SURGERY

## 2025-07-01 PROCEDURE — 64561 IMPLANT NEUROELECTRODES: CPT | Mod: LT,,, | Performed by: COLON & RECTAL SURGERY

## 2025-07-01 PROCEDURE — C1883 ADAPT/EXT, PACING/NEURO LEAD: HCPCS | Performed by: COLON & RECTAL SURGERY

## 2025-07-01 PROCEDURE — 36000707: Performed by: COLON & RECTAL SURGERY

## 2025-07-01 PROCEDURE — 63600175 PHARM REV CODE 636 W HCPCS: Performed by: STUDENT IN AN ORGANIZED HEALTH CARE EDUCATION/TRAINING PROGRAM

## 2025-07-01 PROCEDURE — C1778 LEAD, NEUROSTIMULATOR: HCPCS | Performed by: COLON & RECTAL SURGERY

## 2025-07-01 PROCEDURE — 37000008 HC ANESTHESIA 1ST 15 MINUTES: Performed by: COLON & RECTAL SURGERY

## 2025-07-01 PROCEDURE — 71000044 HC DOSC ROUTINE RECOVERY FIRST HOUR: Performed by: COLON & RECTAL SURGERY

## 2025-07-01 PROCEDURE — 71000015 HC POSTOP RECOV 1ST HR: Performed by: COLON & RECTAL SURGERY

## 2025-07-01 PROCEDURE — 82962 GLUCOSE BLOOD TEST: CPT | Performed by: COLON & RECTAL SURGERY

## 2025-07-01 PROCEDURE — 25000003 PHARM REV CODE 250: Performed by: COLON & RECTAL SURGERY

## 2025-07-01 DEVICE — LEAD INTERSTIM 2 SURESCAN 28CM: Type: IMPLANTABLE DEVICE | Site: SACRUM | Status: FUNCTIONAL

## 2025-07-01 RX ORDER — MUPIROCIN 20 MG/G
OINTMENT TOPICAL
Status: DISCONTINUED | OUTPATIENT
Start: 2025-07-01 | End: 2025-07-01 | Stop reason: HOSPADM

## 2025-07-01 RX ORDER — FENTANYL CITRATE 50 UG/ML
25 INJECTION, SOLUTION INTRAMUSCULAR; INTRAVENOUS EVERY 5 MIN PRN
Status: DISCONTINUED | OUTPATIENT
Start: 2025-07-01 | End: 2025-07-01 | Stop reason: HOSPADM

## 2025-07-01 RX ORDER — OXYCODONE HYDROCHLORIDE 5 MG/1
5 TABLET ORAL
Status: DISCONTINUED | OUTPATIENT
Start: 2025-07-01 | End: 2025-07-01 | Stop reason: HOSPADM

## 2025-07-01 RX ORDER — BUPIVACAINE HYDROCHLORIDE AND EPINEPHRINE 2.5; 5 MG/ML; UG/ML
INJECTION, SOLUTION EPIDURAL; INFILTRATION; INTRACAUDAL; PERINEURAL
Status: DISCONTINUED | OUTPATIENT
Start: 2025-07-01 | End: 2025-07-01 | Stop reason: HOSPADM

## 2025-07-01 RX ORDER — LIDOCAINE HYDROCHLORIDE 10 MG/ML
INJECTION, SOLUTION INFILTRATION; PERINEURAL
Status: DISCONTINUED | OUTPATIENT
Start: 2025-07-01 | End: 2025-07-01 | Stop reason: HOSPADM

## 2025-07-01 RX ORDER — FENTANYL CITRATE 50 UG/ML
INJECTION, SOLUTION INTRAMUSCULAR; INTRAVENOUS
Status: DISCONTINUED | OUTPATIENT
Start: 2025-07-01 | End: 2025-07-01

## 2025-07-01 RX ORDER — SODIUM CHLORIDE 9 MG/ML
INJECTION, SOLUTION INTRAVENOUS CONTINUOUS
Status: DISCONTINUED | OUTPATIENT
Start: 2025-07-01 | End: 2025-07-01 | Stop reason: HOSPADM

## 2025-07-01 RX ORDER — SODIUM CHLORIDE 0.9 % (FLUSH) 0.9 %
10 SYRINGE (ML) INJECTION
Status: DISCONTINUED | OUTPATIENT
Start: 2025-07-01 | End: 2025-07-01 | Stop reason: HOSPADM

## 2025-07-01 RX ORDER — LIDOCAINE HYDROCHLORIDE 20 MG/ML
INJECTION, SOLUTION EPIDURAL; INFILTRATION; INTRACAUDAL; PERINEURAL
Status: DISCONTINUED | OUTPATIENT
Start: 2025-07-01 | End: 2025-07-01

## 2025-07-01 RX ORDER — GLUCAGON 1 MG
1 KIT INJECTION
Status: DISCONTINUED | OUTPATIENT
Start: 2025-07-01 | End: 2025-07-01 | Stop reason: HOSPADM

## 2025-07-01 RX ORDER — PROPOFOL 10 MG/ML
VIAL (ML) INTRAVENOUS
Status: DISCONTINUED | OUTPATIENT
Start: 2025-07-01 | End: 2025-07-01

## 2025-07-01 RX ADMIN — LIDOCAINE HYDROCHLORIDE 60 MG: 20 INJECTION, SOLUTION EPIDURAL; INFILTRATION; INTRACAUDAL at 07:07

## 2025-07-01 RX ADMIN — FENTANYL CITRATE 25 MCG: 50 INJECTION INTRAMUSCULAR; INTRAVENOUS at 07:07

## 2025-07-01 RX ADMIN — PROPOFOL 40 MG: 10 INJECTION, EMULSION INTRAVENOUS at 07:07

## 2025-07-01 RX ADMIN — FENTANYL CITRATE 25 MCG: 50 INJECTION INTRAMUSCULAR; INTRAVENOUS at 08:07

## 2025-07-01 RX ADMIN — PROPOFOL 20 MG: 10 INJECTION, EMULSION INTRAVENOUS at 07:07

## 2025-07-01 RX ADMIN — SODIUM CHLORIDE: 0.9 INJECTION, SOLUTION INTRAVENOUS at 06:07

## 2025-07-01 RX ADMIN — PROPOFOL 150 MCG/KG/MIN: 10 INJECTION, EMULSION INTRAVENOUS at 07:07

## 2025-07-01 RX ADMIN — MUPIROCIN: 20 OINTMENT TOPICAL at 05:07

## 2025-07-01 RX ADMIN — VANCOMYCIN HYDROCHLORIDE 1500 MG: 1.5 INJECTION, POWDER, LYOPHILIZED, FOR SOLUTION INTRAVENOUS at 06:07

## 2025-07-01 NOTE — PLAN OF CARE
Patient being discharged to home via wheelchair to the care of her daughter.  Patient VSS on room air and tolerating PO intake. Discharge instructions (written and verbal) and follow up information given to patient, who verbalized understanding as well as a readiness for discharge. Device rep completed instructions and sent home with patient. C contact info provided for additional questions following discharge.

## 2025-07-01 NOTE — H&P (VIEW-ONLY)
H&P reviewed, no changes.  ----    CRS Office Visit History and Physical        SUBJECTIVE:      Chief Complaint: Fecal incontinence     History of Present Illness:  Patient is a 79 y.o. female presents with fecal incontinence.  Here today with bowel diaries.  Interested in scheduling Interstim procedure.     (4/4/2025)  Present for 15 years.  Last Colonoscopy: Feb 2025  Prior abdominal surgery: Yes - 'bladder repair' and hysterectomy, tubal ligation, Nissen fundoplication  Prior pelvic or anorectal surgery: No  Family history of colon/rectal cancer: No  Family history of IBD: No  Steroid or other immunosuppression: No  Blood thinners: No  Current stool softeners or fiber supplement: No  Active smoking: No  Prior deliveries: Yes - 2  complicated by tear: Yes -  Tried fiber-con, no relief.  Tried Kegels  No rectal prolapse  Takes Imodium to avoid accidents, 2 at a time.     [place] (FI):  Leakage of solid stool:   Daily (4)  Leakage of liquid stool:    Daily (4)  Leakage of flatus:           Daily (4)  Wear a pad:                     Daily (4)  Alter life:                       Daily (4)  Total: 20     Altomare (ODS):  How long on toilet:      6-10min (1)        How many times/day:  1 (0)      Digitation:                     Never (0)          Laxatives:                     Never (0)          Enemas:                       Never (0)          Incomplete stool:                     2-3x/wk (3)        Strain:                           Never (0)          Total: 4     Stool consistency/[place]: 5/6  Bowel movements per month:  Daily    Leakage of urine: No  Trouble emptying your bladder: Yes  Feel something bulging through vagina? No             Review of patient's allergies indicates:   Allergen Reactions    Penicillins Rash              Past Medical History:   Diagnosis Date    ALLERGIC RHINITIS 08/17/2012    Amblyopia      Asthma, well controlled 08/17/2012    Chronic asthma      Chronic rhinitis      Diabetes 02/04/2014     Diabetes mellitus      Diabetes mellitus type II      Fever blister      GERD (gastroesophageal reflux disease) 08/17/2012    Glaucoma      Hyperlipemia      Hypertension      Macular degeneration      Obstructive sleep apnea      Osteoporosis, unspecified      Recurrent upper respiratory infection (URI)      Retinal detachment      Strabismus      Uveitis              Past Surgical History:   Procedure Laterality Date    BLADDER SURGERY        BREAST BIOPSY Left      BREAST SURGERY         left breast biopsy    CATARACT EXTRACTION W/  INTRAOCULAR LENS IMPLANT Left 01/28/2015     WITH TRAB ()    CATARACT EXTRACTION W/  INTRAOCULAR LENS IMPLANT Right 03/30/2016     WITH TRAB ()    COLONOSCOPY N/A 07/01/2016     Procedure: COLONOSCOPY;  Surgeon: Rony Lima MD;  Location: [facility] ENDO (4TH FLR);  Service: Endoscopy;  Laterality: N/A;    COLONOSCOPY N/A 02/04/2022     Procedure: COLONOSCOPY;  Surgeon: FARA Benitez MD;  Location: [facility] (4TH FLR);  Service: Endoscopy;  Laterality: N/A;  fully vacc-inst mail-tb.Covid test at [facility] on 2/1.EC    COLONOSCOPY N/A 11/04/2024     Procedure: COLONOSCOPY;  Surgeon: Domi Carbajal MD;  Location: [facility] (2ND FLR);  Service: Endoscopy;  Laterality: N/A;    COLONOSCOPY, SCREENING, HIGH RISK PATIENT N/A 2/19/2025     Procedure: COLONOSCOPY, SCREENING, HIGH RISK PATIENT;  Surgeon: Yanick Garcia MD;  Location: [facility] (4TH FLR);  Service: Endoscopy;  Laterality: N/A;  1/24 ref by Melissa Mendes, recent incomplete colonoscopy; use the slim pediatric colonoscope and have the short double-balloon enteroscope available, mail instructions. myles  2/11 pre call complete/mleone    ESOPHAGOGASTRODUODENOSCOPY N/A 11/04/2024     Procedure: EGD (ESOPHAGOGASTRODUODENOSCOPY);  Surgeon: Domi Carbajal MD;  Location: [facility] (2ND FLR);  Service: Endoscopy;  Laterality: N/A;  referral a labat/ prep inst given in clinic / diabetic / asthma/  suprep  cleared by cardiology Dr Umanzor-GT  10/28-lvm for pre call-tb  10/29 Pre-call complete- ASul    EYE SURGERY Bilateral       cataract removal and Laser surgery    HYSTERECTOMY        IMPLANTATION OF DEVICE FOR GLAUCOMA Right 09/21/2022     AHMED ()    stomach procedure        TRABECULECTOMY Left 12/28/2015     COMBINED WITH CE ()    TRABECULECTOMY Right 03/30/2016     COMBINED WITH CE ()    TUBAL LIGATION        YAG CAPSULOTOMY Bilateral 10/3/2019 and 10/17/2019                  Family History   Problem Relation Name Age of Onset    Asthma Mother        Glaucoma Mother        Hypertension Mother        No Known Problems Father        Glaucoma Sister Rosa      Asthma Sister Rosa      Hypertension Sister Rosa      Hyperlipidemia Sister        Glaucoma Sister        No Known Problems Sister Elois      No Known Problems Brother none      No Known Problems Maternal Aunt        No Known Problems Maternal Uncle        No Known Problems Paternal Aunt        No Known Problems Paternal Uncle        No Known Problems Maternal Grandmother        No Known Problems Maternal Grandfather        No Known Problems Paternal Grandmother        No Known Problems Paternal Grandfather        Hyperlipidemia Daughter Leanna      Hypertension Daughter Leanna      Depression Daughter Leanna      Diabetes Daughter Leanna      Obesity Daughter Leanna      Hypertension Son Don      Asthma Son Don      Asthma Other nephew      Asthma Grandchild 4      Melanoma Neg Hx        Psoriasis Neg Hx        Lupus Neg Hx        Eczema Neg Hx        Acne Neg Hx        Blindness Neg Hx        Macular degeneration Neg Hx        Retinal detachment Neg Hx        Amblyopia Neg Hx        Cancer Neg Hx        Cataracts Neg Hx        Strabismus Neg Hx        Stroke Neg Hx        Thyroid disease Neg Hx          [Social History]     [Social History]        Tobacco Use    Smoking status: Former        Current packs/day: 0.00       Average packs/day: 0.3 packs/day for 2.0 years (0.5 ttl pk-yrs)       Types: Cigarettes       Start date:        Quit date:        Years since quittin.3       Passive exposure: Past    Smokeless tobacco: Never   Substance Use Topics    Alcohol use: Yes       Alcohol/week: 12.0 standard drinks of alcohol       Types: 6 Glasses of wine, 6 Cans of beer per week       Comment: socially    Drug use: No        Review of Systems:  ROS     OBJECTIVE:      Vital Signs (Most Recent)  BP (!) 160/72 (BP Location: Right arm, Patient Position: Sitting)   Pulse 91   Resp 18   Ht 5' (1.524 m)   Wt 77.8 kg (171 lb 8.3 oz)   BMI 33.50 kg/m²      Physical Exam:  General: Black or  female in no distress   Neuro: Alert and oriented x 4.  Moves all extremities.     HEENT: No icterus.  Trachea midline  Respiratory: Respirations are even and unlabored  Cardiac: Regular rate  Abdomen: Well-healed lower midline incision  Extremities: Warm dry and intact  Skin: No rashes        ASSESSMENT/PLAN:      80yo F w/ fecal incontinence     We discussed sacral nerve stimulation.  We reviewed the relevant anatomy, and principle of the procedure.  We discussed that the 1st surgery would involve placement of the electrode under sedation, followed by a 2 week test period, with a 2nd surgery for implantation of the battery if the patient is satisfied with the device.  We reviewed anticipated efficacy, and I told them that approximately 70% of patients will have the number of episodes of incontinence cut in half.  We reviewed that 20-30% of patients are able to achieve complete continence.       We discussed that the current device is completely MRI compatible.  We discussed risks of surgery including bleeding, infection, need for revision in the future, need for ongoing medications to thickened stool or other therapies, need for explant of the device, and death.  Surgical consent was signed  today in clinic for both procedures.  The patient was asked if they had any additional questions, and they have none at this time.     Nathalie Francisco MD  Staff Surgeon, Colon and Rectal Surgery  [facility]

## 2025-07-01 NOTE — OP NOTE
"[facility]  Operative Note    Date: 07/01/2025    Name: Lupe Jewell    MRN: [id number]    Pre-Op Diagnosis: Full Incontinence of feces    Post-Op Diagnosis: Same    Procedure(s) Performed:   Tined Lead Placement/Stage 1 Interstim (89371)  Fluoro report reading (50094)    Specimen(s): None    Staff Surgeon: Nathalie Francisco    Assistant Surgeon: Yasmin Delgadillo (fellow)    Anesthesia: Monitor Anesthesia Care    Indications: Lupe Jewell is a 79 y.o. female with fecal incontinence who presents for Interstim trial.  Please see my clinic notes for further details.    Details of procedure:     The patient was taken to the operating room and transferred to the OR table in the prone position. A pillow was placed under the lower abdomen to flatten the sacrum.  The toes were allowed to dangle freely, and socks were removed. They were then placed under sedation by the anesthesia team.  The patient was prepped and draped in the usual sterile fashion using Ioban.      The C-arm was moved into the AP position to provide fluoroscopic guidance of the sacrum.  The medial edges of the foramina were identified and marked.  The C-arm was then moved into the lateral position to identify the S3 foramen.  Once the needle point was determined, local injection of 0.25% Marcaine with epinephrine mixed with 1% lidocaine was administered.  A 3.5" foramen needle was placed into the superior, medial aspect of the S3 foramen.  Appropriate needle depth was confirmed using fluoroscopy.  Proper S3 needle location was also confirmed by direct observation of bellowing of the perineum.       The foramen needle stylet was removed and the directional guide was placed through the needle using markers on the guide.  The foramen needle was removed by sliding it over this guide.  A small incision was made on the skin, and the lead introducer with dilator was placed over the directional guide.  This was introduced until the radiopaque marker was " [facility] through the foramen using fluoroscopic guidance.  The dilator was then removed along with the directional guide.  Using fluoroscopy, the tined lead with bent stylet was placed through the introducer until the electrodes 2 and 3 straddled the anterior surface of the sacrum.  All 4 electrodes were tested, and confirmed to produce bellowing.  After satisfactory lead positioning was confirmed, the introducer was removed over the lead under continuous fluoroscopy, deploying the tines into the presacral tissue.  All 4 electrodes were retested confirming an appropriate response, and final images were saved.     The future internal neurostimulator pocket site was identified below the iliac crest and lateral to the sacrum on the left side.  Local was administered and an incision was made into the subcutaneous tissue creating a connection site.  Blunt dissection was used to create a small pocket, and hemostasis was achieved with cautery. A tunneling tool with sheath was placed from the lead exit site subcutaneously to a small incised pocket connection site.  The sheath was removed. The lead was cleaned and dried. The lead was inserted into the temporary percutaneous extension with visual confirmation of blue tip placement.  The screw was tightened until audible clicks were heard.      Using the tunneling tool and sheath, subcutaneous tunnel was created from the pocket site to the contralateral side and exited at a localized site. The percutaneous extension was placed through the sheath, and the sheath was removed.     The connection components were placed into the small pocket.  The incisions and pockets were irrigated with sterile water and closed with multiple layers of Vicryl suture, followed by running subcuticular Monocryl. Skin glue was placed over the incisions the percutaneous extension site was secured with a Biopatch and Tegaderm. The twist lock cable was then plugged into the ENS and placed into the pouch  on the where bbl patient built.  All counts were correct.  I was present and scrubbed for the entire procedure. The patient was transferred to the operating room in good condition.     Using the  and ENS, the patient was programmed to the electrode of optimum sensation and provided utilization instructions prior to discharge.  They will complete a bowel and bladder diary during the test.  To help document the results of this procedure.    Estimated Blood Loss: 5mL    Drains/Implants:   Implant Name Type Inv. Item Serial No.  Lot No. LRB No. Used Action   LEAD INTERSTIM 2 SURESCAN 28CM - [id number]  LEAD INTERSTIM 2 SURESCAN 28CM  [employer] [facility] [id number] N/A 1 Implanted       Wound Class: I (clean)    Nathalie Francisco

## 2025-07-01 NOTE — TRANSFER OF CARE
Anesthesia Transfer of Care Note    Patient: Lupe Jewell    Procedure(s) Performed: Procedure(s) (LRB):  INSERTION, LEAD, SACRAL NEUROSTIMULATOR, OPEN (N/A)    Patient location: [facility]    Anesthesia Type: general    Transport from OR: Transported from OR on room air with adequate spontaneous ventilation    Post pain: adequate analgesia    Post assessment: no apparent anesthetic complications and tolerated procedure well    Post vital signs: stable    Level of consciousness: awake and alert    Nausea/Vomiting: no nausea/vomiting    Complications: none    Transfer of care protocol was followed      Last vitals: Visit Vitals  BP (!) 170/78 (BP Location: Right arm, Patient Position: Lying)   Pulse 98   Temp 37 °C (98.6 °F) (Temporal)   Resp (!) 37   Wt 77.9 kg (171 lb 11.8 oz)   SpO2 100%   BMI 33.54 kg/m²

## 2025-07-01 NOTE — H&P
H&P reviewed, no changes.  ----    CRS Office Visit History and Physical        SUBJECTIVE:      Chief Complaint: Fecal incontinence     History of Present Illness:  Patient is a 79 y.o. female presents with fecal incontinence.  Here today with bowel diaries.  Interested in scheduling Interstim procedure.     (4/4/2025)  Present for 15 years.  Last Colonoscopy: Feb 2025  Prior abdominal surgery: Yes - 'bladder repair' and hysterectomy, tubal ligation, Nissen fundoplication  Prior pelvic or anorectal surgery: No  Family history of colon/rectal cancer: No  Family history of IBD: No  Steroid or other immunosuppression: No  Blood thinners: No  Current stool softeners or fiber supplement: No  Active smoking: No  Prior deliveries: Yes - 2  complicated by tear: Yes -  Tried fiber-con, no relief.  Tried Kegels  No rectal prolapse  Takes Imodium to avoid accidents, 2 at a time.     [place] ([place]):  Leakage of solid stool:   Daily (4)  Leakage of liquid stool:    Daily (4)  Leakage of flatus:           Daily (4)  Wear a pad:                     Daily (4)  Alter life:                       Daily (4)  Total: 20     Altomare (ODS):  How long on toilet:      6-10min (1)        How many times/day:  1 (0)      Digitation:                     Never (0)          Laxatives:                     Never (0)          Enemas:                       Never (0)          Incomplete stool:                     2-3x/wk (3)        Strain:                           Never (0)          Total: 4     Stool consistency/[place]: 5/6  Bowel movements per month:  Daily    Leakage of urine: No  Trouble emptying your bladder: Yes  Feel something bulging through vagina? No             Review of patient's allergies indicates:   Allergen Reactions    Penicillins Rash              Past Medical History:   Diagnosis Date    ALLERGIC RHINITIS 08/17/2012    Amblyopia      Asthma, well controlled 08/17/2012    Chronic asthma      Chronic rhinitis      Diabetes 02/04/2014     Diabetes mellitus      Diabetes mellitus type II      Fever blister      GERD (gastroesophageal reflux disease) 08/17/2012    Glaucoma      Hyperlipemia      Hypertension      Macular degeneration      Obstructive sleep apnea      Osteoporosis, unspecified      Recurrent upper respiratory infection (URI)      Retinal detachment      Strabismus      Uveitis              Past Surgical History:   Procedure Laterality Date    BLADDER SURGERY        BREAST BIOPSY Left      BREAST SURGERY         left breast biopsy    CATARACT EXTRACTION W/  INTRAOCULAR LENS IMPLANT Left 01/28/2015     WITH TRAB ()    CATARACT EXTRACTION W/  INTRAOCULAR LENS IMPLANT Right 03/30/2016     WITH TRAB ()    COLONOSCOPY N/A 07/01/2016     Procedure: COLONOSCOPY;  Surgeon: Rony Lima MD;  Location: [facility] ENDO (4TH FLR);  Service: Endoscopy;  Laterality: N/A;    COLONOSCOPY N/A 02/04/2022     Procedure: COLONOSCOPY;  Surgeon: FARA Benitez MD;  Location: [facility] (4TH FLR);  Service: Endoscopy;  Laterality: N/A;  fully vacc-inst mail-tb.Covid test at [facility] on 2/1.EC    COLONOSCOPY N/A 11/04/2024     Procedure: COLONOSCOPY;  Surgeon: Domi Carbajal MD;  Location: [facility] (2ND FLR);  Service: Endoscopy;  Laterality: N/A;    COLONOSCOPY, SCREENING, HIGH RISK PATIENT N/A 2/19/2025     Procedure: COLONOSCOPY, SCREENING, HIGH RISK PATIENT;  Surgeon: Yanick Garcia MD;  Location: [facility] (4TH FLR);  Service: Endoscopy;  Laterality: N/A;  1/24 ref by Melissa Mendes, recent incomplete colonoscopy; use the slim pediatric colonoscope and have the short double-balloon enteroscope available, mail instructions. myles  2/11 pre call complete/mleone    ESOPHAGOGASTRODUODENOSCOPY N/A 11/04/2024     Procedure: EGD (ESOPHAGOGASTRODUODENOSCOPY);  Surgeon: Domi Carbajal MD;  Location: [facility] (2ND FLR);  Service: Endoscopy;  Laterality: N/A;  referral a labat/ prep inst given in clinic / diabetic / asthma/  suprep  cleared by cardiology Dr Umanzor-GT  10/28-lvm for pre call-tb  10/29 Pre-call complete- ASul    EYE SURGERY Bilateral       cataract removal and Laser surgery    HYSTERECTOMY        IMPLANTATION OF DEVICE FOR GLAUCOMA Right 09/21/2022     AHMED ()    stomach procedure        TRABECULECTOMY Left 12/28/2015     COMBINED WITH CE ()    TRABECULECTOMY Right 03/30/2016     COMBINED WITH CE ()    TUBAL LIGATION        YAG CAPSULOTOMY Bilateral 10/3/2019 and 10/17/2019                  Family History   Problem Relation Name Age of Onset    Asthma Mother        Glaucoma Mother        Hypertension Mother        No Known Problems Father        Glaucoma Sister Rosa      Asthma Sister Rosa      Hypertension Sister Rosa      Hyperlipidemia Sister        Glaucoma Sister        No Known Problems Sister Elois      No Known Problems Brother none      No Known Problems Maternal Aunt        No Known Problems Maternal Uncle        No Known Problems Paternal Aunt        No Known Problems Paternal Uncle        No Known Problems Maternal Grandmother        No Known Problems Maternal Grandfather        No Known Problems Paternal Grandmother        No Known Problems Paternal Grandfather        Hyperlipidemia Daughter Leanna      Hypertension Daughter Leanna      Depression Daughter Leanna      Diabetes Daughter Leanna      Obesity Daughter Leanna      Hypertension Son Don      Asthma Son Don      Asthma Other nephew      Asthma Grandchild 4      Melanoma Neg Hx        Psoriasis Neg Hx        Lupus Neg Hx        Eczema Neg Hx        Acne Neg Hx        Blindness Neg Hx        Macular degeneration Neg Hx        Retinal detachment Neg Hx        Amblyopia Neg Hx        Cancer Neg Hx        Cataracts Neg Hx        Strabismus Neg Hx        Stroke Neg Hx        Thyroid disease Neg Hx          [Social History]     [Social History]        Tobacco Use    Smoking status: Former        Current packs/day: 0.00       Average packs/day: 0.3 packs/day for 2.0 years (0.5 ttl pk-yrs)       Types: Cigarettes       Start date:        Quit date:        Years since quittin.3       Passive exposure: Past    Smokeless tobacco: Never   Substance Use Topics    Alcohol use: Yes       Alcohol/week: 12.0 standard drinks of alcohol       Types: 6 Glasses of wine, 6 Cans of beer per week       Comment: socially    Drug use: No        Review of Systems:  ROS     OBJECTIVE:      Vital Signs (Most Recent)  BP (!) 160/72 (BP Location: Right arm, Patient Position: Sitting)   Pulse 91   Resp 18   Ht 5' (1.524 m)   Wt 77.8 kg (171 lb 8.3 oz)   BMI 33.50 kg/m²      Physical Exam:  General: Black or  female in no distress   Neuro: Alert and oriented x 4.  Moves all extremities.     HEENT: No icterus.  Trachea midline  Respiratory: Respirations are even and unlabored  Cardiac: Regular rate  Abdomen: Well-healed lower midline incision  Extremities: Warm dry and intact  Skin: No rashes        ASSESSMENT/PLAN:      78yo F w/ fecal incontinence     We discussed sacral nerve stimulation.  We reviewed the relevant anatomy, and principle of the procedure.  We discussed that the 1st surgery would involve placement of the electrode under sedation, followed by a 2 week test period, with a 2nd surgery for implantation of the battery if the patient is satisfied with the device.  We reviewed anticipated efficacy, and I told them that approximately 70% of patients will have the number of episodes of incontinence cut in half.  We reviewed that 20-30% of patients are able to achieve complete continence.       We discussed that the current device is completely MRI compatible.  We discussed risks of surgery including bleeding, infection, need for revision in the future, need for ongoing medications to thickened stool or other therapies, need for explant of the device, and death.  Surgical consent was signed  today in clinic for both procedures.  The patient was asked if they had any additional questions, and they have none at this time.     Nathalie Francisco MD  Staff Surgeon, Colon and Rectal Surgery  [facility]

## 2025-07-01 NOTE — BRIEF OP NOTE
Pete Watts - Surgery ([facility])  Brief Operative Note    Surgery Date: 7/1/2025     Surgeons and Role:     * Nathalie Francisco MD - Primary     * Yasmin Delgadillo MD    Assisting Surgeon: None    Pre-op Diagnosis:  Incontinence of feces, unspecified fecal incontinence type [R15.9]    Post-op Diagnosis:  Post-Op Diagnosis Codes:     * Incontinence of feces, unspecified fecal incontinence type [R15.9]    Procedure(s) (LRB):  INSERTION, LEAD, SACRAL NEUROSTIMULATOR, OPEN (N/A)    Anesthesia: Monitor Anesthesia Care    Operative Findings:     Estimated Blood Loss: * No values recorded between 7/1/2025  6:55 AM and 7/1/2025  8:26 AM *         Specimens:   Specimen (24h ago, onward)      None            * No specimens in log *        Discharge Note    OUTCOME: Patient tolerated treatment/procedure well without complication and is now ready for discharge.    DISPOSITION: Home or Self Care    FINAL DIAGNOSIS:  fecal incontinence    FOLLOWUP: In clinic    DISCHARGE INSTRUCTIONS:    Today you had the first stage of your Interstim placed.     ACTIVITY  There are no restrictions in activity.      DIET  Continue your normal diet. You may eat the same foods you ate before your procedure.  Drink plenty of fluids during the first 24-48 hours following your procedure.     MEDICATIONS / PAIN  Resume all other previous medications from your prescribing physician.  You may take Acetaminophen (Tylenol) and/or Ibuprofen (Advil) for pain as needed. It is ok to take them together.  Do not exceed the daily maximum dose listed on the package label.  You have a numbing injection from the surgery, this will start to wear off after 5-6 hours and your pain may increase.  This is normal.     WOUND CARE  There is a clear dressing on your lower back where the lead is coming out of the skin.  DO NOT REMOVE THIS DRESSING.  It should stay in place until your next procedure.  Keep the area where the dressing is dry.  Do not submerge the dressing under  water (no swimming or tub baths).  You can sponge bathe but do not get the clear dressing wet.      SIGNS AND SYMPTOMS TO REPORT TO THE DOCTOR  Fever greater than 101° F   Redness of the skin at the incision site or drainage of fluid from any of the incisions.  Call your doctor with any questions or concerns.     The representative from [employer] [facility] will be checking in with you, and will give you a number to call if you have any issues with the device during the 2 week test period.      For any emergency situation, call 911 immediately or go to your nearest emergency room.

## 2025-07-01 NOTE — DISCHARGE INSTRUCTIONS
Today you had the first stage of your Interstim placed.    ACTIVITY  There are no restrictions in activity.      DIET  Continue your normal diet. You may eat the same foods you ate before your procedure.  Drink plenty of fluids during the first 24-48 hours following your procedure.     MEDICATIONS / PAIN  Resume all other previous medications from your prescribing physician.  You may take Acetaminophen (Tylenol) and/or Ibuprofen (Advil) for pain as needed. It is ok to take them together.  Do not exceed the daily maximum dose listed on the package label.  You have a numbing injection from the surgery, this will start to wear off after 5-6 hours and your pain may increase.  This is normal.    WOUND CARE  There is a clear dressing on your lower back where the lead is coming out of the skin.  DO NOT REMOVE THIS DRESSING.  It should stay in place until your next procedure.  Keep the area where the dressing is dry.  Do not submerge the dressing under water (no swimming or tub baths).  You can sponge bathe but do not get the clear dressing wet.      SIGNS AND SYMPTOMS TO REPORT TO THE DOCTOR  Fever greater than 101° F   Redness of the skin at the incision site or drainage of fluid from any of the incisions.  Call your doctor with any questions or concerns.     The representative from [facility] will be checking in with you, and will give you a number to call if you have any issues with the device during the 2 week test period.     For any emergency situation, call 911 immediately or go to your nearest emergency room.

## 2025-07-01 NOTE — ANESTHESIA PREPROCEDURE EVALUATION
07/01/2025  Lupe Jewell is a 79 y.o., female.  Past Medical History:   Diagnosis Date    ALLERGIC RHINITIS 08/17/2012    Amblyopia     Asthma, well controlled 08/17/2012    Chronic asthma     Chronic rhinitis     Diabetes 02/04/2014    Diabetes mellitus     Diabetes mellitus type II     Fever blister     GERD (gastroesophageal reflux disease) 08/17/2012    Glaucoma     Hyperlipemia     Hypertension     Macular degeneration     Obstructive sleep apnea     Osteoporosis, unspecified     Recurrent upper respiratory infection (URI)     Retinal detachment     Strabismus     Uveitis      Past Surgical History:   Procedure Laterality Date    BLADDER SURGERY      BREAST BIOPSY Left     BREAST SURGERY      left breast biopsy    CATARACT EXTRACTION W/  INTRAOCULAR LENS IMPLANT Left 01/28/2015    WITH TRAB ()    CATARACT EXTRACTION W/  INTRAOCULAR LENS IMPLANT Right 03/30/2016    WITH TRAB ()    COLONOSCOPY N/A 07/01/2016    Procedure: COLONOSCOPY;  Surgeon: Rony Lima MD;  Location: [facility] (4TH FLR);  Service: Endoscopy;  Laterality: N/A;    COLONOSCOPY N/A 02/04/2022    Procedure: COLONOSCOPY;  Surgeon: FARA Benitez MD;  Location: [facility] (4TH FLR);  Service: Endoscopy;  Laterality: N/A;  fully vacc-inst mail-tb.Covid test at [facility] on 2/1.EC    COLONOSCOPY N/A 11/04/2024    Procedure: COLONOSCOPY;  Surgeon: Domi Carbajal MD;  Location: [facility] (2ND FLR);  Service: Endoscopy;  Laterality: N/A;    COLONOSCOPY, SCREENING, HIGH RISK PATIENT N/A 2/19/2025    Procedure: COLONOSCOPY, SCREENING, HIGH RISK PATIENT;  Surgeon: Yanick Garcia MD;  Location: [facility] (4TH FLR);  Service: Endoscopy;  Laterality: N/A;  1/24 ref by Melissa Mendes, recent incomplete colonoscopy; use the slim pediatric colonoscope and have the short  double-balloon enteroscope available, mail instructions. myles  2/11 pre call complete/mleone    ESOPHAGOGASTRODUODENOSCOPY N/A 11/04/2024    Procedure: EGD (ESOPHAGOGASTRODUODENOSCOPY);  Surgeon: Domi Carbajal MD;  Location: [facility] ([address] [place] [address]);  Service: Endoscopy;  Laterality: N/A;  referral a labat/ prep inst given in clinic / diabetic / asthma/ suprep  cleared by cardiology Dr Umanzor-GT  10/28-lvm for pre call-tb  10/29 Pre-call complete- ASul    EYE SURGERY Bilateral     cataract removal and Laser surgery    HYSTERECTOMY      IMPLANTATION OF DEVICE FOR GLAUCOMA Right 09/21/2022    AHMED ()    stomach procedure      TRABECULECTOMY Left 12/28/2015    COMBINED WITH CE ()    TRABECULECTOMY Right 03/30/2016    COMBINED WITH CE ()    TUBAL LIGATION      YAG CAPSULOTOMY Bilateral 10/3/2019 and 10/17/2019             Pre-op Assessment    I have reviewed the Patient Summary Reports.    I have reviewed the NPO Status.   I have reviewed the Medications.     Review of Systems  Anesthesia Hx:  No problems with previous Anesthesia   History of prior surgery of interest to airway management or planning:          Denies Family Hx of Anesthesia complications.    Denies Personal Hx of Anesthesia complications.                    Cardiovascular:  Exercise tolerance: good   Hypertension    Denies CAD.                                          Pulmonary:    Asthma severe   Sleep Apnea                Renal/:  Renal/ Normal                 Hepatic/GI:     GERD, well controlled                Neurological:  Neurology Normal                                      Endocrine:  Diabetes, well controlled, type 2               Physical Exam  General: Well nourished    Airway:  Mallampati: II   Mouth Opening: Normal  TM Distance: Normal  Tongue: Normal    Dental:  Partial Dentures, Caps / Implants    Chest/Lungs:  Normal Respiratory Rate    Heart:  Rate: Normal    Anesthesia  Plan  Type of Anesthesia, risks & benefits discussed:    Anesthesia Type: MAC, Gen Natural Airway  Intra-op Monitoring Plan: Standard ASA Monitors  Induction:  IV  Informed Consent: Informed consent signed with the Patient and all parties understand the risks and agree with anesthesia plan.  All questions answered.   ASA Score: 3  Day of Surgery Review of History & Physical: H&P Update referred to the surgeon/provider.    Ready For Surgery From Anesthesia Perspective.   .

## 2025-07-02 NOTE — ANESTHESIA POSTPROCEDURE EVALUATION
Anesthesia Post Evaluation    Patient: Lupe Jewell    Procedure(s) Performed: Procedure(s) (LRB):  INSERTION, LEAD, SACRAL NEUROSTIMULATOR, OPEN (N/A)    Final Anesthesia Type: general      Patient location during evaluation: PACU  Patient participation: Yes- Able to Participate  Level of consciousness: awake and alert  Post-procedure vital signs: reviewed and stable  Pain management: adequate  Airway patency: patent    PONV status at discharge: No PONV  Anesthetic complications: no      Cardiovascular status: hemodynamically stable  Respiratory status: unassisted, spontaneous ventilation and room air  Hydration status: euvolemic  Follow-up not needed.          Vitals Value Taken Time   /84 07/01/25 09:09   Temp 36.7 °C (98.1 °F) 07/01/25 09:09   Pulse 92 07/01/25 09:09   Resp 35 07/01/25 09:09   SpO2 98 % 07/01/25 09:09         No case tracking events are documented in the log.      Pain/Analia Score: Analia Score: 10 (7/1/2025  9:09 AM)

## 2025-07-03 ENCOUNTER — TELEPHONE (OUTPATIENT)
Dept: OPHTHALMOLOGY | Facility: CLINIC | Age: 80
End: 2025-07-03
Payer: MEDICARE

## 2025-07-10 ENCOUNTER — TELEPHONE (OUTPATIENT)
Dept: SURGERY | Facility: CLINIC | Age: 80
End: 2025-07-10
Payer: MEDICARE

## 2025-07-14 ENCOUNTER — ANESTHESIA EVENT (OUTPATIENT)
Dept: SURGERY | Facility: HOSPITAL | Age: 80
End: 2025-07-14
Payer: MEDICARE

## 2025-07-14 NOTE — ANESTHESIA PREPROCEDURE EVALUATION
[facility]  Anesthesia Pre-Operative Evaluation         Patient Name: Lupe Jewell  YOB: 1945  MRN: [id number]    SUBJECTIVE:     Pre-operative evaluation for Procedure(s) (LRB):  INSERTION, SACRAL NEUROSTIMULATOR, PERMANENT (N/A)     07/14/2025    Lupe Jewell is a 79 y.o. female w/ a significant PMHx of T2DM, asthma, GERD, and fecal incontinence s/p sacral nerve stimulator placement 7/2/25.    Now presenting for the above procedure(s).     2D ECHO:  TTE:  Results for orders placed during the hospital encounter of 03/10/25    Echo    Interpretation Summary    Left Ventricle: The left ventricle is normal in size. Normal wall thickness. There is concentric remodeling. Normal wall motion. There is normal systolic function with a visually estimated ejection fraction of 60 - 65%. There is diastolic dysfunction but grade cannot be determined. Elevated left ventricular filling pressure.    Right Ventricle: The right ventricle is normal in size. Wall thickness is normal. Systolic function is normal.    Right Atrium: Normal right atrial size.    Aortic Valve: The aortic valve is a trileaflet valve. There is mild aortic valve sclerosis. There is mild annular calcification present.    Aorta: Aortic root is normal in size measuring 2.75 cm. Aortic root at ST junction is normal. Ascending aorta is normal measuring 2.74 cm.    Pulmonary Artery: The estimated pulmonary artery systolic pressure is 22 mmHg.    IVC/SVC: Normal venous pressure at 3 mmHg.    Pericardium: There is no pericardial effusion.      REG:  No results found. However, due to the size of the patient record, not all encounters were searched. Please check Results Review for a complete set of results.    Prev airway:     Kayla Ruth CRNA 9/21/2022 12:25 PM  Intubation    Date/Time: 9/21/2022 12:18 PM  Performed by: Kayla Ruth CRNA  Authorized by: Nona Hsu MD    Intubation:  Induction:  Intravenous  Intubated: Postinduction  Mask Ventilation: Easy mask  Attempts: 1  Attempted By: CRNA  Difficult Airway Encountered?: No  Complications: None  Airway Device: Intubating LMA  Airway Device Size: 3.5  Style/Cuff Inflation: Cuffed (inflated to minimal occlusive pressure)  Secured at: The lips  Placement Verified By: Capnometry  Complicating Factors: None  Findings Post-Intubation: BS equal bilateral and atraumatic/condition  of teeth unchanged     LDA: none documented       Drips: none documented      Problem List[1]    Review of patient's allergies indicates:   Allergen Reactions    Penicillins Rash       Current Inpatient Medications:       Current Outpatient Medications   Medication Instructions    albuterol (PROVENTIL/VENTOLIN HFA) 90 mcg/actuation inhaler INHALE 2 PUFFS INTO THE LUNGS EVERY 6 HOURS AS NEEDED FOR WHEEZING OR RESCUE    albuterol-ipratropium (DUO-NEB) 2.5 mg-0.5 mg/3 mL nebulizer solution USE 3 ML VIA NEBULIZER EVERY 6 HOURS AS NEEDED FOR WHEEZING OR SHORTNESS OF BREATH    alendronate (FOSAMAX) 70 mg, Oral, Every 7 days    amitriptyline (ELAVIL) 25 mg, Oral, Nightly    atorvastatin (LIPITOR) 10 mg, Oral    ciclopirox (LOPROX) 0.77 % Crea Topical (Top), 2 times daily    dorzolamide (TRUSOPT) 2 % ophthalmic solution 1 drop, Left Eye, 3 times daily    fluticasone propionate (FLONASE) 50 mcg/actuation nasal spray SHAKE LIQUID AND USE 2 SPRAYS IN EACH NOSTRIL DAILY    fluticasone-salmeterol diskus inhaler 250-50 mcg 1 puff, Inhalation, 2 times daily, Controller    latanoprost 0.005 % ophthalmic solution 1 drop, Both Eyes, Nightly    losartan (COZAAR) 50 mg, Oral    metFORMIN (GLUCOPHAGE) 500 mg, Oral, 2 times daily with meals    mometasone-formoterol (DULERA) 200-5 mcg/actuation inhaler 2 puffs, Inhalation, 2 times daily, Controller    montelukast (SINGULAIR) 10 mg, Oral, Nightly    triamterene-hydrochlorothiazide 37.5-25 mg (MAXZIDE-25) 37.5-25 mg per tablet 1 tablet, Oral     urea (CARMOL) 40 % Crea Topical (Top), Daily       Surgical History  Past Surgical History:   Procedure Laterality Date    BLADDER SURGERY      BREAST BIOPSY Left     BREAST SURGERY      left breast biopsy    CATARACT EXTRACTION W/  INTRAOCULAR LENS IMPLANT Left 01/28/2015    WITH TRAB ()    CATARACT EXTRACTION W/  INTRAOCULAR LENS IMPLANT Right 03/30/2016    WITH TRAB ()    COLONOSCOPY N/A 07/01/2016    Procedure: COLONOSCOPY;  Surgeon: Rony Lima MD;  Location: [facility] (4TH FLR);  Service: Endoscopy;  Laterality: N/A;    COLONOSCOPY N/A 02/04/2022    Procedure: COLONOSCOPY;  Surgeon: FARA eBnitez MD;  Location: [facility] (4TH FLR);  Service: Endoscopy;  Laterality: N/A;  fully vacc-inst mail-tb.Covid test at [facility] on 2/1.EC    COLONOSCOPY N/A 11/04/2024    Procedure: COLONOSCOPY;  Surgeon: Domi Carbajal MD;  Location: [facility] (2ND FLR);  Service: Endoscopy;  Laterality: N/A;    COLONOSCOPY, SCREENING, HIGH RISK PATIENT N/A 2/19/2025    Procedure: COLONOSCOPY, SCREENING, HIGH RISK PATIENT;  Surgeon: Yanick Garcia MD;  Location: [facility] END[facility] (4TH FLR);  Service: Endoscopy;  Laterality: N/A;  1/24 ref by Melissa Mendes, recent incomplete colonoscopy; use the slim pediatric colonoscope and have the short double-balloon enteroscope available, mail instructions. myles  2/11 pre call complete/mleone    ESOPHAGOGASTRODUODENOSCOPY N/A 11/04/2024    Procedure: EGD (ESOPHAGOGASTRODUODENOSCOPY);  Surgeon: Domi Carbajal MD;  Location: [facility] (2ND FLR);  Service: Endoscopy;  Laterality: N/A;  referral a labat/ prep inst given in clinic / diabetic / asthma/ suprep  cleared by cardiology Dr Umanzor-GT  10/28-lvm for pre call-tb  10/29 Pre-call complete- ASul    EYE SURGERY Bilateral     cataract removal and Laser surgery    HYSTERECTOMY      IMPLANTATION OF DEVICE FOR GLAUCOMA Right 09/21/2022    AHMED ()    INSERTION, NEUROSTIMULATOR,  TEMPORARY, SACRAL N/A 7/1/2025    Procedure: INSERTION, LEAD, SACRAL NEUROSTIMULATOR, OPEN;  Surgeon: Nathalie Francisco MD;  Location: [facility] OR [address] [place] [address];  Service: Colon and Rectal;  Laterality: N/A;  prone, MAC, needs fluoro    stomach procedure      TRABECULECTOMY Left 12/28/2015    COMBINED WITH CE ()    TRABECULECTOMY Right 03/30/2016    COMBINED WITH CE ()    TUBAL LIGATION      YAG CAPSULOTOMY Bilateral 10/3/2019 and 10/17/2019           Social History:  Tobacco Use: Medium Risk (7/1/2025)    Patient History     Smoking Tobacco Use: Former     Smokeless Tobacco Use: Never     Passive Exposure: Past     Alcohol Use: Not At Risk (4/28/2025)    AUDIT-C     Frequency of Alcohol Consumption: 2-3 times a week     Average Number of Drinks: 1 or 2     Frequency of Binge Drinking: Never         OBJECTIVE:     Vital Signs Range (Last 24H):         Significant Labs:  Lab Results   Component Value Date    WBC 12.45 09/23/2024    HGB 12.0 09/23/2024    HCT 35.7 (L) 09/23/2024     09/23/2024    INR 0.9 01/14/2005       Lab Results   Component Value Date     12/17/2024    K 3.9 12/17/2024     12/17/2024    CREATININE 0.9 12/17/2024    BUN 16 12/17/2024    CO2 26 12/17/2024       Lab Results   Component Value Date    TSH 1.028 08/09/2024    HGBA1C 5.4 12/17/2024       EKG:   Results for orders placed or performed during the hospital encounter of 10/07/24   EKG 12-lead    Collection Time: 10/07/24 12:19 PM   Result Value Ref Range    QRS Duration 80 ms    OHS QTC Calculation 467 ms    Narrative    Test Reason : R00.2,    Vent. Rate : 089 BPM     Atrial Rate : 089 BPM     P-R Int : 148 ms          QRS Dur : 080 ms      QT Int : 384 ms       P-R-T Axes : 055 023 053 degrees     QTc Int : 467 ms    Normal sinus rhythm  Nonspecific T wave abnormality  Abnormal ECG  When compared with ECG of 30-JUN-2017 12:22,  Premature atrial complexes are no longer  Present  Confirmed by Wale Turpin MD ([id number]) on 10/7/2024 10:27:39 PM    Referred By: LASHAY SARGENT           Confirmed By:Wale Turpin MD       ASSESSMENT/PLAN:           Pre-op Assessment    I have reviewed the Patient Summary Reports.     I have reviewed the Nursing Notes. I have reviewed the NPO Status.   I have reviewed the Medications.     Review of Systems  Anesthesia Hx:  No problems with previous Anesthesia   History of prior surgery of interest to airway management or planning:          Denies Family Hx of Anesthesia complications.    Denies Personal Hx of Anesthesia complications.                    Social:  Alcohol Use, Former Smoker       Hematology/Oncology:  Hematology Normal   Oncology Normal                                   EENT/Dental:  EENT/Dental Normal           Cardiovascular:     Hypertension                Patient not on beta blockers                          Pulmonary:    Asthma severe  Recent URI Sleep Apnea                Renal/:  Renal/ Normal                 Hepatic/GI:     GERD                Neurological:  Neurology Normal                                      Endocrine:  Diabetes, type 2         Obesity / BMI > 30  Psych:  Psychiatric Normal                  Physical Exam  General: Well nourished, Cooperative, Alert and Oriented    Airway:  Mallampati: II   Mouth Opening: Normal  TM Distance: Normal  Tongue: Normal  Neck ROM: Normal ROM    Dental:  Partial Dentures, Caps / Implants    Chest/Lungs:  Normal Respiratory Rate    Heart:  Rate: Normal      Anesthesia Plan  Type of Anesthesia, risks & benefits discussed:    Anesthesia Type: Gen Supraglottic Airway, Gen Natural Airway, MAC  Intra-op Monitoring Plan: Standard ASA Monitors  Post Op Pain Control Plan: multimodal analgesia and IV/PO Opioids PRN  Induction:  IV  Informed Consent: Informed consent signed with the Patient and all parties understand the risks and agree with anesthesia plan.  All questions answered.   ASA Score:  3  Day of Surgery Review of History & Physical: H&P Update referred to the surgeon/provider.    Ready For Surgery From Anesthesia Perspective.     .             [1]  Patient Active Problem List  Diagnosis    Nuclear sclerosis - Both Eyes    Type 2 diabetes mellitus with other specified complication, without long-term current use of insulin    Dry eyes - Both Eyes    Posterior vitreous detachment of both eyes - Both Eyes    Asthma, well controlled    AR (allergic rhinitis)    GERD (gastroesophageal reflux disease)    Presbyopia    Diabetes    Primary open angle glaucoma of right eye, severe stage    Primary open angle glaucoma of left eye    Senile nuclear sclerosis    Bronchitis    Fecal incontinence    Dermatitis    Severe persistent asthma without complication    History of multiple allergies    Aortic atherosclerosis    Palpitations

## 2025-07-15 ENCOUNTER — HOSPITAL ENCOUNTER (OUTPATIENT)
Facility: HOSPITAL | Age: 80
Discharge: HOME | End: 2025-07-15
Attending: COLON & RECTAL SURGERY | Admitting: COLON & RECTAL SURGERY
Payer: MEDICARE

## 2025-07-15 ENCOUNTER — ANESTHESIA (OUTPATIENT)
Dept: SURGERY | Facility: HOSPITAL | Age: 80
End: 2025-07-15
Payer: MEDICARE

## 2025-07-15 VITALS
WEIGHT: 171.06 LBS | DIASTOLIC BLOOD PRESSURE: 95 MMHG | HEART RATE: 76 BPM | OXYGEN SATURATION: 100 % | SYSTOLIC BLOOD PRESSURE: 174 MMHG | BODY MASS INDEX: 33.41 KG/M2 | RESPIRATION RATE: 20 BRPM | TEMPERATURE: 98 F

## 2025-07-15 DIAGNOSIS — R15.9 FECAL INCONTINENCE: ICD-10-CM

## 2025-07-15 LAB
POCT GLUCOSE: 137 MG/DL (ref 70–110)
POCT GLUCOSE: 149 MG/DL (ref 70–110)

## 2025-07-15 PROCEDURE — 36000707: Performed by: COLON & RECTAL SURGERY

## 2025-07-15 PROCEDURE — 64590 INS/RPL PRPH SAC/GSTR NPG/R: CPT | Mod: GC,,, | Performed by: COLON & RECTAL SURGERY

## 2025-07-15 PROCEDURE — 63600175 PHARM REV CODE 636 W HCPCS: Performed by: COLON & RECTAL SURGERY

## 2025-07-15 PROCEDURE — 82962 GLUCOSE BLOOD TEST: CPT | Performed by: COLON & RECTAL SURGERY

## 2025-07-15 PROCEDURE — 37000009 HC ANESTHESIA EA ADD 15 MINS: Performed by: COLON & RECTAL SURGERY

## 2025-07-15 PROCEDURE — 37000008 HC ANESTHESIA 1ST 15 MINUTES: Performed by: COLON & RECTAL SURGERY

## 2025-07-15 PROCEDURE — 63600175 PHARM REV CODE 636 W HCPCS

## 2025-07-15 PROCEDURE — 36000706: Performed by: COLON & RECTAL SURGERY

## 2025-07-15 PROCEDURE — 25000003 PHARM REV CODE 250

## 2025-07-15 PROCEDURE — 25000003 PHARM REV CODE 250: Performed by: COLON & RECTAL SURGERY

## 2025-07-15 PROCEDURE — 71000044 HC DOSC ROUTINE RECOVERY FIRST HOUR: Performed by: COLON & RECTAL SURGERY

## 2025-07-15 PROCEDURE — 25000003 PHARM REV CODE 250: Performed by: NURSE PRACTITIONER

## 2025-07-15 PROCEDURE — C1787 PATIENT PROGR, NEUROSTIM: HCPCS | Performed by: COLON & RECTAL SURGERY

## 2025-07-15 PROCEDURE — 71000015 HC POSTOP RECOV 1ST HR: Performed by: COLON & RECTAL SURGERY

## 2025-07-15 PROCEDURE — C1767 GENERATOR, NEURO NON-RECHARG: HCPCS | Performed by: COLON & RECTAL SURGERY

## 2025-07-15 DEVICE — SYS INTERSTIM X RECHARGE FREE: Type: IMPLANTABLE DEVICE | Site: BACK | Status: FUNCTIONAL

## 2025-07-15 RX ORDER — GLUCAGON 1 MG
1 KIT INJECTION
Status: DISCONTINUED | OUTPATIENT
Start: 2025-07-15 | End: 2025-07-15 | Stop reason: HOSPADM

## 2025-07-15 RX ORDER — LIDOCAINE HYDROCHLORIDE 10 MG/ML
INJECTION, SOLUTION INFILTRATION; PERINEURAL
Status: DISCONTINUED | OUTPATIENT
Start: 2025-07-15 | End: 2025-07-15 | Stop reason: HOSPADM

## 2025-07-15 RX ORDER — BUPIVACAINE HYDROCHLORIDE AND EPINEPHRINE 2.5; 5 MG/ML; UG/ML
INJECTION, SOLUTION EPIDURAL; INFILTRATION; INTRACAUDAL; PERINEURAL
Status: DISCONTINUED | OUTPATIENT
Start: 2025-07-15 | End: 2025-07-15 | Stop reason: HOSPADM

## 2025-07-15 RX ORDER — DEXAMETHASONE SODIUM PHOSPHATE 4 MG/ML
INJECTION, SOLUTION INTRA-ARTICULAR; INTRALESIONAL; INTRAMUSCULAR; INTRAVENOUS; SOFT TISSUE
Status: DISCONTINUED | OUTPATIENT
Start: 2025-07-15 | End: 2025-07-15

## 2025-07-15 RX ORDER — LIDOCAINE HYDROCHLORIDE 10 MG/ML
INJECTION, SOLUTION INTRAVENOUS
Status: DISCONTINUED | OUTPATIENT
Start: 2025-07-15 | End: 2025-07-15

## 2025-07-15 RX ORDER — HALOPERIDOL LACTATE 5 MG/ML
0.5 INJECTION, SOLUTION INTRAMUSCULAR EVERY 10 MIN PRN
Status: DISCONTINUED | OUTPATIENT
Start: 2025-07-15 | End: 2025-07-15 | Stop reason: HOSPADM

## 2025-07-15 RX ORDER — SODIUM CHLORIDE 9 MG/ML
INJECTION, SOLUTION INTRAVENOUS CONTINUOUS
Status: DISCONTINUED | OUTPATIENT
Start: 2025-07-15 | End: 2025-07-15 | Stop reason: HOSPADM

## 2025-07-15 RX ORDER — PROPOFOL 10 MG/ML
VIAL (ML) INTRAVENOUS
Status: DISCONTINUED | OUTPATIENT
Start: 2025-07-15 | End: 2025-07-15

## 2025-07-15 RX ORDER — ACETAMINOPHEN 500 MG
1000 TABLET ORAL
Status: COMPLETED | OUTPATIENT
Start: 2025-07-15 | End: 2025-07-15

## 2025-07-15 RX ORDER — OXYCODONE HYDROCHLORIDE 5 MG/1
5 TABLET ORAL
Status: DISCONTINUED | OUTPATIENT
Start: 2025-07-15 | End: 2025-07-15 | Stop reason: HOSPADM

## 2025-07-15 RX ORDER — ONDANSETRON HYDROCHLORIDE 2 MG/ML
INJECTION, SOLUTION INTRAVENOUS
Status: DISCONTINUED | OUTPATIENT
Start: 2025-07-15 | End: 2025-07-15

## 2025-07-15 RX ORDER — MUPIROCIN 20 MG/G
OINTMENT TOPICAL
Status: DISCONTINUED | OUTPATIENT
Start: 2025-07-15 | End: 2025-07-15 | Stop reason: HOSPADM

## 2025-07-15 RX ORDER — FENTANYL CITRATE 50 UG/ML
25 INJECTION, SOLUTION INTRAMUSCULAR; INTRAVENOUS EVERY 5 MIN PRN
Status: DISCONTINUED | OUTPATIENT
Start: 2025-07-15 | End: 2025-07-15 | Stop reason: HOSPADM

## 2025-07-15 RX ADMIN — ACETAMINOPHEN 1000 MG: 500 TABLET ORAL at 05:07

## 2025-07-15 RX ADMIN — PROPOFOL 25 MG: 10 INJECTION, EMULSION INTRAVENOUS at 07:07

## 2025-07-15 RX ADMIN — PROPOFOL 50 MCG/KG/MIN: 10 INJECTION, EMULSION INTRAVENOUS at 07:07

## 2025-07-15 RX ADMIN — DEXAMETHASONE SODIUM PHOSPHATE 4 MG: 4 INJECTION, SOLUTION INTRAMUSCULAR; INTRAVENOUS at 07:07

## 2025-07-15 RX ADMIN — SODIUM CHLORIDE: 0.9 INJECTION, SOLUTION INTRAVENOUS at 06:07

## 2025-07-15 RX ADMIN — LIDOCAINE HYDROCHLORIDE 60 MG: 10 INJECTION, SOLUTION INTRAVENOUS at 07:07

## 2025-07-15 RX ADMIN — PROPOFOL 50 MG: 10 INJECTION, EMULSION INTRAVENOUS at 07:07

## 2025-07-15 RX ADMIN — ONDANSETRON 4 MG: 2 INJECTION INTRAMUSCULAR; INTRAVENOUS at 07:07

## 2025-07-15 RX ADMIN — MUPIROCIN: 20 OINTMENT TOPICAL at 05:07

## 2025-07-15 RX ADMIN — VANCOMYCIN HYDROCHLORIDE 1250 MG: 1.25 INJECTION, POWDER, LYOPHILIZED, FOR SOLUTION INTRAVENOUS at 06:07

## 2025-07-15 NOTE — INTERVAL H&P NOTE
The patient has been examined and the H&P has been reviewed from the first case and after the first case:    I concur with the findings and no changes have occurred since H&P was written.    Surgery risks, benefits and alternative options discussed and understood by patient/family.    Dx: IVAN

## 2025-07-15 NOTE — DISCHARGE INSTRUCTIONS
Anal Surgery Post Op Instructions:    You had a permanent interstim implanted today.     Wound care:  You have stitches in place that will absorb.   Showering is okay starting the day after tomorrow (two days from surgery). NO baths or swimming!!  Do not put creams or ointments over the incision as it heals. There is a special glue over it - do not peel it off.    Medications:  You should take acetaminophen (tylenol) and ibuprofen (motrin) around the clock for the first 24hrs for continued baseline pain control then wean off, but it is safe to continue taking then around the clock for over a week if needed.  For example, take 1000mg tylenol every 6-8 hours and 800mg motrin every 6-8 hours.    You can resume your home medications.    Restrictions:  No swimming or hot tubs until healed.  You have no activity restrictions. Return to work/school when pain is controlled without narcotics and you feel well to do your job/pay attention in class.  No dietary restrictions. Keep well hydrated to have soft bowel movements.    Follow up:  Return to clinic in about 2-4 weeks for follow up and wound check. Call [phone] for worsening pain, inability to urinate, or fever > 101.  Call or schedule follow up in about 7 days via [employer] if you are not otherwise contacted or see an appointment in the portal.

## 2025-07-15 NOTE — ANESTHESIA POSTPROCEDURE EVALUATION
Anesthesia Post Evaluation    Patient: Lupe Jewell    Procedure(s) Performed: Procedure(s) (LRB):  INSERTION, SACRAL NEUROSTIMULATOR, PERMANENT (N/A)    Final Anesthesia Type: general      Patient location during evaluation: PACU  Patient participation: Yes- Able to Participate  Level of consciousness: awake and alert  Post-procedure vital signs: reviewed and not stable  Pain management: adequate  Airway patency: patent    PONV status at discharge: No PONV  Anesthetic complications: no      Cardiovascular status: blood pressure returned to baseline  Respiratory status: unassisted  Hydration status: euvolemic  Follow-up not needed.          Vitals Value Taken Time   /95 07/15/25 09:15   Temp 36.7 °C (98.1 °F) 07/15/25 07:57   Pulse 76 07/15/25 09:15   Resp 20 07/15/25 09:15   SpO2 100 % 07/15/25 09:15         No case tracking events are documented in the log.      Pain/Analia Score: Pain Rating Prior to Med Admin: 0 (7/15/2025  5:37 AM)  Analia Score: 10 (7/15/2025  8:30 AM)

## 2025-07-15 NOTE — TRANSFER OF CARE
Anesthesia Transfer of Care Note    Patient: Lupe Jewell    Procedure(s) Performed: Procedure(s) (LRB):  INSERTION, SACRAL NEUROSTIMULATOR, PERMANENT (N/A)    Patient location: [facility]    Anesthesia Type: MAC    Transport from OR: Transported from OR on 6-10 L/min O2 by face mask with adequate spontaneous ventilation    Post pain: adequate analgesia    Post assessment: no apparent anesthetic complications    Post vital signs: stable    Level of consciousness: awake    Nausea/Vomiting: no nausea/vomiting    Complications: none    Transfer of care protocol was followed      Last vitals: Visit Vitals  BP (!) 149/67 (BP Location: Right arm, Patient Position: Lying)   Pulse 84   Temp 36.7 °C (98.1 °F) (Temporal)   Resp 16   Wt 77.6 kg (171 lb 1.2 oz)   SpO2 100%   Breastfeeding No   BMI 33.41 kg/m²

## 2025-07-15 NOTE — PLAN OF CARE
Recovery care complete. Discharge instructions given to pt and family. Pt and family verbalized understanding of all instructions. Vital signs stable. Pt is in no acute distress at this time. Incision site clean, dry, and intact. PIV removed. PO fluids given and tolerated well. Discharge home to self care with family. Pt systolic BP consistently In 190s. RN called Dr. Dobbs to inform her. Dr. Dobbs at bedside to speak w/ pt. Pt stated she had not taken BP meds in a few days but will take immediately when she gets home. Dr. Dobbs and RN agree pt is stable to d/c.

## 2025-07-15 NOTE — OP NOTE
"[facility]  Operative Note    Date: 07/15/2025    Name: Lupe Jewell    MRN: [id number]    Pre-Op Diagnosis: Fecal incontinence    Post-Op Diagnosis: Same    Procedure(s) Performed:   INSERTION, NEUROSTIMULATOR, PERMANENT, SACRAL    Specimen(s): None    Staff Surgeon: Nathalie Francisco    Assistant Surgeon: Yasmin Delgadillo (fellow)    Anesthesia: Monitor Anesthesia Care    Details of procedure:  The patient was taken to the operating room and transferred to the OR table in the prone position.  SCD boots were applied.  She was placed under sedation by the anesthesia team.  IV antibiotics were administered.  Her back was prepped with ChloraPrep and draped in the standard sterile fashion.  After an appropriate time-out, 0.25% Marcaine with epinephrine mixed with 1% lidocaine was injected near the incision for the planned battery.  The incision was then opened with a scalpel and sutures were removed.  We entered the previously created pocket.  The percutaneous extension was delivered. This was  from the lead, cut and delivered sterilely from the field. The quadripolar lead was then attached to the InterStim X implantable pulse generator, which was pre-placed within the pocket. The single setscrew was tightened until a "click" was heard.  The pocket had been thoroughly irrigated with sterile water. After impedance testing demonstrated no abnormalities on all electrode pairs, the incision was closed in 3 layers, deeply with a 3-0 Vicryl and superficially with a running 3-0 Monocryl. The incision was covered with Dermabond.  The procedure was then terminated.  All sponge instrument counts were correct.  The patient was awakened and transferred to recovery in good condition.  I was present scrubbed for the entire procedure.    Once awake and alert, complex electronic analysis and programming of the SNM pulse generator was performed, including setting active contact groups, amplitude, pulse width, frequency, " cycling, and lockout parameters.     Estimated Blood Loss: 5mL    Drains/Implants:   Implant Name Type Inv. Item Serial No.  Lot No. LRB No. Used Action   SYS INTERSTIM X RECHARGE FREE - [id number]  SYS INTERSTIM X RECHARGE FREE [id number] [employer] [place]  Right 1 Implanted       Wound Class: I    Nathalie Francisco

## 2025-07-15 NOTE — BRIEF OP NOTE
[facility] - Surgery ([facility])  Brief Operative Note    Surgery Date: 7/15/2025     Surgeons and Role:     * Nathalie Francisco MD - Primary     * Yasmin Delgadillo MD - Resident - Assisting        Pre-op Diagnosis:  Incontinence of feces, unspecified fecal incontinence type [R15.9]    Post-op Diagnosis:  Post-Op Diagnosis Codes:     * Incontinence of feces, unspecified fecal incontinence type [R15.9]    Procedure(s) (LRB):  INSERTION, SACRAL NEUROSTIMULATOR, PERMANENT (N/A)    Anesthesia: Monitor Anesthesia Care    Operative Findings: permanent interstim implanted    Estimated Blood Loss: * No values recorded between 7/15/2025  7:07 AM and 7/15/2025  7:56 AM *         Specimens:   Specimen (24h ago, onward)      None            * No specimens in log *        Discharge Note    OUTCOME: Patient tolerated treatment/procedure well without complication and is now ready for discharge.    DISPOSITION: Home or Self Care    FINAL DIAGNOSIS: fi    FOLLOWUP: In clinic    DISCHARGE INSTRUCTIONS: provided

## 2025-07-28 NOTE — PROGRESS NOTES
CRS Office Visit Follow-up      SUBJECTIVE:     History of Present Illness:  Patient is a 79 y.o. female who presents following Interstim placement on 7/15/2025. Their post-operative course was uncomplicated. There are no new complaints today.    Review of Systems:  ROS    OBJECTIVE:     Vital Signs (Most Recent)  BP (!) 144/70 (BP Location: Right arm, Patient Position: Sitting)   Pulse 107   Ht 5' (1.524 m)   Wt 77.3 kg (170 lb 4.9 oz)   BMI 33.26 kg/m²     Physical Exam:  General: Black or  female in no distress   Neuro: Alert and oriented x 4.  Moves all extremities.     HEENT: No icterus.  Trachea midline  Respiratory: Respirations are even and unlabored  Cardiac: Regular rate  Extremities: Warm dry and intact  Skin: Incision healing well      ASSESSMENT/PLAN:     78yo F s/p Interstim placement on 7/15/2025, doing well    RTC 1 year    Nathalie Francisco MD  Staff Surgeon, Colon and Rectal Surgery  [facility]

## 2025-07-31 ENCOUNTER — EXTERNAL CHRONIC CARE MANAGEMENT (OUTPATIENT)
Dept: PRIMARY CARE CLINIC | Facility: CLINIC | Age: 80
End: 2025-07-31
Payer: MEDICARE

## 2025-07-31 PROCEDURE — 99490 CHRNC CARE MGMT STAFF 1ST 20: CPT | Mod: S$PBB,,, | Performed by: INTERNAL MEDICINE

## 2025-07-31 PROCEDURE — 99490 CHRNC CARE MGMT STAFF 1ST 20: CPT | Mod: PBBFAC | Performed by: INTERNAL MEDICINE

## 2025-08-01 ENCOUNTER — OFFICE VISIT (OUTPATIENT)
Dept: SURGERY | Facility: CLINIC | Age: 80
End: 2025-08-01
Attending: COLON & RECTAL SURGERY
Payer: MEDICARE

## 2025-08-01 VITALS
BODY MASS INDEX: 33.44 KG/M2 | DIASTOLIC BLOOD PRESSURE: 70 MMHG | SYSTOLIC BLOOD PRESSURE: 144 MMHG | HEIGHT: 60 IN | WEIGHT: 170.31 LBS | HEART RATE: 107 BPM

## 2025-08-01 DIAGNOSIS — Z98.890 POST-OPERATIVE STATE: Primary | ICD-10-CM

## 2025-08-01 PROCEDURE — 99213 OFFICE O/P EST LOW 20 MIN: CPT | Mod: PBBFAC | Performed by: COLON & RECTAL SURGERY

## 2025-08-01 PROCEDURE — 99999 PR PBB SHADOW E&M-EST. PATIENT-LVL III: CPT | Mod: PBBFAC,,, | Performed by: COLON & RECTAL SURGERY

## 2025-08-21 ENCOUNTER — TELEPHONE (OUTPATIENT)
Dept: OPHTHALMOLOGY | Facility: CLINIC | Age: 80
End: 2025-08-21
Payer: MEDICARE

## 2025-08-25 ENCOUNTER — TELEPHONE (OUTPATIENT)
Dept: OPHTHALMOLOGY | Facility: CLINIC | Age: 80
End: 2025-08-25
Payer: MEDICARE

## 2025-08-25 RX ORDER — ATORVASTATIN CALCIUM 10 MG/1
10 TABLET, FILM COATED ORAL
Qty: 90 TABLET | Refills: 0 | Status: SHIPPED | OUTPATIENT
Start: 2025-08-25

## 2025-08-28 RX ORDER — MONTELUKAST SODIUM 10 MG/1
10 TABLET ORAL NIGHTLY
Qty: 90 TABLET | Refills: 1 | Status: SHIPPED | OUTPATIENT
Start: 2025-08-28

## 2025-08-29 RX ORDER — LOSARTAN POTASSIUM 50 MG/1
50 TABLET ORAL
Qty: 90 TABLET | Refills: 1 | Status: SHIPPED | OUTPATIENT
Start: 2025-08-29

## 2025-09-03 DIAGNOSIS — I10 HYPERTENSION, UNSPECIFIED TYPE: ICD-10-CM

## 2025-09-03 RX ORDER — TRIAMTERENE AND HYDROCHLOROTHIAZIDE 37.5; 25 MG/1; MG/1
1 TABLET ORAL
Qty: 90 TABLET | Refills: 1 | Status: SHIPPED | OUTPATIENT
Start: 2025-09-03

## (undated) DEVICE — ELECTRODE MEGADYNE RETURN DUAL

## (undated) DEVICE — DRAPE EYE POUCH FEN INCISE

## (undated) DEVICE — PAD EYE STERILE 1-5/8X2-5/8IN

## (undated) DEVICE — DRAPE HALF SURGICAL 40X58IN

## (undated) DEVICE — SPONGE WEC CEL SPEARS

## (undated) DEVICE — HANDSET INTERSTIM X COMM

## (undated) DEVICE — DRAPE C-ARMOR EQUIPMENT COVER

## (undated) DEVICE — SOL BETADINE 5%

## (undated) DEVICE — DRAPE C-ARM ELAS CLIP 42X120IN

## (undated) DEVICE — PENCIL ROCKER SWITCH 10FT CORD

## (undated) DEVICE — SYR 10CC LUER LOCK

## (undated) DEVICE — SCALPEL #11 BLADE STRL DISP

## (undated) DEVICE — DRAPE THREE-QTR REINF 53X77IN

## (undated) DEVICE — SYR 3CC 21 X 1 LUER LOCK

## (undated) DEVICE — DRAPE LAP T SHT W/ INSTR PAD

## (undated) DEVICE — GARTER EYE ADULT

## (undated) DEVICE — PAD CURAD NONADH 3X4IN

## (undated) DEVICE — SUT VICRYL 3-0 27 SH

## (undated) DEVICE — COVER PROBE NL STRL 3.6X96IN

## (undated) DEVICE — SUT MCRYL PLUS 4-0 PS2 27IN

## (undated) DEVICE — NDL FILTER 19GA X 1-1/2

## (undated) DEVICE — GLOVE BIOGEL ECLIPSE SZ 6.5

## (undated) DEVICE — DRESSING ANTIMICROBIAL 1 INCH

## (undated) DEVICE — SHIELD COLLAGEN 12HR CORNEAL

## (undated) DEVICE — ADHESIVE DERMABOND ADVANCED

## (undated) DEVICE — ELECTRODE REM PLYHSV RETURN 9

## (undated) DEVICE — KIT INTERSTIM REVISION ACC

## (undated) DEVICE — TAPE SILK 3IN

## (undated) DEVICE — NDL FLTR 5MCRN BLNT TIP 18GX1

## (undated) DEVICE — DRESSING TRANS 2X2 TEGADERM

## (undated) DEVICE — KIT INTERSTIM II PERC LEAD EXT

## (undated) DEVICE — TRAY MINOR GEN SURG OMC

## (undated) DEVICE — NDL HYPO STD REG BVL 22GX1.5IN

## (undated) DEVICE — DRAPE INCISE IOBAN 2 23X17IN

## (undated) DEVICE — SOL WATER STRL IRR 1000ML

## (undated) DEVICE — KIT MEROCEL INSTRUMENT WIPE

## (undated) DEVICE — SCREENER ISTIM EXT NEROSTIMLTR